# Patient Record
Sex: MALE | Race: WHITE | NOT HISPANIC OR LATINO | Employment: FULL TIME | URBAN - METROPOLITAN AREA
[De-identification: names, ages, dates, MRNs, and addresses within clinical notes are randomized per-mention and may not be internally consistent; named-entity substitution may affect disease eponyms.]

---

## 2017-01-04 ENCOUNTER — ALLSCRIPTS OFFICE VISIT (OUTPATIENT)
Dept: OTHER | Facility: OTHER | Age: 27
End: 2017-01-04

## 2017-01-04 DIAGNOSIS — R73.01 IMPAIRED FASTING GLUCOSE: ICD-10-CM

## 2017-01-04 DIAGNOSIS — E78.5 HYPERLIPIDEMIA: ICD-10-CM

## 2017-01-04 DIAGNOSIS — F31.10 BIPOLAR DISORDER, CURRENT EPISODE MANIC WITHOUT PSYCHOTIC FEATURES (HCC): ICD-10-CM

## 2017-08-28 ENCOUNTER — ALLSCRIPTS OFFICE VISIT (OUTPATIENT)
Dept: OTHER | Facility: OTHER | Age: 27
End: 2017-08-28

## 2017-08-28 DIAGNOSIS — R73.01 IMPAIRED FASTING GLUCOSE: ICD-10-CM

## 2017-08-28 DIAGNOSIS — E78.5 HYPERLIPIDEMIA: ICD-10-CM

## 2018-01-14 VITALS
HEART RATE: 73 BPM | BODY MASS INDEX: 30.81 KG/M2 | SYSTOLIC BLOOD PRESSURE: 110 MMHG | WEIGHT: 253.01 LBS | OXYGEN SATURATION: 98 % | DIASTOLIC BLOOD PRESSURE: 82 MMHG | HEIGHT: 76 IN | RESPIRATION RATE: 14 BRPM

## 2018-01-17 NOTE — PROGRESS NOTES
Assessment    1  Bipolar affective, manic (296 40) (F31 10)   2  ADD (attention deficit disorder) without hyperactivity (314 00) (F98 8)    Plan  ADD (attention deficit disorder) without hyperactivity    · Amphetamine-Dextroamphetamine 20 MG Oral Tablet (Adderall); TAKE 1  TABLET TWICE DAILY; Do Not Fill Before: [id number]  Bipolar affective, manic    · QUEtiapine Fumarate 300 MG Oral Tablet (SEROquel); TAKE one pill at bedtime   · (1) CBC/PLT/DIFF; Status:Active; Requested [id number];    · (1) COMPREHENSIVE METABOLIC PANEL; Status:Active; Requested [id number];    · (1) TSH; Status:Active; Requested EZT:[id number];    · Avoid alcoholic beverages ; Status:Complete;   Done: 84YQL8477   · Avoid foods and beverages that contain caffeine ; Status:Complete;   Done: 35FQF7466   · Be sure to get at least 8 hours of sleep every night ; Status:Complete;   Done: 22HBZ4893   · Drink plenty of fluids ; Status:Complete;   Done: 70GTS8197   · Regular aerobic exercise can help reduce stress ; Status:Complete;   Done: 52GIG2836   · Follow-up visit in 3 months Evaluation and Treatment  Follow-up  Status: Complete   Done: 40KKW6208  Bipolar affective, manic, Borderline hyperlipidemia    · (1) LIPID PANEL, FASTING; Status:Active; Requested [id number];   Bipolar affective, manic, IFG (impaired fasting glucose)    · (1) GLUCOSE,  FASTING; Status:Active; Requested AYJ:[id number];   Need for influenza vaccination    · Stop: Fluarix Quadrivalent 0 5 ML Intramuscular Suspension Prefilled  Syringe    Discussion/Summary    Flu shot offered and refused despite risks of flu  Will get fasting labs before next visit  daily walk  exercise  wt loss     Chief Complaint  Regular f/u no complains      History of Present Illness  here for a check up    The patient is being seen for follow-up of bipolar I disorder  The patient reports doing well and engaged, just bought a house  got a full itme job at [employer] with benefits   He has no comorbid illnesses  He has had no significant interval events  The patient is currently asymptomatic  Associated symptoms: no hallucinations and no suicidal ideation  Medications include quetiapine (Seroquel)  Medications:  the patient is adherent to his medication regimen, but he denies medication side effects  Diet: He consumes a diverse and healthy diet  Weight Issues: He has weight concerns  Exercise: He exercises regularly  Smoking: He does not use tobacco Drug Use: He denies drug use  Rare etoh  Disease management:  the patient is doing well with his goals Goals include relapse prevention and sobriety  Review of Systems    Constitutional: No fever or chills, feels well, no tiredness, no recent weight gain or weight loss  Cardiovascular: No complaints of slow heart rate, no fast heart rate, no chest pain, no palpitations, no leg claudication, no lower extremity  Respiratory: No complaints of shortness of breath, no wheezing, no cough, no SOB on exertion, no orthopnea or PND  Gastrointestinal: No complaints of abdominal pain, no constipation, no nausea or vomiting, no diarrhea or bloody stools  Musculoskeletal: No complaints of arthralgia, no myalgias, no joint swelling or stiffness, no limb pain or swelling  Psychiatric: Is not suicidal, no sleep disturbances, no anxiety or depression, no change in personality, no emotional problems  Over the past 2 weeks, how often have you been bothered by the following problems? 1 ) Little interest or pleasure in doing things? Not at all    2 ) Feeling down, depressed or hopeless? Not at all    3 ) Trouble falling asleep or sleeping too much? Not at all    4 ) Feeling tired or having little energy? Not at all    5 ) Poor appetite or overeating? Not at all    6 ) Feeling bad about yourself, or that you are a failure, or have let yourself or your family down?  Not at all    7 ) Trouble concentrating on things, such as reading a newspaper or watching television? Not at all    8 ) Moving or speaking so slowly that other people could have noticed, or the opposite, moving or speaking faster than usual? Not at all  How difficult have these problems made it for you to do your work, take care of things at home, or get along with people? Not at all  Score      ROS reviewed  Active Problems    1  ADD (attention deficit disorder) without hyperactivity (314 00) (F98 8)   2  Bipolar affective, manic (296 40) (F31 10)   3  Need for Tdap vaccination (V06 1) (Z23)    Past Medical History    1  History of Anxiety state (300 00) (F41 1)   2  History of Benign essential hypertension (401 1) (I10)   3  History of Bipolar disorder (296 80) (F31 9)   4  History of Cellulitis (682 9) (L03 90)   5  History of Depressive disorder (311) (F32 9)   6  History of acne (V13 3) (Z87 2)   7  History of acute bronchitis (V12 69) (Z87 09)   8  History of acute pharyngitis (V12 69) (Z87 09)   9  History of attention deficit hyperactivity disorder (ADHD) (V11 8) (Z86 59)   10  History of emotional problems (V40 9) (F48 9)   11  History of hyperlipidemia (V12 29) (Z86 39)   12  History of insomnia (V13 89) (Z87 898)   13  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)   14  History of Peptic reflux disease (530 81) (K21 9)   15  History of Persistent vomiting (536 2) (R11 10)   16  History of Sprain of shoulder and upper arm (840 9) (S43 409A)    The active problems and past medical history were reviewed and updated today  Surgical History    The surgical history was reviewed and updated today  Family History  Mother    1  Family history of mental disorder (V17 0) (Z81 8)   2  Family history of thyroid disease (V18 19) (Z83 49)   3  Family history of Illicit drug use  Father    4  Family history of diabetes mellitus (V18 0) (Z83 3)   5  Family history of malignant neoplasm (V16 9) (Z80 9)    The family history was reviewed and updated today         Social History    · Consumes alcohol at social events (V49 89) (Z78 9)   · Does not use illicit drugs (F01 62) (Z78 9)   · Full-time employment   · Never smoker  The social history was reviewed and updated today  The social history was reviewed and is unchanged  Current Meds   1  Amphetamine-Dextroamphetamine 20 MG Oral Tablet; TAKE 1 TABLET TWICE DAILY; Last Rx:99Htj7914 Ordered   2  Omeprazole 20 MG Oral Capsule Delayed Release; 1 by mouth every day for stomach   acid  Requested for: 24BLJ1079; Last Rx:22Hbb8824 Ordered   3  QUEtiapine Fumarate 300 MG Oral Tablet; TAKE 1 TABLET AT BEDTIME; Therapy: 34SWS8640 to (Evaluate:11Nov2017)  Requested for: 27ZUZ4863; Last   Rx:39Tti5547 Ordered    The medication list was reviewed and updated today  Allergies    1  No Known Drug Allergies    Vitals  Vital Signs    Recorded: 07JAP5484 01:37PM   Temperature 97 7 F   Heart Rate 515   Systolic 541   Diastolic 78   Height 6 ft    Weight 261 lb 3 2 oz   BMI Calculated 35 43   BSA Calculated 2 39   O2 Saturation 97     Physical Exam    Constitutional   General appearance: No acute distress, well appearing and well nourished  Eyes   Pupils and irises: Equal, round and reactive to light  Ears, Nose, Mouth, and Throat   Otoscopic examination: Tympanic membrance translucent with normal light reflex  Canals patent without erythema  Nasal mucosa, septum, and turbinates: Normal without edema or erythema  Pulmonary   Respiratory effort: No increased work of breathing or signs of respiratory distress  Auscultation of lungs: Clear to auscultation, equal breath sounds bilaterally, no wheezes, no rales, no rhonci  Cardiovascular   Auscultation of heart: Normal rate and rhythm, normal S1 and S2, without murmurs  Examination of extremities for edema and/or varicosities: Normal     Musculoskeletal   Gait and station: Normal     Skin   Skin and subcutaneous tissue: Normal without rashes or lesions      Psychiatric   Orientation to person, place and time: Normal     Mood and affect: Normal          Attending Note  Collaborating Note: I supervised the Advanced Practitioner and I agree with the Advanced Practitioner note        Future Appointments    Date/Time Provider Specialty Site   05/11/2017 02:00 PM Ermelinda Nation, [facility]     Signatures   Electronically signed by : Curtis Drummond, [address] [place] [address]; Jan 4 2017  2:03PM EST                       (Author)    Electronically signed by : Matthew Rod DO; Jan 4 2017  7:32PM EST                       (Co-author)

## 2018-01-22 VITALS
BODY MASS INDEX: 35.38 KG/M2 | OXYGEN SATURATION: 97 % | TEMPERATURE: 97.7 F | SYSTOLIC BLOOD PRESSURE: 126 MMHG | HEART RATE: 105 BPM | HEIGHT: 72 IN | DIASTOLIC BLOOD PRESSURE: 78 MMHG | WEIGHT: 261.2 LBS

## 2018-01-30 DIAGNOSIS — F90.9 ATTENTION DEFICIT HYPERACTIVITY DISORDER (ADHD), UNSPECIFIED ADHD TYPE: Primary | ICD-10-CM

## 2018-01-30 DIAGNOSIS — F31.12 BIPOLAR 1 DISORDER, MANIC, MODERATE (HCC): ICD-10-CM

## 2018-01-30 RX ORDER — DEXTROAMPHETAMINE SACCHARATE, AMPHETAMINE ASPARTATE, DEXTROAMPHETAMINE SULFATE AND AMPHETAMINE SULFATE 5; 5; 5; 5 MG/1; MG/1; MG/1; MG/1
1 TABLET ORAL 2 TIMES DAILY
COMMUNITY
End: 2018-02-05 | Stop reason: SDUPTHER

## 2018-01-30 RX ORDER — QUETIAPINE FUMARATE 300 MG/1
1 TABLET, FILM COATED ORAL
COMMUNITY
Start: 2016-11-16 | End: 2018-02-02 | Stop reason: SDUPTHER

## 2018-01-30 RX ORDER — QUETIAPINE 150 MG/1
TABLET, FILM COATED, EXTENDED RELEASE ORAL
COMMUNITY
Start: 2017-09-04 | End: 2018-02-05 | Stop reason: SDUPTHER

## 2018-01-31 ENCOUNTER — TELEPHONE (OUTPATIENT)
Dept: FAMILY MEDICINE CLINIC | Facility: CLINIC | Age: 28
End: 2018-01-31

## 2018-01-31 NOTE — TELEPHONE ENCOUNTER
Purvi's pt  called for Rx refill request   He will be out of medications tomorrow  Purvi is out of the office the rest of the week, can you please refill ?     Seroquel and Adderall

## 2018-02-02 DIAGNOSIS — F33.2 SEVERE EPISODE OF RECURRENT MAJOR DEPRESSIVE DISORDER, WITHOUT PSYCHOTIC FEATURES (HCC): Primary | ICD-10-CM

## 2018-02-02 RX ORDER — QUETIAPINE FUMARATE 300 MG/1
TABLET, FILM COATED ORAL
Qty: 90 TABLET | Refills: 1 | Status: SHIPPED | OUTPATIENT
Start: 2018-02-02 | End: 2018-07-23 | Stop reason: SDUPTHER

## 2018-02-05 ENCOUNTER — OFFICE VISIT (OUTPATIENT)
Dept: FAMILY MEDICINE CLINIC | Facility: CLINIC | Age: 28
End: 2018-02-05
Payer: COMMERCIAL

## 2018-02-05 VITALS
HEART RATE: 96 BPM | OXYGEN SATURATION: 97 % | WEIGHT: 261 LBS | RESPIRATION RATE: 12 BRPM | HEIGHT: 72 IN | BODY MASS INDEX: 35.35 KG/M2 | DIASTOLIC BLOOD PRESSURE: 82 MMHG | SYSTOLIC BLOOD PRESSURE: 124 MMHG

## 2018-02-05 DIAGNOSIS — F31.62 BIPOLAR DISORDER, CURRENT EPISODE MIXED, MODERATE (HCC): Primary | ICD-10-CM

## 2018-02-05 PROCEDURE — 99214 OFFICE O/P EST MOD 30 MIN: CPT | Performed by: FAMILY MEDICINE

## 2018-02-05 RX ORDER — DEXTROAMPHETAMINE SACCHARATE, AMPHETAMINE ASPARTATE, DEXTROAMPHETAMINE SULFATE AND AMPHETAMINE SULFATE 5; 5; 5; 5 MG/1; MG/1; MG/1; MG/1
1 TABLET ORAL 2 TIMES DAILY
Qty: 60 TABLET | Refills: 0 | Status: SHIPPED | OUTPATIENT
Start: 2018-02-05 | End: 2018-03-01 | Stop reason: SDUPTHER

## 2018-02-05 RX ORDER — QUETIAPINE 150 MG/1
150 TABLET, FILM COATED, EXTENDED RELEASE ORAL
Qty: 30 TABLET | Refills: 0 | OUTPATIENT
Start: 2018-02-05

## 2018-02-05 RX ORDER — QUETIAPINE FUMARATE 300 MG/1
300 TABLET, FILM COATED ORAL
Qty: 30 TABLET | Refills: 0 | OUTPATIENT
Start: 2018-02-05

## 2018-02-05 RX ORDER — FLUOXETINE HYDROCHLORIDE 20 MG/1
CAPSULE ORAL
Qty: 30 EACH | Refills: 0 | Status: SHIPPED | OUTPATIENT
Start: 2018-02-05 | End: 2018-03-05 | Stop reason: SDUPTHER

## 2018-02-05 RX ORDER — DEXTROAMPHETAMINE SACCHARATE, AMPHETAMINE ASPARTATE, DEXTROAMPHETAMINE SULFATE AND AMPHETAMINE SULFATE 5; 5; 5; 5 MG/1; MG/1; MG/1; MG/1
1 TABLET ORAL 2 TIMES DAILY
Qty: 30 TABLET | Refills: 3 | OUTPATIENT
Start: 2018-02-05

## 2018-02-05 NOTE — PROGRESS NOTES
Assessment/Plan:    Bipolar disorder, current episode mixed, moderate (HCC)  Continue the Seroquel  mg in the evening  Daily exercise  Talk therapy as needed  Stay busy  Compliance reinforced regarding medications             Subjective:      Patient ID: Sharri Baer is a 33 y o  male  Here for checkup and med refill  Treated for bipolar disorder with Seroquel 300 mg at bedtime  Treated for ADD with Adderall XR  Once a day in the morning  Gloria Dominguez is currently working full-time doing well  Does have a history of substance abuse and is currently doing very well  He is a long-time girlfriend and they are planning on getting           Review of Systems   Constitutional: Negative for activity change, fatigue, fever and unexpected weight change  HENT: Negative for congestion, ear pain, hearing loss, sinus pain, sore throat, tinnitus, trouble swallowing and voice change  Eyes: Negative for pain, discharge and visual disturbance  Respiratory: Negative for cough, chest tightness, shortness of breath and wheezing  Cardiovascular: Negative for chest pain, palpitations and leg swelling  Gastrointestinal: Negative for abdominal pain, blood in stool, diarrhea and vomiting  Endocrine: Negative for cold intolerance, heat intolerance and polyuria  Genitourinary: Negative for difficulty urinating, dysuria, flank pain, genital sores and urgency  Musculoskeletal: Negative for gait problem, joint swelling and neck stiffness  Skin: Negative for color change, rash and wound  Allergic/Immunologic: Negative for immunocompromised state  Neurological: Negative for syncope, speech difficulty and light-headedness  Psychiatric/Behavioral: Negative for behavioral problems, dysphoric mood and suicidal ideas  Bipolar disorder         Objective:     Physical Exam   Constitutional: He is oriented to person, place, and time  He appears well-developed and well-nourished     HENT:   Head: Normocephalic and atraumatic  Eyes: Pupils are equal, round, and reactive to light  Neck: Normal range of motion  Neck supple  Cardiovascular: Normal rate and normal heart sounds  No murmur heard  Pulmonary/Chest: Effort normal and breath sounds normal  No respiratory distress  He exhibits no tenderness  Abdominal: Soft  Bowel sounds are normal    Musculoskeletal: Normal range of motion  He exhibits no tenderness  Lymphadenopathy:     He has no cervical adenopathy  Neurological: He is alert and oriented to person, place, and time  Coordination normal    Skin: Skin is warm and dry  Psychiatric: He has a normal mood and affect  Thought content normal    Nursing note and vitals reviewed        Social History     Social History    Marital status: Single     Spouse name: N/A    Number of children: N/A    Years of education: N/A     Occupational History    fulltime      Social History Main Topics    Smoking status: Never Smoker    Smokeless tobacco: Not on file    Alcohol use Yes      Comment: at social events    Drug use: No    Sexual activity: Not on file     Other Topics Concern    Not on file     Social History Narrative    No narrative on file     Past Medical History:   Diagnosis Date    ADHD     Anxiety state     [facility] [address]     Depressive disorder     Essential hypertension     benign    History of emotional problems     Hyperlipidemia     Peptic reflux disease        Current Outpatient Prescriptions:     amphetamine-dextroamphetamine (ADDERALL) 20 mg tablet, Take 1 tablet (20 mg total) by mouth 2 (two) times a day Max Daily Amount: 40 mg, Disp: 60 tablet, Rfl: 0    QUEtiapine (SEROquel XR) 150 mg 24 hr tablet, Take by mouth, Disp: , Rfl:     QUEtiapine (SEROquel) 300 mg tablet, TAKE 1 TABLET AT BEDTIME, Disp: 90 tablet, Rfl: 1  Family History   Problem Relation Age of Onset    Mental illness Mother     Thyroid disease Mother     Drug abuse Mother      illicit drug    Diabetes Father    Mirna Velazquez

## 2018-02-05 NOTE — PATIENT INSTRUCTIONS
Continue the Seroquel  mg in the evening  Daily exercise  Talk therapy as needed  Stay busy  Compliance reinforced regarding medications

## 2018-03-01 ENCOUNTER — OFFICE VISIT (OUTPATIENT)
Dept: FAMILY MEDICINE CLINIC | Facility: CLINIC | Age: 28
End: 2018-03-01
Payer: COMMERCIAL

## 2018-03-01 VITALS
HEIGHT: 72 IN | OXYGEN SATURATION: 98 % | HEART RATE: 88 BPM | SYSTOLIC BLOOD PRESSURE: 122 MMHG | DIASTOLIC BLOOD PRESSURE: 80 MMHG | BODY MASS INDEX: 34.27 KG/M2 | WEIGHT: 253 LBS

## 2018-03-01 DIAGNOSIS — F90.9 ATTENTION DEFICIT HYPERACTIVITY DISORDER (ADHD), UNSPECIFIED ADHD TYPE: ICD-10-CM

## 2018-03-01 PROCEDURE — 99213 OFFICE O/P EST LOW 20 MIN: CPT | Performed by: FAMILY MEDICINE

## 2018-03-01 RX ORDER — DEXTROAMPHETAMINE SACCHARATE, AMPHETAMINE ASPARTATE, DEXTROAMPHETAMINE SULFATE AND AMPHETAMINE SULFATE 5; 5; 5; 5 MG/1; MG/1; MG/1; MG/1
1 TABLET ORAL 2 TIMES DAILY
Qty: 60 TABLET | Refills: 0 | Status: SHIPPED | OUTPATIENT
Start: 2018-03-01 | End: 2018-06-06 | Stop reason: SDUPTHER

## 2018-03-01 NOTE — PATIENT INSTRUCTIONS
Continue the Adderall 20 mg twice a day   maintain the Seroquel at bedtime    Never stop this medicine   you must maintain the Seroquel every day   exercise daily  For health as well as your mental health   three-month recheck

## 2018-03-01 NOTE — PROGRESS NOTES
Standard Visit   Assessment/Plan:     Visit Diagnosis:  There are no diagnoses linked to this encounter  Subjective:    Patient ID: Cindy Lomas is a 33 y o  male  Patient is here with the following concerns:     Here for follow-up med check use doing very well   Seroquel and fluoxetine are holding him nicely   no bipolar episodes   mood is stable   his weight is down almost 10 lb   working full-time with benefits at the [employer]   engaged to [facility]    they bought a new home   life is good        The following portions of the patient's history were reviewed and updated as appropriate: allergies, current medications, past family history, past medical history, past social history, past surgical history and problem list     Review of Systems   Constitutional: Negative for activity change, fatigue, fever and unexpected weight change  HENT: Negative for congestion, ear pain, hearing loss, sinus pain, sore throat, tinnitus, trouble swallowing and voice change  Eyes: Negative for pain, discharge and visual disturbance  Respiratory: Negative for cough, chest tightness, shortness of breath and wheezing  Cardiovascular: Negative for chest pain, palpitations and leg swelling  Gastrointestinal: Negative for abdominal pain, blood in stool, diarrhea and vomiting  Endocrine: Negative for cold intolerance, heat intolerance and polyuria  Genitourinary: Negative for difficulty urinating, dysuria, flank pain, genital sores and urgency  Musculoskeletal: Negative for gait problem, joint swelling and neck stiffness  Skin: Negative for color change, rash and wound  Allergic/Immunologic: Negative for immunocompromised state  Neurological: Negative for syncope, speech difficulty and light-headedness  Psychiatric/Behavioral: Negative for behavioral problems, dysphoric mood and suicidal ideas           /80   Pulse 88   Ht 6' (1 829 m)   Wt 115 kg (253 lb)   SpO2 98%   BMI 34 31 kg/m² Social History     Social History    Marital status: Single     Spouse name: N/A    Number of children: N/A    Years of education: N/A     Occupational History    fulltime      Social History Main Topics    Smoking status: Former Smoker     Quit date: 3/30/2015    Smokeless tobacco: Never Used    Alcohol use Yes      Comment: at social events    Drug use: No    Sexual activity: Not on file     Other Topics Concern    Not on file     Social History Narrative    No narrative on file     Past Medical History:   Diagnosis Date    ADHD     Anxiety state     Bipolar disorder ([facility] [address]     Depressive disorder     Essential hypertension     benign    History of emotional problems     Hyperlipidemia     Peptic reflux disease      Family History   Problem Relation Age of Onset    Mental illness Mother     Thyroid disease Mother     Drug abuse Mother      illicit drug    Diabetes Father     Cancer Father      No past surgical history on file  Current Outpatient Prescriptions:     amphetamine-dextroamphetamine (ADDERALL) 20 mg tablet, Take 1 tablet (20 mg total) by mouth 2 (two) times a day Max Daily Amount: 40 mg, Disp: 60 tablet, Rfl: 0    Fluoxetine HCl, PMDD, 20 MG CAPS, Take 1 pill daily every morning, Disp: 30 each, Rfl: 0    QUEtiapine (SEROquel) 300 mg tablet, TAKE 1 TABLET AT BEDTIME, Disp: 90 tablet, Rfl: 1      Objective:     Physical Exam   Constitutional: He is oriented to person, place, and time  He appears well-developed and well-nourished  HENT:   Head: Normocephalic and atraumatic  Eyes: Pupils are equal, round, and reactive to light  Neck: Normal range of motion  Neck supple  Cardiovascular: Normal rate and normal heart sounds  No murmur heard  Pulmonary/Chest: Effort normal and breath sounds normal  No respiratory distress  He exhibits no tenderness  Abdominal: Soft  Bowel sounds are normal    Musculoskeletal: Normal range of motion  He exhibits no tenderness  Lymphadenopathy:     He has no cervical adenopathy  Neurological: He is alert and oriented to person, place, and time  Coordination normal    Skin: Skin is warm and dry  Psychiatric: He has a normal mood and affect  Thought content normal    Nursing note and vitals reviewed  Social History     Social History    Marital status: Single     Spouse name: N/A    Number of children: N/A    Years of education: N/A     Occupational History    fulltime      Social History Main Topics    Smoking status: Former Smoker     Quit date: 3/30/2015    Smokeless tobacco: Never Used    Alcohol use Yes      Comment: at social events    Drug use: No    Sexual activity: Not on file     Other Topics Concern    Not on file     Social History Narrative    No narrative on file     Past Medical History:   Diagnosis Date    ADHD     Anxiety state     Bipolar disorder ([facility] [address]     Depressive disorder     Essential hypertension     benign    History of emotional problems     Hyperlipidemia     Peptic reflux disease        Current Outpatient Prescriptions:     amphetamine-dextroamphetamine (ADDERALL) 20 mg tablet, Take 1 tablet (20 mg total) by mouth 2 (two) times a day Max Daily Amount: 40 mg, Disp: 60 tablet, Rfl: 0    Fluoxetine HCl, PMDD, 20 MG CAPS, Take 1 pill daily every morning, Disp: 30 each, Rfl: 0    QUEtiapine (SEROquel) 300 mg tablet, TAKE 1 TABLET AT BEDTIME, Disp: 90 tablet, Rfl: 1  Family History   Problem Relation Age of Onset    Mental illness Mother     Thyroid disease Mother     Drug abuse Mother      illicit drug    Diabetes Father     Cancer Father        There are no Patient Instructions on file for this visit  Continue the Adderall 20 mg twice a day   maintain the Seroquel at bedtime    Never stop this medicine   you must maintain the Seroquel every day   exercise daily  For health as well as your mental health   three-month recheck    PETER Mendes

## 2018-03-04 DIAGNOSIS — F31.62 BIPOLAR DISORDER, CURRENT EPISODE MIXED, MODERATE (HCC): ICD-10-CM

## 2018-03-05 RX ORDER — FLUOXETINE HYDROCHLORIDE 20 MG/1
CAPSULE ORAL
Qty: 30 CAPSULE | Refills: 0 | Status: SHIPPED | OUTPATIENT
Start: 2018-03-05 | End: 2018-04-12 | Stop reason: SDUPTHER

## 2018-04-12 DIAGNOSIS — F31.62 BIPOLAR DISORDER, CURRENT EPISODE MIXED, MODERATE (HCC): ICD-10-CM

## 2018-04-18 RX ORDER — FLUOXETINE HYDROCHLORIDE 20 MG/1
CAPSULE ORAL
Qty: 30 CAPSULE | Refills: 0 | Status: SHIPPED | OUTPATIENT
Start: 2018-04-18 | End: 2018-06-06 | Stop reason: ALTCHOICE

## 2018-06-02 ENCOUNTER — HOSPITAL ENCOUNTER (EMERGENCY)
Facility: HOSPITAL | Age: 28
Discharge: HOME | End: 2018-06-02
Attending: EMERGENCY MEDICINE | Admitting: EMERGENCY MEDICINE
Payer: COMMERCIAL

## 2018-06-02 VITALS
RESPIRATION RATE: 16 BRPM | HEIGHT: 72 IN | BODY MASS INDEX: 33.26 KG/M2 | OXYGEN SATURATION: 95 % | WEIGHT: 245.6 LBS | HEART RATE: 74 BPM | DIASTOLIC BLOOD PRESSURE: 92 MMHG | SYSTOLIC BLOOD PRESSURE: 137 MMHG | TEMPERATURE: 97.6 F

## 2018-06-02 DIAGNOSIS — R11.10 VOMITING: Primary | ICD-10-CM

## 2018-06-02 LAB
ALBUMIN SERPL BCP-MCNC: 4.4 G/DL (ref 3.5–5)
ALP SERPL-CCNC: 82 U/L (ref 46–116)
ALT SERPL W P-5'-P-CCNC: 25 U/L (ref 12–78)
ANION GAP SERPL CALCULATED.3IONS-SCNC: 13 MMOL/L (ref 4–13)
AST SERPL W P-5'-P-CCNC: 20 U/L (ref 5–45)
BACTERIA UR QL AUTO: ABNORMAL /HPF
BASOPHILS # BLD AUTO: 0.04 THOUSANDS/ΜL (ref 0–0.1)
BASOPHILS NFR BLD AUTO: 0 % (ref 0–1)
BILIRUB SERPL-MCNC: 1.5 MG/DL (ref 0.2–1)
BILIRUB UR QL STRIP: ABNORMAL
BUN SERPL-MCNC: 16 MG/DL (ref 5–25)
CALCIUM SERPL-MCNC: 9.2 MG/DL (ref 8.3–10.1)
CHLORIDE SERPL-SCNC: 94 MMOL/L (ref 100–108)
CLARITY UR: CLEAR
CO2 SERPL-SCNC: 28 MMOL/L (ref 21–32)
COLOR UR: YELLOW
CREAT SERPL-MCNC: 1.1 MG/DL (ref 0.6–1.3)
EOSINOPHIL # BLD AUTO: 0.03 THOUSAND/ΜL (ref 0–0.61)
EOSINOPHIL NFR BLD AUTO: 0 % (ref 0–6)
ERYTHROCYTE [DISTWIDTH] IN BLOOD BY AUTOMATED COUNT: 12.6 % (ref 11.6–15.1)
GFR SERPL CREATININE-BSD FRML MDRD: 92 ML/MIN/1.73SQ M
GLUCOSE SERPL-MCNC: 141 MG/DL (ref 65–140)
GLUCOSE UR STRIP-MCNC: NEGATIVE MG/DL
HCT VFR BLD AUTO: 49.1 % (ref 36.5–49.3)
HGB BLD-MCNC: 17.3 G/DL (ref 12–17)
HGB UR QL STRIP.AUTO: NEGATIVE
IMM GRANULOCYTES # BLD AUTO: 0.04 THOUSAND/UL (ref 0–0.2)
IMM GRANULOCYTES NFR BLD AUTO: 0 % (ref 0–2)
KETONES UR STRIP-MCNC: ABNORMAL MG/DL
LACTATE SERPL-SCNC: 1.6 MMOL/L (ref 0.5–2)
LEUKOCYTE ESTERASE UR QL STRIP: NEGATIVE
LIPASE SERPL-CCNC: 192 U/L (ref 73–393)
LYMPHOCYTES # BLD AUTO: 2.49 THOUSANDS/ΜL (ref 0.6–4.47)
LYMPHOCYTES NFR BLD AUTO: 18 % (ref 14–44)
MAGNESIUM SERPL-MCNC: 1.7 MG/DL (ref 1.6–2.6)
MCH RBC QN AUTO: 28.1 PG (ref 26.8–34.3)
MCHC RBC AUTO-ENTMCNC: 35.2 G/DL (ref 31.4–37.4)
MCV RBC AUTO: 80 FL (ref 82–98)
MONOCYTES # BLD AUTO: 1.39 THOUSAND/ΜL (ref 0.17–1.22)
MONOCYTES NFR BLD AUTO: 10 % (ref 4–12)
MUCOUS THREADS UR QL AUTO: ABNORMAL
NEUTROPHILS # BLD AUTO: 9.76 THOUSANDS/ΜL (ref 1.85–7.62)
NEUTS SEG NFR BLD AUTO: 72 % (ref 43–75)
NITRITE UR QL STRIP: NEGATIVE
NON-SQ EPI CELLS URNS QL MICRO: ABNORMAL /HPF
NRBC BLD AUTO-RTO: 0 /100 WBCS
PH UR STRIP.AUTO: 6.5 [PH] (ref 4.5–8)
PLATELET # BLD AUTO: 365 THOUSANDS/UL (ref 149–390)
PMV BLD AUTO: 10.1 FL (ref 8.9–12.7)
POTASSIUM SERPL-SCNC: 2.9 MMOL/L (ref 3.5–5.3)
PROT SERPL-MCNC: 8.5 G/DL (ref 6.4–8.2)
PROT UR STRIP-MCNC: ABNORMAL MG/DL
RBC # BLD AUTO: 6.15 MILLION/UL (ref 3.88–5.62)
RBC #/AREA URNS AUTO: ABNORMAL /HPF
SODIUM SERPL-SCNC: 135 MMOL/L (ref 136–145)
SP GR UR STRIP.AUTO: 1.02 (ref 1–1.03)
UROBILINOGEN UR QL STRIP.AUTO: 0.2 E.U./DL
WBC # BLD AUTO: 13.75 THOUSAND/UL (ref 4.31–10.16)
WBC #/AREA URNS AUTO: ABNORMAL /HPF

## 2018-06-02 PROCEDURE — 85025 COMPLETE CBC W/AUTO DIFF WBC: CPT | Performed by: EMERGENCY MEDICINE

## 2018-06-02 PROCEDURE — 96361 HYDRATE IV INFUSION ADD-ON: CPT

## 2018-06-02 PROCEDURE — 96375 TX/PRO/DX INJ NEW DRUG ADDON: CPT

## 2018-06-02 PROCEDURE — 96376 TX/PRO/DX INJ SAME DRUG ADON: CPT

## 2018-06-02 PROCEDURE — 36415 COLL VENOUS BLD VENIPUNCTURE: CPT | Performed by: EMERGENCY MEDICINE

## 2018-06-02 PROCEDURE — 83690 ASSAY OF LIPASE: CPT | Performed by: EMERGENCY MEDICINE

## 2018-06-02 PROCEDURE — 83735 ASSAY OF MAGNESIUM: CPT | Performed by: EMERGENCY MEDICINE

## 2018-06-02 PROCEDURE — 81001 URINALYSIS AUTO W/SCOPE: CPT | Performed by: EMERGENCY MEDICINE

## 2018-06-02 PROCEDURE — 99283 EMERGENCY DEPT VISIT LOW MDM: CPT

## 2018-06-02 PROCEDURE — 80053 COMPREHEN METABOLIC PANEL: CPT | Performed by: EMERGENCY MEDICINE

## 2018-06-02 PROCEDURE — 96366 THER/PROPH/DIAG IV INF ADDON: CPT

## 2018-06-02 PROCEDURE — 83605 ASSAY OF LACTIC ACID: CPT | Performed by: EMERGENCY MEDICINE

## 2018-06-02 PROCEDURE — 86618 LYME DISEASE ANTIBODY: CPT | Performed by: EMERGENCY MEDICINE

## 2018-06-02 PROCEDURE — 96365 THER/PROPH/DIAG IV INF INIT: CPT

## 2018-06-02 RX ORDER — ONDANSETRON 4 MG/1
4 TABLET, ORALLY DISINTEGRATING ORAL EVERY 6 HOURS PRN
Qty: 20 TABLET | Refills: 0 | Status: SHIPPED | OUTPATIENT
Start: 2018-06-02 | End: 2018-06-06 | Stop reason: ALTCHOICE

## 2018-06-02 RX ORDER — ONDANSETRON 2 MG/ML
4 INJECTION INTRAMUSCULAR; INTRAVENOUS ONCE
Status: COMPLETED | OUTPATIENT
Start: 2018-06-02 | End: 2018-06-02

## 2018-06-02 RX ORDER — POTASSIUM CHLORIDE 14.9 MG/ML
20 INJECTION INTRAVENOUS ONCE
Status: COMPLETED | OUTPATIENT
Start: 2018-06-02 | End: 2018-06-02

## 2018-06-02 RX ORDER — LORAZEPAM 2 MG/ML
0.5 INJECTION INTRAMUSCULAR ONCE
Status: COMPLETED | OUTPATIENT
Start: 2018-06-02 | End: 2018-06-02

## 2018-06-02 RX ORDER — POTASSIUM CHLORIDE 20 MEQ/1
40 TABLET, EXTENDED RELEASE ORAL ONCE
Status: COMPLETED | OUTPATIENT
Start: 2018-06-02 | End: 2018-06-02

## 2018-06-02 RX ADMIN — POTASSIUM CHLORIDE 20 MEQ: 200 INJECTION, SOLUTION INTRAVENOUS at 07:25

## 2018-06-02 RX ADMIN — POTASSIUM CHLORIDE 40 MEQ: 1500 TABLET, EXTENDED RELEASE ORAL at 07:15

## 2018-06-02 RX ADMIN — ONDANSETRON 4 MG: 2 INJECTION INTRAMUSCULAR; INTRAVENOUS at 06:04

## 2018-06-02 RX ADMIN — LORAZEPAM 0.5 MG: 2 INJECTION INTRAMUSCULAR; INTRAVENOUS at 06:05

## 2018-06-02 RX ADMIN — ONDANSETRON 4 MG: 2 INJECTION INTRAMUSCULAR; INTRAVENOUS at 09:56

## 2018-06-02 RX ADMIN — SODIUM CHLORIDE 1000 ML: 0.9 INJECTION, SOLUTION INTRAVENOUS at 06:06

## 2018-06-02 NOTE — ED PROVIDER NOTES
History  Chief Complaint   Patient presents with    Vomiting     pt states that he has been throwing up for the past three days, can not keep anything down, also states he has been more tired for the past few weeks reports a recent tick bite     Patient is a 56-year-old male with past medical history significant for anxiety, depression, bipolar disorder, hypertension, hyperlipidemia, peptic ulcer disease, GERD, presents to the emergency department complaining of 4 days of vomiting and inability to keep any food or fluids down  Patient states he started vomiting 4 days ago and it is not getting any better  Vomitus is nonbloody and nonbilious and is happening multiple times a day, mostly after he attempts to drink any fluids  He feels weaker and more tired since the symptoms started  He does report 3 weeks ago having a tick bite on his upper chest wall and since then has been slightly more fatigued than normal  On review of systems he admits to constipation and states his last bowel movement was several days ago  He denies any fever, chills, headache, dizziness or near syncope, URI symptoms or cough, visual disturbance or eye pain, chest pain, palpitations, dyspnea, abdominal pain, diarrhea, blood per rectum or melena, dysuria, change in urinary frequency, hematuria, flank pain, skin rash or color change, lateralizing extremity weakness or paresthesia or other focal neurologic deficits  He denies prior abdominal surgical history  Denies any sick contacts, recent travel outside the country or unusual food intake  He does admit to having similar vomiting for several days in the past that was related to his anxiety and reports that he was told he had cyclical vomiting syndrome  He states his anxiety has been worse since the vomiting started because he has been unable to keep his anxiety medication down  He denies alcohol or drug use          History provided by:  Patient and significant other  Language  used: No    Vomiting   Associated symptoms: no abdominal pain, no chills, no cough, no diarrhea, no fever, no headaches and no sore throat        Prior to Admission Medications   Prescriptions Last Dose Informant Patient Reported? Taking? FLUoxetine (PROzac) 20 mg capsule   No No   Sig: TAKE 1 PILL DAILY EVERY MORNING   QUEtiapine (SEROquel) 300 mg tablet   No Yes   Sig: TAKE 1 TABLET AT BEDTIME   amphetamine-dextroamphetamine (ADDERALL) 20 mg tablet   No Yes   Sig: Take 1 tablet (20 mg total) by mouth 2 (two) times a day Max Daily Amount: 40 mg   omeprazole (PriLOSEC) 20 mg delayed release capsule   Yes Yes   Sig: TAKE 1 BY MOUTH EVERY DAY FOR STOMACH ACID      Facility-Administered Medications: None       Past Medical History:   Diagnosis Date    ADHD     Anxiety state     Bipolar disorder (HCC)     Depressive disorder     Essential hypertension     benign    History of emotional problems     Hyperlipidemia     Peptic reflux disease        History reviewed  No pertinent surgical history  Family History   Problem Relation Age of Onset    Mental illness Mother     Thyroid disease Mother     Drug abuse Mother      illicit drug    Diabetes Father     Cancer Father      I have reviewed and agree with the history as documented  Social History   Substance Use Topics    Smoking status: Former Smoker     Quit date: 3/30/2015    Smokeless tobacco: Never Used    Alcohol use Yes      Comment: at social events        Review of Systems   Constitutional: Positive for fatigue  Negative for chills and fever  HENT: Negative for congestion, ear pain, rhinorrhea and sore throat  Eyes: Negative for photophobia, pain and visual disturbance  Respiratory: Negative for cough, chest tightness, shortness of breath and wheezing  Cardiovascular: Negative for chest pain and palpitations  Gastrointestinal: Positive for constipation, nausea and vomiting   Negative for abdominal distention, abdominal pain, blood in stool and diarrhea  Genitourinary: Negative for dysuria, flank pain, frequency and hematuria  Musculoskeletal: Negative for back pain, neck pain and neck stiffness  Skin: Negative for color change, pallor and rash  Allergic/Immunologic: Negative for immunocompromised state  Neurological: Negative for dizziness, syncope, weakness, light-headedness, numbness and headaches  Hematological: Negative for adenopathy  Psychiatric/Behavioral: Negative for confusion and decreased concentration  All other systems reviewed and are negative  Physical Exam  Physical Exam   Constitutional: He is oriented to person, place, and time  He appears well-developed and well-nourished  No distress  HENT:   Head: Normocephalic and atraumatic  Mouth/Throat: Oropharynx is clear and moist  No oropharyngeal exudate  Eyes: Conjunctivae and EOM are normal  Pupils are equal, round, and reactive to light  Neck: Normal range of motion  Neck supple  No JVD present  Cardiovascular: Normal rate, regular rhythm, normal heart sounds and intact distal pulses  Exam reveals no gallop and no friction rub  No murmur heard  Pulmonary/Chest: Effort normal and breath sounds normal  No respiratory distress  He has no wheezes  He has no rales  Abdominal: Soft  Bowel sounds are normal  He exhibits no distension  There is no tenderness  There is no rebound and no guarding  No CVA tenderness  Musculoskeletal: Normal range of motion  He exhibits no edema or tenderness  Lymphadenopathy:     He has no cervical adenopathy  Neurological: He is alert and oriented to person, place, and time  No cranial nerve deficit  No gross motor or sensory deficits  Skin: Skin is warm and dry  No rash noted  He is not diaphoretic  No pallor  Psychiatric: He has a normal mood and affect  His behavior is normal    Nursing note and vitals reviewed        Vital Signs  ED Triage Vitals [06/02/18 0507]   Temperature Pulse Respirations Blood Pressure SpO2   97 6 °F (36 4 °C) 89 18 135/93 98 %      Temp Source Heart Rate Source Patient Position - Orthostatic VS BP Location FiO2 (%)   Oral Monitor Lying Right arm --      Pain Score       --         Vitals:    06/02/18 0507 06/02/18 0715 06/02/18 0925   BP: 135/93 137/92    BP Location: Right arm Right arm    Pulse: 89 80 74   Resp: 18 16    Temp: 97 6 °F (36 4 °C)     TempSrc: Oral     SpO2: 98% 97% 95%   Weight: 111 kg (245 lb 9 6 oz)     Height: 6' (1 829 m)       Visual Acuity      ED Medications  Medications   sodium chloride 0 9 % bolus 1,000 mL (0 mL Intravenous Stopped 6/2/18 0945)   ondansetron (ZOFRAN) injection 4 mg (4 mg Intravenous Given 6/2/18 0604)   LORazepam (ATIVAN) 2 mg/mL injection 0 5 mg (0 5 mg Intravenous Given 6/2/18 0605)   potassium chloride (K-DUR,KLOR-CON) CR tablet 40 mEq (40 mEq Oral Given 6/2/18 0715)   potassium chloride 20 mEq IVPB (premix) (0 mEq Intravenous Stopped 6/2/18 0945)   ondansetron (ZOFRAN) injection 4 mg (4 mg Intravenous Given 6/2/18 0956)       Diagnostic Studies  Results Reviewed     Procedure Component Value Units Date/Time    Lyme Antibody Profile with reflex to [facility] [[id number]] Collected:  06/02/18 0948    Lab Status:   In process Specimen:  Blood from Arm, Right Updated:  06/02/18 0955    Urine Microscopic [[id number]]  (Abnormal) Collected:  06/02/18 0704    Lab Status:  Final result Specimen:  Urine from Urine, Clean Catch Updated:  06/02/18 0741     RBC, UA None Seen /hpf      WBC, UA None Seen /hpf      Epithelial Cells None Seen /hpf      Bacteria, UA None Seen /hpf      MUCOUS THREADS Occasional (A)    UA w Reflex to Microscopic [[id number]]  (Abnormal) Collected:  06/02/18 0704    Lab Status:  Final result Specimen:  Urine from Urine, Clean Catch Updated:  06/02/18 0726     Color, UA Yellow     Clarity, UA Clear     Specific [place], UA 1 020     pH, UA 6 5     Leukocytes, UA Negative     Nitrite, UA Negative     Protein, UA Trace (A) mg/dl      Glucose, UA Negative mg/dl      Ketones, UA 40 (2+) (A) mg/dl      Urobilinogen, UA 0 2 E U /dl      Bilirubin, UA Small (A)     Blood, UA Negative    Lactic acid, plasma [[id number]]  (Normal) Collected:  06/02/18 0603    Lab Status:  Final result Specimen:  Blood from Arm, Right Updated:  06/02/18 0640     LACTIC ACID 1 6 mmol/L     Narrative:         Result may be elevated if tourniquet was used during collection  Comprehensive metabolic panel [[id number]]  (Abnormal) Collected:  06/02/18 0603    Lab Status:  Final result Specimen:  Blood from Arm, Right Updated:  06/02/18 6612     Sodium 135 (L) mmol/L      Potassium 2 9 (L) mmol/L      Chloride 94 (L) mmol/L      CO2 28 mmol/L      Anion Gap 13 mmol/L      BUN 16 mg/dL      Creatinine 1 10 mg/dL      Glucose 141 (H) mg/dL      Calcium 9 2 mg/dL      AST 20 U/L      ALT 25 U/L      Alkaline Phosphatase 82 U/L      Total Protein 8 5 (H) g/dL      Albumin 4 4 g/dL      Total Bilirubin 1 50 (H) mg/dL      eGFR 92 ml/min/1 73sq m     Narrative:         National Kidney Disease Education Program recommendations are as follows:  GFR calculation is accurate only with a steady state creatinine  Chronic Kidney disease less than 60 ml/min/1 73 sq  meters  Kidney failure less than 15 ml/min/1 73 sq  meters      Lipase [[id number]]  (Normal) Collected:  06/02/18 0603    Lab Status:  Final result Specimen:  Blood from Arm, Right Updated:  06/02/18 0633     Lipase 192 u/L     Magnesium [[id number]]  (Normal) Collected:  06/02/18 0603    Lab Status:  Final result Specimen:  Blood from Arm, Right Updated:  06/02/18 2107     Magnesium 1 7 mg/dL     CBC and differential [[id number]]  (Abnormal) Collected:  06/02/18 0603    Lab Status:  Final result Specimen:  Blood from Arm, Right Updated:  06/02/18 0615     WBC 13 75 (H) Thousand/uL      RBC 6 15 (H) Million/uL      Hemoglobin 17 3 (H) g/dL      Hematocrit 49 1 %      MCV 80 (L) fL      MCH 28 1 pg      MCHC 35 2 g/dL      RDW 12 6 %      MPV 10 1 fL      Platelets 685 Thousands/uL      nRBC 0 /100 WBCs      Neutrophils Relative 72 %      Immat GRANS % 0 %      Lymphocytes Relative 18 %      Monocytes Relative 10 %      Eosinophils Relative 0 %      Basophils Relative 0 %      Neutrophils Absolute 9 76 (H) Thousands/µL      Immature Grans Absolute 0 04 Thousand/uL      Lymphocytes Absolute 2 49 Thousands/µL      Monocytes Absolute 1 39 (H) Thousand/µL      Eosinophils Absolute 0 03 Thousand/µL      Basophils Absolute 0 04 Thousands/µL                  No orders to display              Procedures  Procedures       Phone Contacts  ED Phone Contact    ED Course  ED Course as of Jun 04 1058   Sat Jun 02, 2018   0636 Hypokalemia likely secondary to acute vomiting  Will replace with both oral and IV potassium  Potassium: (!) 2 9   0701 LACTIC ACID: 1 6                               MDM  Number of Diagnoses or Management Options  Diagnosis management comments: 50-year-old male with extensive psychiatric history presents with vomiting for 4 days  Differential includes acute infectious gastritis or gastroenteritis, cyclical vomiting syndrome, pancreatitis, biliary colic or less likely cholecystitis  Will check abdominal labs including lactic acid  Will provide IV fluids, Zofran and Ativan for anxiety   Low clinical suspicion for acute surgical pathology such as appendicitis, bowel obstruction especially given absence of prior surgeries, absence of abdominal pain or tenderness on exam        Amount and/or Complexity of Data Reviewed  Clinical lab tests: ordered and reviewed      CritCare Time    Disposition  Final diagnoses:   Vomiting     Time reflects when diagnosis was documented in both MDM as applicable and the Disposition within this note     Time User Action Codes Description Comment    6/2/2018  9:08 AM Pj Goldman Add [R11 10] Vomiting       ED Disposition     ED Disposition Condition Comment    Discharge  Katie Jerez discharge to home/self care  Condition at discharge: Good        Follow-up Information     Follow up With Specialties Details Why Contact Info Additional Information    Jaspal Mae, [address], Nurse Practitioner Schedule an appointment as soon as possible for a visit  [address] Emergency Department Emergency Medicine Go to As needed, If symptoms worsen [facility] [address], [address], [place], [address]          Discharge Medication List as of 6/2/2018  9:09 AM      START taking these medications    Details   ondansetron (ZOFRAN-ODT) 4 mg disintegrating tablet Take 1 tablet (4 mg total) by mouth every 6 (six) hours as needed for nausea or vomiting, Starting Sat 6/2/2018, Normal         CONTINUE these medications which have NOT CHANGED    Details   amphetamine-dextroamphetamine (ADDERALL) 20 mg tablet Take 1 tablet (20 mg total) by mouth 2 (two) times a day Max Daily Amount: 40 mg, Starting Thu 3/1/2018, Print      FLUoxetine (PROzac) 20 mg capsule TAKE 1 PILL DAILY EVERY MORNING, Normal      omeprazole (PriLOSEC) 20 mg delayed release capsule TAKE 1 BY MOUTH EVERY DAY FOR STOMACH ACID, Historical Med      QUEtiapine (SEROquel) 300 mg tablet TAKE 1 TABLET AT BEDTIME, Normal           No discharge procedures on file      ED Provider  Electronically Signed by           Darron Alcantara DO  06/04/18 4537

## 2018-06-02 NOTE — DISCHARGE INSTRUCTIONS
Acute Nausea and Vomiting   WHAT YOU NEED TO KNOW:   Acute nausea and vomiting start suddenly, worsen quickly, and last a short time  DISCHARGE INSTRUCTIONS:   Return to the emergency department if:   · You see blood in your vomit or your bowel movements  · You have sudden, severe pain in your chest and upper abdomen after hard vomiting or retching  · You have swelling in your neck and chest      · You are dizzy, cold, and thirsty and your eyes and mouth are dry  · You are urinating very little or not at all  · You have muscle weakness, leg cramps, and trouble breathing  · Your heart is beating much faster than normal      · You continue to vomit for more than 48 hours  Contact your healthcare provider if:   · You have frequent dry heaves (vomiting but nothing comes out)  · Your nausea and vomiting does not get better or go away after you use medicine  · You have questions or concerns about your condition or treatment  Medicines: You may need any of the following:  · Medicines  may be given to calm your stomach and stop your vomiting  You may also need medicines to help you feel more relaxed or to stop nausea and vomiting caused by motion sickness  · Gastrointestinal stimulants  are used to help empty your stomach and bowels  This may help decrease nausea and vomiting  · Take your medicine as directed  Contact your healthcare provider if you think your medicine is not helping or if you have side effects  Tell him or her if you are allergic to any medicine  Keep a list of the medicines, vitamins, and herbs you take  Include the amounts, and when and why you take them  Bring the list or the pill bottles to follow-up visits  Carry your medicine list with you in case of an emergency  Prevent or manage acute nausea and vomiting:   · Do not drink alcohol  Alcohol may upset or irritate your stomach  Too much alcohol can also cause acute nausea and vomiting  · Control stress    Headaches due to stress may cause nausea and vomiting  Find ways to relax and manage your stress  Get more rest and sleep  · Drink more liquids as directed  Vomiting can lead to dehydration  It is important to drink more liquids to help replace lost body fluids  Ask your healthcare provider how much liquid to drink each day and which liquids are best for you  Your provider may recommend that you drink an oral rehydration solution (ORS)  ORS contains water, salts, and sugar that are needed to replace the lost body fluids  Ask what kind of ORS to use, how much to drink, and where to get it  · Eat smaller meals, more often  Eat small amounts of food every 2 to 3 hours, even if you are not hungry  Food in your stomach may decrease your nausea  · Talk to your healthcare provider before you take over-the-counter (OTC) medicines  These medicines can cause serious problems if you use certain other medicines, or you have a medical condition  You may have problems if you use too much or use them for longer than the label says  Follow directions on the label carefully  Follow up with your healthcare provider as directed:  Write down your questions so you remember to ask them during your follow-up visits  © 2017 [employer] Information is for End User's use only and may not be sold, redistributed or otherwise used for commercial purposes  All illustrations and images included in CareNotes® are the copyrighted property of [employer]  or [employer]  The above information is an  only  It is not intended as medical advice for individual conditions or treatments  Talk to your doctor, nurse or pharmacist before following any medical regimen to see if it is safe and effective for you

## 2018-06-04 ENCOUNTER — HOSPITAL ENCOUNTER (EMERGENCY)
Facility: HOSPITAL | Age: 28
Discharge: HOME | End: 2018-06-04
Attending: EMERGENCY MEDICINE | Admitting: EMERGENCY MEDICINE
Payer: COMMERCIAL

## 2018-06-04 ENCOUNTER — APPOINTMENT (EMERGENCY)
Dept: CT IMAGING | Facility: HOSPITAL | Age: 28
End: 2018-06-04
Payer: COMMERCIAL

## 2018-06-04 VITALS
HEIGHT: 72 IN | HEART RATE: 70 BPM | TEMPERATURE: 98.4 F | RESPIRATION RATE: 18 BRPM | OXYGEN SATURATION: 96 % | SYSTOLIC BLOOD PRESSURE: 158 MMHG | BODY MASS INDEX: 32.51 KG/M2 | DIASTOLIC BLOOD PRESSURE: 101 MMHG | WEIGHT: 240 LBS

## 2018-06-04 DIAGNOSIS — E87.6 HYPOKALEMIA: ICD-10-CM

## 2018-06-04 DIAGNOSIS — R11.10 ACUTE VOMITING: Primary | ICD-10-CM

## 2018-06-04 LAB
ALBUMIN SERPL BCP-MCNC: 4.1 G/DL (ref 3.5–5)
ALP SERPL-CCNC: 74 U/L (ref 46–116)
ALT SERPL W P-5'-P-CCNC: 19 U/L (ref 12–78)
ANION GAP SERPL CALCULATED.3IONS-SCNC: 7 MMOL/L (ref 4–13)
AST SERPL W P-5'-P-CCNC: 15 U/L (ref 5–45)
B BURGDOR IGG SER IA-ACNC: 0.24
B BURGDOR IGM SER IA-ACNC: 0.31
BACTERIA UR QL AUTO: ABNORMAL /HPF
BASOPHILS # BLD AUTO: 0.04 THOUSANDS/ΜL (ref 0–0.1)
BASOPHILS NFR BLD AUTO: 0 % (ref 0–1)
BILIRUB SERPL-MCNC: 1.2 MG/DL (ref 0.2–1)
BILIRUB UR QL STRIP: NEGATIVE
BUN SERPL-MCNC: 11 MG/DL (ref 5–25)
CALCIUM SERPL-MCNC: 9.8 MG/DL (ref 8.3–10.1)
CHLORIDE SERPL-SCNC: 92 MMOL/L (ref 100–108)
CLARITY UR: ABNORMAL
CO2 SERPL-SCNC: 34 MMOL/L (ref 21–32)
COLOR UR: ABNORMAL
CREAT SERPL-MCNC: 1.04 MG/DL (ref 0.6–1.3)
EOSINOPHIL # BLD AUTO: 0.03 THOUSAND/ΜL (ref 0–0.61)
EOSINOPHIL NFR BLD AUTO: 0 % (ref 0–6)
ERYTHROCYTE [DISTWIDTH] IN BLOOD BY AUTOMATED COUNT: 12.4 % (ref 11.6–15.1)
FINE GRAN CASTS URNS QL MICRO: ABNORMAL /LPF
GFR SERPL CREATININE-BSD FRML MDRD: 98 ML/MIN/1.73SQ M
GLUCOSE SERPL-MCNC: 116 MG/DL (ref 65–140)
GLUCOSE UR STRIP-MCNC: NEGATIVE MG/DL
HCT VFR BLD AUTO: 46.4 % (ref 36.5–49.3)
HGB BLD-MCNC: 16.5 G/DL (ref 12–17)
HGB UR QL STRIP.AUTO: NEGATIVE
IMM GRANULOCYTES # BLD AUTO: 0.07 THOUSAND/UL (ref 0–0.2)
IMM GRANULOCYTES NFR BLD AUTO: 1 % (ref 0–2)
KETONES UR STRIP-MCNC: NEGATIVE MG/DL
LACTATE SERPL-SCNC: 1.2 MMOL/L (ref 0.5–2)
LACTATE SERPL-SCNC: 2.1 MMOL/L (ref 0.5–2)
LEUKOCYTE ESTERASE UR QL STRIP: NEGATIVE
LIPASE SERPL-CCNC: 167 U/L (ref 73–393)
LYMPHOCYTES # BLD AUTO: 2.91 THOUSANDS/ΜL (ref 0.6–4.47)
LYMPHOCYTES NFR BLD AUTO: 22 % (ref 14–44)
MAGNESIUM SERPL-MCNC: 1.9 MG/DL (ref 1.6–2.6)
MCH RBC QN AUTO: 28.4 PG (ref 26.8–34.3)
MCHC RBC AUTO-ENTMCNC: 35.6 G/DL (ref 31.4–37.4)
MCV RBC AUTO: 80 FL (ref 82–98)
MONOCYTES # BLD AUTO: 1.3 THOUSAND/ΜL (ref 0.17–1.22)
MONOCYTES NFR BLD AUTO: 10 % (ref 4–12)
MUCOUS THREADS UR QL AUTO: ABNORMAL
NEUTROPHILS # BLD AUTO: 9.17 THOUSANDS/ΜL (ref 1.85–7.62)
NEUTS SEG NFR BLD AUTO: 67 % (ref 43–75)
NITRITE UR QL STRIP: NEGATIVE
NON-SQ EPI CELLS URNS QL MICRO: ABNORMAL /HPF
NRBC BLD AUTO-RTO: 0 /100 WBCS
PH UR STRIP.AUTO: 7 [PH] (ref 4.5–8)
PLATELET # BLD AUTO: 374 THOUSANDS/UL (ref 149–390)
PMV BLD AUTO: 10.1 FL (ref 8.9–12.7)
POTASSIUM SERPL-SCNC: 2.8 MMOL/L (ref 3.5–5.3)
PROT SERPL-MCNC: 7.9 G/DL (ref 6.4–8.2)
PROT UR STRIP-MCNC: ABNORMAL MG/DL
RBC # BLD AUTO: 5.82 MILLION/UL (ref 3.88–5.62)
RBC #/AREA URNS AUTO: ABNORMAL /HPF
SODIUM SERPL-SCNC: 133 MMOL/L (ref 136–145)
SP GR UR STRIP.AUTO: 1.01 (ref 1–1.03)
UROBILINOGEN UR QL STRIP.AUTO: 0.2 E.U./DL
WBC # BLD AUTO: 13.52 THOUSAND/UL (ref 4.31–10.16)
WBC #/AREA URNS AUTO: ABNORMAL /HPF

## 2018-06-04 PROCEDURE — 81001 URINALYSIS AUTO W/SCOPE: CPT | Performed by: EMERGENCY MEDICINE

## 2018-06-04 PROCEDURE — 96365 THER/PROPH/DIAG IV INF INIT: CPT

## 2018-06-04 PROCEDURE — 80053 COMPREHEN METABOLIC PANEL: CPT | Performed by: EMERGENCY MEDICINE

## 2018-06-04 PROCEDURE — 96375 TX/PRO/DX INJ NEW DRUG ADDON: CPT

## 2018-06-04 PROCEDURE — 83690 ASSAY OF LIPASE: CPT | Performed by: EMERGENCY MEDICINE

## 2018-06-04 PROCEDURE — 83735 ASSAY OF MAGNESIUM: CPT | Performed by: EMERGENCY MEDICINE

## 2018-06-04 PROCEDURE — 36415 COLL VENOUS BLD VENIPUNCTURE: CPT | Performed by: EMERGENCY MEDICINE

## 2018-06-04 PROCEDURE — 99284 EMERGENCY DEPT VISIT MOD MDM: CPT

## 2018-06-04 PROCEDURE — 74177 CT ABD & PELVIS W/CONTRAST: CPT

## 2018-06-04 PROCEDURE — 83605 ASSAY OF LACTIC ACID: CPT | Performed by: EMERGENCY MEDICINE

## 2018-06-04 PROCEDURE — 96366 THER/PROPH/DIAG IV INF ADDON: CPT

## 2018-06-04 PROCEDURE — 96361 HYDRATE IV INFUSION ADD-ON: CPT

## 2018-06-04 PROCEDURE — 85025 COMPLETE CBC W/AUTO DIFF WBC: CPT | Performed by: EMERGENCY MEDICINE

## 2018-06-04 RX ORDER — POTASSIUM CHLORIDE 14.9 MG/ML
20 INJECTION INTRAVENOUS ONCE
Status: COMPLETED | OUTPATIENT
Start: 2018-06-04 | End: 2018-06-04

## 2018-06-04 RX ORDER — ONDANSETRON 2 MG/ML
4 INJECTION INTRAMUSCULAR; INTRAVENOUS ONCE
Status: COMPLETED | OUTPATIENT
Start: 2018-06-04 | End: 2018-06-04

## 2018-06-04 RX ORDER — POTASSIUM CHLORIDE 20 MEQ/1
40 TABLET, EXTENDED RELEASE ORAL ONCE
Status: COMPLETED | OUTPATIENT
Start: 2018-06-04 | End: 2018-06-04

## 2018-06-04 RX ADMIN — IOHEXOL 100 ML: 350 INJECTION, SOLUTION INTRAVENOUS at 17:29

## 2018-06-04 RX ADMIN — POTASSIUM CHLORIDE 40 MEQ: 1500 TABLET, EXTENDED RELEASE ORAL at 17:02

## 2018-06-04 RX ADMIN — ONDANSETRON 4 MG: 2 INJECTION INTRAMUSCULAR; INTRAVENOUS at 16:03

## 2018-06-04 RX ADMIN — SODIUM CHLORIDE 1000 ML: 0.9 INJECTION, SOLUTION INTRAVENOUS at 16:57

## 2018-06-04 RX ADMIN — SODIUM CHLORIDE 1000 ML: 0.9 INJECTION, SOLUTION INTRAVENOUS at 15:59

## 2018-06-04 RX ADMIN — POTASSIUM CHLORIDE 20 MEQ: 200 INJECTION, SOLUTION INTRAVENOUS at 17:02

## 2018-06-04 NOTE — ED PROVIDER NOTES
History  Chief Complaint   Patient presents with    Vomiting     Patient was seen here over the weekend for vomiting but has not been feeling any better  States he is still vomiting and feels very weak  Patient is a 78-year-old male with past medical history significant for anxiety, depression, bipolar disorder, hypertension, hyperlipidemia, peptic ulcer disease, GERD, presents to the emergency department complaining of 1 week of vomiting and inability to keep any food or fluids down  Patient states he started vomiting  last Wednesday  Vomitus is nonbloody and nonbilious and is happening multiple times a day, mostly after he attempts to drink any fluids  He was seen and evaluated early Saturday morning, found to be hypokalemic  He was given replacement of potassium, IV fluids and Zofran which did relieve his nausea  He was tolerating p o  prior to ED discharge and states he was feeling better on Saturday  He states Saturday night he tried to go out to dinner which she thinks exacerbated his symptoms and he started having nausea and vomiting again that night, persistent yesterday and today  He states he had several episodes of vomiting this morning however since being in the ED, the nausea and vomiting has been controlled  He reports feeling very weak and fatigued  He also reports having difficulty taking his psychiatric medications which is causing his anxiety to worsen  He denies any suicidal or homicidal ideation  On review of systems he admits to constipation and states his last bowel movement was last Wednesday  He denies any fever, chills, headache, dizziness or near syncope, URI symptoms or cough, visual disturbance or eye pain, chest pain, palpitations, dyspnea, abdominal pain, diarrhea, blood per rectum or melena, dysuria, change in urinary frequency, hematuria, flank pain, skin rash or color change, lateralizing extremity weakness or paresthesia or other focal neurologic deficits    He denies prior abdominal surgical history  Denies any sick contacts, recent travel outside the country or unusual food intake  He does admit to having similar vomiting for several days in the past that was related to his anxiety and reports that he was told he had cyclical vomiting syndrome  He denies alcohol or drug use  History provided by:  Patient and medical records   used: No    Vomiting   Associated symptoms: no abdominal pain, no chills, no cough, no diarrhea, no fever, no headaches and no sore throat        Prior to Admission Medications   Prescriptions Last Dose Informant Patient Reported? Taking? FLUoxetine (PROzac) 20 mg capsule   No No   Sig: TAKE 1 PILL DAILY EVERY MORNING   QUEtiapine (SEROquel) 300 mg tablet   No No   Sig: TAKE 1 TABLET AT BEDTIME   amphetamine-dextroamphetamine (ADDERALL) 20 mg tablet   No No   Sig: Take 1 tablet (20 mg total) by mouth 2 (two) times a day Max Daily Amount: 40 mg   omeprazole (PriLOSEC) 20 mg delayed release capsule   Yes No   Sig: TAKE 1 BY MOUTH EVERY DAY FOR STOMACH ACID   ondansetron (ZOFRAN-ODT) 4 mg disintegrating tablet   No No   Sig: Take 1 tablet (4 mg total) by mouth every 6 (six) hours as needed for nausea or vomiting      Facility-Administered Medications: None       Past Medical History:   Diagnosis Date    ADHD     Anxiety state     Bipolar disorder (HCC)     Depressive disorder     Essential hypertension     benign    History of emotional problems     Hyperlipidemia     Peptic reflux disease        History reviewed  No pertinent surgical history  Family History   Problem Relation Age of Onset    Mental illness Mother     Thyroid disease Mother     Drug abuse Mother      illicit drug    Diabetes Father     Cancer Father      I have reviewed and agree with the history as documented      Social History   Substance Use Topics    Smoking status: Former Smoker     Quit date: 3/30/2015    Smokeless tobacco: Never Used    Alcohol use Yes      Comment: at social events        Review of Systems   Constitutional: Positive for fatigue  Negative for chills and fever  HENT: Negative for congestion, ear pain, rhinorrhea and sore throat  Eyes: Negative for photophobia, pain and visual disturbance  Respiratory: Negative for cough, chest tightness, shortness of breath and wheezing  Cardiovascular: Negative for chest pain and palpitations  Gastrointestinal: Positive for constipation, nausea and vomiting  Negative for abdominal distention, abdominal pain, blood in stool and diarrhea  Genitourinary: Negative for dysuria, flank pain, frequency and hematuria  Musculoskeletal: Negative for back pain, neck pain and neck stiffness  Skin: Negative for color change, pallor, rash and wound  Allergic/Immunologic: Negative for immunocompromised state  Neurological: Positive for weakness  Negative for dizziness, seizures, syncope, light-headedness, numbness and headaches  Hematological: Negative for adenopathy  Psychiatric/Behavioral: Negative for confusion and decreased concentration  The patient is nervous/anxious  All other systems reviewed and are negative  Physical Exam  Physical Exam   Constitutional: He is oriented to person, place, and time  He appears well-developed and well-nourished  No distress  HENT:   Head: Normocephalic and atraumatic  Mouth/Throat: Oropharynx is clear and moist  No oropharyngeal exudate  Eyes: Conjunctivae and EOM are normal  Pupils are equal, round, and reactive to light  Neck: Normal range of motion  Neck supple  No JVD present  Cardiovascular: Normal rate, regular rhythm, normal heart sounds and intact distal pulses  Exam reveals no gallop and no friction rub  No murmur heard  Pulmonary/Chest: Effort normal and breath sounds normal  No respiratory distress  He has no wheezes  He has no rales  Abdominal: Soft  Bowel sounds are normal  He exhibits no distension  There is no tenderness  There is no rebound and no guarding  No CVA tenderness  Musculoskeletal: Normal range of motion  He exhibits no edema or tenderness  Lymphadenopathy:     He has no cervical adenopathy  Neurological: He is alert and oriented to person, place, and time  No cranial nerve deficit  No gross motor or sensory deficits  Skin: Skin is warm and dry  No rash noted  He is not diaphoretic  No pallor  Psychiatric: He has a normal mood and affect  His behavior is normal    Nursing note and vitals reviewed        Vital Signs  ED Triage Vitals [06/04/18 1356]   Temperature Pulse Respirations Blood Pressure SpO2   98 2 °F (36 8 °C) 101 16 120/76 96 %      Temp Source Heart Rate Source Patient Position - Orthostatic VS BP Location FiO2 (%)   Oral Monitor Sitting Left arm --      Pain Score       No Pain         Vitals:    06/04/18 1356 06/04/18 1458 06/04/18 1713 06/04/18 1900   BP: 120/76 139/84 141/86 (!) 158/101   BP Location: Left arm Right arm Left arm Left arm   Pulse: 101 94 79 70   Resp: 16 18 16 18   Temp: 98 2 °F (36 8 °C) 98 4 °F (36 9 °C)     TempSrc: Oral Oral     SpO2: 96% 97% 98% 96%   Weight: 109 kg (240 lb)      Height: 6' (1 829 m)        Visual Acuity      ED Medications  Medications   sodium chloride 0 9 % bolus 1,000 mL (0 mL Intravenous Stopped 6/4/18 1859)   ondansetron (ZOFRAN) injection 4 mg (4 mg Intravenous Given 6/4/18 1603)   potassium chloride (K-DUR,KLOR-CON) CR tablet 40 mEq (40 mEq Oral Given 6/4/18 1702)   potassium chloride 20 mEq IVPB (premix) (0 mEq Intravenous Stopped 6/4/18 1944)   sodium chloride 0 9 % bolus 1,000 mL (0 mL Intravenous Stopped 6/4/18 1900)   iohexol (OMNIPAQUE) 350 MG/ML injection (MULTI-DOSE) 100 mL (100 mL Intravenous Given 6/4/18 1729)       Diagnostic Studies  Results Reviewed     Procedure Component Value Units Date/Time    Lactic acid, plasma [[id number]]  (Normal) Collected:  06/04/18 1752    Lab Status:  Final result Specimen:  Blood from Arm, Left Updated:  06/04/18 1819     LACTIC ACID 1 2 mmol/L     Narrative:         Result may be elevated if tourniquet was used during collection  Urine Microscopic [[id number]]  (Abnormal) Collected:  06/04/18 1655    Lab Status:  Final result Specimen:  Urine from Urine, Clean Catch Updated:  06/04/18 1733     RBC, UA None Seen /hpf      WBC, UA None Seen /hpf      Epithelial Cells None Seen /hpf      Bacteria, UA None Seen /hpf      Fine granular casts 0-1 /lpf      MUCOUS THREADS Occasional (A)    UA w Reflex to Microscopic [[id number]]  (Abnormal) Collected:  06/04/18 1655    Lab Status:  Final result Specimen:  Urine from Urine, Clean Catch Updated:  06/04/18 1721     Color, UA Celeste     Clarity, UA Slightly Cloudy     Specific Gravity, UA 1 015     pH, UA 7 0     Leukocytes, UA Negative     Nitrite, UA Negative     Protein, UA Trace (A) mg/dl      Glucose, UA Negative mg/dl      Ketones, UA Negative mg/dl      Urobilinogen, UA 0 2 E U /dl      Bilirubin, UA Negative     Blood, UA Negative    Lactic acid, plasma [[id number]]  (Abnormal) Collected:  06/04/18 1558    Lab Status:  Final result Specimen:  Blood from Arm, Right Updated:  06/04/18 1636     LACTIC ACID 2 1 (HH) mmol/L     Narrative:         Result may be elevated if tourniquet was used during collection      Comprehensive metabolic panel [[id number]]  (Abnormal) Collected:  06/04/18 1558    Lab Status:  Final result Specimen:  Blood from Arm, Right Updated:  06/04/18 1622     Sodium 133 (L) mmol/L      Potassium 2 8 (L) mmol/L      Chloride 92 (L) mmol/L      CO2 34 (H) mmol/L      Anion Gap 7 mmol/L      BUN 11 mg/dL      Creatinine 1 04 mg/dL      Glucose 116 mg/dL      Calcium 9 8 mg/dL      AST 15 U/L      ALT 19 U/L      Alkaline Phosphatase 74 U/L      Total Protein 7 9 g/dL      Albumin 4 1 g/dL      Total Bilirubin 1 20 (H) mg/dL      eGFR 98 ml/min/1 73sq m     Narrative:         National Kidney Disease Education Program recommendations are as follows:  GFR calculation is accurate only with a steady state creatinine  Chronic Kidney disease less than 60 ml/min/1 73 sq  meters  Kidney failure less than 15 ml/min/1 73 sq  meters  Lipase [[id number]]  (Normal) Collected:  06/04/18 1558    Lab Status:  Final result Specimen:  Blood from Arm, Right Updated:  06/04/18 1622     Lipase 167 u/L     Magnesium [[id number]]  (Normal) Collected:  06/04/18 1558    Lab Status:  Final result Specimen:  Blood from Arm, Right Updated:  06/04/18 1622     Magnesium 1 9 mg/dL     CBC and differential [[id number]]  (Abnormal) Collected:  06/04/18 1558    Lab Status:  Final result Specimen:  Blood from Arm, Right Updated:  06/04/18 1609     WBC 13 52 (H) Thousand/uL      RBC 5 82 (H) Million/uL      Hemoglobin 16 5 g/dL      Hematocrit 46 4 %      MCV 80 (L) fL      MCH 28 4 pg      MCHC 35 6 g/dL      RDW 12 4 %      MPV 10 1 fL      Platelets 259 Thousands/uL      nRBC 0 /100 WBCs      Neutrophils Relative 67 %      Immat GRANS % 1 %      Lymphocytes Relative 22 %      Monocytes Relative 10 %      Eosinophils Relative 0 %      Basophils Relative 0 %      Neutrophils Absolute 9 17 (H) Thousands/µL      Immature Grans Absolute 0 07 Thousand/uL      Lymphocytes Absolute 2 91 Thousands/µL      Monocytes Absolute 1 30 (H) Thousand/µL      Eosinophils Absolute 0 03 Thousand/µL      Basophils Absolute 0 04 Thousands/µL                  CT abdomen pelvis with contrast   Final Result by Harvinder Alaniz MD (06/04 1759)      No evidence of bowel obstruction, colitis or diverticulitis  Normal appendix  Workstation performed: [id number]                    Procedures  Procedures       Phone Contacts  ED Phone Contact    ED Course  ED Course as of Jun 04 1953   Mon Jun 04, 2018   1617 Leukocytosis is stable  WBC: (!) 13 52   1844 LACTIC ACID: 1 2 1844 Patient reassessed and feeling better  He is currently eating crackers and drinking fluids without difficulty    Will await rest of potassium replacement therapy and then discharge  He already has a script for Zofran and states he has many pills at home and does not need a refill  Encouraged him to take the Zofran as needed for nausea or vomiting  Recommended he follow up with his family doctor  Discussed ED return parameters  MDM  Number of Diagnoses or Management Options  Diagnosis management comments: 70-year-old male presents with intractable nausea and vomiting for the past 1 week  He was seen and evaluated in the ED 2 days ago and was noted to be hypokalemic but was feeling better after IV fluids and replacement of potassium  His symptoms then worsened later that evening and are persistent through today  Will recheck labs and this time obtain a CT scan of the abdomen and pelvis given concomitant constipation  Will give IV fluid bolus and IV Zofran  Disposition pending  Amount and/or Complexity of Data Reviewed  Clinical lab tests: ordered and reviewed  Tests in the radiology section of CPT®: reviewed and ordered  Review and summarize past medical records: yes  Independent visualization of images, tracings, or specimens: yes      CritCare Time    Disposition  Final diagnoses:   Acute vomiting   Hypokalemia     Time reflects when diagnosis was documented in both MDM as applicable and the Disposition within this note     Time User Action Codes Description Comment    6/4/2018  7:52 PM Alison Peoples [R11 10] Acute vomiting     6/4/2018  7:52 PM Alison Peoples [E87 6] Hypokalemia       ED Disposition     ED Disposition Condition Comment    Discharge  George Bolus discharge to home/self care      Condition at discharge: Stable        Follow-up Information     Follow up With Specialties Details Why Contact Info Additional Information    Shekhar Watkins, [phone] Nurse Practitioner Schedule an appointment as soon as possible for a visit  [address] [place] [address]  [address] [place] [address] Emergency Department Emergency Medicine Go to If symptoms worsen [address] [facility] [id number] [phone] [place] ED, [address] [place], [place], [facility]          Patient's Medications   Discharge Prescriptions    No medications on file     No discharge procedures on file      ED Provider  Electronically Signed by           Nigel Snider DO  06/04/18 1953

## 2018-06-04 NOTE — ED NOTES
Patient transported to [facility], RN  06/04/18 3887

## 2018-06-04 NOTE — DISCHARGE INSTRUCTIONS
Acute Nausea and Vomiting   WHAT YOU NEED TO KNOW:   Acute nausea and vomiting start suddenly, worsen quickly, and last a short time  DISCHARGE INSTRUCTIONS:   Seek care immediately if:   · You see blood in your vomit or your bowel movements  · You have sudden, severe pain in your chest and upper abdomen after hard vomiting or retching  · You have swelling in your neck and chest      · You are dizzy, cold, and thirsty and your eyes and mouth are dry  · You are urinating very little or not at all  · You have muscle weakness, leg cramps, and trouble breathing  · Your heart is beating much faster than normal      · You continue to vomit for more than 48 hours  Contact your healthcare provider if:   · You have frequent dry heaves (vomiting but nothing comes out)  · Your nausea and vomiting does not get better or go away after you use medicine  · You have questions or concerns about your condition or treatment  Medicines: You may need any of the following:  · Medicines  may be given to calm your stomach and stop your vomiting  You may also need medicines to help you feel more relaxed or to stop nausea and vomiting caused by motion sickness  · Gastrointestinal stimulants  are used to help empty your stomach and bowels  This may help decrease nausea and vomiting  · Take your medicine as directed  Contact your healthcare provider if you think your medicine is not helping or if you have side effects  Tell him or her if you are allergic to any medicine  Keep a list of the medicines, vitamins, and herbs you take  Include the amounts, and when and why you take them  Bring the list or the pill bottles to follow-up visits  Carry your medicine list with you in case of an emergency  Prevent or manage acute nausea and vomiting:   · Do not drink alcohol  Alcohol may upset or irritate your stomach  Too much alcohol can also cause acute nausea and vomiting  · Control stress    Headaches due to stress may cause nausea and vomiting  Find ways to relax and manage your stress  Get more rest and sleep  · Drink more liquids as directed  Vomiting can lead to dehydration  It is important to drink more liquids to help replace lost body fluids  Ask your healthcare provider how much liquid to drink each day and which liquids are best for you  Your provider may recommend that you drink an oral rehydration solution (ORS)  ORS contains water, salts, and sugar that are needed to replace the lost body fluids  Ask what kind of ORS to use, how much to drink, and where to get it  · Eat smaller meals, more often  Eat small amounts of food every 2 to 3 hours, even if you are not hungry  Food in your stomach may decrease your nausea  · Talk to your healthcare provider before you take over-the-counter (OTC) medicines  These medicines can cause serious problems if you use certain other medicines, or you have a medical condition  You may have problems if you use too much or use them for longer than the label says  Follow directions on the label carefully  Follow up with your healthcare provider as directed:  Write down your questions so you remember to ask them during your visits  © 2017 [facility] Information is for End User's use only and may not be sold, redistributed or otherwise used for commercial purposes  All illustrations and images included in CareNotes® are the copyrighted property of [employer]  or [employer]  The above information is an  only  It is not intended as medical advice for individual conditions or treatments  Talk to your doctor, nurse or pharmacist before following any medical regimen to see if it is safe and effective for you  Hypokalemia   WHAT YOU NEED TO KNOW:   Hypokalemia is a low level of potassium in your blood  Potassium helps control how your muscles, heart, and digestive system work   Hypokalemia occurs when your body loses too much potassium or does not absorb enough from food  DISCHARGE INSTRUCTIONS:   Seek care immediately if:   · You cannot move your arm or leg  · You have a fast or irregular heartbeat  · You are too tired or weak to stand up  Contact your healthcare provider if:   · You are vomiting, or you have diarrhea  · You have numbness or tingling in your arms or legs  · Your symptoms do not go away or they get worse  · You have questions or concerns about your condition or care  Medicines:   · Potassium  will be given to bring your potassium levels back to normal     · Take your medicine as directed  Contact your healthcare provider if you think your medicine is not helping or if you have side effects  Tell him of her if you are allergic to any medicine  Keep a list of the medicines, vitamins, and herbs you take  Include the amounts, and when and why you take them  Bring the list or the pill bottles to follow-up visits  Carry your medicine list with you in case of an emergency  Eat foods that are high in potassium:  Foods that are high in potassium include bananas, oranges, tomatoes, potatoes, and avocado  [place] beans, turkey, salmon, lean beef, yogurt, and milk are also high in potassium  Ask your healthcare provider or dietitian for more information about foods that are high in potassium  Follow up with your healthcare provider as directed:  Write down your questions so you remember to ask them during your visits  © 2017 [address] [employer] Information is for End User's use only and may not be sold, redistributed or otherwise used for commercial purposes  All illustrations and images included in CareNotes® are the copyrighted property of [employer]  or [employer]  The above information is an  only  It is not intended as medical advice for individual conditions or treatments   Talk to your doctor, nurse or pharmacist before following any medical regimen to see if it is safe and effective for you

## 2018-06-06 ENCOUNTER — OFFICE VISIT (OUTPATIENT)
Dept: FAMILY MEDICINE CLINIC | Facility: CLINIC | Age: 28
End: 2018-06-06
Payer: COMMERCIAL

## 2018-06-06 VITALS
DIASTOLIC BLOOD PRESSURE: 80 MMHG | BODY MASS INDEX: 33.46 KG/M2 | HEART RATE: 86 BPM | HEIGHT: 72 IN | WEIGHT: 247 LBS | SYSTOLIC BLOOD PRESSURE: 128 MMHG | OXYGEN SATURATION: 98 % | TEMPERATURE: 98.2 F

## 2018-06-06 DIAGNOSIS — F31.10 BIPOLAR I DISORDER WITH MANIA (HCC): Primary | ICD-10-CM

## 2018-06-06 DIAGNOSIS — F90.9 ATTENTION DEFICIT HYPERACTIVITY DISORDER (ADHD), UNSPECIFIED ADHD TYPE: ICD-10-CM

## 2018-06-06 PROCEDURE — 99496 TRANSJ CARE MGMT HIGH F2F 7D: CPT | Performed by: FAMILY MEDICINE

## 2018-06-06 RX ORDER — QUETIAPINE FUMARATE 50 MG/1
TABLET, FILM COATED ORAL
Qty: 90 TABLET | Refills: 1 | Status: SHIPPED | OUTPATIENT
Start: 2018-06-06 | End: 2018-09-26 | Stop reason: SDUPTHER

## 2018-06-06 RX ORDER — DEXTROAMPHETAMINE SACCHARATE, AMPHETAMINE ASPARTATE, DEXTROAMPHETAMINE SULFATE AND AMPHETAMINE SULFATE 5; 5; 5; 5 MG/1; MG/1; MG/1; MG/1
20 TABLET ORAL 2 TIMES DAILY
Qty: 180 TABLET | Refills: 0 | Status: SHIPPED | OUTPATIENT
Start: 2018-06-06 | End: 2018-08-31 | Stop reason: SDUPTHER

## 2018-06-06 RX ORDER — LORAZEPAM 1 MG/1
TABLET ORAL
Qty: 60 TABLET | Refills: 0 | Status: SHIPPED | OUTPATIENT
Start: 2018-06-06 | End: 2018-06-27 | Stop reason: ALTCHOICE

## 2018-06-06 NOTE — PATIENT INSTRUCTIONS
Please provide Marcus Castellano with a note for work   your Adderall has been sent to the pharmacy  I have set up a elizabeth  emergency treatment  plan  I want you to use 25 mg of Seroquel 3 times a day for episodes of elizabeth -  The 1st signs of elizabeth  I have also ordered Ativan  1 mg to be used to 3 times a day for a max of 3 days for episodes of elizabeth  If you need it for more than 3 days - you  Are to call and come in immediately   Zofran for vomiting

## 2018-06-06 NOTE — PROGRESS NOTES
Standard Visit   Assessment/Plan:     Visit Diagnosis:  There are no diagnoses linked to this encounter  Subjective:    Patient ID: George Larson is a 33 y o  male  Patient is here with the following concerns:    HPI    The following portions of the patient's history were reviewed and updated as appropriate: allergies, current medications, past family history, past medical history, past social history, past surgical history and problem list     Review of Systems      /80 (BP Location: Left arm, Patient Position: Sitting, Cuff Size: Adult)   Pulse 86   Temp 98 2 °F (36 8 °C) (Tympanic)   Ht 6' (1 829 m)   Wt 112 kg (247 lb)   SpO2 98%   BMI 33 50 kg/m²   Social History     Social History    Marital status: Single     Spouse name: N/A    Number of children: N/A    Years of education: N/A     Occupational History    fulltime      Social History Main Topics    Smoking status: Former Smoker     Quit date: 3/30/2015    Smokeless tobacco: Never Used    Alcohol use Yes      Comment: at social events    Drug use: No    Sexual activity: Not on file     Other Topics Concern    Not on file     Social History Narrative    No narrative on file     Past Medical History:   Diagnosis Date    ADHD     Anxiety state     Bipolar disorder ([facility] [address] )     Depressive disorder     Essential hypertension     benign    History of emotional problems     Hyperlipidemia     Peptic reflux disease      Family History   Problem Relation Age of Onset    Mental illness Mother     Thyroid disease Mother     Drug abuse Mother      illicit drug    Diabetes Father     Cancer Father      No past surgical history on file      Current Outpatient Prescriptions:     amphetamine-dextroamphetamine (ADDERALL) 20 mg tablet, Take 1 tablet (20 mg total) by mouth 2 (two) times a day Max Daily Amount: 40 mg, Disp: 60 tablet, Rfl: 0    omeprazole (PriLOSEC) 20 mg delayed release capsule, TAKE 1 BY MOUTH EVERY DAY FOR STOMACH ACID, Disp: , Rfl: 1    QUEtiapine (SEROquel) 300 mg tablet, TAKE 1 TABLET AT BEDTIME, Disp: 90 tablet, Rfl: 1      Objective:     Physical Exam      There are no Patient Instructions on file for this visit          PETER Daniel

## 2018-06-06 NOTE — PROGRESS NOTES
Standard Visit   Assessment/Plan:     Visit Diagnosis:  Diagnoses and all orders for this visit:    Bipolar I disorder with elizabeth ([facility] [address] )  -     QUEtiapine (SEROquel) 50 mg tablet; For episodes of elizabeth, take half a pill to 3 times a day p r n   -     LORazepam (ATIVAN) 1 mg tablet; May take 1 pill up to 3 times a day for episodes of bipolar elizabeth for a max of 3 days    Attention deficit hyperactivity disorder (ADHD), unspecified ADHD type  -     amphetamine-dextroamphetamine (ADDERALL) 20 mg tablet; Take 1 tablet (20 mg total) by mouth 2 (two) times a day for 90 days Max Daily Amount: 40 mg    Please provide Aye Patrick with a note for work   your Adderall has been sent to the pharmacy  I have set up a elizabeth  emergency treatment  plan  I want you to use 25 mg of Seroquel 3 times a day for episodes of elizabeth -  The 1st signs of elizabeth  I have also ordered Ativan  1 mg to be used to 3 times a day for a max of 3 days for episodes of elizabeth  If you need it for more than 3 days - you  Are to call and come in immediately   Zofran for vomiting   Subjective:    Patient ID: Marylen Kalata is a 33 y o  male       Patient is here with the following concerns:    Here for transition of care   He was seen in the emergency room  June 2nd for a manic episode and  Persistent vomiting  And then again on June 4th for persistent vomiting and elevated lactic acid levels and hypokalemia   He was treated with IV hydration, Zofran, potassium, Ativan   Aye Patrick has not had a manic episode in years   he has bipolar disorder   He works Full-time   He is engaged to [place] who is  A physical therapist and is  6 months pregnant  With their baby   he is here today and he is starting to feel better he is able to keep food down   He is able to finally keep the Seroquel down   When he was in his manic state he actually stopped his Adderall which was fantastic for him           The following portions of the patient's history were reviewed and updated as appropriate: allergies, current medications, past family history, past medical history, past social history, past surgical history and problem list     Review of Systems   Constitutional: Negative for activity change, fatigue, fever and unexpected weight change  HENT: Negative for congestion, ear pain, hearing loss, sinus pain, sore throat, tinnitus, trouble swallowing and voice change  Eyes: Negative for pain, discharge and visual disturbance  Respiratory: Negative for cough, chest tightness, shortness of breath and wheezing  Cardiovascular: Negative for chest pain, palpitations and leg swelling  Gastrointestinal: Negative for abdominal pain, blood in stool, diarrhea and vomiting  Endocrine: Negative for cold intolerance, heat intolerance and polyuria  Genitourinary: Negative for difficulty urinating, dysuria, flank pain, genital sores and urgency  Musculoskeletal: Negative for gait problem, joint swelling and neck stiffness  Skin: Negative for color change, rash and wound  Allergic/Immunologic: Negative for immunocompromised state  Neurological: Negative for syncope, speech difficulty and light-headedness  Psychiatric/Behavioral: Negative for behavioral problems, dysphoric mood and suicidal ideas           /80 (BP Location: Left arm, Patient Position: Sitting, Cuff Size: Adult)   Pulse 86   Temp 98 2 °F (36 8 °C) (Tympanic)   Ht 6' (1 829 m)   Wt 112 kg (247 lb)   SpO2 98%   BMI 33 50 kg/m²   Social History     Social History    Marital status: Single     Spouse name: N/A    Number of children: N/A    Years of education: N/A     Occupational History    fulltime      Social History Main Topics    Smoking status: Former Smoker     Quit date: 3/30/2015    Smokeless tobacco: Never Used    Alcohol use Yes      Comment: at social events    Drug use: No    Sexual activity: Not on file     Other Topics Concern    Not on file     Social History Narrative    No narrative on file     Past Medical History:   Diagnosis Date    ADHD     Anxiety state     Bipolar disorder ([facility] [address] )     Depressive disorder     Essential hypertension     benign    History of emotional problems     Hyperlipidemia     Peptic reflux disease      Family History   Problem Relation Age of Onset    Mental illness Mother     Thyroid disease Mother     Drug abuse Mother      illicit drug    Diabetes Father     Cancer Father      No past surgical history on file  Current Outpatient Prescriptions:     amphetamine-dextroamphetamine (ADDERALL) 20 mg tablet, Take 1 tablet (20 mg total) by mouth 2 (two) times a day for 90 days Max Daily Amount: 40 mg, Disp: 180 tablet, Rfl: 0    omeprazole (PriLOSEC) 20 mg delayed release capsule, TAKE 1 BY MOUTH EVERY DAY FOR STOMACH ACID, Disp: , Rfl: 1    QUEtiapine (SEROquel) 300 mg tablet, TAKE 1 TABLET AT BEDTIME, Disp: 90 tablet, Rfl: 1    LORazepam (ATIVAN) 1 mg tablet, May take 1 pill up to 3 times a day for episodes of bipolar elizabeth for a max of 3 days, Disp: 60 tablet, Rfl: 0    QUEtiapine (SEROquel) 50 mg tablet, For episodes of elizabeth, take half a pill to 3 times a day p r n , Disp: 90 tablet, Rfl: 1      Objective:     Physical Exam   Constitutional: He is oriented to person, place, and time  He appears well-developed and well-nourished  HENT:   Head: Normocephalic and atraumatic  Eyes: Pupils are equal, round, and reactive to light  Neck: Normal range of motion  Neck supple  Cardiovascular: Normal rate and normal heart sounds  No murmur heard  Pulmonary/Chest: Effort normal and breath sounds normal  No respiratory distress  He exhibits no tenderness  Abdominal: Soft  Bowel sounds are normal    Musculoskeletal: Normal range of motion  He exhibits no tenderness  Lymphadenopathy:     He has no cervical adenopathy  Neurological: He is alert and oriented to person, place, and time  Coordination normal    Skin: Skin is warm and dry     Psychiatric: He has a normal mood and affect  Thought content normal    Nursing note and vitals reviewed        Social History     Social History    Marital status: Single     Spouse name: N/A    Number of children: N/A    Years of education: N/A     Occupational History    fulltime      Social History Main Topics    Smoking status: Former Smoker     Quit date: 3/30/2015    Smokeless tobacco: Never Used    Alcohol use Yes      Comment: at social events    Drug use: No    Sexual activity: Not on file     Other Topics Concern    Not on file     Social History Narrative    No narrative on file     Past Medical History:   Diagnosis Date    ADHD     Anxiety state     Bipolar disorder ([facility] [address] )     Depressive disorder     Essential hypertension     benign    History of emotional problems     Hyperlipidemia     Peptic reflux disease        Current Outpatient Prescriptions:     amphetamine-dextroamphetamine (ADDERALL) 20 mg tablet, Take 1 tablet (20 mg total) by mouth 2 (two) times a day for 90 days Max Daily Amount: 40 mg, Disp: 180 tablet, Rfl: 0    omeprazole (PriLOSEC) 20 mg delayed release capsule, TAKE 1 BY MOUTH EVERY DAY FOR STOMACH ACID, Disp: , Rfl: 1    QUEtiapine (SEROquel) 300 mg tablet, TAKE 1 TABLET AT BEDTIME, Disp: 90 tablet, Rfl: 1    LORazepam (ATIVAN) 1 mg tablet, May take 1 pill up to 3 times a day for episodes of bipolar elizabeth for a max of 3 days, Disp: 60 tablet, Rfl: 0    QUEtiapine (SEROquel) 50 mg tablet, For episodes of elizabeth, take half a pill to 3 times a day p r n , Disp: 90 tablet, Rfl: 1  Family History   Problem Relation Age of Onset    Mental illness Mother     Thyroid disease Mother     Drug abuse Mother      illicit drug    Diabetes Father     Cancer Father        Patient Instructions   Please provide Samir Cowan with a note for work   your Adderall has been sent to the pharmacy  I have set up a elizabeth  emergency treatment  plan  I want you to use 25 mg of Seroquel 3 times a day for episodes of elizabeth -  The 1st signs of elizabeth  I have also ordered Ativan  1 mg to be used to 3 times a day for a max of 3 days for episodes of elizabeth  If you need it for more than 3 days - you  Are to call and come in immediately   Zofran for vomiting             Esthela Wild, PETER

## 2018-06-22 ENCOUNTER — TELEPHONE (OUTPATIENT)
Dept: FAMILY MEDICINE CLINIC | Facility: CLINIC | Age: 28
End: 2018-06-22

## 2018-06-22 NOTE — TELEPHONE ENCOUNTER
Pt called yesterday to speak directly to you , (when computers where out )  He said it was" personal and said he just really needs to talk to you "  Please marcus at [phone]

## 2018-06-25 NOTE — TELEPHONE ENCOUNTER
Purvi, his girlfriend is pregnant and the;baby has a hole in;it's  Heart and he needs FMLA papers filled out   Made him an appointment for wednesday

## 2018-06-27 ENCOUNTER — OFFICE VISIT (OUTPATIENT)
Dept: FAMILY MEDICINE CLINIC | Facility: CLINIC | Age: 28
End: 2018-06-27
Payer: COMMERCIAL

## 2018-06-27 VITALS
DIASTOLIC BLOOD PRESSURE: 88 MMHG | HEART RATE: 88 BPM | SYSTOLIC BLOOD PRESSURE: 120 MMHG | HEIGHT: 72 IN | TEMPERATURE: 98.6 F | WEIGHT: 238 LBS | BODY MASS INDEX: 32.23 KG/M2

## 2018-06-27 DIAGNOSIS — Z63.79 STRESS DUE TO ILLNESS OF FAMILY MEMBER: ICD-10-CM

## 2018-06-27 DIAGNOSIS — Z81.8 FAMILY HISTORY OF ANXIETY DISORDER: ICD-10-CM

## 2018-06-27 DIAGNOSIS — F90.2 ATTENTION DEFICIT HYPERACTIVITY DISORDER (ADHD), COMBINED TYPE: ICD-10-CM

## 2018-06-27 DIAGNOSIS — K21.9 GASTROESOPHAGEAL REFLUX DISEASE WITHOUT ESOPHAGITIS: ICD-10-CM

## 2018-06-27 DIAGNOSIS — F31.74 BIPOLAR 1 DISORDER, MANIC, FULL REMISSION (HCC): ICD-10-CM

## 2018-06-27 PROCEDURE — 99214 OFFICE O/P EST MOD 30 MIN: CPT | Performed by: FAMILY MEDICINE

## 2018-06-28 NOTE — PROGRESS NOTES
Standard Visit   Assessment/Plan:     Visit Diagnosis:  Diagnoses and all orders for this visit:    Stress due to illness of family member    Bipolar 1 disorder, manic, full remission ([address] )    Attention deficit hyperactivity disorder (ADHD), combined type    Gastroesophageal reflux disease without esophagitis    Family history of anxiety disorder       FMLA papers completed   long discussion about Toney Bailey being compliant with his bipolar medications  Discussed the fact that Po Alcala is pregnant  With their 1st child who has a cardiac problem  The worries should be directed to this baby   When you stop taking your meds then  Po Alcala has to worry about 2 people - the baby and you  1  It's unhealthy for  you Toney Bailey  2  It's unhealthy for [place] and the baby  3  That's selfish of you when you stop your meds knowing what happens when you do      that  Do not make this all about you Toney Bailey  This has to be all about the baby and your fiance  So man up and take care of your family! WELCOME TO PARENTHOOD    Ps,  You're going to be a great dad  Subjective:    Patient ID: Sam Mcdowell is a 33 y o  male  Patient is here with the following concerns:     Here to have FMLA papers completed  His Kristyn Alcantara is pregnant with their 1st child and they just discovered a week ago that the baby has a "hole in its heart"  It was found on their 1st ultrasound to discover the gender and that the information that they discovered  Po Alcala is 6 months pregnant and they were referred to [facility] in [place]   they do not know the complete diagnosis at that time  They are going down Friday to get all the information    Po Alcala is upset and they have advised her not to drive especially in [place] and Toney Bailey is going to take off work on the days that she has appointments to  and also be by her side during this trouble some time    Also Toney Bailey is just recovering from a very serious manic episode  I have taking care of  Mark López for several years  He  has bipolar 1 disorder,  As well as in 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, benzo, alcohol addiction  he has been clean from opiates and benzos for 6 years but recently he stopped his Seroquel and went on a alcohol bingeing weekend  He ended up in the emergency room absolutely in a manic state  This manic state lasted 6 days in which I saw him in the office and had phone interviews almost daily with him  We were able to get him back on track  And we were able to work with his job at [employer] to secure his employment there     also which complicates this psychiatric picture is he has clear-cut ADD which was diagnosed when he was in middle school  He absolutely must be on a stimulant and he has been on Adderall for years  Attempts to try Strattera resulted in abdominal pain as well as elevated liver enzymes  Ritalin causes a paradoxical reaction with him and it increases his hyperactivity   Adderall seems to fit the bill quite nicely and it does not seem to contribute to elizabeth  What contributes to elizabeth in Mark López is his sudden stopping of Seroquel, which he does about once every 2 years  rather impulsively  this results in an immediate manic episode which then leads to protracted vomiting due to debilitating anxiety which result in emergency room visits requiring prolonged hydration and Zofran  Mark López is very predictable,  and very lovable elier,  who 99 percent of the time "stays the course" and is compliant  The following portions of the patient's history were reviewed and updated as appropriate: allergies, current medications, past family history, past medical history, past social history, past surgical history and problem list     Review of Systems   Constitutional: Negative for activity change, fatigue, fever and unexpected weight change  HENT: Negative for congestion, ear pain, hearing loss, sinus pain, sore throat, tinnitus, trouble swallowing and voice change      Eyes: Negative for pain, discharge and visual disturbance  Respiratory: Negative for cough, chest tightness, shortness of breath and wheezing  Cardiovascular: Negative for chest pain, palpitations and leg swelling  Gastrointestinal: Negative for abdominal pain, blood in stool, diarrhea and vomiting  Endocrine: Negative for cold intolerance, heat intolerance and polyuria  Genitourinary: Negative for difficulty urinating, dysuria, flank pain, genital sores and urgency  Musculoskeletal: Negative for gait problem, joint swelling and neck stiffness  Skin: Negative for color change, rash and wound  Allergic/Immunologic: Negative for immunocompromised state  Neurological: Negative for syncope, speech difficulty and light-headedness  Psychiatric/Behavioral: Positive for decreased concentration  Negative for behavioral problems, dysphoric mood, hallucinations and suicidal ideas  The patient is nervous/anxious and is hyperactive            /88 (BP Location: Left arm, Patient Position: Sitting, Cuff Size: Adult)   Pulse 88   Temp 98 6 °F (37 °C) (Tympanic)   Ht 6' (1 829 m)   Wt 108 kg (238 lb)   BMI 32 28 kg/m²   Social History     Social History    Marital status: Single     Spouse name: N/A    Number of children: N/A    Years of education: N/A     Occupational History    fulltime      Social History Main Topics    Smoking status: Former Smoker     Quit date: 3/30/2015    Smokeless tobacco: Never Used    Alcohol use Yes      Comment: at social events    Drug use: No    Sexual activity: Not on file     Other Topics Concern    Not on file     Social History Narrative    No narrative on file     Past Medical History:   Diagnosis Date    ADHD     Anxiety state     B[facility] disorder ([facility] [address] )     Depressive disorder     Essential hypertension     benign    History of emotional problems     Hyperlipidemia     Peptic reflux disease      Family History   Problem Relation Age of Onset    Mental illness Mother     Thyroid disease Mother     Drug abuse Mother         illicit drug    Diabetes Father     Cancer Father      No past surgical history on file  Current Outpatient Prescriptions:     amphetamine-dextroamphetamine (ADDERALL) 20 mg tablet, Take 1 tablet (20 mg total) by mouth 2 (two) times a day for 90 days Max Daily Amount: 40 mg, Disp: 180 tablet, Rfl: 0    omeprazole (PriLOSEC) 20 mg delayed release capsule, TAKE 1 BY MOUTH EVERY DAY FOR STOMACH ACID, Disp: , Rfl: 1    QUEtiapine (SEROquel) 300 mg tablet, TAKE 1 TABLET AT BEDTIME, Disp: 90 tablet, Rfl: 1    QUEtiapine (SEROquel) 50 mg tablet, For episodes of elizabeth, take half a pill to 3 times a day p r n , Disp: 90 tablet, Rfl: 1      Objective:     Physical Exam   Constitutional: He is oriented to person, place, and time  He appears well-developed and well-nourished  HENT:   Head: Normocephalic and atraumatic  Eyes:   Pupils equal bilateral reactive  4 mm   Neck: Neck supple  Cardiovascular: Normal rate and regular rhythm  Pulmonary/Chest: Effort normal and breath sounds normal    Musculoskeletal: Normal range of motion  Neurological: He is alert and oriented to person, place, and time  Skin: Skin is warm and dry  Psychiatric: His speech is normal  His mood appears anxious  Thought content is not paranoid and not delusional  He expresses impulsivity  He expresses no suicidal ideation  He expresses no suicidal plans      Behavior currently appeared a bit slowed from recent really loading of his Seroquel  He is no longer manic  Thought patterns are a bit slowed  He appears tired from the recent manic state speech is a bit  Tangential,  But he is coming around   he does appear anxious when talking about the baby   eye contact is excellent       Social History     Social History    Marital status: Single     Spouse name: N/A    Number of children: N/A    Years of education: N/A     Occupational History    fulltime Social History Main Topics    Smoking status: Former Smoker     Quit date: 3/30/2015    Smokeless tobacco: Never Used    Alcohol use Yes      Comment: at social events    Drug use: No    Sexual activity: Not on file     Other Topics Concern    Not on file     Social History Narrative    No narrative on file     Past Medical History:   Diagnosis Date    ADHD     Anxiety state     Bipolar disorder ([facility] [address] )     Depressive disorder     Essential hypertension     benign    History of emotional problems     Hyperlipidemia     Peptic reflux disease        Current Outpatient Prescriptions:     amphetamine-dextroamphetamine (ADDERALL) 20 mg tablet, Take 1 tablet (20 mg total) by mouth 2 (two) times a day for 90 days Max Daily Amount: 40 mg, Disp: 180 tablet, Rfl: 0    omeprazole (PriLOSEC) 20 mg delayed release capsule, TAKE 1 BY MOUTH EVERY DAY FOR STOMACH ACID, Disp: , Rfl: 1    QUEtiapine (SEROquel) 300 mg tablet, TAKE 1 TABLET AT BEDTIME, Disp: 90 tablet, Rfl: 1    QUEtiapine (SEROquel) 50 mg tablet, For episodes of elizabeth, take half a pill to 3 times a day p r n , Disp: 90 tablet, Rfl: 1  Family History   Problem Relation Age of Onset    Mental illness Mother     Thyroid disease Mother     Drug abuse Mother         illicit drug    Diabetes Father     Cancer Father        Patient Instructions    FMLA papers completed   long discussion about Samir Cowan being compliant with his bipolar medications  Discussed the fact that Shraddha Cardona is pregnant  With their 1st child who has a cardiac problem  The worries should be directed to this baby   When you stop taking your meds then  Shraddha Cardona has to worry about 2 people - the baby and you  1  It's unhealthy for  you Samir Cowan  2  It's unhealthy for [place] and the baby  3  That selfish of you when you stop your meds knowing what happens when you do that  Do not make this all about you Samir Cowan  This has to be all about the baby and your fiance          So man up and take care of your family! WELCOME TO PARENTHOOD    Ps,  You're going to be a great dad                PETER Knott

## 2018-06-28 NOTE — PATIENT INSTRUCTIONS
FMLA papers completed   long discussion about Ramez Malcolmrivas Blanco being compliant with his bipolar medications  Discussed the fact that Shraddha Cardona is pregnant  With their 1st child who has a cardiac problem  The worries should be directed to this baby   When you stop taking your meds then  Shraddha Cardona has to worry about 2 people - the baby and you  1  It's unhealthy for  you [address] [place] [address]  2  It's unhealthy for [place] and the baby  3  That selfish of you when you stop your meds knowing what happens when you do that  Do not make this all about you [address] [place] [address]  This has to be all about the baby and your fiance  So man up and take care of your family! WELCOME TO PARENTHOOD    Ps,  You're going to be a great dad

## 2018-07-23 DIAGNOSIS — F33.2 SEVERE EPISODE OF RECURRENT MAJOR DEPRESSIVE DISORDER, WITHOUT PSYCHOTIC FEATURES (HCC): ICD-10-CM

## 2018-07-25 RX ORDER — QUETIAPINE FUMARATE 300 MG/1
TABLET, FILM COATED ORAL
Qty: 90 TABLET | Refills: 1 | Status: SHIPPED | OUTPATIENT
Start: 2018-07-25 | End: 2018-09-26 | Stop reason: SDUPTHER

## 2018-08-30 ENCOUNTER — TELEPHONE (OUTPATIENT)
Dept: FAMILY MEDICINE CLINIC | Facility: CLINIC | Age: 28
End: 2018-08-30

## 2018-08-31 DIAGNOSIS — F90.9 ATTENTION DEFICIT HYPERACTIVITY DISORDER (ADHD), UNSPECIFIED ADHD TYPE: ICD-10-CM

## 2018-08-31 RX ORDER — DEXTROAMPHETAMINE SACCHARATE, AMPHETAMINE ASPARTATE, DEXTROAMPHETAMINE SULFATE AND AMPHETAMINE SULFATE 5; 5; 5; 5 MG/1; MG/1; MG/1; MG/1
20 TABLET ORAL 2 TIMES DAILY
Qty: 180 TABLET | Refills: 0 | Status: SHIPPED | OUTPATIENT
Start: 2018-08-31 | End: 2018-11-17 | Stop reason: ALTCHOICE

## 2018-09-26 ENCOUNTER — OFFICE VISIT (OUTPATIENT)
Dept: FAMILY MEDICINE CLINIC | Facility: CLINIC | Age: 28
End: 2018-09-26
Payer: COMMERCIAL

## 2018-09-26 VITALS
HEART RATE: 74 BPM | HEIGHT: 72 IN | DIASTOLIC BLOOD PRESSURE: 82 MMHG | SYSTOLIC BLOOD PRESSURE: 124 MMHG | TEMPERATURE: 97.6 F | WEIGHT: 259 LBS | OXYGEN SATURATION: 98 % | BODY MASS INDEX: 35.08 KG/M2

## 2018-09-26 DIAGNOSIS — F11.21 OPIOID DEPENDENCE IN REMISSION (HCC): ICD-10-CM

## 2018-09-26 DIAGNOSIS — Z23 NEED FOR TDAP VACCINATION: ICD-10-CM

## 2018-09-26 DIAGNOSIS — F33.2 SEVERE EPISODE OF RECURRENT MAJOR DEPRESSIVE DISORDER, WITHOUT PSYCHOTIC FEATURES (HCC): ICD-10-CM

## 2018-09-26 DIAGNOSIS — F31.10 BIPOLAR I DISORDER WITH MANIA (HCC): ICD-10-CM

## 2018-09-26 DIAGNOSIS — Z23 NEED FOR IMMUNIZATION AGAINST INFLUENZA: Primary | ICD-10-CM

## 2018-09-26 PROCEDURE — 90715 TDAP VACCINE 7 YRS/> IM: CPT

## 2018-09-26 PROCEDURE — 90686 IIV4 VACC NO PRSV 0.5 ML IM: CPT

## 2018-09-26 PROCEDURE — 90472 IMMUNIZATION ADMIN EACH ADD: CPT

## 2018-09-26 PROCEDURE — 90471 IMMUNIZATION ADMIN: CPT

## 2018-09-26 PROCEDURE — 99214 OFFICE O/P EST MOD 30 MIN: CPT | Performed by: FAMILY MEDICINE

## 2018-09-26 PROCEDURE — 3008F BODY MASS INDEX DOCD: CPT | Performed by: FAMILY MEDICINE

## 2018-09-26 RX ORDER — NALTREXONE HYDROCHLORIDE 50 MG/1
50 TABLET, FILM COATED ORAL DAILY
Qty: 90 TABLET | Refills: 1 | Status: SHIPPED | OUTPATIENT
Start: 2018-09-26 | End: 2018-11-20 | Stop reason: HOSPADM

## 2018-09-26 RX ORDER — QUETIAPINE FUMARATE 50 MG/1
TABLET, FILM COATED ORAL
Qty: 90 TABLET | Refills: 0 | Status: SHIPPED | OUTPATIENT
Start: 2018-09-26 | End: 2018-10-24 | Stop reason: SDUPTHER

## 2018-09-26 RX ORDER — QUETIAPINE FUMARATE 300 MG/1
300 TABLET, FILM COATED ORAL
Qty: 90 TABLET | Refills: 0 | Status: SHIPPED | OUTPATIENT
Start: 2018-09-26 | End: 2018-11-20 | Stop reason: HOSPADM

## 2018-09-26 RX ORDER — TOPIRAMATE 50 MG/1
TABLET, FILM COATED ORAL
Qty: 180 TABLET | Refills: 1 | Status: SHIPPED | OUTPATIENT
Start: 2018-09-26 | End: 2018-11-20 | Stop reason: HOSPADM

## 2018-09-26 NOTE — PATIENT INSTRUCTIONS
We are going to start naltrexone 50 mg once a day for the opiate craving   you  Are  Honest - you say you  Crave every day we are going to treat you with this  medicine every day    You have your 1st baby on the way and it is going to be a rough ride with tetralogy of [place] at [address]   we also have to get some of this weight off that you have acquired on the Seroquel   follow-up 1 month

## 2018-09-26 NOTE — PROGRESS NOTES
Standard Visit   Assessment/Plan:     Visit Diagnosis:  Diagnoses and all orders for this visit:    Need for immunization against influenza  -     SYRINGE/SINGLE-DOSE VIAL: influenza vaccine, [id number], quadrivalent, 0 5 mL, preservative-free, for patients 3+ yr (FLUZONE)    Bipolar I disorder with elizabeth (HCC)  -     QUEtiapine (SEROquel) 50 mg tablet; For episodes of elizabeth, take half a pill to 3 times a day p r n   -     topiramate (TOPAMAX) 50 MG tablet; Take 1 pill twice a day and drink a 6 glasses of water a day    Opioid dependence in remission ([facility] [address] )    Severe episode of recurrent major depressive disorder, without psychotic features (HCC)  -     QUEtiapine (SEROquel) 300 mg tablet; Take 1 tablet (300 mg total) by mouth daily at bedtime for 90 days  -     naltrexone (REVIA) 50 mg tablet; Take 1 tablet (50 mg total) by mouth daily for 30 days    Need for Tdap vaccination  -     TDAP VACCINE GREATER THAN OR EQUAL TO 6YO IM     We are going to start naltrexone 50 mg once a day for the opiate craving   you  Are  Honest - you say you  Crave every day we are going to treat you with this  medicine every day    You have your 1st baby on the way and it is going to be a rough ride with tetralogy of [place] at [address]   we also have to get some of this weight off that you have acquired on the Seroquel   follow-up 1 month        Subjective:    Patient ID: Darryl Austin is a 33 y o  male       Patient is here with the following concerns:    Here for a checkup   First baby is on the way November 6th  Makeda Larry and baby will be going to chop at 36 weeks   they will be spending time in the [place] until the baby is delivered   today Samir Cowan needs his flu shot   also needs a tetanus shot for a small open wound on his right heel   it has been a year since his last tetanus shot   he also needs refills on his meds        The following portions of the patient's history were reviewed and updated as appropriate: allergies, current medications, past family history, past medical history, past social history, past surgical history and problem list     Review of Systems   Skin: Positive for wound  Right  heel   Psychiatric/Behavioral: The patient is nervous/anxious  ADD         /82   Pulse 74   Temp 97 6 °F (36 4 °C)   Ht 6' (1 829 m)   Wt 117 kg (259 lb)   SpO2 98%   BMI 35 13 kg/m²   Social History     Social History    Marital status: Single     Spouse name: N/A    Number of children: N/A    Years of education: N/A     Occupational History    fulltime      Social History Main Topics    Smoking status: Former Smoker     Quit date: 3/30/2015    Smokeless tobacco: Never Used    Alcohol use Yes      Comment: at social events    Drug use: No    Sexual activity: Not on file     Other Topics Concern    Not on file     Social History Narrative    No narrative on file     Past Medical History:   Diagnosis Date    ADHD     Anxiety state     Bipolar disorder ([address] )     Depressive disorder     Essential hypertension     benign    History of emotional problems     Hyperlipidemia     Peptic reflux disease      Family History   Problem Relation Age of Onset    Mental illness Mother     Thyroid disease Mother     Drug abuse Mother         illicit drug    Diabetes Father     Cancer Father      No past surgical history on file      Current Outpatient Prescriptions:     amphetamine-dextroamphetamine (ADDERALL) 20 mg tablet, Take 1 tablet (20 mg total) by mouth 2 (two) times a day for 90 days Max Daily Amount: 40 mg, Disp: 180 tablet, Rfl: 0    omeprazole (PriLOSEC) 20 mg delayed release capsule, TAKE 1 BY MOUTH EVERY DAY FOR STOMACH ACID, Disp: , Rfl: 1    QUEtiapine (SEROquel) 300 mg tablet, Take 1 tablet (300 mg total) by mouth daily at bedtime for 90 days, Disp: 90 tablet, Rfl: 0    QUEtiapine (SEROquel) 50 mg tablet, For episodes of elizaebth, take half a pill to 3 times a day p r n , Disp: 90 tablet, Rfl: 0    naltrexone (REVIA) 50 mg tablet, Take 1 tablet (50 mg total) by mouth daily for 30 days, Disp: 90 tablet, Rfl: 1    topiramate (TOPAMAX) 50 MG tablet, Take 1 pill twice a day and drink a 6 glasses of water a day, Disp: 180 tablet, Rfl: 1      Objective:     Physical Exam   Constitutional: He is oriented to person, place, and time  He appears well-developed and well-nourished  HENT:   Head: Normocephalic and atraumatic  Eyes: Pupils are equal, round, and reactive to light  Neck: Normal range of motion  Neck supple  Cardiovascular: Normal rate, regular rhythm and normal heart sounds  No murmur heard  Pulmonary/Chest: Effort normal and breath sounds normal  No respiratory distress  He exhibits no tenderness  Abdominal: Soft  Bowel sounds are normal    Musculoskeletal: Normal range of motion  He exhibits no tenderness  Lymphadenopathy:     He has no cervical adenopathy  Neurological: He is alert and oriented to person, place, and time  Coordination normal    Skin: Skin is warm and dry  Small open wound right heel   Psychiatric: He has a normal mood and affect  Thought content normal    Nursing note and vitals reviewed        Social History     Social History    Marital status: Single     Spouse name: N/A    Number of children: N/A    Years of education: N/A     Occupational History    fulltime      Social History Main Topics    Smoking status: Former Smoker     Quit date: 3/30/2015    Smokeless tobacco: Never Used    Alcohol use Yes      Comment: at social events    Drug use: No    Sexual activity: Not on file     Other Topics Concern    Not on file     Social History Narrative    No narrative on file     Past Medical History:   Diagnosis Date    ADHD     Anxiety state     Bipolar disorder ([facility] [address] )     Depressive disorder     Essential hypertension     benign    History of emotional problems     Hyperlipidemia     Peptic reflux disease Current Outpatient Prescriptions:     amphetamine-dextroamphetamine (ADDERALL) 20 mg tablet, Take 1 tablet (20 mg total) by mouth 2 (two) times a day for 90 days Max Daily Amount: 40 mg, Disp: 180 tablet, Rfl: 0    omeprazole (PriLOSEC) 20 mg delayed release capsule, TAKE 1 BY MOUTH EVERY DAY FOR STOMACH ACID, Disp: , Rfl: 1    QUEtiapine (SEROquel) 300 mg tablet, Take 1 tablet (300 mg total) by mouth daily at bedtime for 90 days, Disp: 90 tablet, Rfl: 0    QUEtiapine (SEROquel) 50 mg tablet, For episodes of elizabeth, take half a pill to 3 times a day p r n , Disp: 90 tablet, Rfl: 0    naltrexone (REVIA) 50 mg tablet, Take 1 tablet (50 mg total) by mouth daily for 30 days, Disp: 90 tablet, Rfl: 1    topiramate (TOPAMAX) 50 MG tablet, Take 1 pill twice a day and drink a 6 glasses of water a day, Disp: 180 tablet, Rfl: 1  Family History   Problem Relation Age of Onset    Mental illness Mother     Thyroid disease Mother     Drug abuse Mother         illicit drug    Diabetes Father     Cancer Father        Patient Instructions    We are going to start naltrexone 50 mg once a day for the opiate craving   you  Are  Honest - you say you  Crave every day we are going to treat you with this  medicine every day    You have your 1st baby on the way and it is going to be a rough ride with tetralogy of [place] at [address]   we also have to get some of this weight off that you have acquired on the Seroquel   follow-up 1 month          PETER Cortes

## 2018-10-12 ENCOUNTER — HOSPITAL ENCOUNTER (EMERGENCY)
Facility: HOSPITAL | Age: 28
Discharge: HOME | End: 2018-10-12
Attending: EMERGENCY MEDICINE
Payer: COMMERCIAL

## 2018-10-12 VITALS
OXYGEN SATURATION: 96 % | RESPIRATION RATE: 16 BRPM | SYSTOLIC BLOOD PRESSURE: 157 MMHG | TEMPERATURE: 98.2 F | DIASTOLIC BLOOD PRESSURE: 88 MMHG | HEART RATE: 69 BPM

## 2018-10-12 DIAGNOSIS — E87.1 HYPONATREMIA: ICD-10-CM

## 2018-10-12 DIAGNOSIS — F41.9 ANXIETY: Primary | ICD-10-CM

## 2018-10-12 DIAGNOSIS — E87.6 HYPOKALEMIA: ICD-10-CM

## 2018-10-12 DIAGNOSIS — R11.10 VOMITING: ICD-10-CM

## 2018-10-12 LAB
ANION GAP BLD CALC-SCNC: 13 MMOL/L (ref 4–13)
ANION GAP SERPL CALCULATED.3IONS-SCNC: 5 MMOL/L (ref 4–13)
ATRIAL RATE: 54 BPM
BUN BLD-MCNC: 28 MG/DL (ref 5–25)
BUN SERPL-MCNC: 19 MG/DL (ref 5–25)
CA-I BLD-SCNC: 0.79 MMOL/L (ref 1.12–1.32)
CALCIUM SERPL-MCNC: 8.4 MG/DL (ref 8.3–10.1)
CHLORIDE BLD-SCNC: 82 MMOL/L (ref 100–108)
CHLORIDE SERPL-SCNC: 92 MMOL/L (ref 100–108)
CO2 SERPL-SCNC: 34 MMOL/L (ref 21–32)
CREAT BLD-MCNC: 1.2 MG/DL (ref 0.6–1.3)
CREAT SERPL-MCNC: 1.09 MG/DL (ref 0.6–1.3)
GFR SERPL CREATININE-BSD FRML MDRD: 82 ML/MIN/1.73SQ M
GFR SERPL CREATININE-BSD FRML MDRD: 93 ML/MIN/1.73SQ M
GLUCOSE SERPL-MCNC: 127 MG/DL (ref 65–140)
GLUCOSE SERPL-MCNC: 139 MG/DL (ref 65–140)
HCT VFR BLD CALC: 48 % (ref 36.5–49.3)
HGB BLDA-MCNC: 16.3 G/DL (ref 12–17)
P AXIS: 67 DEGREES
PCO2 BLD: 38 MMOL/L (ref 21–32)
POTASSIUM BLD-SCNC: 3 MMOL/L (ref 3.5–5.3)
POTASSIUM SERPL-SCNC: 2.6 MMOL/L (ref 3.5–5.3)
PR INTERVAL: 110 MS
QRS AXIS: 66 DEGREES
QRSD INTERVAL: 98 MS
QT INTERVAL: 464 MS
QTC INTERVAL: 440 MS
SODIUM BLD-SCNC: 128 MMOL/L (ref 136–145)
SODIUM SERPL-SCNC: 131 MMOL/L (ref 136–145)
SPECIMEN SOURCE: ABNORMAL
T WAVE AXIS: 34 DEGREES
VENTRICULAR RATE: 54 BPM

## 2018-10-12 PROCEDURE — 80048 BASIC METABOLIC PNL TOTAL CA: CPT | Performed by: EMERGENCY MEDICINE

## 2018-10-12 PROCEDURE — 80047 BASIC METABLC PNL IONIZED CA: CPT

## 2018-10-12 PROCEDURE — C9113 INJ PANTOPRAZOLE SODIUM, VIA: HCPCS | Performed by: EMERGENCY MEDICINE

## 2018-10-12 PROCEDURE — 93010 ELECTROCARDIOGRAM REPORT: CPT | Performed by: INTERNAL MEDICINE

## 2018-10-12 PROCEDURE — 99284 EMERGENCY DEPT VISIT MOD MDM: CPT

## 2018-10-12 PROCEDURE — 96361 HYDRATE IV INFUSION ADD-ON: CPT

## 2018-10-12 PROCEDURE — 96366 THER/PROPH/DIAG IV INF ADDON: CPT

## 2018-10-12 PROCEDURE — 93005 ELECTROCARDIOGRAM TRACING: CPT

## 2018-10-12 PROCEDURE — 85014 HEMATOCRIT: CPT

## 2018-10-12 PROCEDURE — 36415 COLL VENOUS BLD VENIPUNCTURE: CPT | Performed by: EMERGENCY MEDICINE

## 2018-10-12 PROCEDURE — 96376 TX/PRO/DX INJ SAME DRUG ADON: CPT

## 2018-10-12 PROCEDURE — 96365 THER/PROPH/DIAG IV INF INIT: CPT

## 2018-10-12 PROCEDURE — 96375 TX/PRO/DX INJ NEW DRUG ADDON: CPT

## 2018-10-12 RX ORDER — MAGNESIUM HYDROXIDE/ALUMINUM HYDROXICE/SIMETHICONE 120; 1200; 1200 MG/30ML; MG/30ML; MG/30ML
30 SUSPENSION ORAL ONCE
Status: COMPLETED | OUTPATIENT
Start: 2018-10-12 | End: 2018-10-12

## 2018-10-12 RX ORDER — POTASSIUM CHLORIDE 14.9 MG/ML
20 INJECTION INTRAVENOUS ONCE
Status: COMPLETED | OUTPATIENT
Start: 2018-10-12 | End: 2018-10-12

## 2018-10-12 RX ORDER — POTASSIUM CHLORIDE 20 MEQ/1
40 TABLET, EXTENDED RELEASE ORAL ONCE
Status: COMPLETED | OUTPATIENT
Start: 2018-10-12 | End: 2018-10-12

## 2018-10-12 RX ORDER — LORAZEPAM 2 MG/ML
2 INJECTION INTRAMUSCULAR ONCE
Status: COMPLETED | OUTPATIENT
Start: 2018-10-12 | End: 2018-10-12

## 2018-10-12 RX ORDER — ONDANSETRON 2 MG/ML
4 INJECTION INTRAMUSCULAR; INTRAVENOUS ONCE
Status: COMPLETED | OUTPATIENT
Start: 2018-10-12 | End: 2018-10-12

## 2018-10-12 RX ORDER — OLANZAPINE 10 MG/1
10 TABLET, ORALLY DISINTEGRATING ORAL
Status: DISCONTINUED | OUTPATIENT
Start: 2018-10-12 | End: 2018-10-12 | Stop reason: HOSPADM

## 2018-10-12 RX ORDER — PANTOPRAZOLE SODIUM 40 MG/1
40 INJECTION, POWDER, FOR SOLUTION INTRAVENOUS ONCE
Status: COMPLETED | OUTPATIENT
Start: 2018-10-12 | End: 2018-10-12

## 2018-10-12 RX ORDER — ONDANSETRON 8 MG/1
8 TABLET, ORALLY DISINTEGRATING ORAL EVERY 8 HOURS PRN
Qty: 20 TABLET | Refills: 0 | Status: SHIPPED | OUTPATIENT
Start: 2018-10-12 | End: 2019-10-25 | Stop reason: HOSPADM

## 2018-10-12 RX ORDER — LORAZEPAM 2 MG/ML
1 INJECTION INTRAMUSCULAR ONCE
Status: COMPLETED | OUTPATIENT
Start: 2018-10-12 | End: 2018-10-12

## 2018-10-12 RX ADMIN — OLANZAPINE 10 MG: 10 TABLET, ORALLY DISINTEGRATING ORAL at 10:44

## 2018-10-12 RX ADMIN — ALUMINUM HYDROXIDE, MAGNESIUM HYDROXIDE, AND SIMETHICONE 30 ML: 200; 200; 20 SUSPENSION ORAL at 10:27

## 2018-10-12 RX ADMIN — SODIUM CHLORIDE 1000 ML: 0.9 INJECTION, SOLUTION INTRAVENOUS at 08:19

## 2018-10-12 RX ADMIN — ALUMINUM HYDROXIDE, MAGNESIUM HYDROXIDE, AND SIMETHICONE 30 ML: 200; 200; 20 SUSPENSION ORAL at 16:09

## 2018-10-12 RX ADMIN — LIDOCAINE HYDROCHLORIDE 10 ML: 20 SOLUTION ORAL; TOPICAL at 10:27

## 2018-10-12 RX ADMIN — ONDANSETRON 4 MG: 2 INJECTION INTRAMUSCULAR; INTRAVENOUS at 08:20

## 2018-10-12 RX ADMIN — LORAZEPAM 2 MG: 2 INJECTION INTRAMUSCULAR; INTRAVENOUS at 14:12

## 2018-10-12 RX ADMIN — LIDOCAINE HYDROCHLORIDE 10 ML: 20 SOLUTION ORAL; TOPICAL at 16:09

## 2018-10-12 RX ADMIN — PANTOPRAZOLE SODIUM 40 MG: 40 INJECTION, POWDER, FOR SOLUTION INTRAVENOUS at 10:21

## 2018-10-12 RX ADMIN — LORAZEPAM 1 MG: 2 INJECTION INTRAMUSCULAR; INTRAVENOUS at 08:19

## 2018-10-12 RX ADMIN — POTASSIUM CHLORIDE 40 MEQ: 1500 TABLET, EXTENDED RELEASE ORAL at 16:10

## 2018-10-12 RX ADMIN — SODIUM CHLORIDE 1000 ML: 0.9 INJECTION, SOLUTION INTRAVENOUS at 09:42

## 2018-10-12 RX ADMIN — POTASSIUM CHLORIDE 40 MEQ: 1500 TABLET, EXTENDED RELEASE ORAL at 09:42

## 2018-10-12 RX ADMIN — POTASSIUM CHLORIDE 20 MEQ: 14.9 INJECTION, SOLUTION INTRAVENOUS at 09:34

## 2018-10-12 RX ADMIN — POTASSIUM CHLORIDE 20 MEQ: 14.9 INJECTION, SOLUTION INTRAVENOUS at 14:10

## 2018-10-12 NOTE — ED NOTES
Patient stated "I feel a little better " after receiving ativan  NAD noted at this time       Shanice Rodas RN  10/12/18 5370

## 2018-10-12 NOTE — ED PROVIDER NOTES
History  Chief Complaint   Patient presents with    Anxiety     patient presents to the ED with c/o anxiety x1 week aprrox       History provided by:  Patient  Anxiety   Presenting symptoms: depression    Presenting symptoms: no disorganized speech, no disorganized thought process, no hallucinations, no homicidal ideas, no paranoid behavior, no self-mutilation, no suicidal thoughts and no suicidal threats    Patient accompanied by:  Family member (fiance)  Degree of incapacity (severity): Moderate  Onset quality:  Gradual  Duration:  1 week  Timing:  Constant  Progression:  Worsening  Chronicity:  Recurrent  Context: medication    Context: not alcohol use and not drug abuse    Treatment compliance: All of the time  Time since last psychoactive medication taken:  1 week  Relieved by:  Nothing (usually helped by seroquel, but he has been vomiting it up)  Worsened by:  Lack of sleep (the stress of his son with heart issues, and his fiance is pregnant with a child that also has heart issues)  Ineffective treatments:  None tried  Associated symptoms: anxiety and insomnia    Associated symptoms: no abdominal pain, no fatigue, no irritability and no poor judgment    Risk factors: hx of mental illness    Risk factors: no recent psychiatric admission (last hospitalized over 5 years ago)        Prior to Admission Medications   Prescriptions Last Dose Informant Patient Reported? Taking?    QUEtiapine (SEROquel) 300 mg tablet   No Yes   Sig: Take 1 tablet (300 mg total) by mouth daily at bedtime for 90 days   QUEtiapine (SEROquel) 50 mg tablet   No Yes   Sig: For episodes of elizabeth, take half a pill to 3 times a day p r n    amphetamine-dextroamphetamine (ADDERALL) 20 mg tablet   No No   Sig: Take 1 tablet (20 mg total) by mouth 2 (two) times a day for 90 days Max Daily Amount: 40 mg   naltrexone (REVIA) 50 mg tablet   No No   Sig: Take 1 tablet (50 mg total) by mouth daily for 30 days   omeprazole (PriLOSEC) 20 mg delayed release capsule  Self Yes Yes   Sig: TAKE 1 BY MOUTH EVERY DAY FOR STOMACH ACID   topiramate (TOPAMAX) 50 MG tablet   No No   Sig: Take 1 pill twice a day and drink a 6 glasses of water a day      Facility-Administered Medications: None       Past Medical History:   Diagnosis Date    ADHD     Anxiety state     Bipolar disorder (HCC)     Depressive disorder     Essential hypertension     benign    History of emotional problems     Hyperlipidemia     Peptic reflux disease        History reviewed  No pertinent surgical history  Family History   Problem Relation Age of Onset    Mental illness Mother     Thyroid disease Mother     Drug abuse Mother         illicit drug    Diabetes Father     Cancer Father      I have reviewed and agree with the history as documented  Social History   Substance Use Topics    Smoking status: Former Smoker     Quit date: 3/30/2015    Smokeless tobacco: Never Used    Alcohol use Yes      Comment: at social events        Review of Systems   Constitutional: Negative for fatigue and irritability  Gastrointestinal: Negative for abdominal pain  Psychiatric/Behavioral: Negative for hallucinations, homicidal ideas, paranoia, self-injury and suicidal ideas  The patient is nervous/anxious and has insomnia  All other systems reviewed and are negative  Physical Exam  Physical Exam   Constitutional: He appears well-developed and well-nourished  HENT:   Head: Normocephalic and atraumatic  Eyes: Pupils are equal, round, and reactive to light  EOM are normal    Neck: Neck supple  Cardiovascular: Normal rate and regular rhythm  Pulmonary/Chest: Effort normal and breath sounds normal    Abdominal: Soft  Bowel sounds are normal  He exhibits no distension  There is no tenderness  Musculoskeletal: He exhibits no edema  Neurological: He is alert  No cranial nerve deficit  He exhibits normal muscle tone  Skin: No rash noted  No pallor     Psychiatric: His affect is blunt  His speech is delayed  He is withdrawn  He exhibits a depressed mood  He expresses no homicidal and no suicidal ideation  He expresses no suicidal plans and no homicidal plans  Vitals reviewed        Vital Signs  ED Triage Vitals   Temperature Pulse Respirations Blood Pressure SpO2   10/12/18 0745 10/12/18 0745 10/12/18 0745 10/12/18 0745 10/12/18 0745   98 2 °F (36 8 °C) 75 18 143/97 95 %      Temp Source Heart Rate Source Patient Position - Orthostatic VS BP Location FiO2 (%)   10/12/18 0745 10/12/18 1208 10/12/18 1208 10/12/18 1208 --   Oral Monitor Lying Left arm       Pain Score       10/12/18 0745       No Pain           Vitals:    10/12/18 0745 10/12/18 0944 10/12/18 1208 10/12/18 1414   BP: 143/97 137/84 150/80 135/71   Pulse: 75 81 64 67   Patient Position - Orthostatic VS:   Lying        Visual Acuity      ED Medications  Medications   OLANZapine (ZyPREXA ZYDIS) dispersible tablet 10 mg (10 mg Oral Given 10/12/18 1044)   potassium chloride 20 mEq IVPB (premix) (20 mEq Intravenous New Bag 10/12/18 1410)   potassium chloride (K-DUR,KLOR-CON) CR tablet 40 mEq (not administered)   aluminum-magnesium hydroxide-simethicone (MYLANTA) 200-200-20 mg/5 mL oral suspension 30 mL (not administered)   lidocaine viscous (XYLOCAINE) 2 % mucosal solution 10 mL (not administered)   sodium chloride 0 9 % bolus 1,000 mL (0 mL Intravenous Stopped 10/12/18 0919)   ondansetron (ZOFRAN) injection 4 mg (4 mg Intravenous Given 10/12/18 0820)   LORazepam (ATIVAN) 2 mg/mL injection 1 mg (1 mg Intravenous Given 10/12/18 0819)   sodium chloride 0 9 % bolus 1,000 mL (0 mL Intravenous Stopped 10/12/18 1235)   potassium chloride (K-DUR,KLOR-CON) CR tablet 40 mEq (40 mEq Oral Given 10/12/18 0942)   potassium chloride 20 mEq IVPB (premix) (0 mEq Intravenous Stopped 10/12/18 1134)   pantoprazole (PROTONIX) injection 40 mg (40 mg Intravenous Given 10/12/18 1021)   aluminum-magnesium hydroxide-simethicone (MYLANTA) 200-200-20 mg/5 mL oral suspension 30 mL (30 mL Oral Given 10/12/18 1027)   lidocaine viscous (XYLOCAINE) 2 % mucosal solution 10 mL (10 mL Swish & Swallow Given 10/12/18 1027)   LORazepam (ATIVAN) 2 mg/mL injection 2 mg (2 mg Intravenous Given 10/12/18 1412)       Diagnostic Studies  Results Reviewed     Procedure Component Value Units Date/Time    Basic metabolic panel [[id number]]  (Abnormal) Collected:  10/12/18 1235    Lab Status:  Final result Specimen:  Blood from Arm, Right Updated:  10/12/18 1324     Sodium 131 (L) mmol/L      Potassium 2 6 (LL) mmol/L      Chloride 92 (L) mmol/L      CO2 34 (H) mmol/L      ANION GAP 5 mmol/L      BUN 19 mg/dL      Creatinine 1 09 mg/dL      Glucose 127 mg/dL      Calcium 8 4 mg/dL      eGFR 93 ml/min/1 73sq m     Narrative:         National Kidney Disease Education Program recommendations are as follows:  GFR calculation is accurate only with a steady state creatinine  Chronic Kidney disease less than 60 ml/min/1 73 sq  meters  Kidney failure less than 15 ml/min/1 73 sq  meters  POCT Chem 8+ [[id number]]  (Abnormal) Collected:  10/12/18 0837    Lab Status:  Final result Updated:  10/12/18 0906     SODIUM, I-STAT 128 (L) mmol/l      Potassium, i-STAT 3 0 (L) mmol/L      Chloride, istat 82 (LL) mmol/L      CO2, i-STAT 38 (H) mmol/L      Anion Gap, Istat 13 mmol/L      Calcium, Ionized i-STAT 0 79 (L) mmol/L      BUN, I-STAT 28 (H) mg/dl      Creatinine, i-STAT 1 2 mg/dl      eGFR 82 ml/min/1 73sq m      Glucose, i-STAT 139 mg/dl      Hct, i-STAT 48 %      Hgb, i-STAT 16 3 g/dl      Specimen Type VENOUS                 No orders to display              Procedures  Procedures       Phone Contacts  ED Phone Contact    ED Course  ED Course as of Oct 12 1556   Fri Oct 12, 2018   1209 Pt anxiety resolved with zyprexa and reflux sx improving  Gets dehydrated when he gets cyclical vomiting with worsened reflux and anxiety  Hydrating him with 2L NS and KCL  Will repeat chem to make sure its improving  Pt met with crisis and given outpatient resources  1407 D/w pt decreased potassium level  Likely dilutional related b/c he got 2L of IVF but his potassium was low and d/w him being admitted for dehydration and hyponatremia and hypokalemia from anxiety and vomiting  Pt refusing to stay  States he can't because it will induce a worse anxiety attack and he was feeling improved  Fiance is here as well  After d/w pt he is willing to stay a few more hours to get another dose of IV potassium  MDM  Number of Diagnoses or Management Options  Anxiety: new and requires workup  Hypokalemia: new and requires workup  Hyponatremia: new and requires workup  Vomiting: new and requires workup     Amount and/or Complexity of Data Reviewed  Clinical lab tests: ordered and reviewed  Discuss the patient with other providers: yes (crisis)    Risk of Complications, Morbidity, and/or Mortality  Presenting problems: high    Patient Progress  Patient progress: improved    CritCare Time    Disposition  Final diagnoses:   Anxiety   Vomiting   Hypokalemia   Hyponatremia     Time reflects when diagnosis was documented in both MDM as applicable and the Disposition within this note     Time User Action Codes Description Comment    10/12/2018  3:53 PM Brendon Mary [F41 9] Anxiety     10/12/2018  3:53 PM [address] [R11 10] Vomiting     10/12/2018  3:53 PM Yaz [address] [[address]87 6] Hypokalemia     10/12/2018  3:53 PM Tiki Brennan [address] [[address] 1] Hyponatremia       ED Disposition     ED Disposition Condition Comment    Discharge  Gloria Cardoso discharge to home/self care      Condition at discharge: Good        Follow-up Information     Follow up With Specialties Details Why Contact Jennifer Ortiz, [address] Nurse Practitioner Go in 1 week  [address] [address]  [phone]            Patient's Medications   Discharge Prescriptions    ONDANSETRON (ZOFRAN-ODT) 8 MG DISINTEGRATING TABLET    Take 1 tablet (8 mg total) by mouth every 8 (eight) hours as needed for nausea or vomiting       Start Date: 10/12/2018End Date: --       Order Dose: 8 mg       Quantity: 20 tablet    Refills: 0       Outpatient Discharge Orders  Basic metabolic panel   Standing Status: Future  Standing Exp   Date: 10/12/19         ED Provider  Electronically Signed by           Ermelinda Middleton MD  10/12/18 7474

## 2018-10-12 NOTE — DISCHARGE INSTRUCTIONS
Acute Nausea and Vomiting, Ambulatory Care   GENERAL INFORMATION:   Acute nausea and vomiting  starts suddenly, gets worse quickly, and lasts a short time  Nausea and vomiting may be caused by pregnancy, alcohol, infection, or medicines  Common related symptoms include the following:   · Fever    · Abdominal swelling    · Pain, tenderness, or a lump in the abdomen    · Splashing sounds heard in your stomach when you move  Seek immediate care for the following symptoms:   · Blood in your vomit or bowel movements    · Sudden, severe pain in your chest and upper abdomen after hard vomiting    · Dizziness, dry mouth, and thirst    · Urinating very little or not at all    · Muscle weakness, leg cramps, and trouble breathing    · A heart beat that is faster than normal    · Vomiting for more than 48 hours  Treatment for acute nausea and vomiting  may include medicines to calm your stomach and stop the vomiting  You may need IV fluids if you are dehydrated  Manage your nausea and vomiting:   · Drink liquids as directed to prevent dehydration  Ask how much liquid to drink each day and which liquids are best for you  You may need to drink an oral rehydration solution (ORS)  ORS contains water, salts, and sugar that are needed to replace the lost body fluids  Ask what kind of ORS to use, how much to drink, and where to get it  · Eat smaller meals, more often  Eat small amounts of food every 2 to 3 hours, even if you are not hungry  Food in your stomach may help decrease your nausea  · Avoid stress  Find ways to relax and manage your stress  Headaches due to stress may cause nausea and vomiting  Get more rest and sleep  Follow up with your healthcare provider as directed:  Write down your questions so you remember to ask them during your visits  CARE AGREEMENT:   You have the right to help plan your care  Learn about your health condition and how it may be treated   Discuss treatment options with your caregivers to decide what care you want to receive  You always have the right to refuse treatment  The above information is an  only  It is not intended as medical advice for individual conditions or treatments  Talk to your doctor, nurse or pharmacist before following any medical regimen to see if it is safe and effective for you  © 2014 [address] is for End User's use only and may not be sold, redistributed or otherwise used for commercial purposes  All illustrations and images included in CareNotes® are the copyrighted property of [employer]  or Basilio Anabell  Anxiety   WHAT YOU SHOULD KNOW:   Anxiety is a condition that causes you to feel excessive worry, uneasiness, or fear  Family or work stress, smoking, caffeine, and alcohol can increase your risk for anxiety  Certain medicines or health conditions can also increase your risk  Anxiety may begin gradually, and can become a long-term condition if it is not managed or treated  AFTER YOU LEAVE:   Medicines:   · Medicines  can help you feel more calm and relaxed, and decrease your symptoms  · Take your medicine as directed  Contact your healthcare provider if you think your medicine is not helping or if you have side effects  Tell him if you are allergic to any medicine  Keep a list of the medicines, vitamins, and herbs you take  Include the amounts, and when and why you take them  Bring the list or the pill bottles to follow-up visits  Carry your medicine list with you in case of an emergency  Follow up with your healthcare provider within 2 weeks or as directed:  Write down your questions so you remember to ask them during your visits  Manage anxiety:   · Go to counseling as directed  Cognitive behavioral therapy can help you understand and change how you react to events that trigger your symptoms  · Find ways to manage your symptoms    Activities such as exercise, meditation, or listening to music can help you relax  · Practice deep breathing  Breathing can change how your body reacts to stress  Focus on taking slow, deep breaths several times a day, or during an anxiety attack  Breathe in through your nose, and out through your mouth  · Avoid caffeine  Caffeine can make your symptoms worse  Avoid foods or drinks that are meant to increase your energy level  · Limit or avoid alcohol  Ask your healthcare provider if alcohol is safe for you  You may not be able to drink alcohol if you take certain anxiety or depression medicines  Limit alcohol to 1 drink per day if you are a woman  Limit alcohol to 2 drinks per day if you are a man  A drink of alcohol is 12 ounces of beer, 5 ounces of wine, or 1½ ounces of liquor  Contact your healthcare provider if:   · Your symptoms get worse or do not get better with treatment  · You think your medicine may be causing side effects  · Your anxiety keeps you from doing your regular daily activities  · You have new symptoms since your last visit  · You have questions or concerns about your condition or care  Seek care immediately or call 911 if:   · You have chest pain, tightness, or heaviness that may spread to your shoulders, arms, jaw, neck, or back  · You feel like hurting yourself or someone else  · You feel dizzy, lightheaded, or faint  © 2014 [address] is for End User's use only and may not be sold, redistributed or otherwise used for commercial purposes  All illustrations and images included in CareNotes® are the copyrighted property of [employer]  or Basilio Hung  The above information is an  only  It is not intended as medical advice for individual conditions or treatments  Talk to your doctor, nurse or pharmacist before following any medical regimen to see if it is safe and effective for you      Hypokalemia   AMBULATORY CARE:   Hypokalemia  is a low level of potassium in your blood  Potassium helps control how your muscles, heart, and digestive system work  Hypokalemia occurs when your body loses too much potassium or does not absorb enough from food  Common signs and symptoms include the following: You may not have any signs or symptoms if you have mild hypokalemia  You may have any of the following if it is more severe:  · Fatigue    · Constipation    · Frequent urination or urinating large amounts    · Muscle cramps or skin tingling    · Muscle weakness    · Fast or irregular heartbeat  Seek care immediately if:   · You cannot move your arm or leg  · You have a fast or irregular heartbeat  · You are too tired or weak to stand up  Contact your healthcare provider if:   · You are vomiting, or you have diarrhea  · You have numbness or tingling in your arms or legs  · Your symptoms do not go away or they get worse  · You have questions or concerns about your condition or care  Treatment:  You will receive potassium to bring your levels back to normal  This may be given as a pill or IV  The amount of potassium you will be given depends on your potassium level  Eat foods that are high in potassium:  Foods that are high in potassium include bananas, oranges, tomatoes, potatoes, and avocado  [place] beans, turkey, salmon, lean beef, yogurt, and milk are also high in potassium  Ask your healthcare provider or dietitian for more information about foods that are high in potassium  Follow up with your healthcare provider as directed:  Write down your questions so you remember to ask them during your visits  © 2017 [address]  Information is for End User's use only and may not be sold, redistributed or otherwise used for commercial purposes  All illustrations and images included in CareNotes® are the copyrighted property of [employer]  or Basilio Hung  The above information is an  only   It is not intended as medical advice for individual conditions or treatments  Talk to your doctor, nurse or pharmacist before following any medical regimen to see if it is safe and effective for you

## 2018-10-12 NOTE — ED NOTES
Outpt  resources provided to the pt, per the request of the ED Physician       Malcom Min LMSW  10/12/18  2203

## 2018-10-15 ENCOUNTER — HOSPITAL ENCOUNTER (EMERGENCY)
Facility: HOSPITAL | Age: 28
Discharge: HOME | End: 2018-10-15
Attending: EMERGENCY MEDICINE
Payer: COMMERCIAL

## 2018-10-15 VITALS
HEART RATE: 78 BPM | WEIGHT: 250 LBS | BODY MASS INDEX: 33.86 KG/M2 | RESPIRATION RATE: 18 BRPM | TEMPERATURE: 98.3 F | SYSTOLIC BLOOD PRESSURE: 156 MMHG | DIASTOLIC BLOOD PRESSURE: 81 MMHG | HEIGHT: 72 IN | OXYGEN SATURATION: 95 %

## 2018-10-15 DIAGNOSIS — F41.9 ANXIETY: Primary | ICD-10-CM

## 2018-10-15 DIAGNOSIS — R11.10 VOMITING: ICD-10-CM

## 2018-10-15 LAB
ALBUMIN SERPL BCP-MCNC: 4 G/DL (ref 3.5–5)
ALP SERPL-CCNC: 65 U/L (ref 46–116)
ALT SERPL W P-5'-P-CCNC: 20 U/L (ref 12–78)
AMPHETAMINES SERPL QL SCN: NEGATIVE
ANION GAP SERPL CALCULATED.3IONS-SCNC: 10 MMOL/L (ref 4–13)
AST SERPL W P-5'-P-CCNC: 19 U/L (ref 5–45)
BARBITURATES UR QL: NEGATIVE
BASOPHILS # BLD AUTO: 0.03 THOUSANDS/ΜL (ref 0–0.1)
BASOPHILS NFR BLD AUTO: 0 % (ref 0–1)
BENZODIAZ UR QL: NEGATIVE
BILIRUB DIRECT SERPL-MCNC: 0.22 MG/DL (ref 0–0.2)
BILIRUB SERPL-MCNC: 0.8 MG/DL (ref 0.2–1)
BUN SERPL-MCNC: 12 MG/DL (ref 5–25)
CALCIUM SERPL-MCNC: 9.2 MG/DL (ref 8.3–10.1)
CHLORIDE SERPL-SCNC: 97 MMOL/L (ref 100–108)
CO2 SERPL-SCNC: 30 MMOL/L (ref 21–32)
COCAINE UR QL: NEGATIVE
CREAT SERPL-MCNC: 1.15 MG/DL (ref 0.6–1.3)
EOSINOPHIL # BLD AUTO: 0.01 THOUSAND/ΜL (ref 0–0.61)
EOSINOPHIL NFR BLD AUTO: 0 % (ref 0–6)
ERYTHROCYTE [DISTWIDTH] IN BLOOD BY AUTOMATED COUNT: 12.4 % (ref 11.6–15.1)
GFR SERPL CREATININE-BSD FRML MDRD: 87 ML/MIN/1.73SQ M
GLUCOSE SERPL-MCNC: 133 MG/DL (ref 65–140)
HCT VFR BLD AUTO: 46.3 % (ref 36.5–49.3)
HGB BLD-MCNC: 16.3 G/DL (ref 12–17)
IMM GRANULOCYTES # BLD AUTO: 0.08 THOUSAND/UL (ref 0–0.2)
IMM GRANULOCYTES NFR BLD AUTO: 1 % (ref 0–2)
LYMPHOCYTES # BLD AUTO: 1.93 THOUSANDS/ΜL (ref 0.6–4.47)
LYMPHOCYTES NFR BLD AUTO: 16 % (ref 14–44)
MCH RBC QN AUTO: 28.5 PG (ref 26.8–34.3)
MCHC RBC AUTO-ENTMCNC: 35.2 G/DL (ref 31.4–37.4)
MCV RBC AUTO: 81 FL (ref 82–98)
METHADONE UR QL: NEGATIVE
MONOCYTES # BLD AUTO: 1.06 THOUSAND/ΜL (ref 0.17–1.22)
MONOCYTES NFR BLD AUTO: 9 % (ref 4–12)
NEUTROPHILS # BLD AUTO: 8.91 THOUSANDS/ΜL (ref 1.85–7.62)
NEUTS SEG NFR BLD AUTO: 74 % (ref 43–75)
NRBC BLD AUTO-RTO: 0 /100 WBCS
OPIATES UR QL SCN: NEGATIVE
PCP UR QL: NEGATIVE
PLATELET # BLD AUTO: 363 THOUSANDS/UL (ref 149–390)
PMV BLD AUTO: 10.2 FL (ref 8.9–12.7)
POTASSIUM SERPL-SCNC: 3.1 MMOL/L (ref 3.5–5.3)
PROT SERPL-MCNC: 7.7 G/DL (ref 6.4–8.2)
RBC # BLD AUTO: 5.71 MILLION/UL (ref 3.88–5.62)
SODIUM SERPL-SCNC: 137 MMOL/L (ref 136–145)
THC UR QL: POSITIVE
WBC # BLD AUTO: 12.02 THOUSAND/UL (ref 4.31–10.16)

## 2018-10-15 PROCEDURE — 96374 THER/PROPH/DIAG INJ IV PUSH: CPT

## 2018-10-15 PROCEDURE — 80076 HEPATIC FUNCTION PANEL: CPT | Performed by: EMERGENCY MEDICINE

## 2018-10-15 PROCEDURE — 85025 COMPLETE CBC W/AUTO DIFF WBC: CPT | Performed by: EMERGENCY MEDICINE

## 2018-10-15 PROCEDURE — 80048 BASIC METABOLIC PNL TOTAL CA: CPT | Performed by: EMERGENCY MEDICINE

## 2018-10-15 PROCEDURE — 96361 HYDRATE IV INFUSION ADD-ON: CPT

## 2018-10-15 PROCEDURE — 99283 EMERGENCY DEPT VISIT LOW MDM: CPT

## 2018-10-15 PROCEDURE — 96375 TX/PRO/DX INJ NEW DRUG ADDON: CPT

## 2018-10-15 PROCEDURE — 80307 DRUG TEST PRSMV CHEM ANLYZR: CPT | Performed by: EMERGENCY MEDICINE

## 2018-10-15 PROCEDURE — 36415 COLL VENOUS BLD VENIPUNCTURE: CPT | Performed by: EMERGENCY MEDICINE

## 2018-10-15 RX ORDER — ONDANSETRON 4 MG/1
4 TABLET, FILM COATED ORAL EVERY 6 HOURS
Qty: 12 TABLET | Refills: 0 | Status: SHIPPED | OUTPATIENT
Start: 2018-10-15 | End: 2018-11-17 | Stop reason: ALTCHOICE

## 2018-10-15 RX ORDER — LORAZEPAM 2 MG/ML
0.5 INJECTION INTRAMUSCULAR ONCE
Status: COMPLETED | OUTPATIENT
Start: 2018-10-15 | End: 2018-10-15

## 2018-10-15 RX ORDER — POTASSIUM CHLORIDE 20 MEQ/1
40 TABLET, EXTENDED RELEASE ORAL ONCE
Status: COMPLETED | OUTPATIENT
Start: 2018-10-15 | End: 2018-10-15

## 2018-10-15 RX ORDER — ONDANSETRON 2 MG/ML
4 INJECTION INTRAMUSCULAR; INTRAVENOUS ONCE
Status: COMPLETED | OUTPATIENT
Start: 2018-10-15 | End: 2018-10-15

## 2018-10-15 RX ADMIN — POTASSIUM CHLORIDE 40 MEQ: 1500 TABLET, EXTENDED RELEASE ORAL at 08:45

## 2018-10-15 RX ADMIN — LORAZEPAM 0.5 MG: 2 INJECTION INTRAMUSCULAR; INTRAVENOUS at 07:51

## 2018-10-15 RX ADMIN — SODIUM CHLORIDE 1000 ML: 0.9 INJECTION, SOLUTION INTRAVENOUS at 07:49

## 2018-10-15 RX ADMIN — ONDANSETRON 4 MG: 2 INJECTION INTRAMUSCULAR; INTRAVENOUS at 07:51

## 2018-10-15 NOTE — DISCHARGE INSTRUCTIONS
Acute Nausea and Vomiting, Ambulatory Care   GENERAL INFORMATION:   Acute nausea and vomiting  starts suddenly, gets worse quickly, and lasts a short time  Nausea and vomiting may be caused by pregnancy, alcohol, infection, or medicines  Common related symptoms include the following:   · Fever    · Abdominal swelling    · Pain, tenderness, or a lump in the abdomen    · Splashing sounds heard in your stomach when you move  Seek immediate care for the following symptoms:   · Blood in your vomit or bowel movements    · Sudden, severe pain in your chest and upper abdomen after hard vomiting    · Dizziness, dry mouth, and thirst    · Urinating very little or not at all    · Muscle weakness, leg cramps, and trouble breathing    · A heart beat that is faster than normal    · Vomiting for more than 48 hours  Treatment for acute nausea and vomiting  may include medicines to calm your stomach and stop the vomiting  You may need IV fluids if you are dehydrated  Manage your nausea and vomiting:   · Drink liquids as directed to prevent dehydration  Ask how much liquid to drink each day and which liquids are best for you  You may need to drink an oral rehydration solution (ORS)  ORS contains water, salts, and sugar that are needed to replace the lost body fluids  Ask what kind of ORS to use, how much to drink, and where to get it  · Eat smaller meals, more often  Eat small amounts of food every 2 to 3 hours, even if you are not hungry  Food in your stomach may help decrease your nausea  · Avoid stress  Find ways to relax and manage your stress  Headaches due to stress may cause nausea and vomiting  Get more rest and sleep  Follow up with your healthcare provider as directed:  Write down your questions so you remember to ask them during your visits  CARE AGREEMENT:   You have the right to help plan your care  Learn about your health condition and how it may be treated   Discuss treatment options with your caregivers to decide what care you want to receive  You always have the right to refuse treatment  The above information is an  only  It is not intended as medical advice for individual conditions or treatments  Talk to your doctor, nurse or pharmacist before following any medical regimen to see if it is safe and effective for you  © 2014 [address] is for End User's use only and may not be sold, redistributed or otherwise used for commercial purposes  All illustrations and images included in CareNotes® are the copyrighted property of [employer]  or Basilio Anabell  Anxiety   WHAT YOU SHOULD KNOW:   Anxiety is a condition that causes you to feel excessive worry, uneasiness, or fear  Family or work stress, smoking, caffeine, and alcohol can increase your risk for anxiety  Certain medicines or health conditions can also increase your risk  Anxiety may begin gradually, and can become a long-term condition if it is not managed or treated  AFTER YOU LEAVE:   Medicines:   · Medicines  can help you feel more calm and relaxed, and decrease your symptoms  · Take your medicine as directed  Contact your healthcare provider if you think your medicine is not helping or if you have side effects  Tell him if you are allergic to any medicine  Keep a list of the medicines, vitamins, and herbs you take  Include the amounts, and when and why you take them  Bring the list or the pill bottles to follow-up visits  Carry your medicine list with you in case of an emergency  Follow up with your healthcare provider within 2 weeks or as directed:  Write down your questions so you remember to ask them during your visits  Manage anxiety:   · Go to counseling as directed  Cognitive behavioral therapy can help you understand and change how you react to events that trigger your symptoms  · Find ways to manage your symptoms    Activities such as exercise, meditation, or listening to music can help you relax  · Practice deep breathing  Breathing can change how your body reacts to stress  Focus on taking slow, deep breaths several times a day, or during an anxiety attack  Breathe in through your nose, and out through your mouth  · Avoid caffeine  Caffeine can make your symptoms worse  Avoid foods or drinks that are meant to increase your energy level  · Limit or avoid alcohol  Ask your healthcare provider if alcohol is safe for you  You may not be able to drink alcohol if you take certain anxiety or depression medicines  Limit alcohol to 1 drink per day if you are a woman  Limit alcohol to 2 drinks per day if you are a man  A drink of alcohol is 12 ounces of beer, 5 ounces of wine, or 1½ ounces of liquor  Contact your healthcare provider if:   · Your symptoms get worse or do not get better with treatment  · You think your medicine may be causing side effects  · Your anxiety keeps you from doing your regular daily activities  · You have new symptoms since your last visit  · You have questions or concerns about your condition or care  Seek care immediately or call 911 if:   · You have chest pain, tightness, or heaviness that may spread to your shoulders, arms, jaw, neck, or back  · You feel like hurting yourself or someone else  · You feel dizzy, lightheaded, or faint  © 2014 [address] is for End User's use only and may not be sold, redistributed or otherwise used for commercial purposes  All illustrations and images included in CareNotes® are the copyrighted property of [employer] M [employer]  or Basilio Hung  The above information is an  only  It is not intended as medical advice for individual conditions or treatments  Talk to your doctor, nurse or pharmacist before following any medical regimen to see if it is safe and effective for you

## 2018-10-15 NOTE — ED PROVIDER NOTES
History  Chief Complaint   Patient presents with    Anxiety     pt was seen on friday for high levels of anxiety spoke with crisis continues with anxiety causing excessive vomiting      HPI  60-year-old white male with a chief complaint of being very anxious and with the anxiety comes vomiting  Patient states that he is not able to keep anything down due to his vomiting and has not been able to take his Seroquel due to the vomiting as well  Patient states that he is anxious about the upcoming birth of his  child who has heart issues  Significant other is in the room and states that she has to be hospitalized for 3 weeks prior to the baby being born at the [facility]  Patient states he was here 3 days ago for the similar problems and thought he could make it to work today however he just continues to vomit and complains of anxiety  Patient states that he talked to crisis 3 days ago and does not wish to speak to them although I offered again for him to see them  Significant other states that he has set up an outpatient appointment  Patient denies any suicidal or homicidal ideations  Patient denies any use of illicit drugs and does not smoke  Patient was also hypokalemic when he was here 3 days ago  Prior to Admission Medications   Prescriptions Last Dose Informant Patient Reported? Taking?    QUEtiapine (SEROquel) 300 mg tablet   No No   Sig: Take 1 tablet (300 mg total) by mouth daily at bedtime for 90 days   QUEtiapine (SEROquel) 50 mg tablet   No No   Sig: For episodes of elizabeth, take half a pill to 3 times a day p r n    amphetamine-dextroamphetamine (ADDERALL) 20 mg tablet   No No   Sig: Take 1 tablet (20 mg total) by mouth 2 (two) times a day for 90 days Max Daily Amount: 40 mg   naltrexone (REVIA) 50 mg tablet   No No   Sig: Take 1 tablet (50 mg total) by mouth daily for 30 days   omeprazole (PriLOSEC) 20 mg delayed release capsule  Self Yes No   Sig: TAKE 1 BY MOUTH EVERY DAY FOR STOMACH ACID   ondansetron (ZOFRAN-ODT) 8 mg disintegrating tablet   No No   Sig: Take 1 tablet (8 mg total) by mouth every 8 (eight) hours as needed for nausea or vomiting   topiramate (TOPAMAX) 50 MG tablet   No No   Sig: Take 1 pill twice a day and drink a 6 glasses of water a day      Facility-Administered Medications: None       Past Medical History:   Diagnosis Date    ADHD     Anxiety state     Bipolar disorder (HCC)     Depressive disorder     Essential hypertension     benign    History of emotional problems     Hyperlipidemia     Peptic reflux disease        History reviewed  No pertinent surgical history  Family History   Problem Relation Age of Onset    Mental illness Mother     Thyroid disease Mother     Drug abuse Mother         illicit drug    Diabetes Father     Cancer Father      I have reviewed and agree with the history as documented  Social History   Substance Use Topics    Smoking status: Former Smoker     Quit date: 3/30/2015    Smokeless tobacco: Never Used    Alcohol use No      Comment: at social events        Review of Systems   Constitutional: Negative for chills and fever  HENT: Negative for congestion and rhinorrhea  Eyes: Negative for discharge and visual disturbance  Respiratory: Negative for shortness of breath and wheezing  Cardiovascular: Negative for chest pain and palpitations  Gastrointestinal: Positive for nausea and vomiting  Negative for abdominal pain  Endocrine: Negative for polydipsia and polyuria  Genitourinary: Negative for dysuria and hematuria  Musculoskeletal: Negative for arthralgias, gait problem and neck stiffness  Skin: Negative for rash and wound  Neurological: Negative for dizziness and headaches  Psychiatric/Behavioral: Negative for confusion and suicidal ideas  The patient is nervous/anxious  Physical Exam  Physical Exam   Constitutional: He is oriented to person, place, and time   He appears well-developed and well-nourished  72-year-old white male lying on the stretcher in no acute distress   HENT:   Head: Normocephalic and atraumatic  Mouth/Throat: Oropharynx is clear and moist    Eyes: Pupils are equal, round, and reactive to light  EOM are normal    Neck: Normal range of motion  Neck supple  Cardiovascular: Normal rate, regular rhythm and normal heart sounds  Pulmonary/Chest: Breath sounds normal  No respiratory distress  He has no wheezes  He exhibits no tenderness  Abdominal: Soft  Bowel sounds are normal  There is no tenderness  There is no rebound and no guarding  Musculoskeletal: Normal range of motion  Neurological: He is alert and oriented to person, place, and time  No cranial nerve deficit  He exhibits normal muscle tone  Coordination normal    Skin: Skin is warm and dry  Psychiatric:   Patient is somewhat anxious  Patient denies any suicidal or homicidal ideations  Nursing note and vitals reviewed        Vital Signs  ED Triage Vitals [10/15/18 0713]   Temperature Pulse Respirations Blood Pressure SpO2   98 3 °F (36 8 °C) 68 18 158/100 96 %      Temp Source Heart Rate Source Patient Position - Orthostatic VS BP Location FiO2 (%)   Oral Monitor Sitting Right arm --      Pain Score       No Pain           Vitals:    10/15/18 0713 10/15/18 0844 10/15/18 0900   BP: 158/100 (!) 154/103 156/81   Pulse: 68 78    Patient Position - Orthostatic VS: Sitting Lying        Visual Acuity      ED Medications  Medications   sodium chloride 0 9 % bolus 1,000 mL (0 mL Intravenous Stopped 10/15/18 0849)   ondansetron (ZOFRAN) injection 4 mg (4 mg Intravenous Given 10/15/18 0751)   LORazepam (ATIVAN) 2 mg/mL injection 0 5 mg (0 5 mg Intravenous Given 10/15/18 0751)   potassium chloride (K-DUR,KLOR-CON) CR tablet 40 mEq (40 mEq Oral Given 10/15/18 0845)       Diagnostic Studies  Results Reviewed     Procedure Component Value Units Date/Time    Rapid drug screen, urine [[id number]]  (Abnormal) Collected:  10/15/18 0906    Lab Status:  Final result Specimen:  Urine from Urine, Clean Catch Updated:  10/15/18 0921     Amph/Meth UR Negative     Barbiturate Ur Negative     Benzodiazepine Urine Negative     Cocaine Urine Negative     Methadone Urine Negative     Opiate Urine Negative     PCP Ur Negative     THC Urine Positive (A)    Narrative:         Presumptive report  If requested, specimen will be sent to reference lab for confirmation  FOR MEDICAL PURPOSES ONLY  IF CONFIRMATION NEEDED PLEASE CONTACT THE LAB WITHIN 5 DAYS  Drug Screen Cutoff Levels:  AMPHETAMINE/METHAMPHETAMINES  1000 ng/mL  BARBITURATES     200 ng/mL  BENZODIAZEPINES     200 ng/mL  COCAINE      300 ng/mL  METHADONE      300 ng/mL  OPIATES      300 ng/mL  PHENCYCLIDINE     25 ng/mL  THC       50 ng/mL    Basic metabolic panel [[id number]]  (Abnormal) Collected:  10/15/18 0753    Lab Status:  Final result Specimen:  Blood from Arm, Right Updated:  10/15/18 4576     Sodium 137 mmol/L      Potassium 3 1 (L) mmol/L      Chloride 97 (L) mmol/L      CO2 30 mmol/L      ANION GAP 10 mmol/L      BUN 12 mg/dL      Creatinine 1 15 mg/dL      Glucose 133 mg/dL      Calcium 9 2 mg/dL      eGFR 87 ml/min/1 73sq m     Narrative:         National Kidney Disease Education Program recommendations are as follows:  GFR calculation is accurate only with a steady state creatinine  Chronic Kidney disease less than 60 ml/min/1 73 sq  meters  Kidney failure less than 15 ml/min/1 73 sq  meters      Hepatic function panel [[id number]]  (Abnormal) Collected:  10/15/18 0753    Lab Status:  Final result Specimen:  Blood from Arm, Right Updated:  10/15/18 0821     Total Bilirubin 0 80 mg/dL      Bilirubin, Direct 0 22 (H) mg/dL      Alkaline Phosphatase 65 U/L      AST 19 U/L      ALT 20 U/L      Total Protein 7 7 g/dL      Albumin 4 0 g/dL     CBC and differential [[id number]]  (Abnormal) Collected:  10/15/18 0753    Lab Status:  Final result Specimen:  Blood from Arm, Right Updated:  10/15/18 0804     WBC 12 02 (H) Thousand/uL      RBC 5 71 (H) Million/uL      Hemoglobin 16 3 g/dL      Hematocrit 46 3 %      MCV 81 (L) fL      MCH 28 5 pg      MCHC 35 2 g/dL      RDW 12 4 %      MPV 10 2 fL      Platelets 016 Thousands/uL      nRBC 0 /100 WBCs      Neutrophils Relative 74 %      Immat GRANS % 1 %      Lymphocytes Relative 16 %      Monocytes Relative 9 %      Eosinophils Relative 0 %      Basophils Relative 0 %      Neutrophils Absolute 8 91 (H) Thousands/µL      Immature Grans Absolute 0 08 Thousand/uL      Lymphocytes Absolute 1 93 Thousands/µL      Monocytes Absolute 1 06 Thousand/µL      Eosinophils Absolute 0 01 Thousand/µL      Basophils Absolute 0 03 Thousands/µL                  No orders to display              Procedures  Procedures       Phone Contacts  ED Phone Contact    ED Course  ED Course as of Oct 15 1726   Mon Oct 15, 2018   0910 SL AMB POTASSIUM: (!) 3 1          patient felt better after IV hydration and some IV Ativan  I discussed the labs with patient and gave him 1 dose of potassium since his level was slightly low  I also discussed with patient that he denied any illicit drug use in his drug screen was positive for marijuana  Patient states that he admitted to smoking marijuana last week  I educated him on the possible causes of cyclic vomiting with cannabis use  I advised patient to stop using the drug  Patient requested a work note for today  I also prescribed him some p o  Zofran on discharge  [facility]  CritCare Time     Differential diagnosis includes:  1  Vomiting  2  Nausea  3  Anxiety  4   Nonsuicidal / non homicidal ideations      Disposition  Final diagnoses:   Anxiety   Vomiting     Time reflects when diagnosis was documented in both MDM as applicable and the Disposition within this note     Time User Action Codes Description Comment    10/15/2018  9:57 AM Andria Burdick Add [F41 9] Anxiety     10/15/2018  9:57 AM Mahad Trevino Add [R11 10] Vomiting       ED Disposition     ED Disposition Condition Comment    Discharge  Reece Monroe discharge to home/self care  Condition at discharge: Good        Follow-up Information     Follow up With Specialties Details [address], Nurse Practitioner In 3 days  [address] [address]  [phone]            Discharge Medication List as of 10/15/2018 10:26 AM      START taking these medications    Details   ondansetron (ZOFRAN) 4 mg tablet Take 1 tablet (4 mg total) by mouth every 6 (six) hours, Starting Mon 10/15/2018, Print         CONTINUE these medications which have NOT CHANGED    Details   amphetamine-dextroamphetamine (ADDERALL) 20 mg tablet Take 1 tablet (20 mg total) by mouth 2 (two) times a day for 90 days Max Daily Amount: 40 mg, Starting Fri 8/31/2018, Until Thu 11/29/2018, Normal      naltrexone (REVIA) 50 mg tablet Take 1 tablet (50 mg total) by mouth daily for 30 days, Starting Wed 9/26/2018, Until Fri 10/26/2018, Normal      omeprazole (PriLOSEC) 20 mg delayed release capsule TAKE 1 BY MOUTH EVERY DAY FOR STOMACH ACID, Historical Med      ondansetron (ZOFRAN-ODT) 8 mg disintegrating tablet Take 1 tablet (8 mg total) by mouth every 8 (eight) hours as needed for nausea or vomiting, Starting Fri 10/12/2018, Normal      !! QUEtiapine (SEROquel) 300 mg tablet Take 1 tablet (300 mg total) by mouth daily at bedtime for 90 days, Starting Wed 9/26/2018, Until Tue 12/25/2018, Normal      !! QUEtiapine (SEROquel) 50 mg tablet For episodes of elizabeth, take half a pill to 3 times a day p r n , Normal      topiramate (TOPAMAX) 50 MG tablet Take 1 pill twice a day and drink a 6 glasses of water a day, Normal       !! - Potential duplicate medications found  Please discuss with provider  No discharge procedures on file      ED Provider  Electronically Signed by           Mo Alfred DO  10/15/18 3245

## 2018-10-24 DIAGNOSIS — F31.10 BIPOLAR I DISORDER WITH MANIA (HCC): ICD-10-CM

## 2018-10-24 RX ORDER — QUETIAPINE FUMARATE 50 MG/1
TABLET, FILM COATED ORAL
Qty: 90 TABLET | Refills: 0 | Status: SHIPPED | OUTPATIENT
Start: 2018-10-24 | End: 2018-11-20 | Stop reason: HOSPADM

## 2018-11-17 ENCOUNTER — HOSPITAL ENCOUNTER (EMERGENCY)
Facility: HOSPITAL | Age: 28
End: 2018-11-18
Attending: EMERGENCY MEDICINE | Admitting: EMERGENCY MEDICINE
Payer: COMMERCIAL

## 2018-11-17 DIAGNOSIS — R11.2 NAUSEA AND VOMITING: ICD-10-CM

## 2018-11-17 DIAGNOSIS — F32.A DEPRESSION: Primary | ICD-10-CM

## 2018-11-17 LAB
ALBUMIN SERPL BCP-MCNC: 4.5 G/DL (ref 3.5–5)
ALP SERPL-CCNC: 79 U/L (ref 46–116)
ALT SERPL W P-5'-P-CCNC: 29 U/L (ref 12–78)
AMPHETAMINES SERPL QL SCN: NEGATIVE
ANION GAP SERPL CALCULATED.3IONS-SCNC: 10 MMOL/L (ref 4–13)
ANION GAP SERPL CALCULATED.3IONS-SCNC: 17 MMOL/L (ref 4–13)
AST SERPL W P-5'-P-CCNC: 44 U/L (ref 5–45)
BARBITURATES UR QL: NEGATIVE
BASOPHILS # BLD AUTO: 0.01 THOUSANDS/ΜL (ref 0–0.1)
BASOPHILS # BLD AUTO: 0.02 THOUSANDS/ΜL (ref 0–0.1)
BASOPHILS NFR BLD AUTO: 0 % (ref 0–1)
BASOPHILS NFR BLD AUTO: 0 % (ref 0–1)
BENZODIAZ UR QL: NEGATIVE
BILIRUB SERPL-MCNC: 1 MG/DL (ref 0.2–1)
BILIRUB UR QL STRIP: NEGATIVE
BUN SERPL-MCNC: 11 MG/DL (ref 5–25)
BUN SERPL-MCNC: 15 MG/DL (ref 5–25)
CALCIUM SERPL-MCNC: 10.1 MG/DL (ref 8.3–10.1)
CALCIUM SERPL-MCNC: 9.3 MG/DL (ref 8.3–10.1)
CHLORIDE SERPL-SCNC: 91 MMOL/L (ref 100–108)
CHLORIDE SERPL-SCNC: 99 MMOL/L (ref 100–108)
CLARITY UR: CLEAR
CO2 SERPL-SCNC: 29 MMOL/L (ref 21–32)
CO2 SERPL-SCNC: 29 MMOL/L (ref 21–32)
COCAINE UR QL: NEGATIVE
COLOR UR: YELLOW
CREAT SERPL-MCNC: 0.95 MG/DL (ref 0.6–1.3)
CREAT SERPL-MCNC: 0.98 MG/DL (ref 0.6–1.3)
EOSINOPHIL # BLD AUTO: 0 THOUSAND/ΜL (ref 0–0.61)
EOSINOPHIL # BLD AUTO: 0 THOUSAND/ΜL (ref 0–0.61)
EOSINOPHIL NFR BLD AUTO: 0 % (ref 0–6)
EOSINOPHIL NFR BLD AUTO: 0 % (ref 0–6)
ERYTHROCYTE [DISTWIDTH] IN BLOOD BY AUTOMATED COUNT: 12.8 % (ref 11.6–15.1)
ERYTHROCYTE [DISTWIDTH] IN BLOOD BY AUTOMATED COUNT: 12.9 % (ref 11.6–15.1)
ETHANOL SERPL-MCNC: <3 MG/DL (ref 0–3)
GFR SERPL CREATININE-BSD FRML MDRD: 105 ML/MIN/1.73SQ M
GFR SERPL CREATININE-BSD FRML MDRD: 109 ML/MIN/1.73SQ M
GLUCOSE SERPL-MCNC: 114 MG/DL (ref 65–140)
GLUCOSE SERPL-MCNC: 155 MG/DL (ref 65–140)
GLUCOSE UR STRIP-MCNC: ABNORMAL MG/DL
HCT VFR BLD AUTO: 44.5 % (ref 36.5–49.3)
HCT VFR BLD AUTO: 47.4 % (ref 36.5–49.3)
HGB BLD-MCNC: 15.2 G/DL (ref 12–17)
HGB BLD-MCNC: 16.8 G/DL (ref 12–17)
HGB UR QL STRIP.AUTO: NEGATIVE
IMM GRANULOCYTES # BLD AUTO: 0.04 THOUSAND/UL (ref 0–0.2)
IMM GRANULOCYTES # BLD AUTO: 0.07 THOUSAND/UL (ref 0–0.2)
IMM GRANULOCYTES NFR BLD AUTO: 0 % (ref 0–2)
IMM GRANULOCYTES NFR BLD AUTO: 0 % (ref 0–2)
KETONES UR STRIP-MCNC: ABNORMAL MG/DL
LEUKOCYTE ESTERASE UR QL STRIP: NEGATIVE
LYMPHOCYTES # BLD AUTO: 1.52 THOUSANDS/ΜL (ref 0.6–4.47)
LYMPHOCYTES # BLD AUTO: 1.97 THOUSANDS/ΜL (ref 0.6–4.47)
LYMPHOCYTES NFR BLD AUTO: 15 % (ref 14–44)
LYMPHOCYTES NFR BLD AUTO: 9 % (ref 14–44)
MAGNESIUM SERPL-MCNC: 1.3 MG/DL (ref 1.6–2.6)
MCH RBC QN AUTO: 27.8 PG (ref 26.8–34.3)
MCH RBC QN AUTO: 28.1 PG (ref 26.8–34.3)
MCHC RBC AUTO-ENTMCNC: 34.2 G/DL (ref 31.4–37.4)
MCHC RBC AUTO-ENTMCNC: 35.4 G/DL (ref 31.4–37.4)
MCV RBC AUTO: 79 FL (ref 82–98)
MCV RBC AUTO: 81 FL (ref 82–98)
METHADONE UR QL: NEGATIVE
MONOCYTES # BLD AUTO: 1.01 THOUSAND/ΜL (ref 0.17–1.22)
MONOCYTES # BLD AUTO: 1.24 THOUSAND/ΜL (ref 0.17–1.22)
MONOCYTES NFR BLD AUTO: 6 % (ref 4–12)
MONOCYTES NFR BLD AUTO: 9 % (ref 4–12)
NEUTROPHILS # BLD AUTO: 14.19 THOUSANDS/ΜL (ref 1.85–7.62)
NEUTROPHILS # BLD AUTO: 9.87 THOUSANDS/ΜL (ref 1.85–7.62)
NEUTS SEG NFR BLD AUTO: 76 % (ref 43–75)
NEUTS SEG NFR BLD AUTO: 85 % (ref 43–75)
NITRITE UR QL STRIP: NEGATIVE
NRBC BLD AUTO-RTO: 0 /100 WBCS
NRBC BLD AUTO-RTO: 0 /100 WBCS
OPIATES UR QL SCN: NEGATIVE
PCP UR QL: NEGATIVE
PH UR STRIP.AUTO: 7.5 [PH] (ref 4.5–8)
PLATELET # BLD AUTO: 341 THOUSANDS/UL (ref 149–390)
PLATELET # BLD AUTO: 385 THOUSANDS/UL (ref 149–390)
PMV BLD AUTO: 10.2 FL (ref 8.9–12.7)
PMV BLD AUTO: 10.4 FL (ref 8.9–12.7)
POTASSIUM SERPL-SCNC: 3.6 MMOL/L (ref 3.5–5.3)
POTASSIUM SERPL-SCNC: 3.9 MMOL/L (ref 3.5–5.3)
PROT SERPL-MCNC: 9.1 G/DL (ref 6.4–8.2)
PROT UR STRIP-MCNC: NEGATIVE MG/DL
RBC # BLD AUTO: 5.47 MILLION/UL (ref 3.88–5.62)
RBC # BLD AUTO: 5.98 MILLION/UL (ref 3.88–5.62)
SODIUM SERPL-SCNC: 137 MMOL/L (ref 136–145)
SODIUM SERPL-SCNC: 138 MMOL/L (ref 136–145)
SP GR UR STRIP.AUTO: 1.01 (ref 1–1.03)
THC UR QL: POSITIVE
UROBILINOGEN UR QL STRIP.AUTO: 0.2 E.U./DL
WBC # BLD AUTO: 13.14 THOUSAND/UL (ref 4.31–10.16)
WBC # BLD AUTO: 16.8 THOUSAND/UL (ref 4.31–10.16)

## 2018-11-17 PROCEDURE — 96372 THER/PROPH/DIAG INJ SC/IM: CPT

## 2018-11-17 PROCEDURE — 81003 URINALYSIS AUTO W/O SCOPE: CPT | Performed by: EMERGENCY MEDICINE

## 2018-11-17 PROCEDURE — 96361 HYDRATE IV INFUSION ADD-ON: CPT

## 2018-11-17 PROCEDURE — 96365 THER/PROPH/DIAG IV INF INIT: CPT

## 2018-11-17 PROCEDURE — 80320 DRUG SCREEN QUANTALCOHOLS: CPT | Performed by: EMERGENCY MEDICINE

## 2018-11-17 PROCEDURE — 36415 COLL VENOUS BLD VENIPUNCTURE: CPT | Performed by: EMERGENCY MEDICINE

## 2018-11-17 PROCEDURE — 96366 THER/PROPH/DIAG IV INF ADDON: CPT

## 2018-11-17 PROCEDURE — C9113 INJ PANTOPRAZOLE SODIUM, VIA: HCPCS | Performed by: EMERGENCY MEDICINE

## 2018-11-17 PROCEDURE — G0480 DRUG TEST DEF 1-7 CLASSES: HCPCS | Performed by: EMERGENCY MEDICINE

## 2018-11-17 PROCEDURE — 80307 DRUG TEST PRSMV CHEM ANLYZR: CPT | Performed by: EMERGENCY MEDICINE

## 2018-11-17 PROCEDURE — 85025 COMPLETE CBC W/AUTO DIFF WBC: CPT | Performed by: EMERGENCY MEDICINE

## 2018-11-17 PROCEDURE — 80048 BASIC METABOLIC PNL TOTAL CA: CPT | Performed by: EMERGENCY MEDICINE

## 2018-11-17 PROCEDURE — 96367 TX/PROPH/DG ADDL SEQ IV INF: CPT

## 2018-11-17 PROCEDURE — 83735 ASSAY OF MAGNESIUM: CPT | Performed by: EMERGENCY MEDICINE

## 2018-11-17 PROCEDURE — 99285 EMERGENCY DEPT VISIT HI MDM: CPT

## 2018-11-17 PROCEDURE — 93005 ELECTROCARDIOGRAM TRACING: CPT

## 2018-11-17 PROCEDURE — 96375 TX/PRO/DX INJ NEW DRUG ADDON: CPT

## 2018-11-17 PROCEDURE — 96376 TX/PRO/DX INJ SAME DRUG ADON: CPT

## 2018-11-17 PROCEDURE — 80053 COMPREHEN METABOLIC PANEL: CPT | Performed by: EMERGENCY MEDICINE

## 2018-11-17 RX ORDER — TOPIRAMATE 25 MG/1
50 TABLET ORAL 2 TIMES DAILY
Status: DISCONTINUED | OUTPATIENT
Start: 2018-11-17 | End: 2018-11-18 | Stop reason: HOSPADM

## 2018-11-17 RX ORDER — TRAZODONE HYDROCHLORIDE 50 MG/1
50 TABLET ORAL
Status: CANCELLED | OUTPATIENT
Start: 2018-11-17

## 2018-11-17 RX ORDER — PANTOPRAZOLE SODIUM 20 MG/1
20 TABLET, DELAYED RELEASE ORAL
Status: DISCONTINUED | OUTPATIENT
Start: 2018-11-18 | End: 2018-11-18 | Stop reason: HOSPADM

## 2018-11-17 RX ORDER — MAGNESIUM SULFATE 1 G/100ML
1 INJECTION INTRAVENOUS ONCE
Status: COMPLETED | OUTPATIENT
Start: 2018-11-17 | End: 2018-11-17

## 2018-11-17 RX ORDER — LORAZEPAM 2 MG/ML
1 INJECTION INTRAMUSCULAR ONCE
Status: COMPLETED | OUTPATIENT
Start: 2018-11-17 | End: 2018-11-17

## 2018-11-17 RX ORDER — OLANZAPINE 10 MG/1
10 INJECTION, POWDER, LYOPHILIZED, FOR SOLUTION INTRAMUSCULAR ONCE
Status: COMPLETED | OUTPATIENT
Start: 2018-11-17 | End: 2018-11-17

## 2018-11-17 RX ORDER — PROMETHAZINE HYDROCHLORIDE 25 MG/ML
25 INJECTION, SOLUTION INTRAMUSCULAR; INTRAVENOUS ONCE
Status: COMPLETED | OUTPATIENT
Start: 2018-11-17 | End: 2018-11-17

## 2018-11-17 RX ORDER — ACETAMINOPHEN 325 MG/1
325 TABLET ORAL EVERY 6 HOURS PRN
Status: CANCELLED | OUTPATIENT
Start: 2018-11-17

## 2018-11-17 RX ORDER — PANTOPRAZOLE SODIUM 40 MG/1
40 INJECTION, POWDER, FOR SOLUTION INTRAVENOUS ONCE
Status: COMPLETED | OUTPATIENT
Start: 2018-11-17 | End: 2018-11-17

## 2018-11-17 RX ORDER — POTASSIUM CHLORIDE 14.9 MG/ML
20 INJECTION INTRAVENOUS
Status: DISPENSED | OUTPATIENT
Start: 2018-11-17 | End: 2018-11-17

## 2018-11-17 RX ORDER — BENZTROPINE MESYLATE 1 MG/1
2 TABLET ORAL EVERY 6 HOURS PRN
Status: CANCELLED | OUTPATIENT
Start: 2018-11-17

## 2018-11-17 RX ORDER — MAGNESIUM HYDROXIDE/ALUMINUM HYDROXICE/SIMETHICONE 120; 1200; 1200 MG/30ML; MG/30ML; MG/30ML
15 SUSPENSION ORAL EVERY 4 HOURS PRN
Status: CANCELLED | OUTPATIENT
Start: 2018-11-17

## 2018-11-17 RX ORDER — BENZTROPINE MESYLATE 1 MG/ML
2 INJECTION INTRAMUSCULAR; INTRAVENOUS EVERY 6 HOURS PRN
Status: CANCELLED | OUTPATIENT
Start: 2018-11-17

## 2018-11-17 RX ORDER — HYDROXYZINE HYDROCHLORIDE 25 MG/1
25 TABLET, FILM COATED ORAL EVERY 6 HOURS PRN
Status: CANCELLED | OUTPATIENT
Start: 2018-11-17

## 2018-11-17 RX ORDER — HALOPERIDOL 5 MG
5 TABLET ORAL EVERY 6 HOURS PRN
Status: CANCELLED | OUTPATIENT
Start: 2018-11-17

## 2018-11-17 RX ORDER — HALOPERIDOL 5 MG/ML
5 INJECTION INTRAMUSCULAR EVERY 6 HOURS PRN
Status: CANCELLED | OUTPATIENT
Start: 2018-11-17

## 2018-11-17 RX ORDER — POTASSIUM CHLORIDE 29.8 MG/ML
40 INJECTION INTRAVENOUS ONCE
Status: DISCONTINUED | OUTPATIENT
Start: 2018-11-17 | End: 2018-11-17 | Stop reason: CLARIF

## 2018-11-17 RX ADMIN — OLANZAPINE 10 MG: 10 INJECTION, POWDER, FOR SOLUTION INTRAMUSCULAR at 08:14

## 2018-11-17 RX ADMIN — LORAZEPAM 1 MG: 2 INJECTION INTRAMUSCULAR; INTRAVENOUS at 08:05

## 2018-11-17 RX ADMIN — LORAZEPAM 1 MG: 2 INJECTION INTRAMUSCULAR; INTRAVENOUS at 22:27

## 2018-11-17 RX ADMIN — PANTOPRAZOLE SODIUM 40 MG: 40 INJECTION, POWDER, FOR SOLUTION INTRAVENOUS at 08:04

## 2018-11-17 RX ADMIN — QUETIAPINE 300 MG: 200 TABLET ORAL at 22:02

## 2018-11-17 RX ADMIN — MAGNESIUM SULFATE HEPTAHYDRATE 1 G: 1 INJECTION, SOLUTION INTRAVENOUS at 08:39

## 2018-11-17 RX ADMIN — WATER 2.1 ML: 1 INJECTION INTRAMUSCULAR; INTRAVENOUS; SUBCUTANEOUS at 08:15

## 2018-11-17 RX ADMIN — TOPIRAMATE 50 MG: 25 TABLET, FILM COATED ORAL at 22:00

## 2018-11-17 RX ADMIN — SODIUM CHLORIDE 1000 ML: 0.9 INJECTION, SOLUTION INTRAVENOUS at 07:51

## 2018-11-17 RX ADMIN — POTASSIUM CHLORIDE 20 MEQ: 200 INJECTION, SOLUTION INTRAVENOUS at 09:56

## 2018-11-17 RX ADMIN — LORAZEPAM 1 MG: 2 INJECTION INTRAMUSCULAR; INTRAVENOUS at 15:50

## 2018-11-17 RX ADMIN — PROMETHAZINE HYDROCHLORIDE 25 MG: 25 INJECTION, SOLUTION INTRAMUSCULAR; INTRAVENOUS at 08:10

## 2018-11-17 RX ADMIN — LORAZEPAM 1 MG: 2 INJECTION INTRAMUSCULAR; INTRAVENOUS at 11:59

## 2018-11-17 RX ADMIN — PROMETHAZINE HYDROCHLORIDE 25 MG: 25 INJECTION INTRAMUSCULAR; INTRAVENOUS at 15:40

## 2018-11-17 RX ADMIN — PROMETHAZINE HYDROCHLORIDE 25 MG: 25 INJECTION, SOLUTION INTRAMUSCULAR; INTRAVENOUS at 22:27

## 2018-11-17 NOTE — ED NOTES
Pt is a 33 y o  male who was brought to the ED with increased anxiety and depression  Patient has experienced an increase in symptoms for the past three weeks following his wife giving birth, however the baby was stillborn  Patient has not slept or eaten in two days; he reports that he will fall asleep for about 20 minutes at a time and then wake up with energy  Patient has also been vomitting due to increased anxiety and panic attacks, therefore he has not been able to keep down his psychiatric medications  Patient recognizes that his symptoms are severe, however he relates that they were worse in June and this is about moderate for him  Patient does speak with a counselor at his job, however, he has not been to work in almost 4 weeks  Patient has scheduled for outpatient services, but cannot get an appointment until December and then won't see the psychiatrist until about February  Patient does feel that he needs an adjustment to his medications as the ones his PCP has prescribed are not helping him  Patient denies suicidal/homicidal ideations and auditory/visual hallucinations  Patient is receptive to inpatient care, however at this time he feels that he cannot be away from his wife  Patient was provided with a list of outpatient providers that accept his insurance  He was also encouraged to contact county crisis as needed, and return to the ER if symptoms progress or worsen  Chief Complaint   Patient presents with    Vomiting     patient presents to the ED with c/o extreme vomitting and depression after patient's wife had stillbirth three weeks ago   Patient denies SI/HI     Intake Assessment completed, Safety risk Assessment completed    GERONIMO Moreno  11/17/18   [id number]

## 2018-11-17 NOTE — ED NOTES
11/17/18 @ 1815:  Crisis notified inpatient unit that labs were re-drawn  RN states that patient will be re-submitted to [facility] for possible admission  RN will notify crisis as soon as decision is reached    1800 Mara Villegas MS

## 2018-11-17 NOTE — ED NOTES
Met with patient, who expressed that he thinks that inpatient treatment would be best for him  He relates that if he goes home, he will not get better  Patient is worried about being away from his wife and expressed that he would like to just be there for 72 hours in order to be stabilized and linked with outpatient treatment  Patient and Dr Padmini De Los Santos signed [phone]       GERONIMO West  11/17/18   3008

## 2018-11-17 NOTE — ED PROVIDER NOTES
History  Chief Complaint   Patient presents with    Vomiting     patient presents to the ED with c/o extreme vomitting and depression after patient's wife had stillbirth three weeks ago  Patient denies SI/HI       History provided by:  Patient  Vomiting   Severity:  Moderate  Duration:  2 days  Timing:  Constant  Quality:  Stomach contents and bilious material  Progression:  Worsening  Chronicity:  Recurrent  Recent urination:  Decreased  Context: not post-tussive and not self-induced    Relieved by:  Nothing  Worsened by:  Nothing  Ineffective treatments:  Antiemetics (took ODT zofran twice without relief)  Associated symptoms: no abdominal pain, no cough, no sore throat and no URI    Risk factors: no prior abdominal surgery, no suspect food intake and no travel to endemic areas    Depression   Presenting symptoms: depression    Presenting symptoms: no aggressive behavior, no agitation, no disorganized speech, no disorganized thought process, no hallucinations, no homicidal ideas, no paranoid behavior, no suicidal thoughts, no suicidal threats and no suicide attempt    Patient accompanied by:  Family member (wife)  Degree of incapacity (severity):  Severe  Onset quality:  Gradual  Duration:  3 weeks  Timing:  Constant  Progression:  Worsening  Chronicity:  New  Context: stressful life event (Pt has been having increased stress b/c his wife was pregnant with a child with congenital defects, but 3 weeks ago, she gave birth to the baby that was stillborn, the loss of the child has greatly increased his depression/anxiety)    Treatment compliance:   All of the time  Time since last psychoactive medication taken:  2 days  Relieved by:  Nothing  Worsened by:  Lack of sleep  Ineffective treatments:  Anti-anxiety medications and antidepressants (sx worsened that when he gets severe anxiety/depression, he gets cyclical vomiting and cannot keep anything down including his psychiatric meds)  Associated symptoms: anxiety, fatigue and insomnia    Associated symptoms: no abdominal pain    Risk factors: no recent psychiatric admission        Prior to Admission Medications   Prescriptions Last Dose Informant Patient Reported? Taking? QUEtiapine (SEROquel) 300 mg tablet   No Yes   Sig: Take 1 tablet (300 mg total) by mouth daily at bedtime for 90 days   QUEtiapine (SEROquel) 50 mg tablet   No Yes   Sig: FOR EPISODES OF SYEDA, TAKE 1/2 TABLET 3 TIMES A DAY AS NEEDED   naltrexone (REVIA) 50 mg tablet   No No   Sig: Take 1 tablet (50 mg total) by mouth daily for 30 days   omeprazole (PriLOSEC) 20 mg delayed release capsule  Self Yes Yes   Sig: TAKE 1 BY MOUTH EVERY DAY FOR STOMACH ACID   ondansetron (ZOFRAN-ODT) 8 mg disintegrating tablet   No Yes   Sig: Take 1 tablet (8 mg total) by mouth every 8 (eight) hours as needed for nausea or vomiting   topiramate (TOPAMAX) 50 MG tablet   No No   Sig: Take 1 pill twice a day and drink a 6 glasses of water a day      Facility-Administered Medications: None       Past Medical History:   Diagnosis Date    ADHD     Anxiety state     Bipolar disorder (HCC)     Depressive disorder     Essential hypertension     benign    History of emotional problems     Hyperlipidemia     Peptic reflux disease        History reviewed  No pertinent surgical history  Family History   Problem Relation Age of Onset    Mental illness Mother     Thyroid disease Mother     Drug abuse Mother         illicit drug    Diabetes Father     Cancer Father      I have reviewed and agree with the history as documented  Social History   Substance Use Topics    Smoking status: Former Smoker     Quit date: 3/30/2015    Smokeless tobacco: Never Used    Alcohol use No      Comment: at social events        Review of Systems   Constitutional: Positive for fatigue  HENT: Negative for sore throat  Respiratory: Negative for cough  Gastrointestinal: Positive for vomiting  Negative for abdominal pain  Psychiatric/Behavioral: Positive for depression  Negative for agitation, hallucinations, homicidal ideas, paranoia and suicidal ideas  The patient is nervous/anxious and has insomnia  All other systems reviewed and are negative  Physical Exam  Physical Exam   Constitutional: He appears well-developed and well-nourished  HENT:   Head: Normocephalic and atraumatic  Eyes: Pupils are equal, round, and reactive to light  EOM are normal    Neck: Neck supple  Cardiovascular: Normal rate  Pulmonary/Chest: Effort normal and breath sounds normal    Abdominal: Soft  Bowel sounds are normal  He exhibits no distension  There is no tenderness  Musculoskeletal: Normal range of motion  He exhibits no edema or tenderness  Neurological: He is alert  No cranial nerve deficit  He exhibits normal muscle tone  Skin: Skin is warm  Psychiatric: His speech is not delayed, not tangential and not slurred  He is withdrawn  He is not actively hallucinating  He does not express impulsivity or inappropriate judgment  He exhibits a depressed mood  He expresses no suicidal ideation  He expresses no suicidal plans  He is attentive         Vital Signs  ED Triage Vitals [11/17/18 0735]   Temp Pulse Respirations Blood Pressure SpO2   -- 80 18 142/82 98 %      Temp src Heart Rate Source Patient Position - Orthostatic VS BP Location FiO2 (%)   -- -- -- -- --      Pain Score       --           Vitals:    11/17/18 0735   BP: 142/82   Pulse: 80       Visual Acuity      ED Medications  Medications   sodium chloride 0 9 % bolus 1,000 mL (1,000 mL Intravenous New Bag 11/17/18 0751)   magnesium sulfate IVPB (premix) SOLN 1 g (not administered)   potassium chloride 40 mEq IVPB (premix) (not administered)   OLANZapine (ZyPREXA) IM injection 10 mg (10 mg Intramuscular Given 11/17/18 0814)   promethazine (PHENERGAN) injection 25 mg (25 mg Intravenous Given 11/17/18 0810)   LORazepam (ATIVAN) 2 mg/mL injection 1 mg (1 mg Intravenous Given 11/17/18 0805)   pantoprazole (PROTONIX) injection 40 mg (40 mg Intravenous Given 11/17/18 0804)   sterile water injection **ADS Override Pull** (2 1 mL  Given 11/17/18 0815)       Diagnostic Studies  Results Reviewed     Procedure Component Value Units Date/Time    Comprehensive metabolic panel [[id number]]  (Abnormal) Collected:  11/17/18 0751    Lab Status:  Final result Specimen:  Blood from Arm, Right Updated:  11/17/18 0824     Sodium 137 mmol/L      Potassium 3 9 mmol/L      Chloride 91 (L) mmol/L      CO2 29 mmol/L      ANION GAP 17 (H) mmol/L      BUN 15 mg/dL      Creatinine 0 98 mg/dL      Glucose 155 (H) mg/dL      Calcium 10 1 mg/dL      AST 44 U/L      ALT 29 U/L      Alkaline Phosphatase 79 U/L      Total Protein 9 1 (H) g/dL      Albumin 4 5 g/dL      Total Bilirubin 1 00 mg/dL      eGFR 105 ml/min/1 73sq m     Narrative:         National Kidney Disease Education Program recommendations are as follows:  GFR calculation is accurate only with a steady state creatinine  Chronic Kidney disease less than 60 ml/min/1 73 sq  meters  Kidney failure less than 15 ml/min/1 73 sq  meters  Magnesium [[id number]]  (Abnormal) Collected:  11/17/18 0751    Lab Status:  Final result Specimen:  Blood from Arm, Right Updated:  11/17/18 0824     Magnesium 1 3 (L) mg/dL     Ethanol [[id number]] Collected:  11/17/18 3812    Lab Status:   In process Specimen:  Blood from Arm, Right Updated:  11/17/18 0823    CBC and differential [[id number]]  (Abnormal) Collected:  11/17/18 0751    Lab Status:  Final result Specimen:  Blood from Arm, Right Updated:  11/17/18 0759     WBC 16 80 (H) Thousand/uL      RBC 5 98 (H) Million/uL      Hemoglobin 16 8 g/dL      Hematocrit 47 4 %      MCV 79 (L) fL      MCH 28 1 pg      MCHC 35 4 g/dL      RDW 12 8 %      MPV 10 4 fL      Platelets 904 Thousands/uL      nRBC 0 /100 WBCs      Neutrophils Relative 85 (H) %      Immat GRANS % 0 %      Lymphocytes Relative 9 (L) %      Monocytes Relative 6 % Eosinophils Relative 0 %      Basophils Relative 0 %      Neutrophils Absolute 14 19 (H) Thousands/µL      Immature Grans Absolute 0 07 Thousand/uL      Lymphocytes Absolute 1 52 Thousands/µL      Monocytes Absolute 1 01 Thousand/µL      Eosinophils Absolute 0 00 Thousand/µL      Basophils Absolute 0 01 Thousands/µL     UA w Reflex to Microscopic [[id number]]     Lab Status:  No result Specimen:  Urine     Rapid drug screen, urine [[id number]]     Lab Status:  No result Specimen:  Urine                  No orders to display              Procedures  ECG 12 Lead Documentation  Date/Time: 11/17/2018 8:29 AM  Performed by: Francisco Javier Ramos  Authorized by: Francisco Javier Ramos     Indications / Diagnosis:  Vomiting, h/o low potassium  ECG reviewed by me, the ED Provider: yes    Patient location:  ED  Rate:     ECG rate:  72    ECG rate assessment: normal    Rhythm:     Rhythm: sinus rhythm      Rhythm comment:  With sinus arrythmia  QRS:     QRS axis:  Normal  ST segments:     ST segments:  Normal  T waves:     T waves: normal             Phone Contacts  ED Phone Contact    ED Course  ED Course as of Nov 17 1450   Sat Nov 17, 2018   1146 Discussed the patient his labs and his potassium her done  He is feeling improved although is a little bit anxious  He had met with crisis and did not want inpatient treatment  And he was not homicidal or suicidal and was not hearing voices or seeing things, so would not be a candidate for involuntary commitment  He did not want to be away from his wife because he thought that would make things worse  Also discussed with him whether not he smokes marijuana when he is anxious with acute stressors  Because when he is more stressed he seems to get cyclical vomiting  Patient did state that he smokes marijuana when he gets more anxious and admits that he has in the hot shower all the time    So I actually discussed with patient that marijuana can induce cyclical vomiting syndrome the only gets better with hot showers and does not really get better with other anti medics like Zofran, as in his case  And then his psychiatric symptoms worsen because he is unable to keep his medications down  Discussed with him that he may need other management options for his anxiety other than the marijuana because they may in fact be worsening things because they are likely inducing his cyclical vomiting that he then cannot keep his psychiatric medications down  1356 Pt evaluated and treated, and is medically stable and clear for psychiatric inpatient admission  12 Pt now with d/w his wife, does think he should come in for psychiatric treatment since thinks he will not get better at home  Pt signed a 201 with crisis  MDM  Number of Diagnoses or Management Options     Amount and/or Complexity of Data Reviewed  Clinical lab tests: ordered  Discuss the patient with other providers: yes (D/w crisis)  Independent visualization of images, tracings, or specimens: yes    Risk of Complications, Morbidity, and/or Mortality  Presenting problems: high      CritCare Time    Disposition  Final diagnoses:   None     ED Disposition     None      Follow-up Information    None         Patient's Medications   Discharge Prescriptions    No medications on file     No discharge procedures on file      ED Provider  Electronically Signed by           Karma Fontenot MD  11/19/18 8966

## 2018-11-17 NOTE — LETTER
[address] [address]  [address] [address]  Dept: [phone]             SQXSEP TRANSFER CONSENT    NAME Hallie Hobbs                               1990                              MRN [id number]    I have been informed of my rights regarding examination, treatment, and transfer   by Dr Kimberly Yip DO    Benefits: Other benefits (Include comment)_______________________ (Inpatient MH treatment)    Risks: Potential for delay in receiving treatment      Consent for Transfer:  I acknowledge that my medical condition has been evaluated and explained to me by the emergency department physician or other qualified medical person and/or my attending physician, who has recommended that I be transferred to the service of  Accepting Physician: Dr Dewayne Dennis at [facility] Name, [address] : [place]  The above potential benefits of such transfer, the potential risks associated with such transfer, and the probable risks of not being transferred have been explained to me, and I fully understand them  The doctor has explained that, in my case, the benefits of transfer outweigh the risks  I agree to be transferred  I authorize the performance of emergency medical procedures and treatments upon me in both transit and upon arrival at the receiving facility  Additionally, I authorize the release of any and all medical records to the receiving facility and request they be transported with me, if possible  I understand that the safest mode of transportation during a medical emergency is an ambulance and that the Hospital advocates the use of this mode of transport  Risks of traveling to the receiving facility by car, including absence of medical control, life sustaining equipment, such as oxygen, and medical personnel has been explained to me and I fully understand them      (ELENA CORRECT BOX BELOW)  Margret Núñez  I consent to the stated transfer and to be transported by ambulance/helicopter  [  ]  I consent to the stated transfer, but refuse transportation by ambulance and accept full responsibility for my transportation by car  I understand the risks of non-ambulance transfers and I exonerate the Hospital and its staff from any deterioration in my condition that results from this refusal     X___________________________________________    DATE  18  TIME________  Signature of patient or legally responsible individual signing on patient behalf           RELATIONSHIP TO PATIENT_________________________                      Provider Certification    NAME Sharri Baer                               MIGUELINA 1990                              MRN [id number]    A medical screening exam was performed on the above named patient  Based on the examination:    Condition Necessitating Transfer The primary encounter diagnosis was Depression  A diagnosis of Nausea and vomiting was also pertinent to this visit  Patient Condition: The patient has been stabilized such that within reasonable medical probability, no material deterioration of the patient condition or the condition of the unborn child(jairo) is likely to result from the transfer    Reason for Transfer: Level of Care needed not available at this facility    Transfer Requirements: Facility [facility], [address] [place] [address]   · Space available and qualified personnel available for treatment as acknowledged by GERONIMO Moreno  · Agreed to accept transfer and to provide appropriate medical treatment as acknowledged by       Dr John Mas  · Appropriate medical records of the examination and treatment of the patient are provided at the time of transfer   [address] [place] [address] [address] ___DC____  · Transfer will be performed by qualified personnel from [facility]   [phone]  and appropriate transfer equipment as required, including the use of necessary and appropriate life support measures      Provider Certification: I have examined the patient and explained the following risks and benefits of being transferred/refusing transfer to the patient/family:  General risk, such as traffic hazards, adverse weather conditions, rough terrain or turbulence, possible failure of equipment (including vehicle or aircraft), or consequences of actions of persons outside the control of the transport personnel      Based on these reasonable risks and benefits to the patient and/or the unborn child(jairo), and based upon the information available at the time of the patients examination, I certify that the medical benefits reasonably to be expected from the provision of appropriate medical treatments at another medical facility outweigh the increasing risks, if any, to the individuals medical condition, and in the case of labor to the unborn child, from effecting the transfer      X____________________________________________ DATE 11/17/18        TIME_______      ORIGINAL - SEND TO MEDICAL RECORDS   COPY - SEND WITH PATIENT DURING TRANSFER

## 2018-11-17 NOTE — ED NOTES
Call received from Sadiq at [facility], expressing that the unit is concerned with patient's lab results and requested updated results  Dr Eric Allen 94 made aware and put in for repeat labs       Fan Dexter, GERONIMO  11/17/18   6312

## 2018-11-17 NOTE — ED NOTES
Spoke with Tameka Abrams at  [facility] to make him aware that patient's lab results are available for review  Patient's chart will be re-reviewed       GERONIMO Nguyen  11/17/18   4056

## 2018-11-18 ENCOUNTER — HOSPITAL ENCOUNTER (INPATIENT)
Facility: HOSPITAL | Age: 28
LOS: 2 days | Discharge: HOME | DRG: 885 | End: 2018-11-20
Attending: PSYCHIATRY & NEUROLOGY | Admitting: PSYCHIATRY & NEUROLOGY
Payer: COMMERCIAL

## 2018-11-18 VITALS
DIASTOLIC BLOOD PRESSURE: 80 MMHG | RESPIRATION RATE: 18 BRPM | TEMPERATURE: 98.4 F | SYSTOLIC BLOOD PRESSURE: 132 MMHG | HEART RATE: 84 BPM | OXYGEN SATURATION: 96 %

## 2018-11-18 DIAGNOSIS — F41.9 ANXIETY: ICD-10-CM

## 2018-11-18 DIAGNOSIS — F31.62 BIPOLAR DISORDER, CURRENT EPISODE MIXED, MODERATE (HCC): Primary | ICD-10-CM

## 2018-11-18 DIAGNOSIS — R11.2 NAUSEA AND VOMITING: ICD-10-CM

## 2018-11-18 LAB
ATRIAL RATE: 72 BPM
P AXIS: 69 DEGREES
PR INTERVAL: 124 MS
QRS AXIS: 69 DEGREES
QRSD INTERVAL: 90 MS
QT INTERVAL: 416 MS
QTC INTERVAL: 455 MS
T WAVE AXIS: 67 DEGREES
VENTRICULAR RATE: 72 BPM

## 2018-11-18 PROCEDURE — 99222 1ST HOSP IP/OBS MODERATE 55: CPT | Performed by: PSYCHIATRY & NEUROLOGY

## 2018-11-18 PROCEDURE — 93010 ELECTROCARDIOGRAM REPORT: CPT | Performed by: INTERNAL MEDICINE

## 2018-11-18 RX ORDER — ONDANSETRON 4 MG/1
4 TABLET, ORALLY DISINTEGRATING ORAL EVERY 6 HOURS PRN
Status: DISCONTINUED | OUTPATIENT
Start: 2018-11-18 | End: 2018-11-20 | Stop reason: HOSPADM

## 2018-11-18 RX ORDER — HYDROXYZINE HYDROCHLORIDE 25 MG/1
25 TABLET, FILM COATED ORAL EVERY 6 HOURS PRN
Status: DISCONTINUED | OUTPATIENT
Start: 2018-11-18 | End: 2018-11-20 | Stop reason: HOSPADM

## 2018-11-18 RX ORDER — TRAZODONE HYDROCHLORIDE 50 MG/1
50 TABLET ORAL
Status: DISCONTINUED | OUTPATIENT
Start: 2018-11-18 | End: 2018-11-20 | Stop reason: HOSPADM

## 2018-11-18 RX ORDER — MAGNESIUM HYDROXIDE/ALUMINUM HYDROXICE/SIMETHICONE 120; 1200; 1200 MG/30ML; MG/30ML; MG/30ML
15 SUSPENSION ORAL EVERY 4 HOURS PRN
Status: DISCONTINUED | OUTPATIENT
Start: 2018-11-18 | End: 2018-11-20 | Stop reason: HOSPADM

## 2018-11-18 RX ORDER — HALOPERIDOL 5 MG
5 TABLET ORAL EVERY 6 HOURS PRN
Status: DISCONTINUED | OUTPATIENT
Start: 2018-11-18 | End: 2018-11-20 | Stop reason: HOSPADM

## 2018-11-18 RX ORDER — BENZTROPINE MESYLATE 1 MG/ML
2 INJECTION INTRAMUSCULAR; INTRAVENOUS EVERY 6 HOURS PRN
Status: DISCONTINUED | OUTPATIENT
Start: 2018-11-18 | End: 2018-11-20 | Stop reason: HOSPADM

## 2018-11-18 RX ORDER — ACETAMINOPHEN 325 MG/1
325 TABLET ORAL EVERY 6 HOURS PRN
Status: DISCONTINUED | OUTPATIENT
Start: 2018-11-18 | End: 2018-11-20 | Stop reason: HOSPADM

## 2018-11-18 RX ORDER — HALOPERIDOL 5 MG/ML
5 INJECTION INTRAMUSCULAR EVERY 6 HOURS PRN
Status: DISCONTINUED | OUTPATIENT
Start: 2018-11-18 | End: 2018-11-20 | Stop reason: HOSPADM

## 2018-11-18 RX ORDER — OLANZAPINE 2.5 MG/1
2.5 TABLET ORAL 2 TIMES DAILY
Status: DISCONTINUED | OUTPATIENT
Start: 2018-11-18 | End: 2018-11-19

## 2018-11-18 RX ORDER — METOCLOPRAMIDE HYDROCHLORIDE 5 MG/ML
10 INJECTION INTRAMUSCULAR; INTRAVENOUS ONCE
Status: COMPLETED | OUTPATIENT
Start: 2018-11-18 | End: 2018-11-18

## 2018-11-18 RX ORDER — BENZTROPINE MESYLATE 1 MG/1
2 TABLET ORAL EVERY 6 HOURS PRN
Status: DISCONTINUED | OUTPATIENT
Start: 2018-11-18 | End: 2018-11-20 | Stop reason: HOSPADM

## 2018-11-18 RX ADMIN — METOCLOPRAMIDE 10 MG: 5 INJECTION, SOLUTION INTRAMUSCULAR; INTRAVENOUS at 12:23

## 2018-11-18 RX ADMIN — HALOPERIDOL 5 MG: 5 TABLET ORAL at 05:50

## 2018-11-18 RX ADMIN — OLANZAPINE 2.5 MG: 2.5 TABLET, FILM COATED ORAL at 12:23

## 2018-11-18 RX ADMIN — BENZTROPINE MESYLATE 2 MG: 1 TABLET ORAL at 05:50

## 2018-11-18 RX ADMIN — HYDROXYZINE HYDROCHLORIDE 25 MG: 25 TABLET ORAL at 02:19

## 2018-11-18 RX ADMIN — ALUMINUM HYDROXIDE, MAGNESIUM HYDROXIDE, AND SIMETHICONE 15 ML: 200; 200; 20 SUSPENSION ORAL at 16:59

## 2018-11-18 RX ADMIN — OLANZAPINE 2.5 MG: 2.5 TABLET, FILM COATED ORAL at 17:05

## 2018-11-18 RX ADMIN — HYDROXYZINE HYDROCHLORIDE 25 MG: 25 TABLET ORAL at 20:38

## 2018-11-18 RX ADMIN — TRAZODONE HYDROCHLORIDE 50 MG: 50 TABLET ORAL at 20:38

## 2018-11-18 RX ADMIN — ONDANSETRON 4 MG: 4 TABLET, ORALLY DISINTEGRATING ORAL at 17:49

## 2018-11-18 RX ADMIN — ALUMINUM HYDROXIDE, MAGNESIUM HYDROXIDE, AND SIMETHICONE 15 ML: 200; 200; 20 SUSPENSION ORAL at 21:17

## 2018-11-18 RX ADMIN — HALOPERIDOL 5 MG: 5 TABLET ORAL at 23:05

## 2018-11-18 RX ADMIN — TRAZODONE HYDROCHLORIDE 50 MG: 50 TABLET ORAL at 02:19

## 2018-11-18 RX ADMIN — BENZTROPINE MESYLATE 2 MG: 1 TABLET ORAL at 23:05

## 2018-11-18 NOTE — H&P
H&P Exam - Srinivas Michael 33 y o  male MRN: [id number]    Unit/Bed#: -01 Encounter: [id number]      Assessment/Plan     Assessment:  Bipolar disorder  Depression  Marijuana use    Plan:  Bipolar disorder-this will be addressed by Psychiatry  Depression-this will be addressed by Psychiatry  Marijuana abuse-this will be addressed by Psychiatry    History of Present Illness   History, ROS and PFSH unobtainable from any source due to N?A   HPI:  Srinivas Michael is a 33 y o  male who presents with bipolar disorder and depression  Review of Systems    Historical Information   Past Medical History:   Diagnosis Date    ADHD     Anxiety state     Bipolar disorder ([facility] [address] )     Depressive disorder     Essential hypertension     benign    History of emotional problems     Hyperlipidemia     Peptic reflux disease      No past surgical history on file  Social History   History   Alcohol Use    Yes     Comment: at social events     History   Drug Use    Frequency: 2 0 times per week    Types: Marijuana     History   Smoking Status    Former Smoker    Quit date: 3/30/2015   Smokeless Tobacco    Never Used     Family History: non-contributory    Meds/Allergies   all medications and allergies reviewed  No Known Allergies    Objective   Vitals: Blood pressure 137/71, pulse 71, temperature 98 4 °F (36 9 °C), temperature source Temporal, resp  rate 16, height 6' (1 829 m), weight 113 kg (249 lb), SpO2 96 %  No intake or output data in the 24 hours ending 11/18/18 1116    Invasive Devices     Peripheral Intravenous Line            Peripheral IV 11/17/18 Right Antecubital 1 day                Physical Exam    Lab Results: I have personally reviewed pertinent reports  Imaging: I have personally reviewed pertinent reports  EKG, Pathology, and Other Studies: I have personally reviewed pertinent reports        Code Status: Level 1 - Full Code  Advance Directive and Living Will:      Power of :    POLST: Counseling / Coordination of Care  Total floor / unit time spent today 20 minutes  Greater than 50% of total time was spent with the patient and / or family counseling and / or coordination of care  A description of the counseling / coordination of care:   Reported to staff

## 2018-11-18 NOTE — ED NOTES
Attempted to complete pre-cert with patient's insurance [facility]: [phone]; offices are closed at this time and there was not a representative available  A message was left requesting a return call to provide clinical and obtain authorization- it is not clear if this will be completed kishor Chris, GERONIMO  11/17/18   2608

## 2018-11-18 NOTE — LETTER
November 20, 2018     Roshan Flores, [address] [facility] [place] [address]    Patient: Adryan Garza   YOB: 1990   Date of Visit: 11/18/2018       Dear Dr Simmons Gone: Thank you for referring Adryan Garza to me for evaluation  Below are my notes for this consultation  If you have questions, please do not hesitate to call me  I look forward to following your patient along with you  Sincerely,        No name on file          CC: Mingo Streeter

## 2018-11-18 NOTE — PLAN OF CARE
Anxiety     Anxiety is at manageable level Progressing        Depression     Treatment Goal: Demonstrate behavioral control of depressive symptoms, verbalize feelings of improved mood/affect, and adopt new coping skills prior to discharge Progressing     Verbalize thoughts and feelings Progressing

## 2018-11-18 NOTE — PROGRESS NOTES
Patient bright alert awake in am but declined to eat breakfast related to "zero appetite " per pt  Pt c/o mild nausea but has not vomited since last night, c/o depression 7/10 and anxiety 7/10  Pt tells this nurse ~ 1 month ago him and his fiance had still born baby at 6 months gestation and has been not doing well since then   Pt c/o poor sleep, appetite and difficulty coping since then for him and his fiance  He states they have been together for 7 years and have a house together and they have good family support in the area  Pt denies si hi and hallucinations and only had history of [facility] inpatient when teenager related to substance abuse  Pt has not used heroin in years but currently smokes THC and rarely drinks alcohol  Will maintain on safety precautions and 7 minute checks, no needs identified

## 2018-11-18 NOTE — PROGRESS NOTES
Patient up at [phone] stating he has been unable to sleep  Let patient know this writer had tried to wake him shortly after he laid down and he was sleeping  Patient stated he did not feel like he had gotten any sleep  He c/o feeling restless and agitated  Discussed prn haldol and cogentin with patient  Patient agreeable to trying this    Received haldol and cogentin at 0125

## 2018-11-18 NOTE — H&P
Psychiatric Evaluation - Maria De Jesus [age] y o  male MRN: [id number]  Unit/Bed#: U 218-01 Encounter: [id number]    Assessment/Plan   Principal Problem:    Bipolar disorder, current episode mixed, moderate ([address] )    Plan:   Risks, benefits and possible side effects of Medications:   Risks, benefits, and possible side effects of medications explained to patient and patient verbalizes understanding  1-Zyprexa 2 5mg bid  2-Consider Tegretol   2-Unit Milue  3-Supportive therapy      Chief Complaint: "I don't want to go on living like this"    History of Present Illness     Patient is a 33 y o  male presents with a background hx notable for bipolar disorder who was admitted with worsening of his mood related symptoms in the past 2 weeks  the patient reports that they lost a baby 2 weeks ago and since then he had been struggling with his mood with attendant low mood,anhedonia,social withdrawal,despair with sleep and appetite issues,he reports having repeated bouts of cyclical vomiting something he states often happens when he is under a lot of stress  Pt also endorses feelings of hopelessness and worthlessness which was made worse by acting as a caregiver for his fiancee the past 2 weeks  Pt also reports feelings of anger with some irritability  No Psychosis,delusional thinking/behavior,he denies /  PT has been non-compliant with his medications including Seroquel which he states don't quite help with his symptoms at this time      Psychiatric Review Of Systems:  sleep: insomnia  appetite changes: reduced  weight changes: no  energy/anergy: no  interest/pleasure/anhedonia: yes  somatic symptoms: yes  anxiety/panic: no  elizabeth: no  guilty/hopeless: yes  self injurious behavior/risky behavior: yes    Historical Information     Past Psychiatric History:   Therapy, Out Patient Sees a psychiatrist in a [facility]  Past Suicide attempts: nil previous suicidal behavior  Past Violent behavior: admits to some anger issues  Past Psychiatric medication trial: seroquel    Substance Abuse History:  Social History     Tobacco History     Smoking Status  Former Smoker Quit date  3/30/2015    Smokeless Tobacco Use  Never Used          Alcohol History     Alcohol Use Status  Yes Comment  at social events          Drug Use     Drug Use Status  Yes Types  Marijuana Frequency  2 times/week          Sexual Activity     Sexually Active  Yes Partners  Female          Activities of Daily Living    Not Asked               Additional Substance Use Detail     Questions Responses    Cannabis frequency 1-2 times/week    Comment: 1-2 times/week on 11/18/2018     Cannabis method Smoke    Comment: Smoke on 11/18/2018     Last reviewed by Alvino Tariq RN on 11/18/2018        I have assessed this patient for substance use within the past 12 months    Family Psychiatric History:   Pt denies    Social History:  Education: high school diploma/GED  Learning Disabilities: denies  Marital history: co-habitating  Living arrangement, social support: The patient lives in home with jessi  Occupational History: works full time in [employer]: good support system  Other Pertinent History: None      Traumatic History:   Abuse: denies  Other Traumatic Events: denies    Past Medical History:   Diagnosis Date    ADHD     Anxiety state     B[address] disorder [address] [address]     Depressive disorder     Essential hypertension     benign    History of emotional problems     Hyperlipidemia     Peptic reflux disease        Medical Review Of Systems:  Pertinent items are noted in HPI      Meds/Allergies   current meds:   Current Facility-Administered Medications   Medication Dose Route Frequency    acetaminophen (TYLENOL) tablet 325 mg  325 mg Oral Q6H PRN    aluminum-magnesium hydroxide-simethicone (MYLANTA) 200-200-20 mg/5 mL oral suspension 15 mL  15 mL Oral Q4H PRN    benztropine (COGENTIN) injection 2 mg  2 mg Intramuscular Q6H PRN    benztropine (COGENTIN) tablet 2 mg  2 mg Oral Q6H PRN    haloperidol (HALDOL) tablet 5 mg  5 mg Oral Q6H PRN    haloperidol lactate (HALDOL) injection 5 mg  5 mg Intramuscular Q6H PRN    hydrOXYzine HCL (ATARAX) tablet 25 mg  25 mg Oral Q6H PRN    magnesium hydroxide (MILK OF MAGNESIA) 400 mg/5 mL oral suspension 20 mL  20 mL Oral Daily PRN    OLANZapine (ZyPREXA) tablet 2 5 mg  2 5 mg Oral BID    traZODone (DESYREL) tablet 50 mg  50 mg Oral HS PRN     No Known Allergies    Objective   Vital signs in last 24 hours:  Temp:  [97 8 °F (36 6 °C)-98 4 °F (36 9 °C)] 98 4 °F (36 9 °C)  HR:  [] 71  Resp:  [14-21] 16  BP: (128-151)/(64-92) 137/71    No intake or output data in the 24 hours ending 11/18/18 1225    Mental Status Evaluation:  Appearance:  disheveled   Behavior:  psychomotor retardation and restless and fidgety   Speech:  soft   Mood:  anxious and depressed   Affect:  labile   Language: wnl   Thought Process:  goal directed   Thought Content:  delusions  obsessive/rumination   Perceptual Disturbances: denies   Risk Potential: Suicidal Ideations without plan   Sensorium:  person, place, time/date, situation, day of week, month of year and year   Cognition:  grossly intact   Consciousness:  awake    Attention: attention span and concentration were age appropriate   Intellect: within normal limits   Fund of Knowledge: awareness of current events: fair   Insight:  limited   Judgment: limited   Muscle Strength and Tone: wnl   Gait/Station: normal gait/station   Motor Activity: no abnormal movements     Lab Results: I have personally reviewed pertinent lab results        Code Status: Level 1 - Full Code  Advance Directive and Living Will:      Power of :    POLST:        Patient Strengths/Assets: average or above intelligence, capable of independent living, cooperative, communication skills, good physical health    Patient Barriers/Limitations: difficulty adapting, noncompliant with medication, noncompliant with treatment, poor insight    Counseling / Coordination of Care  Total floor / unit time spent today 55 minutes  Greater than 50% of total time was spent with the patient and / or family counseling and / or coordination of care   A description of the counseling / coordination of care:

## 2018-11-18 NOTE — TREATMENT PLAN
TREATMENT PLAN REVIEW - Roseanna Sevilla 1213 28 y o  1990 male MRN: [id number]    [address] Room / Bed: [facility] 218/[facility] 218-01 Encounter: [id number]          Admit Date/Time:  11/18/2018  1:40 AM    Treatment Team: Attending Provider: Hellen Bailey MD; Registered Nurse: Dwayne Ribeiro, RN; Patient Care Assistant: Traci Mcgee; Registered Nurse: Emery Cadena, RN; Nursing Student: Jonathan Whitley; Registered Nurse: Shi Krause RN; Patient Care Assistant: Trev Maher    Diagnosis: Principal Problem:    Bipolar disorder, current episode mixed, moderate ([facility] [address] )      Patient Strengths/Assets: average or above intelligence, capable of independent living, cooperative, general fund of knowledge, good support system    Patient Barriers/Limitations: limited motivation, noncompliant with medication, noncompliant with treatment, poor insight    Short Term Goals: decrease in depressive symptoms, decrease in anxiety symptoms, improvement in insight    Long Term Goals: resolution of depressive symptoms, stabilization of mood, free of suicidal thoughts, resolution of psychotic symptoms, improved insight, acceptance of need for psychiatric medications, acceptance of need for psychiatric treatment, acceptance of need for psychiatric follow up after discharge, acceptance of psychiatric diagnosis, adequate sleep    Progress Towards Goals: starting psychiatric medications as prescribed    Recommended Treatment: medication management, patient medication education, group therapy, milieu therapy, continued Behavioral Health psychiatric evaluation/assessment process    Treatment Frequency: daily medication monitoring, group and milieu therapy daily, monitoring through interdisciplinary rounds, monitoring through weekly patient care conferences    Expected Discharge Date:  11/14/18    Discharge Plan: referral for outpatient medication management with a psychiatrist    Treatment Plan Created/Updated By: Jane Alan MD

## 2018-11-18 NOTE — PROGRESS NOTES
Patient transferred to [facility] from [facility] ER on a 201 status with a diagnosis of Depression    Medical hx - GERD    Past in-patient psych - 2x as an adolescent    D&A - Marijuana 2-3x/wk  Alcohol - Every other week to the point of drunkenness    Events leading to admission:  Patient reports that he is generally a happy person and has been relatively stable on his current psych medications  (Seroquel 300 mg HS and 25 mg PRN )    Three weeks ago he and his fiance lost their first child  The fiance was due to deliver and noticed that the baby hadn't moved in awhile  They immediately went to the hospital but the baby was already dead and the fiance had to deliver the baby  Patient reports he was strong at first but over the last week he has become increasingly anxious  He states that when his emotions are this extreme he frequently experiences nausea as he has been over the past few days  He has been unable to keep his psych meds down which has exacerbated the situation  He reports he hasn't slept or eaten anything that has stayed down over the last three days  He relates he is experiencing grief, extreme anxiety and racing thoughts  Denies any s/i  Says he would like a medication adjustment  Patient was cooperative with admission process  Flat, depressed, tearful at times  C/o anxiety  Received PRN atarax and trazodone after admission and retired to his room  Currently sleeping  PRN medications effective

## 2018-11-18 NOTE — PROGRESS NOTES
Patient vomited small amount after eating few bites of lunch but states " I feel so much better now" reglan Im injection given per provider order and pt went to sleep  Will continue to monitor

## 2018-11-18 NOTE — ED NOTES
Patient is accepted at [facility]  Patient is accepted by Dr Bri Dexter per Christian Hooks  Transportation is arranged with [facility]  Transportation is scheduled for Midnight  Patient may go to the floor at anytime  *Nurse report is to be called to [phone] prior to patient transfer  GERONIMO Joe  11/17/18   6415

## 2018-11-18 NOTE — ED NOTES
11/17/18 @ 1945:  Received call from RN that patient is accepted by Dr Breezy Momin  Crisis will inform Samir Girard at [facility] [employer] [facility]  Mary MS  1950: Crisis informed Samir Girard at [facility] [employer] [facility];  at 2400 by CASI; Crisis informed inpatient unit    Brook Evans, MS

## 2018-11-18 NOTE — PLAN OF CARE
Anxiety     Anxiety is at manageable level Not Progressing        Depression     Treatment Goal: Demonstrate behavioral control of depressive symptoms, verbalize feelings of improved mood/affect, and adopt new coping skills prior to discharge Not Progressing        GASTROINTESTINAL - ADULT     Minimal or absence of nausea and/or vomiting Not Progressing     Maintains adequate nutritional intake Not Progressing        METABOLIC, FLUID AND ELECTROLYTES - ADULT     Electrolytes maintained within normal limits Not Progressing        SLEEP DISTURBANCE     Will exhibit normal sleeping pattern Not Progressing          Depression     Verbalize thoughts and feelings Progressing

## 2018-11-19 PROBLEM — F43.21 GRIEF AT LOSS OF CHILD: Status: ACTIVE | Noted: 2018-11-19

## 2018-11-19 PROBLEM — Z63.4 GRIEF AT LOSS OF CHILD: Status: ACTIVE | Noted: 2018-11-19

## 2018-11-19 PROCEDURE — 99232 SBSQ HOSP IP/OBS MODERATE 35: CPT | Performed by: PSYCHIATRY & NEUROLOGY

## 2018-11-19 RX ORDER — OXCARBAZEPINE 150 MG/1
300 TABLET, FILM COATED ORAL EVERY 12 HOURS SCHEDULED
Status: DISCONTINUED | OUTPATIENT
Start: 2018-11-19 | End: 2018-11-20 | Stop reason: HOSPADM

## 2018-11-19 RX ORDER — PANTOPRAZOLE SODIUM 20 MG/1
20 TABLET, DELAYED RELEASE ORAL
Status: DISCONTINUED | OUTPATIENT
Start: 2018-11-19 | End: 2018-11-20 | Stop reason: HOSPADM

## 2018-11-19 RX ORDER — OMEPRAZOLE 20 MG/1
20 CAPSULE, DELAYED RELEASE ORAL
Status: DISCONTINUED | OUTPATIENT
Start: 2018-11-19 | End: 2018-11-19 | Stop reason: CLARIF

## 2018-11-19 RX ORDER — CLONAZEPAM 0.5 MG/1
0.5 TABLET ORAL 2 TIMES DAILY
Status: DISCONTINUED | OUTPATIENT
Start: 2018-11-19 | End: 2018-11-20 | Stop reason: HOSPADM

## 2018-11-19 RX ADMIN — OLANZAPINE 2.5 MG: 2.5 TABLET, FILM COATED ORAL at 07:53

## 2018-11-19 RX ADMIN — HYDROXYZINE HYDROCHLORIDE 25 MG: 25 TABLET ORAL at 07:53

## 2018-11-19 RX ADMIN — OXCARBAZEPINE 300 MG: 150 TABLET ORAL at 17:14

## 2018-11-19 RX ADMIN — PANTOPRAZOLE SODIUM 20 MG: 20 TABLET, DELAYED RELEASE ORAL at 10:46

## 2018-11-19 RX ADMIN — CLONAZEPAM 0.5 MG: 0.5 TABLET ORAL at 10:46

## 2018-11-19 RX ADMIN — ONDANSETRON 4 MG: 4 TABLET, ORALLY DISINTEGRATING ORAL at 07:15

## 2018-11-19 RX ADMIN — CLONAZEPAM 0.5 MG: 0.5 TABLET ORAL at 17:14

## 2018-11-19 RX ADMIN — QUETIAPINE FUMARATE 300 MG: 200 TABLET ORAL at 22:03

## 2018-11-19 RX ADMIN — HALOPERIDOL 5 MG: 5 TABLET ORAL at 15:18

## 2018-11-19 NOTE — PLAN OF CARE
Anxiety     Anxiety is at manageable level Not Progressing        Depression     Treatment Goal: Demonstrate behavioral control of depressive symptoms, verbalize feelings of improved mood/affect, and adopt new coping skills prior to discharge Not Progressing        SLEEP DISTURBANCE     Will exhibit normal sleeping pattern Not Progressing          Depression     Verbalize thoughts and feelings Progressing

## 2018-11-19 NOTE — PROGRESS NOTES
Patient came to nurses station anxious and restless, patient requested PRN medication for anxiety and Atarax given with positive results  Patient currently resting in his room  Denies any SI/HI  Will continue to monitor for behaviors and changes

## 2018-11-19 NOTE — PROGRESS NOTES
Progress Note - Lida [age] y o  male MRN: [id number]   Unit/Bed#: U 218-01 Encounter: [id number]    Behavior over the last 24 hours: more anxious  Sunitha Barnes  Reports he is "too anxious" to be here and would feel better at home with his fiance  They have been struggling with acute grief due to the loss of their baby two weeks ago  He is beside himself with anxiety and panic  Sleep: insomnia  Appetite: poor  Medication side effects: undetermined   ROS: no complaints    Mental Status Evaluation:    Appearance:  age appropriate, casually dressed   Behavior:  agitated   Speech:  delayed, decreased volume   Mood:  dysphoric   Affect:  constricted   Thought Process:  perseverative   Associations: circumstantial associations   Thought Content:  negative thinking   Perceptual Disturbances: none   Risk Potential: Suicidal ideation - Yes  Homicidal ideation - None  Potential for aggression - No   Sensorium:  oriented to person, place and situation   Memory:  recent and remote memory grossly intact   Consciousness:  alert   Attention: attention span and concentration appear shorter than expected for age   Insight:  fair   Judgment: fair   Gait/Station: normal gait/station   Motor Activity: no abnormal movements     Vital signs in last 24 hours:    Temp:  [98 2 °F (36 8 °C)-98 7 °F (37 1 °C)] 98 2 °F (36 8 °C)  HR:  [] 105  Resp:  [16-18] 16  BP: (124-140)/(76-93) 140/93    Laboratory results:    I have personally reviewed all pertinent laboratory/tests results    Most Recent Labs:   Lab Results   Component Value Date    WBC 13 14 (H) 11/17/2018    RBC 5 47 11/17/2018    HGB 15 2 11/17/2018    HCT 44 5 11/17/2018     11/17/2018    RDW 12 9 11/17/2018    NEUTROABS 9 87 (H) 11/17/2018    SODIUM 138 11/17/2018    K 3 6 11/17/2018    CL 99 (L) 11/17/2018    CO2 29 11/17/2018    BUN 11 11/17/2018    CREATININE 0 95 11/17/2018    GLUC 114 11/17/2018    CALCIUM 9 3 11/17/2018    AST 44 11/17/2018    ALT 29 11/17/2018    ALKPHOS 79 11/17/2018    TP 9 1 (H) 11/17/2018    ALB 4 5 11/17/2018    TBILI 1 00 11/17/2018    BILIDIR 0 22 (H) 10/15/2018       Progress Toward Goals: Incomplete    Assessment/Plan   Principal Problem:    Bipolar disorder, current episode mixed, moderate ([facility])    Recommended Treatment:   Short-term trial of clonazepam to clarify diagnosis, panic, anxiety, grief  Clarify the diagnosis, effective treatments  Planned medication and treatment changes: All current active medications have been reviewed  Encourage group therapy, milieu therapy and occupational therapy  Behavioral Health checks every 7 minutes        Current Facility-Administered Medications:  acetaminophen 325 mg Oral Q6H PRN Pura Rubinstein, MD   aluminum-magnesium hydroxide-simethicone 15 mL Oral Q4H PRN Pura Rubinstein, MD   benztropine 2 mg Intramuscular Q6H PRN Pura Rubinstein, MD   benztropine 2 mg Oral Q6H PRN Pura Rubinstein, MD   clonazePAM 0 5 mg Oral BID Gilford Lennox, MD   haloperidol 5 mg Oral Q6H PRN Pura Rubinstein, MD   haloperidol lactate 5 mg Intramuscular Q6H PRN Pura Rubinstein, MD   hydrOXYzine HCL 25 mg Oral Q6H PRN Pura Rubinstein, MD   magnesium hydroxide 20 mL Oral Daily PRN Pura Rubinstein, MD   OLANZapine 2 5 mg Oral BID Shelia Barros MD   ondansetron 4 mg Oral Q6H PRN Summer Mann MD   pantoprazole 20 mg Oral Early Morning PETER Martinez   traZODone 50 mg Oral HS PRN Pura Rubinstein, MD       Risks / Benefits of Treatment:    Risks, benefits, and possible side effects of medications explained to patient and patient verbalizes understanding and agreement for treatment  Counseling / Coordination of Care:    Patient's progress discussed with staff in treatment team meeting  Medications, treatment progress and treatment plan reviewed with patient      Gilford Lennox, MD 11/19/18

## 2018-11-19 NOTE — SOCIAL WORK
PT met with PT to complete psychosocial assessment, treatment plan review, and to complete DON's  DON's signed for PCP, fiance, Therapist, D&A, and Psychiatrist      PT presented as depressed and anxious at onset of session with periodic episodes of being teary eyed, particularly when talking about not being with his fiance and recent loss of baby, by end of the session appeared to be more relaxed  PT reported mid session and at end of session he "felt better" after talking about presenting needs and concerns  PT denies SI/HI/AH/VH  PT reported coming to hospital because he was depressed and anxious since recent loss of baby  PT reported he was "fearful of going back to old ways" when asked to clarify he discussed past use of drugs to include, marijuana, cocaine, heroine  PT couldn't clarify a time line exactly of last use but that it had been years  He reported he use to self medicate by taking "downers"  PT reported he did however use marijuana "one or two weeks ago"  PT reported he hadn't taken his Seroquel in 3 days due to having nausea  PT reported current stressors are losing baby, "wife and I were staying at [address] [facility] [facility] for a month for monitoring   took a few days off and went to a museum    she was not feeling well   went to [facility]   baby did not have heart beat  Kasandra Nissen abdominal pain   lost baby at  [address] weeks " PT reported he feels bad not being able to be there for his wife because he is in the hospital, but wants " to do the right thing" " thats why I came to the hospital for help, so I don't go back to my old ways " PT reports his parents (Phyllis Wili and Marilu Briceno) and siblings (Sandrita Mahmood) are supportive along with his fiance Harley Blanchard) and her family  PT reports HX Bipolar with manic episodes, diagnosed at age 26  PT Denies access to firearms  PT reported his maternal family has a strong  HX of substance abuse   He reported that his father use to be a "Bachelor" until age 37, but that he "never partied in front of the children"  PT reports no legal history  PT is engaged and has been with his fiance for ten years, and they bought a home together five years ago  PT reported he and his fiance are financially stable, that his fiance is a Physical Therapist  PT reported he is open to seeing a psychiatrist, therapist, for follow up services/treatment, along with taking part in support groups to include both NA and grief  PT reports having "no coping skills" but enjoys music, "war stuff", and mystery/criminal shows/movies  PT reported his strengths include family, loyal to family, and being a good friend

## 2018-11-19 NOTE — PROGRESS NOTES
Patient had a difficult time sleeping last night  Up and down multiple times  Anxious  Agitated  Tearful at times  Concerned he will "not get better '  Offered multiple different distraction techniques to patient  He settled on taking a hot shower  Controlled despite his distress    Patient requesting he be restarted on Seroquel as he states it is the only thing that helps him sleep

## 2018-11-19 NOTE — ED NOTES
Call received from [address] [place] [address] at [facility] to report admission for precertification purposes ([phone])  She requested that clinical information be faxed for review by a nurse to [phone], using reference number: [id number]  Clinical chart to be printed and faxed

## 2018-11-19 NOTE — CASE MANAGEMENT
CM called to notify fiance of PT's admission and address any needs or concerns, unable to leave a message, voicemail not set up, unable to leave a message  SW to follow up

## 2018-11-19 NOTE — PROGRESS NOTES
Patient c/o high anxiety but reports he is feeling "a little better" overall  Reported he had a good conversation with his girlfriend  He did state he likes the zyprexa he was started on however he feels it doesn't last long enough  He received prn trazodone and atarax per his request at 2040  At 2115 he c/o acid reflux  He was given prn mylanta with good results  He states he takes omeprazole for GERD at home  None ordered today during H&P  Will pass along to next shift  He then went to his room and fell asleep briefly  At 2300 he came out flush faced and agitated  Reported he felt as if he had closed his eyes for only a minute and woke back up  C/o racing thoughts  Per his request, prn haldol and cogentin were given at 2305   He fell asleep shortly thereafter

## 2018-11-19 NOTE — ED NOTES
Call received from [place], [place], indicating that prior-authorization was not yet obtained  Attempted to complete pre-cert with patient's insurance [facility]: [phone]; offices are closed at this time and there was not a representative available  A message was left requesting a return call to provide clinical and obtain authorization  [employer] to follow-up

## 2018-11-19 NOTE — PROGRESS NOTES
Clinical Pharmacy Note: Antipsychotic Monitoring Requirements     Charles Abad is a 33 y o  male who presents with worsening of his mood and is currently taking  olanzapine 2 5 mg twice daily  Performing metabolic monitoring on patients receiving any antipsychotics is a Centers for [employer] (CMS) requirement  Antipsychotic treatment increases the risk of developing type 2 diabetes mellitus, hypertension, and hyperlipidemia  According to the [facility] (NICE) guidelines, baseline weight, waist circumference, pulse, blood pressure, fasting blood glucose, hemoglobin A1c, lipid profile, and prolactin level (if initiating risperidone or paliperidone) should be collected  These guidelines coincide with Centers and Medicare & Medicaid Services (CMS) requirements including baseline Body Mass Index, blood pressure, fasting glucose, hemoglobin A1c, and fasting lipid panel  CMS states laboratory values collected 12 months from discharge date are acceptable for evaluation  CMS Checklist:     BMI: yes  BP: yes  Fasting glucose: yes  A1c within last 12 months: no, needs to be ordered  Lipid panel within last 12 months: no, needs to be ordered  Nicotine Replacement Therapy: no, patient does not smoke    The following values have been collected:  Value Status Result    BMI Body mass index is 33 77 kg/m²     Blood pressure /79 (BP Location: Left arm)   Pulse 95   Temp 98 3 °F (36 8 °C) (Temporal)   Resp 16   Ht 6' (1 829 m)   Wt 113 kg (249 lb)   SpO2 95%   BMI 33 77 kg/m²    Fasting glucose   0  Lab Value Date/Time   GLUC 114 11/17/2018 1659      Hemoglobin A1c No results found for: HGBA1C   Lipid panel No results found for: CHOL  No results found for: HDL  No results found for: LDLCALC   No results found for: TRIG       Recommendations    Based on the above information, pharmacy recommends the following: Please order HbA1c and fasting lipid panel to meet CMS requirements for metabolic syndrome monitoring        Pharmacy will continue to follow patient with team   Electronically signed by: Nathanael Carrion, Pharmacist

## 2018-11-20 VITALS
BODY MASS INDEX: 33.72 KG/M2 | TEMPERATURE: 98.8 F | HEIGHT: 72 IN | OXYGEN SATURATION: 93 % | WEIGHT: 249 LBS | DIASTOLIC BLOOD PRESSURE: 95 MMHG | HEART RATE: 95 BPM | RESPIRATION RATE: 18 BRPM | SYSTOLIC BLOOD PRESSURE: 159 MMHG

## 2018-11-20 PROCEDURE — 99239 HOSP IP/OBS DSCHRG MGMT >30: CPT | Performed by: PSYCHIATRY & NEUROLOGY

## 2018-11-20 RX ORDER — OXCARBAZEPINE 300 MG/1
300 TABLET, FILM COATED ORAL EVERY 12 HOURS SCHEDULED
Qty: 60 TABLET | Refills: 0 | Status: SHIPPED | OUTPATIENT
Start: 2018-11-20 | End: 2018-12-21 | Stop reason: SDUPTHER

## 2018-11-20 RX ORDER — QUETIAPINE FUMARATE 300 MG/1
300 TABLET, FILM COATED ORAL
Qty: 30 TABLET | Refills: 0 | Status: SHIPPED | OUTPATIENT
Start: 2018-11-20 | End: 2019-01-08 | Stop reason: SDUPTHER

## 2018-11-20 RX ORDER — CLONAZEPAM 0.5 MG/1
0.5 TABLET ORAL 2 TIMES DAILY
Qty: 20 TABLET | Refills: 0 | Status: SHIPPED | OUTPATIENT
Start: 2018-11-20 | End: 2018-12-06 | Stop reason: SDUPTHER

## 2018-11-20 RX ADMIN — OXCARBAZEPINE 300 MG: 150 TABLET ORAL at 08:10

## 2018-11-20 RX ADMIN — HALOPERIDOL LACTATE 5 MG: 5 INJECTION, SOLUTION INTRAMUSCULAR at 05:15

## 2018-11-20 RX ADMIN — CLONAZEPAM 0.5 MG: 0.5 TABLET ORAL at 08:10

## 2018-11-20 RX ADMIN — HYDROXYZINE HYDROCHLORIDE 25 MG: 25 TABLET ORAL at 04:21

## 2018-11-20 RX ADMIN — PANTOPRAZOLE SODIUM 20 MG: 20 TABLET, DELAYED RELEASE ORAL at 04:21

## 2018-11-20 NOTE — DISCHARGE INSTR - OTHER ORDERS
You will discharge home to [address] [facility] Phone: [phone]  Crisis Plan:    Triggers you identified during your hospital stay that led to increased anxiety, depression, and panic attacks include: loss of your baby and poor sleep  Coping skills you identified during your hospital stay include: listening to music, talking to your family, and watching TV  We also recommend trying journaling, exercise, and deep breathing  After discharge, if you find your coping skills are not effective and you continue to feel distressed please reach out to your family and contact a mental health provider  If that is not effective and you feel you are a danger to yourself or others please contact [facility] Intervention: (307) 328-2018, [facility] Alcohol: (351) 430-7276, Peer Support Talk Line: Seven days a week, 1:00 PM  9:00 PM) Call: 782.955.7641 or Text: 402.274.2896, National Suicide Prevention Lifeline:  8-308.341.9050, National [place] on [address] [facility] ([facility]) HELPLINE: 872.338.5793/Email: www karsten  org, or Substance Abuse and [employer] [address] [place] [address])  [employer], which is a confidential, free, 24-hour-a-day, 365-day-a-year, information service for individuals and family members facing mental health and/or substance use disorders  This service provides referrals to local treatment facilities, support groups, and community-based organizations  Callers can also order free publications and other information    Call 4-438.315.6361/AXEL: www [facility] gov

## 2018-11-20 NOTE — SOCIAL WORK
SAGE made referral to [facility] for individual therapy; they will call him today after discharge to confirm and schedule appointment  SAGE scheduled pt appointment with his PCP

## 2018-11-20 NOTE — SOCIAL WORK
SW met with pt to discuss discharge/aftercare plan  Pt is aware that team feels he should stay but feels strongly that he needs to get home to his fiance  SW explained to pt that she has attempted to get him services but due to him insisting on leaving AMA it has significantly limited the time we have to find services  Pt informed SW that his wife is out of work for 2 months due to loss and that she will work hard on finding him a provider  Pt denies any SI and feels safe to leave    Discharge set for 3:30PM

## 2018-11-20 NOTE — DISCHARGE SUMMARY
Discharge Summary - Maria De Jesus [age] y o  male MRN: [id number]  Unit/Bed#: -01 Encounter: [id number]     Admission Date: 11/18/2018         Discharge Date: No discharge date for patient encounter  Attending Psychiatrist: Saúl Zaragoza MD    Reason for Admission/HPI:   Jannie Alva is a 41-year-old male patient admitted to the Adult Psychiatric Unit for depression, anxiety, and feelings of helplessness and hopelessness  Per initial emergency room intake completed by crisis worker Sandro Stevens, when Jannie Alva arrived at the hospital, he stated he had not slept or eaten in 2 days because when he is emotionally distressed he vomits and is unable to keep anything down including his psychiatric medications  He has had these kinds of episodes in the past   Before this admission, Jannie Alva and his fiancee had lost a full-term baby  This happened 3 weeks ago and he has been taking care of his wife since she came home from the hospital   He relates his depression and anxiety along with the nausea and vomiting kept worsening until he felt he needed to come to the emergency room  Jannie Alva has a history of bipolar disorder and sees a psychiatrist on an outpatient basis  He has never received psychiatric inpatient services  Past Medical History:   Diagnosis Date    ADHD     Anxiety state     Bipolar disorder ([address] )     Depressive disorder     Essential hypertension     benign    History of emotional problems     Hyperlipidemia     Peptic reflux disease      History reviewed  No pertinent surgical history  Medications: All current active medications have been reviewed      Allergies:     No Known Allergies    Objective     Vital signs in last 24 hours:    Temp:  [98 3 °F (36 8 °C)-98 8 °F (37 1 °C)] 98 8 °F (37 1 °C)  HR:  [95] 95  Resp:  [16-18] 18  BP: (156-159)/(79-95) 159/95    No intake or output data in the 24 hours ending 11/20/18 [id number] [address]:   Jannie Alva was admitted to the Adult Psychiatric unit after being medically cleared  He opted deciding 72 hr notice upon admission once on the unit  He was seen by medical doctor for physical examination  He was placed on 7 min behavioral health checks for patient safety  He was encouraged to participate in group and occupational therapy  Psychiatric medications were initiated and titrated to effective doses  Before any medication was administered, risk versus benefit was discussed  Tariq Dexter agreed to take psychiatric medications as prescribed and participate in treatment  Once he began taking medication he was able to sleep  After being exposed to the therapeutic milieu of the unit and continuing treatment, Rudolph's mood began to stabilize although he remained depressed and anxious  The Psychiatric Care Team felt it would be appropriate for Tariq Dexter to spend a few more days on the unit to continue treatment  He did not agree and wished to return home to his wife  Because he was not experiencing any suicidal or homicidal ideation or signs of psychosis and was psychiatrically stable, the care team was obligated to comply with his decision  On the day of discharge, Tariq Dexter denied any suicidal or homicidal ideation as well as any auditory or visual hallucinations  Although he was depressed and anxious, his mood had improved significantly  His fiancee was contacted by case management  She felt comfortable with the discharge plan    Resources for follow-up were given to Tariq Dexter by case management team     Mental Status at Time of Discharge:     Appearance:  casually dressed   Behavior:  cooperative, calm   Speech:  normal rate and volume   Mood:  Improved but still somewhat depressed and anxious   Affect:  normal range and intensity   Thought Process:  organized, logical, coherent   Associations: intact associations   Thought Content:  normal   Perceptual Disturbances: none   Risk Potential: Suicidal ideation - None  Homicidal ideation - None  Potential for aggression - No   Sensorium:   Oriented to person, place, time, date, and situation   Memory:  recent and remote memory grossly intact   Consciousness:  alert and awake   Attention: attention span and concentration are age appropriate   Insight:  fair   Judgment: fair   Gait/Station: normal gait/station and normal balance   Motor Activity: no abnormal movements       Admission Diagnosis:    Principal Problem:    Bipolar disorder, current episode mixed, moderate ([facility] [address] )  Active Problems:    Grief at loss of child      Discharge Diagnosis:     Principal Problem:    [facility]  Active Problems:    Grief at loss of child  Resolved Problems:    * No resolved hospital problems  *      Lab Results: I have personally reviewed all pertinent laboratory/tests results  Discharge Medications:    See after visit summary for all reconciled discharge medications provided to patient and family  Discharge instructions/Information to patient and family:     See after visit summary for information provided to patient and family  Provisions for Follow-Up Care:    See after visit summary for information related to follow-up care and any pertinent home health orders  Discharge Statement:    I spent 35 minutes discharging the patient  This time was spent on the day of discharge  I had direct contact with the patient on the day of discharge  Additional documentation is required if more than 30 minutes were spent on discharge:    I reviewed with Rena Dc importance of compliance with medications and outpatient treatment after discharge

## 2018-11-20 NOTE — DISCHARGE INSTRUCTIONS
Oxcarbazepine (By mouth)   Oxcarbazepine (mq-ssw-KLJ-e-peen)  Treats seizures  Brand Name(s): Oxtellar XR, Trileptal   There may be other brand names for this medicine  When This Medicine Should Not Be Used: This medicine is not right for everyone  Do not use it if you had an allergic reaction to oxcarbazepine  How to Use This Medicine:   Liquid, Tablet, Long Acting Tablet  · Take your medicine as directed  Your dose may need to be changed several times to find what works best for you  · This medicine should come with a Medication Guide  Ask your pharmacist for a copy if you do not have one  · Swallow the extended-release tablet whole  Do not crush, break, or chew it  · Oral liquid: Measure the oral liquid medicine with a marked measuring spoon, oral syringe, or medicine cup  Shake well just before you measure a dose  Swallow the medicine directly, or mix it in a glass with a small amount of water and drink all of the mixture right away  · Food:   ¨ Take the extended-release tablet on an empty stomach at least 1 hour before or 2 hours after a meal   ¨ You may take the oral liquid or regular tablet with or without food  · Missed dose: Take a dose as soon as you remember  If it is almost time for your next dose, wait until then and take a regular dose  Do not take extra medicine to make up for a missed dose  · Store the medicine in a closed container at room temperature, away from heat, moisture, and direct light  Store the oral liquid in the original container and use within 7 weeks after you first open the bottle  Throw away any unused liquid  Drugs and Foods to Avoid:   Ask your doctor or pharmacist before using any other medicine, including over-the-counter medicines, vitamins, and herbal products  · Some medicines and foods can affect how this drug works   Tell your doctor if you are also using any of the following:  ¨ Calcium channel blocker medicine, including felodipine or verapamil  ¨ Other medicine to control seizures  · Tell your doctor if you use anything else that makes you sleepy  Some examples are allergy medicine, narcotic pain medicine, and alcohol  Warnings While Using This Medicine:   · Tell your doctor if you are pregnant or breastfeeding, or if you have kidney disease or liver disease  Also tell your doctor if you are allergic to carbamazepine or tartrazine  · This medicine may cause the following problems:  ¨ Drug reaction with eosinophilia and systemic symptoms (DRESS), which may damage organs such as the liver, kidney, or heart  ¨ Low sodium levels in the blood  ¨ Serious skin or allergic reactions  ¨ Decreased levels of blood cells, which may cause bleeding problems or increase your risk for infection  · This medicine may cause depression, thoughts of suicide, or changes in mood or behavior  Tell your doctor if you have a history of depression or mental health problems  · Do not stop using this medicine suddenly  Your doctor will need to slowly decrease your dose before you stop it completely  The seizures might become more frequent if you suddenly stop using this medicine  · Birth control that uses hormones may not work as well while you are using this medicine  Use a different type of birth control if you need to  Talk with your doctor if you have questions  · This medicine may make you dizzy or drowsy  Do not drive or do anything else that could be dangerous until you know how this medicine affects you  · Your doctor will do lab tests at regular visits to check on the effects of this medicine  Keep all appointments  · Keep all medicine out of the reach of children  Never share your medicine with anyone    Possible Side Effects While Using This Medicine:   Call your doctor right away if you notice any of these side effects:  · Allergic reaction: Itching or hives, swelling in your face or hands, swelling or tingling in your mouth or throat, chest tightness, trouble breathing  · Blistering, peeling, red skin rash  · Confusion, weakness, muscle twitching  · Fever, skin rash, or swollen glands in your armpits, neck, or groin  · Unusual thoughts or behaviors, thoughts of hurting yourself, or feeling depressed, irritable, nervous, restless  · Unusual bleeding, bruising, or weakness  If you notice these less serious side effects, talk with your doctor:   · Dizziness, headache  · Nausea, vomiting, stomach pain  · Trouble concentrating or speaking, tiredness, clumsiness, trouble walking  · Vision changes, double vision  If you notice other side effects that you think are caused by this medicine, tell your doctor  Call your doctor for medical advice about side effects  You may report side effects to FDA at 9-807-FDA-3103  © 2017 [address] Information is for End User's use only and may not be sold, redistributed or otherwise used for commercial purposes  The above information is an  only  It is not intended as medical advice for individual conditions or treatments  Talk to your doctor, nurse or pharmacist before following any medical regimen to see if it is safe and effective for you  Quetiapine (By mouth)   Quetiapine Fumarate (qiw-TFF-u-peen FUE-ma-rate)  Treats schizophrenia, bipolar disorder, or depression  Brand Name(s): SEROquel, SEROquel XR, Seroquel XR 14-Day Sample Kit   There may be other brand names for this medicine  When This Medicine Should Not Be Used: This medicine is not right for everyone  Do not use if you had an allergic reaction to quetiapine  How to Use This Medicine:   Tablet, Long Acting Tablet  · Take your medicine as directed  Your dose may need to be changed several times to find what works best for you  You need to start with a low dose, even if you have used this medicine before  · Your doctor may tell you to take the medicine at bedtime, because quetiapine can make you sleepy    · Extended-release tablets: Take this medicine either without food or with a light snack (approximately 300 calories)  · Swallow the extended-release tablet whole  Do not crush, break, or chew it  · This medicine should come with a Medication Guide  Ask your pharmacist for a copy if you do not have one  · Missed dose: Take a dose as soon as you remember  If it is almost time for your next dose, wait until then and take a regular dose  Do not take extra medicine to make up for a missed dose  · Store the medicine in a closed container at room temperature, away from heat, moisture, and direct light  Drugs and Foods to Avoid:   Ask your doctor or pharmacist before using any other medicine, including over-the-counter medicines, vitamins, and herbal products  · Some medicines can affect how quetiapine works  Tell your doctor if you are using any of the following:  ¨ Levodopa, methadone, nefazodone, rifampin, Danica's wort  ¨ Blood pressure medicine  ¨ Medicine for heart rhythm problems (including amiodarone, procainamide, quinidine, sotalol)  ¨ Medicine for seizures (including carbamazepine, phenobarbital, phenytoin)  ¨ Medicine to treat an infection (including gatifloxacin, itraconazole, ketoconazole, moxifloxacin, pentamidine)  ¨ Medicine to treat HIV/AIDS (including indinavir, ritonavir)  ¨ Other medicine to treat mental health problems (including chlorpromazine, thioridazine, ziprasidone)  · Tell your doctor if you use anything else that makes you sleepy  Some examples are allergy medicine, narcotic pain medicine, and alcohol  · Do not drink alcohol while you are using this medicine  Warnings While Using This Medicine:   · Tell your doctor if you are pregnant or breastfeeding, or if you have liver disease, breast cancer, diabetes, underactive thyroid, or a history of seizures or neuroleptic malignant syndrome (NMS)   Tell your doctor if you have any kind of blood vessel or heart problems, including low or high blood pressure, heart failure, heart rhythm problems (QT prolongation, slow heartbeat), high cholesterol, or a history of heart attack or stroke  · This medicine may cause the following problems:  ¨ Neuroleptic malignant syndrome (a nerve and muscle problem)  ¨ High blood sugar, cholesterol, or triglyceride levels  ¨ Tardive dyskinesia (a muscle problem that may become permanent)  ¨ Changes in blood pressure, especially in children  ¨ Heart rhythm changes  · This medicine may cause depression or thoughts of suicide  Make sure family members know about this  Always tell your doctor about any behavior changes, depression, intense feelings, or thoughts of hurting yourself or others  · This medicine may make you dizzy or drowsy, or may cause trouble with thinking or controlling body movements, which may lead to falls, fractures or other injuries  Do not drive or do anything that could be dangerous until you know how this medicine affects you  You may also feel lightheaded when getting up suddenly from a lying or sitting position, so stand up slowly  · This medicine may make you bleed, bruise, or get infections more easily  Take precautions to prevent illness and injury  Wash your hands often  · This medicine may make it more difficult for your body to cool down  Be careful to not become overheated during exercise or hot weather, because you could have heat stroke  · Do not stop using this medicine suddenly  Your doctor will need to slowly decrease your dose before you stop it completely  · Your doctor will do lab tests at regular visits to check on the effects of this medicine  Keep all appointments  You may also need to have your eyes tested on a regular basis  · Tell any doctor or dentist who treats you that you are using this medicine  This medicine may affect certain medical test results  · Keep all medicine out of the reach of children  Never share your medicine with anyone    Possible Side Effects While Using This Medicine:   Call your doctor right away if you notice any of these side effects:  · Allergic reaction: Itching or hives, swelling in your face or hands, swelling or tingling in your mouth or throat, chest tightness, trouble breathing  · Changes in mood or behavior, agitation, anxiety, restlessness, or thoughts of hurting yourself or others  · Constant muscle movement that you cannot control (often in your lips, tongue, jaw, arms, or legs)  · Fast, slow, pounding, or uneven heartbeat  · Fever, chills, cough, sore throat, and body aches  · Fever, sweating, confusion, uneven heartbeat, muscle stiffness  · Increase in how much or how often you urinate, increased thirst, increased hunger, or weakness  · Lightheadedness, dizziness, fainting, or clumsiness  · Seizures  · Vision changes  If you notice these less serious side effects, talk with your doctor:   · Constipation, vomiting, nausea, dry mouth  · Headache  · Tiredness, dizziness, or sleepiness  · Trouble swallowing  · Weight gain  If you notice other side effects that you think are caused by this medicine, tell your doctor  Call your doctor for medical advice about side effects  You may report side effects to FDA at 8-335-FDA-3361  © 2017 [address]  Information is for End User's use only and may not be sold, redistributed or otherwise used for commercial purposes  The above information is an  only  It is not intended as medical advice for individual conditions or treatments  Talk to your doctor, nurse or pharmacist before following any medical regimen to see if it is safe and effective for you

## 2018-11-20 NOTE — PROGRESS NOTES
Medication and shower not effective  Shaking and unable to relax  Moderate agitation noted  Stated he needs to leave the hospital today  Ruminating about stillborn child and wife  Haldol 5 mg given IM at 0515  Allowed to sit in shower

## 2018-11-20 NOTE — PROGRESS NOTES
Slept most of the shift  Requested and received Atarax 25 mg at 0421 for anxiety  Allowed to take shower to decrease anxiety  Maintained on q 7 minute safety checks  No suicidal ideations

## 2018-11-20 NOTE — SOCIAL WORK
SW spoke to pt's zeina Henry and provided update on patient  SAGE explained that pt signed a 72 hour notice when he arrived and is expires today and he is requesting to leave today  SW explained that we don't have grounds to hold him but do feel as though he should stay as there have been no improvements but that he feels he wants to get home to her  Elaine informed SW that pt feels anxious about not being with her and states that she will talk to him  She reports that if he slept then she feels he should be okay to come home  SAGE informed her of medication change  She asked about pt having appointments for aftercare and SW explained that because pt is leaving AMA he is leaving us in a position in which it is very difficult to establish aftercare  SW informed her that she would try her best but can't guarantee that she will be able to establish an appointment  SW explained that they may need to find their own appointments  She reports that he had appointments with Julia Akins in [facility] but they a month out which is too long  SAGE again explained that she will do her best and explain this to pt  [facility] will speak to pt as well and call back with any concerns or changes if necessary      SAGE made the following aftercare contacts:  [facility]- booked until February  [facility] [address] accept pt's insurance  [facility]- left [facility]-do not accept pt's insurance  Dr Siddharth Thomas left  Dr Arcadio Sheppard until January

## 2018-11-20 NOTE — PROGRESS NOTES
Patient quiet, minimally social, can make needs known  Appetite is good  Hygiene is good  Maintained on Q 7 minute safety checks  No acting out, suicidal ideations, and/or homicidal behaviors  No changes in medical condition  Fluids maintained at bedside to promote hydration

## 2018-11-20 NOTE — NURSING NOTE
Reviewed discharge packet, prescriptions and follow up care appointments with pt  Patient verbalized an understanding with no additional questions or concerns  Provided patient with paper copy of discharge packet

## 2018-11-21 ENCOUNTER — TRANSITIONAL CARE MANAGEMENT (OUTPATIENT)
Dept: FAMILY MEDICINE CLINIC | Facility: CLINIC | Age: 28
End: 2018-11-21

## 2018-12-06 ENCOUNTER — OFFICE VISIT (OUTPATIENT)
Dept: FAMILY MEDICINE CLINIC | Facility: CLINIC | Age: 28
End: 2018-12-06
Payer: COMMERCIAL

## 2018-12-06 VITALS
HEIGHT: 72 IN | WEIGHT: 269 LBS | OXYGEN SATURATION: 97 % | HEART RATE: 93 BPM | BODY MASS INDEX: 36.44 KG/M2 | TEMPERATURE: 97.7 F | DIASTOLIC BLOOD PRESSURE: 84 MMHG | SYSTOLIC BLOOD PRESSURE: 128 MMHG

## 2018-12-06 DIAGNOSIS — Z63.4 GRIEF AT LOSS OF CHILD: ICD-10-CM

## 2018-12-06 DIAGNOSIS — F43.21 GRIEF REACTION: ICD-10-CM

## 2018-12-06 DIAGNOSIS — F90.2 ATTENTION DEFICIT HYPERACTIVITY DISORDER (ADHD), COMBINED TYPE: ICD-10-CM

## 2018-12-06 DIAGNOSIS — IMO0001 TRANSITION OF CARE PERFORMED WITH SHARING OF CLINICAL SUMMARY: Primary | ICD-10-CM

## 2018-12-06 DIAGNOSIS — F31.62 BIPOLAR DISORDER, CURRENT EPISODE MIXED, MODERATE (HCC): ICD-10-CM

## 2018-12-06 DIAGNOSIS — F43.21 GRIEF AT LOSS OF CHILD: ICD-10-CM

## 2018-12-06 DIAGNOSIS — F41.9 ANXIETY: ICD-10-CM

## 2018-12-06 PROBLEM — F90.9 ADHD: Status: ACTIVE | Noted: 2018-12-06

## 2018-12-06 PROCEDURE — 99495 TRANSJ CARE MGMT MOD F2F 14D: CPT | Performed by: FAMILY MEDICINE

## 2018-12-06 RX ORDER — DEXTROAMPHETAMINE SACCHARATE, AMPHETAMINE ASPARTATE MONOHYDRATE, DEXTROAMPHETAMINE SULFATE AND AMPHETAMINE SULFATE 7.5; 7.5; 7.5; 7.5 MG/1; MG/1; MG/1; MG/1
CAPSULE, EXTENDED RELEASE ORAL
Qty: 180 CAPSULE | Refills: 0 | Status: CANCELLED | OUTPATIENT
Start: 2018-12-06

## 2018-12-06 RX ORDER — DEXTROAMPHETAMINE SACCHARATE, AMPHETAMINE ASPARTATE MONOHYDRATE, DEXTROAMPHETAMINE SULFATE AND AMPHETAMINE SULFATE 7.5; 7.5; 7.5; 7.5 MG/1; MG/1; MG/1; MG/1
CAPSULE, EXTENDED RELEASE ORAL
Qty: 180 CAPSULE | Refills: 0 | Status: SHIPPED | OUTPATIENT
Start: 2018-12-06 | End: 2018-12-07 | Stop reason: SDUPTHER

## 2018-12-06 RX ORDER — CLONAZEPAM 0.5 MG/1
0.5 TABLET ORAL 2 TIMES DAILY PRN
Qty: 30 TABLET | Refills: 0 | Status: SHIPPED | OUTPATIENT
Start: 2018-12-06 | End: 2019-04-13

## 2018-12-06 SDOH — SOCIAL STABILITY - SOCIAL INSECURITY: DISSAPEARANCE AND DEATH OF FAMILY MEMBER: Z63.4

## 2018-12-06 NOTE — PROGRESS NOTES
TCM Call (since 11/5/2018)     Date and time call was made  11/21/2018 11:21 AM    Hospital care reviewed  Records reviewed    Patient was hospitialized at  [facility]    Date of Admission  11/18/18    Date of discharge  11/20/18    Diagnosis  Bipolar disorder, current episode mixed, moderate ([facility] [address] )     Disposition  Home    Were the patients medications reviewed and updated  Yes    Current Symptoms  None      TCM Call (since 11/5/2018)     Post hospital issues  None    Should patient be enrolled in anticoag monitoring? No    Scheduled for follow up? Yes    Did you obtain your prescribed medications  Yes    Do you need help managing your prescriptions or medications  No    Is transportation to your appointment needed  No    I have advised the patient to call PCP with any new or worsening symptoms  Jada Susana AGUS  A  Living Arrangements  Spouse or Significiant other    Are you recieving any outpatient services  No    Are you recieving home care services  No    Are you using any community resources  No    Current waiver services  No    Have you fallen in the last 12 months  No    Interperter language line needed  No    Counseling  Patient            Assessment and Plan   1  Transition of care performed with sharing of clinical summary  Psychiatric meds have been reordered  Trileptal remain at 300 mg twice a day  Seroquel 300 mg Q HS as he has always been  Adderall X are 30 mg 5:00 a m  And 2:00 p m  We are going to leave the Klonopin as is as it was ordered when he was released  From the hospital    2  Grief reaction  We are going to set him up with Hugo Centeno    Please provide him with her phone number today for talk therapy  3  Bipolar disorder, current episode mixed, moderate ([facility])    Seroquel  mg p o  Q h s    Trileptal 300 mg b i d       4  Grief at loss of child  Talk therapy with Hugo Centeno  I want you to go to talk therapy with Donny Hodgkin this is a process and it takes time    5  Attention deficit hyperactivity disorder (ADHD), combined type    - amphetamine-dextroamphetamine (ADDERALL XR) 30 MG 24 hr capsule; Take the 1st pill at 5:00 a m  In the morning then take the 2nd pill at 2:00 p m  In the afternoon  Dispense: 180 capsule; Refill: 0    6  Anxiety    - clonazePAM (KlonoPIN) 0 5 mg tablet; Take 1 tablet (0 5 mg total) by mouth 2 (two) times a day as needed for seizures for up to 10 days  Dispense: 30 tablet; Refill: 0    Return to office in: 1 month    Transitional Care Management Review   Hallie Hobbs is a 33 y o  male here for TCM follow-up    During the TCM phone call patient stated:    TCM Call (since 11/5/2018)     Date and time call was made  11/21/2018 11:21 AM    Hospital care reviewed  Records reviewed    Patient was hospitialized at  [address] [place] [address]    Date of Admission  11/18/18    Date of discharge  11/20/18    Diagnosis  B[facility]     Disposition  Home    Were the patients medications reviewed and updated  Yes    Current Symptoms  None      TCM Call (since 11/5/2018)     Post hospital issues  None    Should patient be enrolled in anticoag monitoring? No    Scheduled for follow up? Yes    Did you obtain your prescribed medications  Yes    Do you need help managing your prescriptions or medications  No    Is transportation to your appointment needed  No    I have advised the patient to call PCP with any new or worsening symptoms  Parish VENTURA      Living Arrangements  Spouse or Significiant other    Are you recieving any outpatient services  No    Are you recieving home care services  No    Are you using any community resources  No    Current waiver services  No    Have you fallen in the last 12 months  No    Interperter language line needed  No    Counseling  Patient          History of Present Illness     Here in office following up hospitalization  Spent 72 hours mental health unit following grief reaction following death of his son Casa Horner  The current medications continue Seroquel 300 mg q h s  Trileptal 300 mg was added q 12 hours  Small dose of Klonopin was added to help quell panic attacks  Currently Jaycee Romano feels grounded and feels well  He is doing very well for [employer]  He is back to work  He is currently off his Adderall but he needs to be put back on that  Currently no suicidal ideation  Lives in [place] with [place]        The following portions of the patient's history were reviewed and updated as appropriate: allergies, current medications, past family history, past medical history, past social history, past surgical history and problem list     Review of Systems   Review of Systems   Psychiatric/Behavioral: Positive for dysphoric mood and sleep disturbance  Bipolar1    ADD       Active Problem List     Patient Active Problem List   Diagnosis    Bipolar disorder, current episode mixed, moderate (HCC)    Grief at loss of child    ADHD       Objective   /84   Pulse 93   Temp 97 7 °F (36 5 °C)   Ht 6' (1 829 m)   Wt 122 kg (269 lb)   SpO2 97%   BMI 36 48 kg/m²     Physical Exam   Constitutional: He is oriented to person, place, and time  He appears well-developed and well-nourished  HENT:   Head: Normocephalic and atraumatic  Eyes: Pupils are equal, round, and reactive to light  Neck: Normal range of motion  Neck supple  Cardiovascular: Normal rate  Pulmonary/Chest: Effort normal and breath sounds normal    Abdominal: Soft  Musculoskeletal: Normal range of motion  Neurological: He is alert and oriented to person, place, and time  Skin: Skin is warm and dry  Psychiatric: He has a normal mood and affect  Nursing note and vitals reviewed        Pertinent Laboratory/Diagnostic Studies:  CBC:   Lab Results   Component Value Date/Time    WBC 13 14 (H) 11/17/2018 05:48 PM    WBC 10 03 05/29/2014 06:21 PM    RBC 5 47 11/17/2018 05:48 PM    RBC 6 11 (H) 05/29/2014 06:21 PM    HGB 15 2 11/17/2018 05:48 PM    HGB 17 7 (H) 05/30/2014 01:17 AM    HGB 17 7 (H) 05/29/2014 06:21 PM    HCT 44 5 11/17/2018 05:48 PM    HCT 47 3 05/29/2014 06:21 PM    MCV 81 (L) 11/17/2018 05:48 PM    MCV 77 (L) 05/29/2014 06:21 PM    MCH 27 8 11/17/2018 05:48 PM    MCH 29 0 05/29/2014 06:21 PM    MCHC 34 2 11/17/2018 05:48 PM    MCHC 37 4 05/29/2014 06:21 PM    RDW 12 9 11/17/2018 05:48 PM    RDW 12 2 05/29/2014 06:21 PM    MPV 10 2 11/17/2018 05:48 PM    MPV 10 7 05/29/2014 06:21 PM     11/17/2018 05:48 PM     05/29/2014 06:21 PM    NRBC 0 11/17/2018 05:48 PM    NEUTOPHILPCT 76 (H) 11/17/2018 05:48 PM    NEUTOPHILPCT 64 05/29/2014 06:21 PM    LYMPHOPCT 15 11/17/2018 05:48 PM    LYMPHOPCT 22 05/29/2014 06:21 PM    MONOPCT 9 11/17/2018 05:48 PM    MONOPCT 14 (H) 05/29/2014 06:21 PM    EOSPCT 0 11/17/2018 05:48 PM    EOSPCT 0 05/29/2014 06:21 PM    BASOPCT 0 11/17/2018 05:48 PM    BASOPCT 0 05/29/2014 06:21 PM    NEUTROABS 9 87 (H) 11/17/2018 05:48 PM    NEUTROABS 6 42 05/29/2014 06:21 PM    LYMPHSABS 1 97 11/17/2018 05:48 PM    LYMPHSABS 2 21 05/29/2014 06:21 PM    MONOSABS 1 24 (H) 11/17/2018 05:48 PM    MONOSABS 1 40 (H) 05/29/2014 06:21 PM    EOSABS 0 00 11/17/2018 05:48 PM    EOSABS 0 00 05/29/2014 06:21 PM     Chemistry Profile:   Lab Results   Component Value Date/Time     (L) 05/29/2014 06:21 PM    K 3 6 11/17/2018 04:59 PM    K 3 4 (L) 05/30/2014 05:11 AM    CL 99 (L) 11/17/2018 04:59 PM    CL 90 (L) 05/29/2014 06:21 PM    CO2 29 11/17/2018 04:59 PM    CO2 38 (H) 10/12/2018 08:37 AM    ANIONGAP 13 05/30/2014 01:17 AM    BUN 11 11/17/2018 04:59 PM    BUN 12 05/29/2014 06:21 PM    CREATININE 0 95 11/17/2018 04:59 PM    CREATININE 0 89 05/29/2014 06:21 PM    GLUC 114 11/17/2018 04:59 PM    GLUCOSE 139 10/12/2018 08:37 AM    GLUCOSE 109 05/29/2014 06:21 PM    CALCIUM 9 3 11/17/2018 04:59 PM    CALCIUM 9 8 05/29/2014 06:21 PM    MG 1 3 (L) 11/17/2018 07:51 AM    AST 44 11/17/2018 07:51 AM    ALT 29 11/17/2018 07:51 AM    ALKPHOS 79 11/17/2018 07:51 AM    EGFR 109 11/17/2018 04:59 PM    EGFR 82 10/12/2018 08:37 AM     Coagulation Studies: No results found for: PROTIME, INR, PTT  Cardiac Studies: No results found for: NTBNP, BNP, TROPONINI, POCTROP  Hepatology:   Lab Results   Component Value Date/Time    LIPASE 167 06/04/2018 03:58 PM     Endocrine Studies: No results found for: HGBA1C, [id number], T3FREE, S0OKYLT, FREET4, THYMICROANTI, THGAB, TRIG, CHOL, CHOLESTEROL, HDL, LDLC, LDLCALC, LDL, LDLDIRECT, NONHDLC, [id number], PTH, [id number]  Iron Studies: No results found for: LABIRON, IRON, TIBC, FERRITIN  Health Maintenance: No results found for: PSA, HEPCAB  Toxicology:   Lab Results   Component Value Date/Time    AMPHETUR Negative 05/29/2014 06:04 PM    BARBTUR Negative 11/17/2018 10:53 AM    BARBTUR Negative 05/29/2014 06:04 PM    BDZUR Negative 11/17/2018 10:53 AM    BDZUR Negative 05/29/2014 06:04 PM    COCAINEUR Negative 11/17/2018 10:53 AM    COCAINEUR Negative 05/29/2014 06:04 PM    OPIATEUR Negative 11/17/2018 10:53 AM    OPIATEUR Negative 05/29/2014 06:04 PM    PCPUR Negative 11/17/2018 10:53 AM    PCPUR Negative 05/29/2014 06:04 PM    THCUR Positive (A) 11/17/2018 10:53 AM    THCUR Positive (A) 05/29/2014 06:04 PM    ETOH <3 11/17/2018 08:07 AM     Urine Protein/Creatinine Ratio: No results found for: CREATININEUR, PROTUR, UTPCR, LABMICR, MICROALBCRE  Urinalysis:   Lab Results   Component Value Date/Time    COLORU Yellow 11/17/2018 10:53 AM    CLARITYU Clear 11/17/2018 10:53 AM    SPECGRAV 1 015 11/17/2018 10:53 AM    PHUR 7 5 11/17/2018 10:53 AM    LEUKOCYTESUR Negative 11/17/2018 10:53 AM    NITRITE Negative 11/17/2018 10:53 AM    GLUCOSEU 100 (1/10%) (A) 11/17/2018 10:53 AM    KETONESU Trace (A) 11/17/2018 10:53 AM    BILIRUBINUR Negative 11/17/2018 10:53 AM    BLOODU Negative 11/17/2018 10:53 AM      Urine Micro:   Lab Results   Component Value Date/Time    RBCUA None Seen 06/04/2018 04:55 PM    WBCUA None Seen 06/04/2018 04:55 PM    EPIS None Seen 06/04/2018 04:55 PM    BACTERIA None Seen 06/04/2018 04:55 PM     Urine Dip: No components found for: Nadia Sor, SLAMBSG, [address], [address] [facility], [address] [facility], [address], [address] [facility], [address] [facility], [address]  Hematology: No results found for: FOLATE, [id number], LDH, IRF, RETICCTPCT, RETIC, RETICHGB  ID Studies:   Lab Results   Component Value Date/Time    LACTICACID 1 2 06/04/2018 05:52 PM     Cultures: No results found for: Neomia Tashi, SPUTUMCULTUR, GRAMSTAIN, URINECX, WOUNDCULT, BODYFLUIDCUL, MRSACULTURE, INFLUAPCR, INFLUBPCR, RSVPCR, LEGIONELLAUR, STPU, CDIFFTOXINB, AFBSTAIN, TISSUECULT, ANAEROBICCUL, FUNGUSCULTUR, CSFCULTURE     Current Medications     Current Outpatient Prescriptions:     clonazePAM (KlonoPIN) 0 5 mg tablet, Take 1 tablet (0 5 mg total) by mouth 2 (two) times a day as needed for seizures for up to 10 days, Disp: 30 tablet, Rfl: 0    omeprazole (PriLOSEC) 20 mg delayed release capsule, TAKE 1 BY MOUTH EVERY DAY FOR STOMACH ACID, Disp: , Rfl: 1    ondansetron (ZOFRAN-ODT) 8 mg disintegrating tablet, Take 1 tablet (8 mg total) by mouth every 8 (eight) hours as needed for nausea or vomiting, Disp: 20 tablet, Rfl: 0    OXcarbazepine (TRILEPTAL) 300 mg tablet, Take 1 tablet (300 mg total) by mouth every 12 (twelve) hours for 30 days, Disp: 60 tablet, Rfl: 0    QUEtiapine (SEROquel) 300 mg tablet, Take 1 tablet (300 mg total) by mouth daily at bedtime for 30 days, Disp: 30 tablet, Rfl: 0    amphetamine-dextroamphetamine (ADDERALL XR) 30 MG 24 hr capsule, Take the 1st pill at 5:00 a m  In the morning then take the 2nd pill at 2:00 p m   In the afternoon, Disp: 180 capsule, Rfl: 0

## 2018-12-07 DIAGNOSIS — F90.2 ATTENTION DEFICIT HYPERACTIVITY DISORDER (ADHD), COMBINED TYPE: ICD-10-CM

## 2018-12-07 RX ORDER — DEXTROAMPHETAMINE SACCHARATE, AMPHETAMINE ASPARTATE MONOHYDRATE, DEXTROAMPHETAMINE SULFATE AND AMPHETAMINE SULFATE 5; 5; 5; 5 MG/1; MG/1; MG/1; MG/1
CAPSULE, EXTENDED RELEASE ORAL
Qty: 60 CAPSULE | Refills: 0 | Status: SHIPPED | OUTPATIENT
Start: 2018-12-07 | End: 2019-01-08 | Stop reason: SDUPTHER

## 2018-12-19 DIAGNOSIS — K21.9 GASTROESOPHAGEAL REFLUX DISEASE WITHOUT ESOPHAGITIS: Primary | ICD-10-CM

## 2018-12-19 RX ORDER — OMEPRAZOLE 20 MG/1
CAPSULE, DELAYED RELEASE ORAL
Qty: 90 CAPSULE | Refills: 1 | Status: SHIPPED | OUTPATIENT
Start: 2018-12-19 | End: 2019-05-08

## 2018-12-21 DIAGNOSIS — F31.62 BIPOLAR DISORDER, CURRENT EPISODE MIXED, MODERATE (HCC): ICD-10-CM

## 2018-12-26 RX ORDER — OXCARBAZEPINE 300 MG/1
300 TABLET, FILM COATED ORAL EVERY 12 HOURS SCHEDULED
Qty: 120 TABLET | Refills: 1 | Status: SHIPPED | OUTPATIENT
Start: 2018-12-26 | End: 2018-12-28 | Stop reason: SDUPTHER

## 2018-12-28 ENCOUNTER — TELEPHONE (OUTPATIENT)
Dept: FAMILY MEDICINE CLINIC | Facility: CLINIC | Age: 28
End: 2018-12-28

## 2018-12-28 DIAGNOSIS — F31.62 BIPOLAR DISORDER, CURRENT EPISODE MIXED, MODERATE (HCC): ICD-10-CM

## 2018-12-28 RX ORDER — OXCARBAZEPINE 300 MG/1
300 TABLET, FILM COATED ORAL EVERY 12 HOURS SCHEDULED
Qty: 120 TABLET | Refills: 1 | Status: SHIPPED | OUTPATIENT
Start: 2018-12-28 | End: 2019-04-08 | Stop reason: SDUPTHER

## 2018-12-28 NOTE — TELEPHONE ENCOUNTER
----- Message from Beck Herrera, [address] Villegas sent at 12/28/2018  1:21 PM EST -----  Let Tariq Dexter know I sent in his Trileptal

## 2019-01-08 DIAGNOSIS — F90.2 ATTENTION DEFICIT HYPERACTIVITY DISORDER (ADHD), COMBINED TYPE: ICD-10-CM

## 2019-01-08 DIAGNOSIS — F31.62 BIPOLAR DISORDER, CURRENT EPISODE MIXED, MODERATE (HCC): ICD-10-CM

## 2019-01-11 RX ORDER — DEXTROAMPHETAMINE SACCHARATE, AMPHETAMINE ASPARTATE MONOHYDRATE, DEXTROAMPHETAMINE SULFATE AND AMPHETAMINE SULFATE 5; 5; 5; 5 MG/1; MG/1; MG/1; MG/1
CAPSULE, EXTENDED RELEASE ORAL
Qty: 60 CAPSULE | Refills: 0 | Status: SHIPPED | OUTPATIENT
Start: 2019-01-11 | End: 2019-02-18 | Stop reason: SDUPTHER

## 2019-01-11 RX ORDER — QUETIAPINE FUMARATE 300 MG/1
300 TABLET, FILM COATED ORAL
Qty: 90 TABLET | Refills: 0 | Status: SHIPPED | OUTPATIENT
Start: 2019-01-11 | End: 2019-05-06 | Stop reason: SDUPTHER

## 2019-01-28 ENCOUNTER — HOSPITAL ENCOUNTER (EMERGENCY)
Facility: HOSPITAL | Age: 29
Discharge: HOME | End: 2019-01-28
Admitting: EMERGENCY MEDICINE
Payer: COMMERCIAL

## 2019-01-28 VITALS
BODY MASS INDEX: 34.04 KG/M2 | SYSTOLIC BLOOD PRESSURE: 133 MMHG | HEIGHT: 72 IN | TEMPERATURE: 98.4 F | RESPIRATION RATE: 18 BRPM | DIASTOLIC BLOOD PRESSURE: 89 MMHG | HEART RATE: 92 BPM | OXYGEN SATURATION: 95 % | WEIGHT: 251.32 LBS

## 2019-01-28 DIAGNOSIS — F43.0 STRESS REACTION: ICD-10-CM

## 2019-01-28 DIAGNOSIS — R11.2 NAUSEA AND VOMITING: Primary | ICD-10-CM

## 2019-01-28 LAB
AMPHETAMINES SERPL QL SCN: NEGATIVE
ANION GAP SERPL CALCULATED.3IONS-SCNC: 14 MMOL/L (ref 4–13)
ATRIAL RATE: 87 BPM
BARBITURATES UR QL: NEGATIVE
BASOPHILS # BLD AUTO: 0.02 THOUSANDS/ΜL (ref 0–0.1)
BASOPHILS NFR BLD AUTO: 0 % (ref 0–1)
BENZODIAZ UR QL: NEGATIVE
BUN SERPL-MCNC: 14 MG/DL (ref 5–25)
CALCIUM SERPL-MCNC: 9.4 MG/DL (ref 8.3–10.1)
CHLORIDE SERPL-SCNC: 89 MMOL/L (ref 100–108)
CO2 SERPL-SCNC: 32 MMOL/L (ref 21–32)
COCAINE UR QL: NEGATIVE
CREAT SERPL-MCNC: 1.11 MG/DL (ref 0.6–1.3)
EOSINOPHIL # BLD AUTO: 0.01 THOUSAND/ΜL (ref 0–0.61)
EOSINOPHIL NFR BLD AUTO: 0 % (ref 0–6)
ERYTHROCYTE [DISTWIDTH] IN BLOOD BY AUTOMATED COUNT: 12.1 % (ref 11.6–15.1)
GFR SERPL CREATININE-BSD FRML MDRD: 91 ML/MIN/1.73SQ M
GLUCOSE SERPL-MCNC: 139 MG/DL (ref 65–140)
HCT VFR BLD AUTO: 48.9 % (ref 36.5–49.3)
HGB BLD-MCNC: 17.6 G/DL (ref 12–17)
IMM GRANULOCYTES # BLD AUTO: 0.07 THOUSAND/UL (ref 0–0.2)
IMM GRANULOCYTES NFR BLD AUTO: 1 % (ref 0–2)
LYMPHOCYTES # BLD AUTO: 1.95 THOUSANDS/ΜL (ref 0.6–4.47)
LYMPHOCYTES NFR BLD AUTO: 16 % (ref 14–44)
MAGNESIUM SERPL-MCNC: 1.9 MG/DL (ref 1.6–2.6)
MCH RBC QN AUTO: 28 PG (ref 26.8–34.3)
MCHC RBC AUTO-ENTMCNC: 36 G/DL (ref 31.4–37.4)
MCV RBC AUTO: 78 FL (ref 82–98)
METHADONE UR QL: NEGATIVE
MONOCYTES # BLD AUTO: 1.66 THOUSAND/ΜL (ref 0.17–1.22)
MONOCYTES NFR BLD AUTO: 14 % (ref 4–12)
NEUTROPHILS # BLD AUTO: 8.61 THOUSANDS/ΜL (ref 1.85–7.62)
NEUTS SEG NFR BLD AUTO: 69 % (ref 43–75)
NRBC BLD AUTO-RTO: 0 /100 WBCS
OPIATES UR QL SCN: NEGATIVE
P AXIS: 69 DEGREES
PCP UR QL: NEGATIVE
PLATELET # BLD AUTO: 402 THOUSANDS/UL (ref 149–390)
PMV BLD AUTO: 10.5 FL (ref 8.9–12.7)
POTASSIUM SERPL-SCNC: 2.5 MMOL/L (ref 3.5–5.3)
PR INTERVAL: 138 MS
QRS AXIS: 37 DEGREES
QRSD INTERVAL: 88 MS
QT INTERVAL: 504 MS
QTC INTERVAL: 606 MS
RBC # BLD AUTO: 6.28 MILLION/UL (ref 3.88–5.62)
SODIUM SERPL-SCNC: 135 MMOL/L (ref 136–145)
T WAVE AXIS: 47 DEGREES
THC UR QL: POSITIVE
VENTRICULAR RATE: 87 BPM
WBC # BLD AUTO: 12.32 THOUSAND/UL (ref 4.31–10.16)

## 2019-01-28 PROCEDURE — 99284 EMERGENCY DEPT VISIT MOD MDM: CPT

## 2019-01-28 PROCEDURE — 96361 HYDRATE IV INFUSION ADD-ON: CPT

## 2019-01-28 PROCEDURE — 96365 THER/PROPH/DIAG IV INF INIT: CPT

## 2019-01-28 PROCEDURE — 83735 ASSAY OF MAGNESIUM: CPT | Performed by: NURSE PRACTITIONER

## 2019-01-28 PROCEDURE — 93010 ELECTROCARDIOGRAM REPORT: CPT | Performed by: INTERNAL MEDICINE

## 2019-01-28 PROCEDURE — 80048 BASIC METABOLIC PNL TOTAL CA: CPT | Performed by: NURSE PRACTITIONER

## 2019-01-28 PROCEDURE — 96372 THER/PROPH/DIAG INJ SC/IM: CPT

## 2019-01-28 PROCEDURE — 96375 TX/PRO/DX INJ NEW DRUG ADDON: CPT

## 2019-01-28 PROCEDURE — 93005 ELECTROCARDIOGRAM TRACING: CPT

## 2019-01-28 PROCEDURE — 80307 DRUG TEST PRSMV CHEM ANLYZR: CPT | Performed by: NURSE PRACTITIONER

## 2019-01-28 PROCEDURE — 96376 TX/PRO/DX INJ SAME DRUG ADON: CPT

## 2019-01-28 PROCEDURE — 36415 COLL VENOUS BLD VENIPUNCTURE: CPT | Performed by: NURSE PRACTITIONER

## 2019-01-28 PROCEDURE — 96366 THER/PROPH/DIAG IV INF ADDON: CPT

## 2019-01-28 PROCEDURE — C9113 INJ PANTOPRAZOLE SODIUM, VIA: HCPCS | Performed by: NURSE PRACTITIONER

## 2019-01-28 PROCEDURE — 85025 COMPLETE CBC W/AUTO DIFF WBC: CPT | Performed by: NURSE PRACTITIONER

## 2019-01-28 RX ORDER — SODIUM CHLORIDE, SODIUM LACTATE, POTASSIUM CHLORIDE, CALCIUM CHLORIDE 600; 310; 30; 20 MG/100ML; MG/100ML; MG/100ML; MG/100ML
1000 INJECTION, SOLUTION INTRAVENOUS ONCE
Status: COMPLETED | OUTPATIENT
Start: 2019-01-28 | End: 2019-01-28

## 2019-01-28 RX ORDER — OLANZAPINE 10 MG/1
10 INJECTION, POWDER, LYOPHILIZED, FOR SOLUTION INTRAMUSCULAR ONCE
Status: COMPLETED | OUTPATIENT
Start: 2019-01-28 | End: 2019-01-28

## 2019-01-28 RX ORDER — POTASSIUM CHLORIDE 14.9 MG/ML
20 INJECTION INTRAVENOUS
Status: COMPLETED | OUTPATIENT
Start: 2019-01-28 | End: 2019-01-28

## 2019-01-28 RX ORDER — SODIUM CHLORIDE 9 MG/ML
125 INJECTION, SOLUTION INTRAVENOUS CONTINUOUS
Status: DISCONTINUED | OUTPATIENT
Start: 2019-01-28 | End: 2019-01-28 | Stop reason: HOSPADM

## 2019-01-28 RX ORDER — LORAZEPAM 2 MG/ML
0.5 INJECTION INTRAMUSCULAR ONCE
Status: COMPLETED | OUTPATIENT
Start: 2019-01-28 | End: 2019-01-28

## 2019-01-28 RX ORDER — ONDANSETRON 4 MG/1
4 TABLET, ORALLY DISINTEGRATING ORAL EVERY 8 HOURS PRN
Qty: 9 TABLET | Refills: 0 | Status: SHIPPED | OUTPATIENT
Start: 2019-01-28 | End: 2019-01-31

## 2019-01-28 RX ORDER — LORAZEPAM 2 MG/ML
1 INJECTION INTRAMUSCULAR ONCE
Status: COMPLETED | OUTPATIENT
Start: 2019-01-28 | End: 2019-01-28

## 2019-01-28 RX ORDER — MAGNESIUM HYDROXIDE/ALUMINUM HYDROXICE/SIMETHICONE 120; 1200; 1200 MG/30ML; MG/30ML; MG/30ML
30 SUSPENSION ORAL ONCE
Status: COMPLETED | OUTPATIENT
Start: 2019-01-28 | End: 2019-01-28

## 2019-01-28 RX ORDER — PANTOPRAZOLE SODIUM 40 MG/1
40 INJECTION, POWDER, FOR SOLUTION INTRAVENOUS ONCE
Status: COMPLETED | OUTPATIENT
Start: 2019-01-28 | End: 2019-01-28

## 2019-01-28 RX ORDER — POTASSIUM CHLORIDE 29.8 MG/ML
40 INJECTION INTRAVENOUS ONCE
Status: DISCONTINUED | OUTPATIENT
Start: 2019-01-28 | End: 2019-01-28 | Stop reason: CLARIF

## 2019-01-28 RX ORDER — ONDANSETRON 2 MG/ML
4 INJECTION INTRAMUSCULAR; INTRAVENOUS ONCE
Status: COMPLETED | OUTPATIENT
Start: 2019-01-28 | End: 2019-01-28

## 2019-01-28 RX ADMIN — SODIUM CHLORIDE 125 ML/HR: 0.9 INJECTION, SOLUTION INTRAVENOUS at 09:44

## 2019-01-28 RX ADMIN — ONDANSETRON 4 MG: 2 INJECTION INTRAMUSCULAR; INTRAVENOUS at 08:06

## 2019-01-28 RX ADMIN — ALUMINUM HYDROXIDE, MAGNESIUM HYDROXIDE, AND SIMETHICONE 30 ML: 200; 200; 20 SUSPENSION ORAL at 08:41

## 2019-01-28 RX ADMIN — SODIUM CHLORIDE, SODIUM LACTATE, POTASSIUM CHLORIDE, AND CALCIUM CHLORIDE 1000 ML: .6; .31; .03; .02 INJECTION, SOLUTION INTRAVENOUS at 08:00

## 2019-01-28 RX ADMIN — PANTOPRAZOLE SODIUM 40 MG: 40 INJECTION, POWDER, FOR SOLUTION INTRAVENOUS at 08:04

## 2019-01-28 RX ADMIN — LORAZEPAM 1 MG: 2 INJECTION, SOLUTION INTRAMUSCULAR; INTRAVENOUS at 11:29

## 2019-01-28 RX ADMIN — WATER 2.1 ML: 1 INJECTION INTRAMUSCULAR; INTRAVENOUS; SUBCUTANEOUS at 08:05

## 2019-01-28 RX ADMIN — POTASSIUM CHLORIDE 20 MEQ: 200 INJECTION, SOLUTION INTRAVENOUS at 09:38

## 2019-01-28 RX ADMIN — OLANZAPINE 10 MG: 10 INJECTION, POWDER, FOR SOLUTION INTRAMUSCULAR at 08:01

## 2019-01-28 RX ADMIN — POTASSIUM CHLORIDE 20 MEQ: 200 INJECTION, SOLUTION INTRAVENOUS at 11:50

## 2019-01-28 RX ADMIN — LORAZEPAM 0.5 MG: 2 INJECTION, SOLUTION INTRAMUSCULAR; INTRAVENOUS at 08:04

## 2019-01-28 NOTE — DISCHARGE INSTRUCTIONS

## 2019-01-28 NOTE — ED NOTES
D/c instructions and rx reviewed w/ pt  All questions and concerns addressed at this time         Rosamaria Rasheed, PRETTY  01/28/19 5904

## 2019-01-28 NOTE — ED PROVIDER NOTES
History  Chief Complaint   Patient presents with    Anxiety     pt states he has been having anxiety attacks and vomiting for 3 days  67-year-old male patient presents here with his significant other with reports of anxiety and stress  He believes this is related to a stillborn birth at the head in the 2 months ago  Is a known history of bipolar disorder  He reports that he is not able to take medications because of the nausea vomiting  Denies any fever chills no diarrhea no tremor he does not look overly stressed he has sort of a flat affect rather than an anxious affect            Prior to Admission Medications   Prescriptions Last Dose Informant Patient Reported? Taking? OXcarbazepine (TRILEPTAL) 300 mg tablet   No No   Sig: Take 1 tablet (300 mg total) by mouth every 12 (twelve) hours for 30 days   QUEtiapine (SEROquel) 300 mg tablet   No No   Sig: Take 1 tablet (300 mg total) by mouth daily at bedtime for 30 days   amphetamine-dextroamphetamine (ADDERALL XR) 20 MG 24 hr capsule   No No   Sig: Take the 1st pill at 5:00 a m  In the morning then take the 2nd pill at 2:00 p m  In the afternoon   clonazePAM (KlonoPIN) 0 5 mg tablet   No No   Sig: Take 1 tablet (0 5 mg total) by mouth 2 (two) times a day as needed for seizures for up to 10 days   omeprazole (PriLOSEC) 20 mg delayed release capsule   No No   Sig: TAKE 1 BY MOUTH EVERY DAY FOR STOMACH ACID   ondansetron (ZOFRAN-ODT) 8 mg disintegrating tablet   No No   Sig: Take 1 tablet (8 mg total) by mouth every 8 (eight) hours as needed for nausea or vomiting      Facility-Administered Medications: None       Past Medical History:   Diagnosis Date    ADHD     Anxiety state     Bipolar disorder (HCC)     Depressive disorder     Essential hypertension     benign    History of emotional problems     Hyperlipidemia     Peptic reflux disease        History reviewed  No pertinent surgical history      Family History   Problem Relation Age of Onset    Mental illness Mother     Thyroid disease Mother     Drug abuse Mother         illicit drug    Diabetes Father     Cancer Father      I have reviewed and agree with the history as documented  Social History   Substance Use Topics    Smoking status: Former Smoker     Quit date: 3/30/2015    Smokeless tobacco: Never Used    Alcohol use Yes      Comment: at social events        Review of Systems   Constitutional: Negative for diaphoresis, fatigue and fever  HENT: Negative for congestion, ear pain, nosebleeds and sore throat  Eyes: Negative for photophobia, pain, discharge and visual disturbance  Respiratory: Negative for cough, choking, chest tightness, shortness of breath and wheezing  Cardiovascular: Negative for chest pain and palpitations  Gastrointestinal: Positive for nausea  Negative for abdominal distention, abdominal pain, diarrhea and vomiting  Genitourinary: Negative for dysuria, flank pain and frequency  Musculoskeletal: Negative for back pain, gait problem and joint swelling  Skin: Negative for color change and rash  Neurological: Negative for dizziness, syncope and headaches  Psychiatric/Behavioral: Positive for sleep disturbance  Negative for behavioral problems and confusion  The patient is nervous/anxious  All other systems reviewed and are negative  Physical Exam  Physical Exam   Constitutional: He is oriented to person, place, and time  He appears well-developed and well-nourished  HENT:   Head: Normocephalic and atraumatic  Eyes: Pupils are equal, round, and reactive to light  Neck: Normal range of motion  Neck supple  Cardiovascular: Normal rate, regular rhythm, normal heart sounds and normal pulses  PMI is not displaced  Pulmonary/Chest: Effort normal and breath sounds normal  No respiratory distress  Abdominal: Soft  He exhibits no distension  There is no tenderness  There is no guarding  Musculoskeletal: Normal range of motion  Lymphadenopathy:     He has no cervical adenopathy  Neurological: He is alert and oriented to person, place, and time  Skin: Skin is warm and dry  No rash noted  He is not diaphoretic  No pallor  Psychiatric: His affect is labile  He expresses no homicidal and no suicidal ideation  He expresses no suicidal plans and no homicidal plans  Vitals reviewed        Vital Signs  ED Triage Vitals   Temperature Pulse Respirations Blood Pressure SpO2   01/28/19 0730 01/28/19 0725 01/28/19 0725 01/28/19 0725 01/28/19 0725   98 4 °F (36 9 °C) 65 18 154/98 98 %      Temp Source Heart Rate Source Patient Position - Orthostatic VS BP Location FiO2 (%)   01/28/19 0730 01/28/19 0725 01/28/19 0725 01/28/19 0725 --   Oral Monitor Lying Right arm       Pain Score       01/28/19 0725       No Pain           Vitals:    01/28/19 0725 01/28/19 0944 01/28/19 1000 01/28/19 1100   BP: 154/98 140/89 127/80 133/89   Pulse: 65 85 92 79   Patient Position - Orthostatic VS: Lying Lying Lying Lying       Visual Acuity      ED Medications  Medications   potassium chloride 20 mEq IVPB (premix) (20 mEq Intravenous New Bag 1/28/19 1150)   sodium chloride 0 9 % infusion (125 mL/hr Intravenous New Bag 1/28/19 0944)   lactated ringers infusion 1,000 mL (0 mL Intravenous Stopped 1/28/19 0938)   pantoprazole (PROTONIX) injection 40 mg (40 mg Intravenous Given 1/28/19 0804)   LORazepam (ATIVAN) 2 mg/mL injection 0 5 mg (0 5 mg Intravenous Given 1/28/19 0804)   OLANZapine (ZyPREXA) IM injection 10 mg (10 mg Intramuscular Given 1/28/19 0801)   aluminum-magnesium hydroxide-simethicone (MYLANTA) 200-200-20 mg/5 mL oral suspension 30 mL (30 mL Oral Given 1/28/19 0841)   ondansetron (ZOFRAN) injection 4 mg (4 mg Intravenous Given 1/28/19 0806)   sterile water injection **ADS Override Pull** (2 1 mL  Given 1/28/19 0805)   LORazepam (ATIVAN) 2 mg/mL injection 1 mg (1 mg Intravenous Given 1/28/19 1128)       Diagnostic Studies  Results Reviewed     Procedure Component Value Units Date/Time    Rapid drug screen, urine [[id number]]  (Abnormal) Collected:  01/28/19 1134    Lab Status:  Final result Specimen:  Urine from Urine, Clean Catch Updated:  01/28/19 1157     Amph/Meth UR Negative     Barbiturate Ur Negative     Benzodiazepine Urine Negative     Cocaine Urine Negative     Methadone Urine Negative     Opiate Urine Negative     PCP Ur Negative     THC Urine Positive (A)    Narrative:         Presumptive report  If requested, specimen will be sent to reference lab for confirmation  FOR MEDICAL PURPOSES ONLY  IF CONFIRMATION NEEDED PLEASE CONTACT THE LAB WITHIN 5 DAYS  Drug Screen Cutoff Levels:  AMPHETAMINE/METHAMPHETAMINES  1000 ng/mL  BARBITURATES     200 ng/mL  BENZODIAZEPINES     200 ng/mL  COCAINE      300 ng/mL  METHADONE      300 ng/mL  OPIATES      300 ng/mL  PHENCYCLIDINE     25 ng/mL  THC       50 ng/mL    Magnesium [[id number]]  (Normal) Collected:  01/28/19 0800    Lab Status:  Final result Specimen:  Blood from Arm, Right Updated:  01/28/19 0928     Magnesium 1 9 mg/dL     Basic metabolic panel [[id number]]  (Abnormal) Collected:  01/28/19 0800    Lab Status:  Final result Specimen:  Blood from Arm, Right Updated:  01/28/19 0834     Sodium 135 (L) mmol/L      Potassium 2 5 (LL) mmol/L      Chloride 89 (L) mmol/L      CO2 32 mmol/L      ANION GAP 14 (H) mmol/L      BUN 14 mg/dL      Creatinine 1 11 mg/dL      Glucose 139 mg/dL      Calcium 9 4 mg/dL      eGFR 91 ml/min/1 73sq m     Narrative:         National Kidney Disease Education Program recommendations are as follows:  GFR calculation is accurate only with a steady state creatinine  Chronic Kidney disease less than 60 ml/min/1 73 sq  meters  Kidney failure less than 15 ml/min/1 73 sq  meters      CBC and differential [[id number]]  (Abnormal) Collected:  01/28/19 0800    Lab Status:  Final result Specimen:  Blood from Arm, Right Updated:  01/28/19 0812     WBC 12 32 (H) Thousand/uL      RBC 6 28 (H) Million/uL      Hemoglobin 17 6 (H) g/dL      Hematocrit 48 9 %      MCV 78 (L) fL      MCH 28 0 pg      MCHC 36 0 g/dL      RDW 12 1 %      MPV 10 5 fL      Platelets 357 (H) Thousands/uL      nRBC 0 /100 WBCs      Neutrophils Relative 69 %      Immat GRANS % 1 %      Lymphocytes Relative 16 %      Monocytes Relative 14 (H) %      Eosinophils Relative 0 %      Basophils Relative 0 %      Neutrophils Absolute 8 61 (H) Thousands/µL      Immature Grans Absolute 0 07 Thousand/uL      Lymphocytes Absolute 1 95 Thousands/µL      Monocytes Absolute 1 66 (H) Thousand/µL      Eosinophils Absolute 0 01 Thousand/µL      Basophils Absolute 0 02 Thousands/µL                  No orders to display              Procedures  ECG 12 Lead Documentation  Date/Time: 1/28/2019 1:12 PM  Performed by: Donnamae Najjar  Authorized by: Donnamae Najjar     Indications / Diagnosis:  Hypokalemia  ECG reviewed by me, the ED Provider: yes    Patient location:  ED  Interpretation:     Interpretation: normal    Rate:     ECG rate assessment: normal    Rhythm:     Rhythm: sinus rhythm    Ectopy:     Ectopy: none    QRS:     QRS axis:  Normal  Conduction:     Conduction: normal    ST segments:     ST segments:  Normal  T waves:     T waves: normal             Phone Contacts  ED Phone Contact    ED Course                               MDM  Number of Diagnoses or Management Options  Nausea and vomiting: new and requires workup  Stress reaction: new and requires workup  Diagnosis management comments: I spoke to the patient's PCP 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  Is a history of opiate abuse she is concerned with good reason  He denies any recent use to me  PCP reported to me that in the past when Jaycee Romano becomes stressed or bipolar mood swings he stops taking his medication and develops nausea vomiting  She knows him for the last 20 years and this has been his pattern of presentation    Patient tolerated oral intake air potassium was replaced with he is not suicidal or homicidal        Amount and/or Complexity of Data Reviewed  Clinical lab tests: reviewed and ordered  Tests in the radiology section of CPT®: reviewed and ordered  Tests in the medicine section of CPT®: reviewed and ordered  Review and summarize past medical records: yes  Discuss the patient with other providers: yes (Discussed with PCP)  Independent visualization of images, tracings, or specimens: yes      CritCare Time    Disposition  Final diagnoses:   Nausea and vomiting   Stress reaction     Time reflects when diagnosis was documented in both MDM as applicable and the Disposition within this note     Time User Action Codes Description Comment    1/28/2019 12:59 PM Arlene Bee Add [R11 2] Nausea and vomiting     1/28/2019 12:59 PM Arlene Bee Add [F43 0] Stress reaction       ED Disposition     ED Disposition Condition Comment    Discharge  Sharri Baer discharge to home/self care  Condition at discharge: Stable        Follow-up Information     Follow up With Specialties Details Why Contact Info    Iram Ortiz, [address], Nurse Practitioner Schedule an appointment as soon as possible for a visit For Continued Evaluation [address] [place] [id number]  [phone]            Patient's Medications   Discharge Prescriptions    ONDANSETRON (ZOFRAN-ODT) 4 MG DISINTEGRATING TABLET    Take 1 tablet (4 mg total) by mouth every 8 (eight) hours as needed for nausea or vomiting for up to 3 days       Start Date: 1/28/2019 End Date: 1/31/2019       Order Dose: 4 mg       Quantity: 9 tablet    Refills: 0     No discharge procedures on file      ED Provider  Electronically Signed by           PETER Santizo  01/28/19 6520

## 2019-01-31 ENCOUNTER — OFFICE VISIT (OUTPATIENT)
Dept: FAMILY MEDICINE CLINIC | Facility: CLINIC | Age: 29
End: 2019-01-31
Payer: COMMERCIAL

## 2019-01-31 VITALS
TEMPERATURE: 98.8 F | BODY MASS INDEX: 34 KG/M2 | WEIGHT: 251 LBS | HEART RATE: 94 BPM | SYSTOLIC BLOOD PRESSURE: 140 MMHG | HEIGHT: 72 IN | RESPIRATION RATE: 16 BRPM | OXYGEN SATURATION: 98 % | DIASTOLIC BLOOD PRESSURE: 90 MMHG

## 2019-01-31 DIAGNOSIS — F43.21 GRIEF AT LOSS OF CHILD: Primary | ICD-10-CM

## 2019-01-31 DIAGNOSIS — Z63.4 GRIEF AT LOSS OF CHILD: Primary | ICD-10-CM

## 2019-01-31 DIAGNOSIS — F50.89 PSYCHOGENIC VOMITING WITH NAUSEA: ICD-10-CM

## 2019-01-31 DIAGNOSIS — F31.4 BIPOLAR 1 DISORDER, DEPRESSED, SEVERE (HCC): ICD-10-CM

## 2019-01-31 DIAGNOSIS — F90.2 ATTENTION DEFICIT HYPERACTIVITY DISORDER (ADHD), COMBINED TYPE: ICD-10-CM

## 2019-01-31 PROCEDURE — 99214 OFFICE O/P EST MOD 30 MIN: CPT | Performed by: FAMILY MEDICINE

## 2019-01-31 PROCEDURE — 3008F BODY MASS INDEX DOCD: CPT | Performed by: FAMILY MEDICINE

## 2019-01-31 SDOH — SOCIAL STABILITY - SOCIAL INSECURITY: DISSAPEARANCE AND DEATH OF FAMILY MEMBER: Z63.4

## 2019-01-31 NOTE — PROGRESS NOTES
Standard Visit   Assessment/Plan:     Visit Diagnosis:  Diagnoses and all orders for this visit:    Grief at loss of child    Bipolar 1 disorder, depressed, severe ([address] )    Attention deficit hyperactivity disorder (ADHD), combined type    Psychogenic vomiting with nausea      Continue the Zofran to allow you to rehydrate  High potassium foods  No work for the rest of the week  Signed up for talk therapy with Rajni Preston  Introduced to [facility] to establish for talk therapy  For any worsening of mood to the emergency room immediately for crisis  Continue the Seroquel and Trileptal  Mood recheck 2 weeks or sooner if needed      Subjective:    Patient ID: Rena Boswell is a 33 y o  male  Patient is here with the following concerns:    Here for follow-up following his emergency room visit  Was seen in the emergency room January 28th 2 days ago for an acute stress reaction with vomiting dehydration anxiety panic attack  Extreme dehydration, secondary to vomiting and lack of fluid intake with a serum potassium of 2 5  Zoie Maguire recently experienced a major life event with stillborn birth of his first child  He has diagnosis of bipolar disorder, ADD and past history of opiate substance abuse  He has done very well for the past 5 years  Has been very compliant with his bipolar meds  His fiancee Vivino have been together for several years they bought a house and the loss of a child has thrown him into a spiral   Zoie Magurie had done very well while Francisco Javier Franklin was home on leave, but when she went back to work, that is when he decompensated  All his meds were stopped and the anxiety and panic escalated, as is Rudolph's pattern  Currently, he is able to tolerate liquids  He is able to taking Pedialyte for the electrolyte replacement chicken noodle soup with Zofran  He is back on his Seroquel and Trileptal  He is using the Klonopin to help him sleep  He is taking this week off from work    Drug screen done in the emergency room was negative for opiates and cocaine  He denies suicidal ideation  The following portions of the patient's history were reviewed and updated as appropriate: allergies, current medications, past family history, past medical history, past social history, past surgical history and problem list     Review of Systems   Constitutional: Positive for appetite change and fatigue  Cardiovascular: Negative for chest pain and palpitations  Gastrointestinal: Positive for abdominal pain, nausea and vomiting  Neurological: Positive for dizziness and weakness  Psychiatric/Behavioral: Positive for decreased concentration, dysphoric mood and sleep disturbance  Negative for self-injury and suicidal ideas  The patient is nervous/anxious  /90   Pulse 94   Temp 98 8 °F (37 1 °C)   Resp 16   Ht 6' (1 829 m)   Wt 114 kg (251 lb)   SpO2 98%   BMI 34 04 kg/m²   Social History     Social History    Marital status: Single     Spouse name: N/A    Number of children: N/A    Years of education: N/A     Occupational History    fulltime      Social History Main Topics    Smoking status: Former Smoker     Quit date: 3/30/2015    Smokeless tobacco: Never Used    Alcohol use Yes      Comment: at social events    Drug use: No    Sexual activity: Yes     Partners: Female     Other Topics Concern    Not on file     Social History Narrative    No narrative on file     Past Medical History:   Diagnosis Date    ADHD     Essential hypertension     benign    Hyperlipidemia     Peptic reflux disease      Family History   Problem Relation Age of Onset    Mental illness Mother     Thyroid disease Mother     Drug abuse Mother         illicit drug    Diabetes Father     Cancer Father      No past surgical history on file  Current Outpatient Prescriptions:     amphetamine-dextroamphetamine (ADDERALL XR) 20 MG 24 hr capsule, Take the 1st pill at 5:00 a m   In the morning then take the 2nd pill at 2: 00 p m  In the afternoon, Disp: 60 capsule, Rfl: 0    clonazePAM (KlonoPIN) 0 5 mg tablet, Take 1 tablet (0 5 mg total) by mouth 2 (two) times a day as needed for seizures for up to 10 days, Disp: 30 tablet, Rfl: 0    omeprazole (PriLOSEC) 20 mg delayed release capsule, TAKE 1 BY MOUTH EVERY DAY FOR STOMACH ACID, Disp: 90 capsule, Rfl: 1    ondansetron (ZOFRAN-ODT) 8 mg disintegrating tablet, Take 1 tablet (8 mg total) by mouth every 8 (eight) hours as needed for nausea or vomiting, Disp: 20 tablet, Rfl: 0    OXcarbazepine (TRILEPTAL) 300 mg tablet, Take 1 tablet (300 mg total) by mouth every 12 (twelve) hours for 30 days, Disp: 120 tablet, Rfl: 1    QUEtiapine (SEROquel) 300 mg tablet, Take 1 tablet (300 mg total) by mouth daily at bedtime for 30 days, Disp: 90 tablet, Rfl: 0      Objective:     Physical Exam   Constitutional: He is oriented to person, place, and time  Eyes: Pupils are equal, round, and reactive to light  Neck: Normal range of motion  Neck supple  Cardiovascular: Normal rate, regular rhythm and normal heart sounds  Pulmonary/Chest: Effort normal and breath sounds normal    Musculoskeletal: Normal range of motion  Neurological: He is alert and oriented to person, place, and time  Skin: Skin is warm and dry  Psychiatric: His mood appears anxious  He is slowed  Thought content is not delusional  He does not express inappropriate judgment  He exhibits a depressed mood  He expresses no suicidal ideation  He expresses no suicidal plans     Very depressed, tearful  Crying during exam  Looks very tired       Social History     Social History    Marital status: Single     Spouse name: N/A    Number of children: N/A    Years of education: N/A     Occupational History    fulltime      Social History Main Topics    Smoking status: Former Smoker     Quit date: 3/30/2015    Smokeless tobacco: Never Used    Alcohol use Yes      Comment: at social events    Drug use: No    Sexual activity: Yes     Partners: Female     Other Topics Concern    Not on file     Social History Narrative    No narrative on file     Past Medical History:   Diagnosis Date    ADHD     Essential hypertension     benign    Hyperlipidemia     Peptic reflux disease        Current Outpatient Prescriptions:     amphetamine-dextroamphetamine (ADDERALL XR) 20 MG 24 hr capsule, Take the 1st pill at 5:00 a m  In the morning then take the 2nd pill at 2:00 p m   In the afternoon, Disp: 60 capsule, Rfl: 0    clonazePAM (KlonoPIN) 0 5 mg tablet, Take 1 tablet (0 5 mg total) by mouth 2 (two) times a day as needed for seizures for up to 10 days, Disp: 30 tablet, Rfl: 0    omeprazole (PriLOSEC) 20 mg delayed release capsule, TAKE 1 BY MOUTH EVERY DAY FOR STOMACH ACID, Disp: 90 capsule, Rfl: 1    ondansetron (ZOFRAN-ODT) 8 mg disintegrating tablet, Take 1 tablet (8 mg total) by mouth every 8 (eight) hours as needed for nausea or vomiting, Disp: 20 tablet, Rfl: 0    OXcarbazepine (TRILEPTAL) 300 mg tablet, Take 1 tablet (300 mg total) by mouth every 12 (twelve) hours for 30 days, Disp: 120 tablet, Rfl: 1    QUEtiapine (SEROquel) 300 mg tablet, Take 1 tablet (300 mg total) by mouth daily at bedtime for 30 days, Disp: 90 tablet, Rfl: 0  Family History   Problem Relation Age of Onset    Mental illness Mother     Thyroid disease Mother     Drug abuse Mother         illicit drug    Diabetes Father     Cancer Father        Patient Instructions   Continue the Zofran to allow you to rehydrate  High potassium foods  No work for the rest of the week  Signed up for talk therapy with Lion Bobo  Introduced to [facility] to establish for talk therapy  For any worsening of mood to the emergency room immediately for crisis  Continue the Seroquel and Trileptal  Mood recheck 2 weeks or sooner if needed          PETER Morgan

## 2019-01-31 NOTE — PATIENT INSTRUCTIONS
Continue the Zofran to allow you to rehydrate  High potassium foods  No work for the rest of the week  Signed up for talk therapy with Nidia Coyne to [facility] to establish for talk therapy  For any worsening of mood to the emergency room immediately for crisis  Continue the Seroquel and Trileptal  Mood recheck 2 weeks or sooner if needed

## 2019-02-15 ENCOUNTER — OFFICE VISIT (OUTPATIENT)
Dept: BEHAVIORAL/MENTAL HEALTH CLINIC | Facility: CLINIC | Age: 29
End: 2019-02-15
Payer: COMMERCIAL

## 2019-02-15 DIAGNOSIS — F90.2 ATTENTION DEFICIT HYPERACTIVITY DISORDER (ADHD), COMBINED TYPE: ICD-10-CM

## 2019-02-15 DIAGNOSIS — F41.1 GENERALIZED ANXIETY DISORDER: Primary | ICD-10-CM

## 2019-02-15 DIAGNOSIS — F90.0 ATTENTION DEFICIT HYPERACTIVITY DISORDER, INATTENTIVE TYPE: ICD-10-CM

## 2019-02-15 PROCEDURE — 90834 PSYTX W PT 45 MINUTES: CPT | Performed by: SOCIAL WORKER

## 2019-02-15 NOTE — PSYCH
Assessment/Plan:      Diagnoses and all orders for this visit:    Generalized anxiety disorder    Attention deficit hyperactivity disorder, inattentive type          Subjective: Client reported increased episodes of anxiety and mood liability  Due to the intensity of anxiety and panic, he now fears these attacks  During these episodes he will vomit  These symptoms began in high school  He also reported prior poor sleep, restlessness, and feeling on edge, and poor appetite at times  He denied any SI/HI or self harm, since high school  Patient ID: Lizandro Danielle is a 30 y o  male  Client is a 30year old  male of average height and stocky build  He currently resides with his fiance Elaine  They have been together for 8 years and plan to wed in February of 2020  Client reported his relationship is "very good and supportive " Summer Gonzales has her doctorate degree in physical therapy and is employed as the same  In October of last year Elaine delivered their first child, lamar Gutierrezborn  They had known he had passed away in the womb  Shortly after this client was admitted to the psych aiken for feeling overwhelmed  Client reported he and Elaine continue to process this loss, but will try again for another child following their marriage  Client is employed by the [employer] as a   He has been employed there since 2011  He enjoys his job and 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  He graduated from [employer], he had sperical education to assist with his Dyslexia as well as ADD diagnosis  Client had no behavior issues in school and played football for the school team  Client did attend one year of community college however due to his disability he did not feel this was the correct fit for him and left to gain employment  Client reported an extensive history of substance abuse  He continues to use marijuana regularly   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, and then moved to opiate pain killers  He reported he still will "take pills" and has never sustained full sobriety, though his family believes that he has  He does not use alcohol  He reported an "amazing" support system including his parents Pepe Valentine and Eliana Cosme, and six friends he has had since childhood  He reported that two have also struggled with substances and three others are "great men who work hard " Three of them are marrying this year, he is part of all three weddings  He has two older brothers Theresa and Laura Monreal, he is closest to Laura Monreal  Client is currently prescribed Adderall 20mg and Serequel, however chart states this was terminated  for mental health  Review of Systems   Psychiatric/Behavioral: Positive for dysphoric mood  The patient is nervous/anxious  Objective: Client presented as casually dressed, he was fully oriented and made consistent eye contact  He was able to fully engaged with provider, demonstrated insight, and was attentive and focused during session  Physical Exam   Psychiatric: His speech is normal and behavior is normal  Judgment and thought content normal  His mood appears anxious  Cognition and memory are normal    No SI/HI or self harm reported

## 2019-02-18 DIAGNOSIS — F90.2 ATTENTION DEFICIT HYPERACTIVITY DISORDER (ADHD), COMBINED TYPE: ICD-10-CM

## 2019-02-18 RX ORDER — DEXTROAMPHETAMINE SACCHARATE, AMPHETAMINE ASPARTATE MONOHYDRATE, DEXTROAMPHETAMINE SULFATE AND AMPHETAMINE SULFATE 5; 5; 5; 5 MG/1; MG/1; MG/1; MG/1
CAPSULE, EXTENDED RELEASE ORAL
Qty: 60 CAPSULE | Refills: 0 | Status: SHIPPED | OUTPATIENT
Start: 2019-02-18 | End: 2019-04-08 | Stop reason: SDUPTHER

## 2019-03-18 ENCOUNTER — HOSPITAL ENCOUNTER (EMERGENCY)
Facility: HOSPITAL | Age: 29
Discharge: HOME | End: 2019-03-18
Attending: EMERGENCY MEDICINE | Admitting: EMERGENCY MEDICINE
Payer: COMMERCIAL

## 2019-03-18 ENCOUNTER — APPOINTMENT (EMERGENCY)
Dept: CT IMAGING | Facility: HOSPITAL | Age: 29
End: 2019-03-18
Payer: COMMERCIAL

## 2019-03-18 VITALS
SYSTOLIC BLOOD PRESSURE: 147 MMHG | DIASTOLIC BLOOD PRESSURE: 102 MMHG | HEART RATE: 75 BPM | BODY MASS INDEX: 31.71 KG/M2 | HEIGHT: 72 IN | OXYGEN SATURATION: 100 % | RESPIRATION RATE: 18 BRPM | WEIGHT: 234.13 LBS | TEMPERATURE: 97.9 F

## 2019-03-18 DIAGNOSIS — E86.0 DEHYDRATION: Primary | ICD-10-CM

## 2019-03-18 DIAGNOSIS — R11.10 VOMITING AND DIARRHEA: ICD-10-CM

## 2019-03-18 DIAGNOSIS — R19.7 VOMITING AND DIARRHEA: ICD-10-CM

## 2019-03-18 LAB
ALBUMIN SERPL BCP-MCNC: 5.1 G/DL (ref 3.5–5)
ALP SERPL-CCNC: 90 U/L (ref 46–116)
ALT SERPL W P-5'-P-CCNC: 16 U/L (ref 12–78)
ANION GAP SERPL CALCULATED.3IONS-SCNC: 11 MMOL/L (ref 4–13)
AST SERPL W P-5'-P-CCNC: 17 U/L (ref 5–45)
ATRIAL RATE: 70 BPM
BACTERIA UR QL AUTO: NORMAL /HPF
BASOPHILS # BLD AUTO: 0.03 THOUSANDS/ΜL (ref 0–0.1)
BASOPHILS NFR BLD AUTO: 0 % (ref 0–1)
BILIRUB SERPL-MCNC: 1.3 MG/DL (ref 0.2–1)
BILIRUB UR QL STRIP: NEGATIVE
BUN SERPL-MCNC: 28 MG/DL (ref 5–25)
CALCIUM SERPL-MCNC: 10 MG/DL (ref 8.3–10.1)
CHLORIDE SERPL-SCNC: 85 MMOL/L (ref 100–108)
CLARITY UR: CLEAR
CO2 SERPL-SCNC: 35 MMOL/L (ref 21–32)
COLOR UR: YELLOW
CREAT SERPL-MCNC: 1.29 MG/DL (ref 0.6–1.3)
EOSINOPHIL # BLD AUTO: 0 THOUSAND/ΜL (ref 0–0.61)
EOSINOPHIL NFR BLD AUTO: 0 % (ref 0–6)
ERYTHROCYTE [DISTWIDTH] IN BLOOD BY AUTOMATED COUNT: 12.4 % (ref 11.6–15.1)
GFR SERPL CREATININE-BSD FRML MDRD: 75 ML/MIN/1.73SQ M
GLUCOSE SERPL-MCNC: 150 MG/DL (ref 65–140)
GLUCOSE UR STRIP-MCNC: NEGATIVE MG/DL
HCT VFR BLD AUTO: 50.5 % (ref 36.5–49.3)
HGB BLD-MCNC: 18.1 G/DL (ref 12–17)
HGB UR QL STRIP.AUTO: NEGATIVE
IMM GRANULOCYTES # BLD AUTO: 0.07 THOUSAND/UL (ref 0–0.2)
IMM GRANULOCYTES NFR BLD AUTO: 0 % (ref 0–2)
KETONES UR STRIP-MCNC: ABNORMAL MG/DL
LEUKOCYTE ESTERASE UR QL STRIP: NEGATIVE
LIPASE SERPL-CCNC: 127 U/L (ref 73–393)
LYMPHOCYTES # BLD AUTO: 2.05 THOUSANDS/ΜL (ref 0.6–4.47)
LYMPHOCYTES NFR BLD AUTO: 12 % (ref 14–44)
MAGNESIUM SERPL-MCNC: 1.9 MG/DL (ref 1.6–2.6)
MCH RBC QN AUTO: 27.8 PG (ref 26.8–34.3)
MCHC RBC AUTO-ENTMCNC: 35.8 G/DL (ref 31.4–37.4)
MCV RBC AUTO: 78 FL (ref 82–98)
MONOCYTES # BLD AUTO: 1.45 THOUSAND/ΜL (ref 0.17–1.22)
MONOCYTES NFR BLD AUTO: 9 % (ref 4–12)
NEUTROPHILS # BLD AUTO: 13.47 THOUSANDS/ΜL (ref 1.85–7.62)
NEUTS SEG NFR BLD AUTO: 79 % (ref 43–75)
NITRITE UR QL STRIP: NEGATIVE
NON-SQ EPI CELLS URNS QL MICRO: NORMAL /HPF
NRBC BLD AUTO-RTO: 0 /100 WBCS
P AXIS: 73 DEGREES
PH UR STRIP.AUTO: 8 [PH]
PHOSPHATE SERPL-MCNC: 2.2 MG/DL (ref 2.7–4.5)
PLATELET # BLD AUTO: 421 THOUSANDS/UL (ref 149–390)
PMV BLD AUTO: 10.8 FL (ref 8.9–12.7)
POTASSIUM SERPL-SCNC: 2.6 MMOL/L (ref 3.5–5.3)
PR INTERVAL: 126 MS
PROT SERPL-MCNC: 9.1 G/DL (ref 6.4–8.2)
PROT UR STRIP-MCNC: ABNORMAL MG/DL
QRS AXIS: 72 DEGREES
QRSD INTERVAL: 88 MS
QT INTERVAL: 454 MS
QTC INTERVAL: 490 MS
RBC # BLD AUTO: 6.51 MILLION/UL (ref 3.88–5.62)
RBC #/AREA URNS AUTO: NORMAL /HPF
SODIUM SERPL-SCNC: 131 MMOL/L (ref 136–145)
SP GR UR STRIP.AUTO: 1.01 (ref 1–1.03)
T WAVE AXIS: 61 DEGREES
UROBILINOGEN UR QL STRIP.AUTO: 0.2 E.U./DL
VENTRICULAR RATE: 70 BPM
WBC # BLD AUTO: 17.07 THOUSAND/UL (ref 4.31–10.16)
WBC #/AREA URNS AUTO: NORMAL /HPF

## 2019-03-18 PROCEDURE — 99284 EMERGENCY DEPT VISIT MOD MDM: CPT

## 2019-03-18 PROCEDURE — 81001 URINALYSIS AUTO W/SCOPE: CPT | Performed by: PHYSICIAN ASSISTANT

## 2019-03-18 PROCEDURE — 96374 THER/PROPH/DIAG INJ IV PUSH: CPT

## 2019-03-18 PROCEDURE — 83735 ASSAY OF MAGNESIUM: CPT | Performed by: PHYSICIAN ASSISTANT

## 2019-03-18 PROCEDURE — 93010 ELECTROCARDIOGRAM REPORT: CPT | Performed by: INTERNAL MEDICINE

## 2019-03-18 PROCEDURE — 83690 ASSAY OF LIPASE: CPT | Performed by: PHYSICIAN ASSISTANT

## 2019-03-18 PROCEDURE — 84100 ASSAY OF PHOSPHORUS: CPT | Performed by: PHYSICIAN ASSISTANT

## 2019-03-18 PROCEDURE — 85025 COMPLETE CBC W/AUTO DIFF WBC: CPT | Performed by: PHYSICIAN ASSISTANT

## 2019-03-18 PROCEDURE — 96375 TX/PRO/DX INJ NEW DRUG ADDON: CPT

## 2019-03-18 PROCEDURE — 36415 COLL VENOUS BLD VENIPUNCTURE: CPT | Performed by: PHYSICIAN ASSISTANT

## 2019-03-18 PROCEDURE — 80053 COMPREHEN METABOLIC PANEL: CPT | Performed by: PHYSICIAN ASSISTANT

## 2019-03-18 PROCEDURE — 93005 ELECTROCARDIOGRAM TRACING: CPT

## 2019-03-18 PROCEDURE — 74177 CT ABD & PELVIS W/CONTRAST: CPT

## 2019-03-18 PROCEDURE — 96361 HYDRATE IV INFUSION ADD-ON: CPT

## 2019-03-18 RX ORDER — POTASSIUM CHLORIDE 20 MEQ/1
40 TABLET, EXTENDED RELEASE ORAL ONCE
Status: COMPLETED | OUTPATIENT
Start: 2019-03-18 | End: 2019-03-18

## 2019-03-18 RX ORDER — METOCLOPRAMIDE HYDROCHLORIDE 5 MG/ML
10 INJECTION INTRAMUSCULAR; INTRAVENOUS ONCE
Status: COMPLETED | OUTPATIENT
Start: 2019-03-18 | End: 2019-03-18

## 2019-03-18 RX ORDER — DIPHENHYDRAMINE HYDROCHLORIDE 50 MG/ML
25 INJECTION INTRAMUSCULAR; INTRAVENOUS ONCE
Status: COMPLETED | OUTPATIENT
Start: 2019-03-18 | End: 2019-03-18

## 2019-03-18 RX ORDER — ONDANSETRON 4 MG/1
4 TABLET, ORALLY DISINTEGRATING ORAL EVERY 8 HOURS PRN
Qty: 20 TABLET | Refills: 0 | Status: SHIPPED | OUTPATIENT
Start: 2019-03-18 | End: 2019-04-13

## 2019-03-18 RX ORDER — ONDANSETRON 2 MG/ML
4 INJECTION INTRAMUSCULAR; INTRAVENOUS ONCE
Status: COMPLETED | OUTPATIENT
Start: 2019-03-18 | End: 2019-03-18

## 2019-03-18 RX ADMIN — SODIUM CHLORIDE 1000 ML: 0.9 INJECTION, SOLUTION INTRAVENOUS at 17:40

## 2019-03-18 RX ADMIN — POTASSIUM CHLORIDE 40 MEQ: 1500 TABLET, EXTENDED RELEASE ORAL at 19:00

## 2019-03-18 RX ADMIN — DIPHENHYDRAMINE HYDROCHLORIDE 25 MG: 50 INJECTION, SOLUTION INTRAMUSCULAR; INTRAVENOUS at 22:12

## 2019-03-18 RX ADMIN — POTASSIUM & SODIUM PHOSPHATES POWDER PACK 280-160-250 MG 2 PACKET: 280-160-250 PACK at 22:09

## 2019-03-18 RX ADMIN — SODIUM CHLORIDE 1000 ML: 0.9 INJECTION, SOLUTION INTRAVENOUS at 18:56

## 2019-03-18 RX ADMIN — METOCLOPRAMIDE 10 MG: 5 INJECTION, SOLUTION INTRAMUSCULAR; INTRAVENOUS at 22:09

## 2019-03-18 RX ADMIN — ONDANSETRON 4 MG: 2 INJECTION INTRAMUSCULAR; INTRAVENOUS at 17:57

## 2019-03-18 RX ADMIN — IOHEXOL 100 ML: 350 INJECTION, SOLUTION INTRAVENOUS at 19:40

## 2019-03-18 NOTE — ED PROVIDER NOTES
History  Chief Complaint   Patient presents with    Abdominal Pain     Beginning Friday, could not keep any food down, has not really eaten anything since  Multiple episodes of n/v/d, muscle cramping  PHx of same related to anxiety, usually needs electrolyte replacement and hydration  Chest pain began today  Timothy Barrios is a 30 y o  male w PMH anxiety, HTN, HLD who presents for evaluation of nausea and vomiting  Patient presents complaining of nausea, vomiting and diarrhea, ongoing since Friday  He reports he apparently gets anxiety episodes when he gets nauseous and vomiting episodes  Does not know why he gets these episodes  He denies history of diabetes  He does take medicines for his anxiety and sleep at night but reports he can't swallow them when he gets nauseous  Has apparently been hospital setting before and needed electrolyte repletion and IV fluids for his dehydration he has full body aches including the chest and extremities but denies abdominal pain  Prior to Admission Medications   Prescriptions Last Dose Informant Patient Reported? Taking? OXcarbazepine (TRILEPTAL) 300 mg tablet   No No   Sig: Take 1 tablet (300 mg total) by mouth every 12 (twelve) hours for 30 days   QUEtiapine (SEROquel) 300 mg tablet   No No   Sig: Take 1 tablet (300 mg total) by mouth daily at bedtime for 30 days   amphetamine-dextroamphetamine (ADDERALL XR) 20 MG 24 hr capsule   No No   Sig: Take the 1st pill at 5:00 a m  In the morning then take the 2nd pill at 2:00 p m   In the afternoon   clonazePAM (KlonoPIN) 0 5 mg tablet   No No   Sig: Take 1 tablet (0 5 mg total) by mouth 2 (two) times a day as needed for seizures for up to 10 days   omeprazole (PriLOSEC) 20 mg delayed release capsule   No No   Sig: TAKE 1 BY MOUTH EVERY DAY FOR STOMACH ACID   ondansetron (ZOFRAN-ODT) 8 mg disintegrating tablet   No No   Sig: Take 1 tablet (8 mg total) by mouth every 8 (eight) hours as needed for nausea or vomiting      Facility-Administered Medications: None       Past Medical History:   Diagnosis Date    ADHD     Anxiety     Essential hypertension     benign    Hyperlipidemia     Peptic reflux disease        History reviewed  No pertinent surgical history  Family History   Problem Relation Age of Onset    Mental illness Mother     Thyroid disease Mother     Drug abuse Mother         illicit drug    Diabetes Father     Cancer Father      I have reviewed and agree with the history as documented  Social History     Tobacco Use    Smoking status: Former Smoker     Last attempt to quit: 3/30/2015     Years since quitting: 3 9    Smokeless tobacco: Never Used   Substance Use Topics    Alcohol use: Not Currently     Comment: at social events    Drug use: No     Frequency: 2 0 times per week     Types: Marijuana        Review of Systems   Constitutional: Negative for activity change, chills, diaphoresis, fatigue and fever  HENT: Negative for congestion and rhinorrhea  Eyes: Negative for pain  Respiratory: Negative for cough, chest tightness, shortness of breath and wheezing  Cardiovascular: Negative for chest pain and palpitations  Gastrointestinal: Positive for diarrhea, nausea and vomiting  Negative for abdominal distention and constipation  Genitourinary: Negative for difficulty urinating and dysuria  Musculoskeletal: Positive for myalgias  Negative for arthralgias  Neurological: Negative for dizziness, weakness, light-headedness and headaches  Psychiatric/Behavioral: The patient is not nervous/anxious  Physical Exam  Physical Exam   Constitutional: He is oriented to person, place, and time  He appears well-developed and well-nourished  No distress  HENT:   Head: Normocephalic and atraumatic  Dry mucous membranes    Eyes: Pupils are equal, round, and reactive to light  Neck: Normal range of motion  Neck supple  No tracheal deviation present     Cardiovascular: Normal rate, regular rhythm, normal heart sounds and intact distal pulses  Exam reveals no gallop and no friction rub  No murmur heard  Pulmonary/Chest: Effort normal and breath sounds normal  No respiratory distress  He has no wheezes  He has no rales  Abdominal: Soft  Bowel sounds are normal  He exhibits no distension and no mass  There is no tenderness  There is no guarding  Musculoskeletal: He exhibits no edema or deformity  Neurological: He is alert and oriented to person, place, and time  Skin: Skin is warm and dry  He is not diaphoretic  Psychiatric: He has a normal mood and affect  His behavior is normal    No anxiety / panic      Nursing note and vitals reviewed        Vital Signs  ED Triage Vitals [03/18/19 1709]   Temperature Pulse Respirations Blood Pressure SpO2   97 9 °F (36 6 °C) 85 18 138/97 97 %      Temp Source Heart Rate Source Patient Position - Orthostatic VS BP Location FiO2 (%)   Oral Monitor Lying Left arm --      Pain Score       --           Vitals:    03/18/19 1709 03/18/19 1945 03/18/19 2215   BP: 138/97  (!) 147/102   Pulse: 85 71 75   Patient Position - Orthostatic VS: Lying  Sitting       qSOFA     Row Name 03/18/19 2215 03/18/19 1945 03/18/19 1709          Altered mental status GCS < 15  --  --  --      Respiratory Rate > / =22  0  0  0      Systolic BP < / =285  0  --  0      Q Sofa Score  0  0  0            Visual Acuity      ED Medications  Medications   sodium chloride 0 9 % bolus 1,000 mL (0 mL Intravenous Stopped 3/18/19 1857)   ondansetron (ZOFRAN) injection 4 mg (4 mg Intravenous Given 3/18/19 1757)   potassium chloride (K-DUR,KLOR-CON) CR tablet 40 mEq (40 mEq Oral Given 3/18/19 1900)   potassium chloride (K-DUR,KLOR-CON) CR tablet 40 mEq (40 mEq Oral Given 3/18/19 1900)   sodium chloride 0 9 % bolus 1,000 mL (0 mL Intravenous Stopped 3/18/19 2045)   iohexol (OMNIPAQUE) 350 MG/ML injection (MULTI-DOSE) 100 mL (100 mL Intravenous Given 3/18/19 1940)   potassium-sodium phosphates (PHOS-NAK) packet 2 packet (2 packets Oral Given 3/18/19 2209)   metoclopramide (REGLAN) injection 10 mg (10 mg Intravenous Given 3/18/19 2209)   diphenhydrAMINE (BENADRYL) injection 25 mg (25 mg Intravenous Given 3/18/19 2212)       Diagnostic Studies  Results Reviewed     Procedure Component Value Units Date/Time    Phosphorus [[id number]]  (Abnormal) Collected:  03/18/19 1737    Lab Status:  Final result Specimen:  Blood from Arm, Right Updated:  03/18/19 2120     Phosphorus 2 2 mg/dL     Urine Microscopic [[id number]]  (Normal) Collected:  03/18/19 2055    Lab Status:  Final result Specimen:  Urine Updated:  03/18/19 2116     RBC, UA None Seen /hpf      WBC, UA None Seen /hpf      Epithelial Cells None Seen /hpf      Bacteria, UA None Seen /hpf     UA w Reflex to Microscopic w Reflex to Culture [[id number]]  (Abnormal) Collected:  03/18/19 2055    Lab Status:  Final result Specimen:  Urine Updated:  03/18/19 2059     Color, UA Yellow     Clarity, UA Clear     Specific Gravity, UA 1 010     pH, UA 8 0     Leukocytes, UA Negative     Nitrite, UA Negative     Protein, UA Trace mg/dl      Glucose, UA Negative mg/dl      Ketones, UA 15 (1+) mg/dl      Urobilinogen, UA 0 2 E U /dl      Bilirubin, UA Negative     Blood, UA Negative    Comprehensive metabolic panel [[id number]]  (Abnormal) Collected:  03/18/19 1737    Lab Status:  Final result Specimen:  Blood from Arm, Right Updated:  03/18/19 1816     Sodium 131 mmol/L      Potassium 2 6 mmol/L      Chloride 85 mmol/L      CO2 35 mmol/L      ANION GAP 11 mmol/L      BUN 28 mg/dL      Creatinine 1 29 mg/dL      Glucose 150 mg/dL      Calcium 10 0 mg/dL      AST 17 U/L      ALT 16 U/L      Alkaline Phosphatase 90 U/L      Total Protein 9 1 g/dL      Albumin 5 1 g/dL      Total Bilirubin 1 30 mg/dL      eGFR 75 ml/min/1 73sq m     Narrative:       National Kidney Disease Education Program recommendations are as follows:  GFR calculation is accurate only with a steady state creatinine  Chronic Kidney disease less than 60 ml/min/1 73 sq  meters  Kidney failure less than 15 ml/min/1 73 sq  meters  Lipase [[id number]]  (Normal) Collected:  03/18/19 1737    Lab Status:  Final result Specimen:  Blood from Arm, Right Updated:  03/18/19 1812     Lipase 127 u/L     Magnesium [[id number]]  (Normal) Collected:  03/18/19 1737    Lab Status:  Final result Specimen:  Blood from Arm, Right Updated:  03/18/19 1812     Magnesium 1 9 mg/dL     CBC and differential [[id number]]  (Abnormal) Collected:  03/18/19 1737    Lab Status:  Final result Specimen:  Blood from Arm, Right Updated:  03/18/19 1749     WBC 17 07 Thousand/uL      RBC 6 51 Million/uL      Hemoglobin 18 1 g/dL      Hematocrit 50 5 %      MCV 78 fL      MCH 27 8 pg      MCHC 35 8 g/dL      RDW 12 4 %      MPV 10 8 fL      Platelets 922 Thousands/uL      nRBC 0 /100 WBCs      Neutrophils Relative 79 %      Immat GRANS % 0 %      Lymphocytes Relative 12 %      Monocytes Relative 9 %      Eosinophils Relative 0 %      Basophils Relative 0 %      Neutrophils Absolute 13 47 Thousands/µL      Immature Grans Absolute 0 07 Thousand/uL      Lymphocytes Absolute 2 05 Thousands/µL      Monocytes Absolute 1 45 Thousand/µL      Eosinophils Absolute 0 00 Thousand/µL      Basophils Absolute 0 03 Thousands/µL                  CT abdomen pelvis with contrast   Final Result by Nasreen Piedra MD (03/18 2008)      No acute CT findings  Workstation performed: [id number]                    Procedures  Procedures       Phone Contacts  ED Phone Contact    ED Course  ED Course as of Mar 18 2234   Mon Mar 18, 2019   2125 Given 40meq to raise K to 3 4     Potassium(!!): 2 6                               MDM  Number of Diagnoses or Management Options  Dehydration:   Vomiting and diarrhea:   Diagnosis management comments: DDX includes but not ltd to:   Concern for lyte abnormalities, dehydration 2/2 nausea / vomiting   Consider this is psychogenic, related to marajuana use vs gastroenteritis     Plan is to obtain:  CBC as marker of infectivity, hemoconcentration for hydration status  CMP to check for electrolytes, renal function, liver function   Lipase to check for pancreatitis    EKG Interpretation    Rate: 70 BPM  Rhythm: nsr  Axis: normal  Intervals: Normal, no blocks, QTc 490ms  Q waves: no  T waves: normal   ST segments: no dep / elevation    Impression: normal EKG  EKG for comparison: improved compared to prior on 1/29/19, qtc has shortened   EKG interpreted by me  Based on results: Added on CT abdomen given his continued vomiting and report that he doesn't feel improved  He had K / phos repleted  Mg was normal  He can follow up w PCP as an outpatient, can see GI as requesting someone to follow w for these repetitive episodes  CT returned normal  He can take zofran at home, is concerned about recurrent vomiting at home so was given reglan here and benadryl, can follow w GI  Return parameters discussed  Pt requires f/u as an outpt  Pt expresses understanding w above treatment plan  All questions answered prior to d/c  Portions of the record may have been created with voice recognition software   Occasional wrong word or "sound a like" substitutions may have occurred due to the inherent limitations of voice recognition software   Read the chart carefully and recognize, using context, where substitutions have occurred          Disposition  Final diagnoses:   Dehydration   Vomiting and diarrhea     Time reflects when diagnosis was documented in both MDM as applicable and the Disposition within this note     Time User Action Codes Description Comment    3/18/2019  9:29 PM Radha Whittaker Add [E86 0] Dehydration     3/18/2019  9:29 PM Nohemy Breed [R11 10,  R19 7] Vomiting and diarrhea       ED Disposition     ED Disposition Condition Date/Time Comment    Discharge Stable Mon Mar 18, 2019  9:29 PM Margaux Healy discharge to home/self care             Follow-up Information     Follow up With Specialties Details Why Contact Info Additional [address], [address] [facility], Nurse Practitioner  for follow up  [place] [phone]        [facility]  for constant nausea and vomiting [facility] Abdiel [address] [facility], [address],  [address] [facility], [place], [place], [address]          Discharge Medication List as of 3/18/2019  9:30 PM      START taking these medications    Details   !! ondansetron (ZOFRAN-ODT) 4 mg disintegrating tablet Take 1 tablet (4 mg total) by mouth every 8 (eight) hours as needed for nausea for up to 7 days, Starting Mon 3/18/2019, Until Mon 3/25/2019, Print       !! - Potential duplicate medications found  Please discuss with provider  CONTINUE these medications which have NOT CHANGED    Details   amphetamine-dextroamphetamine (ADDERALL XR) 20 MG 24 hr capsule Take the 1st pill at 5:00 a m  In the morning then take the 2nd pill at 2:00 p m   In the afternoon, Normal      clonazePAM (KlonoPIN) 0 5 mg tablet Take 1 tablet (0 5 mg total) by mouth 2 (two) times a day as needed for seizures for up to 10 days, Starting Thu 12/6/2018, Until Thu 1/31/2019, Normal      omeprazole (PriLOSEC) 20 mg delayed release capsule TAKE 1 BY MOUTH EVERY DAY FOR STOMACH ACID, Normal      !! ondansetron (ZOFRAN-ODT) 8 mg disintegrating tablet Take 1 tablet (8 mg total) by mouth every 8 (eight) hours as needed for nausea or vomiting, Starting Fri 10/12/2018, Normal      OXcarbazepine (TRILEPTAL) 300 mg tablet Take 1 tablet (300 mg total) by mouth every 12 (twelve) hours for 30 days, Starting Fri 12/28/2018, Until Thu 1/31/2019, Print      QUEtiapine (SEROquel) 300 mg tablet Take 1 tablet (300 mg total) by mouth daily at bedtime for 30 days, Starting Fri 1/11/2019, Until Sun 2/10/2019, Normal       !! - Potential duplicate medications found  Please discuss with provider  No discharge procedures on file  ED Provider  Electronically Signed by           Essie Barfield PA-C  03/18/19 2232       Essie Barfield PA-C  03/18/19 2956       Raymundo Cao MD  03/18/19 2179  I was the supervising physician of the advanced practice provider at the time of service  I was physically present and available for consultation during that time         Raymundo Cao MD  03/18/19 8808

## 2019-03-20 ENCOUNTER — TELEPHONE (OUTPATIENT)
Dept: BEHAVIORAL/MENTAL HEALTH CLINIC | Facility: CLINIC | Age: 29
End: 2019-03-20

## 2019-03-29 ENCOUNTER — OFFICE VISIT (OUTPATIENT)
Dept: BEHAVIORAL/MENTAL HEALTH CLINIC | Facility: CLINIC | Age: 29
End: 2019-03-29
Payer: COMMERCIAL

## 2019-03-29 DIAGNOSIS — F41.1 GENERALIZED ANXIETY DISORDER: Primary | ICD-10-CM

## 2019-03-29 PROCEDURE — 90834 PSYTX W PT 45 MINUTES: CPT | Performed by: SOCIAL WORKER

## 2019-03-29 NOTE — PSYCH
Time In 2pm, Time Out 2:45 pm Session length 45 minutes  Assessment/Plan:   Client continues to struggle with anxiety as well as using pain meds sporadically, and marijuana consistently  Diagnoses and all orders for this visit:    Generalized anxiety disorder          Subjective: Client reported continuing to struggle with anxiety  He is also struggling with his use of marijuana, and constant cravings for opiates  Patient ID: Yanci Colunga is a 30 y o  male  Client reported he is still struggling with anxiety, missing work due to drug use, and becoming caught in his own negative thinking  Session focused on teaching anti-anxiety techniques, as well as discussing the vivatrol shot with him  He was encouraged to make an appointment with Dee Branch to discuss the potential benefits of this with her  We also discussed becoming active in a hobby, or volunteering  He stated his wife has also wanted him to engage in this as he has too much time after work  Review of Systems   Psychiatric/Behavioral: The patient is nervous/anxious  Objective: Client was casually dressed, she was fully oriented, however he appeared to be under the influence  He made limited contact and demonstrated limited insight  He was able to engage in session  Physical Exam   Psychiatric: His speech is normal and behavior is normal  Judgment and thought content normal  His mood appears anxious  Cognition and memory are normal    No SI/HI or self harming behaviors reported

## 2019-04-08 DIAGNOSIS — F90.2 ATTENTION DEFICIT HYPERACTIVITY DISORDER (ADHD), COMBINED TYPE: ICD-10-CM

## 2019-04-08 DIAGNOSIS — F31.62 BIPOLAR DISORDER, CURRENT EPISODE MIXED, MODERATE (HCC): ICD-10-CM

## 2019-04-08 RX ORDER — OXCARBAZEPINE 300 MG/1
300 TABLET, FILM COATED ORAL EVERY 12 HOURS SCHEDULED
Qty: 120 TABLET | Refills: 0 | Status: SHIPPED | OUTPATIENT
Start: 2019-04-08 | End: 2019-05-08 | Stop reason: SDUPTHER

## 2019-04-08 RX ORDER — DEXTROAMPHETAMINE SACCHARATE, AMPHETAMINE ASPARTATE MONOHYDRATE, DEXTROAMPHETAMINE SULFATE AND AMPHETAMINE SULFATE 5; 5; 5; 5 MG/1; MG/1; MG/1; MG/1
CAPSULE, EXTENDED RELEASE ORAL
Qty: 60 CAPSULE | Refills: 0 | Status: SHIPPED | OUTPATIENT
Start: 2019-04-08 | End: 2019-04-13

## 2019-04-13 ENCOUNTER — HOSPITAL ENCOUNTER (EMERGENCY)
Facility: HOSPITAL | Age: 29
End: 2019-04-14
Attending: EMERGENCY MEDICINE
Payer: COMMERCIAL

## 2019-04-13 DIAGNOSIS — F11.23 OPIATE WITHDRAWAL (HCC): Primary | ICD-10-CM

## 2019-04-13 LAB
ALBUMIN SERPL BCP-MCNC: 4.4 G/DL (ref 3.5–5)
ALP SERPL-CCNC: 90 U/L (ref 46–116)
ALT SERPL W P-5'-P-CCNC: 18 U/L (ref 12–78)
AMPHETAMINES SERPL QL SCN: NEGATIVE
ANION GAP SERPL CALCULATED.3IONS-SCNC: 14 MMOL/L (ref 4–13)
AST SERPL W P-5'-P-CCNC: 22 U/L (ref 5–45)
BARBITURATES UR QL: NEGATIVE
BASOPHILS # BLD AUTO: 0.02 THOUSANDS/ΜL (ref 0–0.1)
BASOPHILS NFR BLD AUTO: 0 % (ref 0–1)
BENZODIAZ UR QL: NEGATIVE
BILIRUB SERPL-MCNC: 0.8 MG/DL (ref 0.2–1)
BUN SERPL-MCNC: 18 MG/DL (ref 5–25)
CALCIUM SERPL-MCNC: 10.1 MG/DL (ref 8.3–10.1)
CHLORIDE SERPL-SCNC: 91 MMOL/L (ref 100–108)
CO2 SERPL-SCNC: 29 MMOL/L (ref 21–32)
COCAINE UR QL: NEGATIVE
CREAT SERPL-MCNC: 1.03 MG/DL (ref 0.6–1.3)
EOSINOPHIL # BLD AUTO: 0 THOUSAND/ΜL (ref 0–0.61)
EOSINOPHIL NFR BLD AUTO: 0 % (ref 0–6)
ERYTHROCYTE [DISTWIDTH] IN BLOOD BY AUTOMATED COUNT: 12.9 % (ref 11.6–15.1)
GFR SERPL CREATININE-BSD FRML MDRD: 98 ML/MIN/1.73SQ M
GLUCOSE SERPL-MCNC: 154 MG/DL (ref 65–140)
HCT VFR BLD AUTO: 45 % (ref 36.5–49.3)
HGB BLD-MCNC: 15.8 G/DL (ref 12–17)
IMM GRANULOCYTES # BLD AUTO: 0.09 THOUSAND/UL (ref 0–0.2)
IMM GRANULOCYTES NFR BLD AUTO: 1 % (ref 0–2)
LYMPHOCYTES # BLD AUTO: 1.18 THOUSANDS/ΜL (ref 0.6–4.47)
LYMPHOCYTES NFR BLD AUTO: 7 % (ref 14–44)
MCH RBC QN AUTO: 28.3 PG (ref 26.8–34.3)
MCHC RBC AUTO-ENTMCNC: 35.1 G/DL (ref 31.4–37.4)
MCV RBC AUTO: 81 FL (ref 82–98)
METHADONE UR QL: NEGATIVE
MONOCYTES # BLD AUTO: 0.98 THOUSAND/ΜL (ref 0.17–1.22)
MONOCYTES NFR BLD AUTO: 5 % (ref 4–12)
NEUTROPHILS # BLD AUTO: 15.81 THOUSANDS/ΜL (ref 1.85–7.62)
NEUTS SEG NFR BLD AUTO: 87 % (ref 43–75)
NRBC BLD AUTO-RTO: 0 /100 WBCS
OPIATES UR QL SCN: POSITIVE
PCP UR QL: NEGATIVE
PLATELET # BLD AUTO: 399 THOUSANDS/UL (ref 149–390)
PMV BLD AUTO: 10.5 FL (ref 8.9–12.7)
POTASSIUM SERPL-SCNC: 3.1 MMOL/L (ref 3.5–5.3)
PROT SERPL-MCNC: 8.9 G/DL (ref 6.4–8.2)
RBC # BLD AUTO: 5.59 MILLION/UL (ref 3.88–5.62)
SODIUM SERPL-SCNC: 134 MMOL/L (ref 136–145)
THC UR QL: POSITIVE
WBC # BLD AUTO: 18.08 THOUSAND/UL (ref 4.31–10.16)

## 2019-04-13 PROCEDURE — 96366 THER/PROPH/DIAG IV INF ADDON: CPT

## 2019-04-13 PROCEDURE — 96361 HYDRATE IV INFUSION ADD-ON: CPT

## 2019-04-13 PROCEDURE — 96365 THER/PROPH/DIAG IV INF INIT: CPT

## 2019-04-13 PROCEDURE — 99285 EMERGENCY DEPT VISIT HI MDM: CPT

## 2019-04-13 PROCEDURE — 36415 COLL VENOUS BLD VENIPUNCTURE: CPT | Performed by: NURSE PRACTITIONER

## 2019-04-13 PROCEDURE — 99284 EMERGENCY DEPT VISIT MOD MDM: CPT | Performed by: NURSE PRACTITIONER

## 2019-04-13 PROCEDURE — 96376 TX/PRO/DX INJ SAME DRUG ADON: CPT

## 2019-04-13 PROCEDURE — 85025 COMPLETE CBC W/AUTO DIFF WBC: CPT | Performed by: NURSE PRACTITIONER

## 2019-04-13 PROCEDURE — 80307 DRUG TEST PRSMV CHEM ANLYZR: CPT | Performed by: NURSE PRACTITIONER

## 2019-04-13 PROCEDURE — 96375 TX/PRO/DX INJ NEW DRUG ADDON: CPT

## 2019-04-13 PROCEDURE — 80053 COMPREHEN METABOLIC PANEL: CPT | Performed by: NURSE PRACTITIONER

## 2019-04-13 RX ORDER — LORAZEPAM 2 MG/ML
1 INJECTION INTRAMUSCULAR ONCE
Status: COMPLETED | OUTPATIENT
Start: 2019-04-13 | End: 2019-04-13

## 2019-04-13 RX ORDER — DIPHENHYDRAMINE HCL 25 MG
50 TABLET ORAL ONCE
Status: COMPLETED | OUTPATIENT
Start: 2019-04-13 | End: 2019-04-13

## 2019-04-13 RX ORDER — CHLORDIAZEPOXIDE HYDROCHLORIDE 25 MG/1
50 CAPSULE, GELATIN COATED ORAL ONCE
Status: COMPLETED | OUTPATIENT
Start: 2019-04-13 | End: 2019-04-13

## 2019-04-13 RX ORDER — MAGNESIUM HYDROXIDE/ALUMINUM HYDROXICE/SIMETHICONE 120; 1200; 1200 MG/30ML; MG/30ML; MG/30ML
30 SUSPENSION ORAL ONCE
Status: COMPLETED | OUTPATIENT
Start: 2019-04-13 | End: 2019-04-13

## 2019-04-13 RX ORDER — CLONIDINE HYDROCHLORIDE 0.1 MG/1
0.1 TABLET ORAL ONCE
Status: COMPLETED | OUTPATIENT
Start: 2019-04-13 | End: 2019-04-13

## 2019-04-13 RX ORDER — KETOROLAC TROMETHAMINE 30 MG/ML
15 INJECTION, SOLUTION INTRAMUSCULAR; INTRAVENOUS ONCE
Status: COMPLETED | OUTPATIENT
Start: 2019-04-13 | End: 2019-04-13

## 2019-04-13 RX ORDER — LORAZEPAM 1 MG/1
1 TABLET ORAL ONCE
Status: COMPLETED | OUTPATIENT
Start: 2019-04-13 | End: 2019-04-13

## 2019-04-13 RX ORDER — ONDANSETRON 4 MG/1
4 TABLET, ORALLY DISINTEGRATING ORAL ONCE
Status: COMPLETED | OUTPATIENT
Start: 2019-04-13 | End: 2019-04-13

## 2019-04-13 RX ORDER — PROMETHAZINE HYDROCHLORIDE 25 MG/ML
25 INJECTION, SOLUTION INTRAMUSCULAR; INTRAVENOUS ONCE
Status: COMPLETED | OUTPATIENT
Start: 2019-04-13 | End: 2019-04-13

## 2019-04-13 RX ORDER — ONDANSETRON 2 MG/ML
4 INJECTION INTRAMUSCULAR; INTRAVENOUS ONCE
Status: COMPLETED | OUTPATIENT
Start: 2019-04-13 | End: 2019-04-13

## 2019-04-13 RX ORDER — POTASSIUM CHLORIDE 14.9 MG/ML
20 INJECTION INTRAVENOUS ONCE
Status: COMPLETED | OUTPATIENT
Start: 2019-04-13 | End: 2019-04-13

## 2019-04-13 RX ADMIN — ONDANSETRON 4 MG: 4 TABLET, ORALLY DISINTEGRATING ORAL at 20:19

## 2019-04-13 RX ADMIN — ONDANSETRON 4 MG: 2 INJECTION INTRAMUSCULAR; INTRAVENOUS at 07:17

## 2019-04-13 RX ADMIN — DIPHENHYDRAMINE HCL 50 MG: 25 TABLET, FILM COATED ORAL at 22:57

## 2019-04-13 RX ADMIN — CLONIDINE HYDROCHLORIDE 0.1 MG: 0.1 TABLET ORAL at 07:16

## 2019-04-13 RX ADMIN — SODIUM CHLORIDE 1000 ML: 0.9 INJECTION, SOLUTION INTRAVENOUS at 07:15

## 2019-04-13 RX ADMIN — LORAZEPAM 1 MG: 2 INJECTION INTRAMUSCULAR; INTRAVENOUS at 14:12

## 2019-04-13 RX ADMIN — FAMOTIDINE 20 MG: 10 INJECTION, SOLUTION INTRAVENOUS at 07:17

## 2019-04-13 RX ADMIN — LORAZEPAM 1 MG: 1 TABLET ORAL at 18:59

## 2019-04-13 RX ADMIN — LORAZEPAM 1 MG: 2 INJECTION INTRAMUSCULAR; INTRAVENOUS at 10:34

## 2019-04-13 RX ADMIN — PROMETHAZINE HYDROCHLORIDE 25 MG: 25 INJECTION, SOLUTION INTRAMUSCULAR; INTRAVENOUS at 10:34

## 2019-04-13 RX ADMIN — POTASSIUM CHLORIDE 20 MEQ: 200 INJECTION, SOLUTION INTRAVENOUS at 08:12

## 2019-04-13 RX ADMIN — ALUMINUM HYDROXIDE, MAGNESIUM HYDROXIDE, AND SIMETHICONE 30 ML: 200; 200; 20 SUSPENSION ORAL at 07:15

## 2019-04-13 RX ADMIN — LIDOCAINE HYDROCHLORIDE 10 ML: 20 SOLUTION ORAL; TOPICAL at 07:15

## 2019-04-13 RX ADMIN — KETOROLAC TROMETHAMINE 15 MG: 30 INJECTION, SOLUTION INTRAMUSCULAR; INTRAVENOUS at 07:16

## 2019-04-13 RX ADMIN — ONDANSETRON 4 MG: 4 TABLET, ORALLY DISINTEGRATING ORAL at 18:59

## 2019-04-13 RX ADMIN — CHLORDIAZEPOXIDE HYDROCHLORIDE 50 MG: 25 CAPSULE ORAL at 07:16

## 2019-04-13 RX ADMIN — SODIUM CHLORIDE 1000 ML: 0.9 INJECTION, SOLUTION INTRAVENOUS at 10:34

## 2019-04-14 VITALS
SYSTOLIC BLOOD PRESSURE: 132 MMHG | RESPIRATION RATE: 18 BRPM | TEMPERATURE: 98 F | HEART RATE: 87 BPM | OXYGEN SATURATION: 97 % | DIASTOLIC BLOOD PRESSURE: 71 MMHG

## 2019-04-14 RX ORDER — LORAZEPAM 1 MG/1
1 TABLET ORAL ONCE
Status: COMPLETED | OUTPATIENT
Start: 2019-04-14 | End: 2019-04-14

## 2019-04-14 RX ADMIN — LORAZEPAM 1 MG: 1 TABLET ORAL at 00:33

## 2019-04-14 RX ADMIN — LORAZEPAM 1 MG: 1 TABLET ORAL at 08:08

## 2019-04-21 ENCOUNTER — APPOINTMENT (EMERGENCY)
Dept: RADIOLOGY | Facility: HOSPITAL | Age: 29
DRG: 871 | End: 2019-04-21
Payer: COMMERCIAL

## 2019-04-21 ENCOUNTER — APPOINTMENT (EMERGENCY)
Dept: CT IMAGING | Facility: HOSPITAL | Age: 29
DRG: 871 | End: 2019-04-21
Payer: COMMERCIAL

## 2019-04-21 ENCOUNTER — HOSPITAL ENCOUNTER (INPATIENT)
Facility: HOSPITAL | Age: 29
LOS: 3 days | Discharge: HOME | DRG: 871 | End: 2019-04-24
Attending: EMERGENCY MEDICINE | Admitting: FAMILY MEDICINE
Payer: COMMERCIAL

## 2019-04-21 DIAGNOSIS — J18.9 MULTIFOCAL PNEUMONIA: Primary | ICD-10-CM

## 2019-04-21 DIAGNOSIS — E87.1 HYPONATREMIA: ICD-10-CM

## 2019-04-21 DIAGNOSIS — F41.9 ANXIETY: ICD-10-CM

## 2019-04-21 DIAGNOSIS — Z63.4 GRIEF AT LOSS OF CHILD: ICD-10-CM

## 2019-04-21 DIAGNOSIS — E87.6 HYPOKALEMIA: ICD-10-CM

## 2019-04-21 DIAGNOSIS — F43.21 GRIEF AT LOSS OF CHILD: ICD-10-CM

## 2019-04-21 PROBLEM — R74.01 TRANSAMINITIS: Status: ACTIVE | Noted: 2019-04-21

## 2019-04-21 PROBLEM — F32.A DEPRESSION: Status: ACTIVE | Noted: 2019-04-21

## 2019-04-21 LAB
ALBUMIN SERPL BCP-MCNC: 2.4 G/DL (ref 3.5–5)
ALP SERPL-CCNC: 252 U/L (ref 46–116)
ALT SERPL W P-5'-P-CCNC: 120 U/L (ref 12–78)
AMPHETAMINES SERPL QL SCN: NEGATIVE
ANION GAP SERPL CALCULATED.3IONS-SCNC: 8 MMOL/L (ref 4–13)
AST SERPL W P-5'-P-CCNC: 149 U/L (ref 5–45)
ATRIAL RATE: 103 BPM
BARBITURATES UR QL: NEGATIVE
BASOPHILS # BLD MANUAL: 0 THOUSAND/UL (ref 0–0.1)
BASOPHILS NFR MAR MANUAL: 0 % (ref 0–1)
BENZODIAZ UR QL: POSITIVE
BILIRUB SERPL-MCNC: 2 MG/DL (ref 0.2–1)
BUN SERPL-MCNC: 6 MG/DL (ref 5–25)
CALCIUM SERPL-MCNC: 9.3 MG/DL (ref 8.3–10.1)
CHLORIDE SERPL-SCNC: 89 MMOL/L (ref 100–108)
CO2 SERPL-SCNC: 33 MMOL/L (ref 21–32)
COCAINE UR QL: NEGATIVE
CREAT SERPL-MCNC: 0.92 MG/DL (ref 0.6–1.3)
DEPRECATED D DIMER PPP: 1012 NG/ML (FEU)
EOSINOPHIL # BLD MANUAL: 0.17 THOUSAND/UL (ref 0–0.4)
EOSINOPHIL NFR BLD MANUAL: 1 % (ref 0–6)
ERYTHROCYTE [DISTWIDTH] IN BLOOD BY AUTOMATED COUNT: 13.6 % (ref 11.6–15.1)
GFR SERPL CREATININE-BSD FRML MDRD: 113 ML/MIN/1.73SQ M
GLUCOSE SERPL-MCNC: 141 MG/DL (ref 65–140)
HCT VFR BLD AUTO: 36.2 % (ref 36.5–49.3)
HGB BLD-MCNC: 12.7 G/DL (ref 12–17)
HIV 1+2 AB+HIV1 P24 AG SERPL QL IA: NORMAL
HIV1 P24 AG SER QL: NORMAL
LACTATE SERPL-SCNC: 1.5 MMOL/L (ref 0.5–2)
LIPASE SERPL-CCNC: 250 U/L (ref 73–393)
LYMPHOCYTES # BLD AUTO: 1.01 THOUSAND/UL (ref 0.6–4.47)
LYMPHOCYTES # BLD AUTO: 6 % (ref 14–44)
MCH RBC QN AUTO: 28 PG (ref 26.8–34.3)
MCHC RBC AUTO-ENTMCNC: 35.1 G/DL (ref 31.4–37.4)
MCV RBC AUTO: 80 FL (ref 82–98)
METHADONE UR QL: NEGATIVE
MONOCYTES # BLD AUTO: 0.34 THOUSAND/UL (ref 0–1.22)
MONOCYTES NFR BLD: 2 % (ref 4–12)
NEUTROPHILS # BLD MANUAL: 15.27 THOUSAND/UL (ref 1.85–7.62)
NEUTS BAND NFR BLD MANUAL: 7 % (ref 0–8)
NEUTS SEG NFR BLD AUTO: 84 % (ref 43–75)
NRBC BLD AUTO-RTO: 0 /100 WBCS
OPIATES UR QL SCN: NEGATIVE
P AXIS: 63 DEGREES
PCP UR QL: NEGATIVE
PLATELET # BLD AUTO: 499 THOUSANDS/UL (ref 149–390)
PLATELET BLD QL SMEAR: ABNORMAL
PMV BLD AUTO: 10.2 FL (ref 8.9–12.7)
POTASSIUM SERPL-SCNC: 2.9 MMOL/L (ref 3.5–5.3)
PR INTERVAL: 128 MS
PROT SERPL-MCNC: 7.8 G/DL (ref 6.4–8.2)
QRS AXIS: 67 DEGREES
QRSD INTERVAL: 84 MS
QT INTERVAL: 350 MS
QTC INTERVAL: 458 MS
RBC # BLD AUTO: 4.54 MILLION/UL (ref 3.88–5.62)
SODIUM SERPL-SCNC: 130 MMOL/L (ref 136–145)
T WAVE AXIS: 62 DEGREES
THC UR QL: POSITIVE
TOTAL CELLS COUNTED SPEC: 100
VENTRICULAR RATE: 103 BPM
WBC # BLD AUTO: 16.78 THOUSAND/UL (ref 4.31–10.16)

## 2019-04-21 PROCEDURE — 99223 1ST HOSP IP/OBS HIGH 75: CPT | Performed by: FAMILY MEDICINE

## 2019-04-21 PROCEDURE — 71046 X-RAY EXAM CHEST 2 VIEWS: CPT

## 2019-04-21 PROCEDURE — 99284 EMERGENCY DEPT VISIT MOD MDM: CPT | Performed by: EMERGENCY MEDICINE

## 2019-04-21 PROCEDURE — 83605 ASSAY OF LACTIC ACID: CPT | Performed by: EMERGENCY MEDICINE

## 2019-04-21 PROCEDURE — 96361 HYDRATE IV INFUSION ADD-ON: CPT

## 2019-04-21 PROCEDURE — 85027 COMPLETE CBC AUTOMATED: CPT | Performed by: EMERGENCY MEDICINE

## 2019-04-21 PROCEDURE — 36415 COLL VENOUS BLD VENIPUNCTURE: CPT | Performed by: EMERGENCY MEDICINE

## 2019-04-21 PROCEDURE — 87806 HIV AG W/HIV1&2 ANTB W/OPTIC: CPT | Performed by: EMERGENCY MEDICINE

## 2019-04-21 PROCEDURE — 71275 CT ANGIOGRAPHY CHEST: CPT

## 2019-04-21 PROCEDURE — 93010 ELECTROCARDIOGRAM REPORT: CPT | Performed by: INTERNAL MEDICINE

## 2019-04-21 PROCEDURE — 87040 BLOOD CULTURE FOR BACTERIA: CPT | Performed by: EMERGENCY MEDICINE

## 2019-04-21 PROCEDURE — 80307 DRUG TEST PRSMV CHEM ANLYZR: CPT | Performed by: FAMILY MEDICINE

## 2019-04-21 PROCEDURE — 83690 ASSAY OF LIPASE: CPT | Performed by: EMERGENCY MEDICINE

## 2019-04-21 PROCEDURE — 87449 NOS EACH ORGANISM AG IA: CPT | Performed by: FAMILY MEDICINE

## 2019-04-21 PROCEDURE — 85379 FIBRIN DEGRADATION QUANT: CPT | Performed by: EMERGENCY MEDICINE

## 2019-04-21 PROCEDURE — 93005 ELECTROCARDIOGRAM TRACING: CPT

## 2019-04-21 PROCEDURE — 87205 SMEAR GRAM STAIN: CPT | Performed by: FAMILY MEDICINE

## 2019-04-21 PROCEDURE — 80053 COMPREHEN METABOLIC PANEL: CPT | Performed by: EMERGENCY MEDICINE

## 2019-04-21 PROCEDURE — 94664 DEMO&/EVAL PT USE INHALER: CPT

## 2019-04-21 PROCEDURE — 85007 BL SMEAR W/DIFF WBC COUNT: CPT | Performed by: EMERGENCY MEDICINE

## 2019-04-21 PROCEDURE — 94760 N-INVAS EAR/PLS OXIMETRY 1: CPT

## 2019-04-21 PROCEDURE — 96365 THER/PROPH/DIAG IV INF INIT: CPT

## 2019-04-21 PROCEDURE — 87070 CULTURE OTHR SPECIMN AEROBIC: CPT | Performed by: FAMILY MEDICINE

## 2019-04-21 PROCEDURE — 99285 EMERGENCY DEPT VISIT HI MDM: CPT

## 2019-04-21 RX ORDER — QUETIAPINE FUMARATE 100 MG/1
300 TABLET, FILM COATED ORAL
Status: DISCONTINUED | OUTPATIENT
Start: 2019-04-21 | End: 2019-04-24 | Stop reason: HOSPADM

## 2019-04-21 RX ORDER — SODIUM CHLORIDE 9 MG/ML
1000 INJECTION, SOLUTION INTRAVENOUS ONCE
Status: COMPLETED | OUTPATIENT
Start: 2019-04-21 | End: 2019-04-21

## 2019-04-21 RX ORDER — POTASSIUM CHLORIDE 14.9 MG/ML
20 INJECTION INTRAVENOUS ONCE
Status: COMPLETED | OUTPATIENT
Start: 2019-04-21 | End: 2019-04-21

## 2019-04-21 RX ORDER — ONDANSETRON 2 MG/ML
4 INJECTION INTRAMUSCULAR; INTRAVENOUS EVERY 6 HOURS PRN
Status: DISCONTINUED | OUTPATIENT
Start: 2019-04-21 | End: 2019-04-24 | Stop reason: HOSPADM

## 2019-04-21 RX ORDER — LANOLIN ALCOHOL/MO/W.PET/CERES
3 CREAM (GRAM) TOPICAL
Status: DISCONTINUED | OUTPATIENT
Start: 2019-04-21 | End: 2019-04-24 | Stop reason: HOSPADM

## 2019-04-21 RX ORDER — OXCARBAZEPINE 150 MG/1
300 TABLET, FILM COATED ORAL EVERY 12 HOURS SCHEDULED
Status: DISCONTINUED | OUTPATIENT
Start: 2019-04-21 | End: 2019-04-24 | Stop reason: HOSPADM

## 2019-04-21 RX ORDER — POTASSIUM CHLORIDE 20 MEQ/1
40 TABLET, EXTENDED RELEASE ORAL ONCE
Status: COMPLETED | OUTPATIENT
Start: 2019-04-21 | End: 2019-04-21

## 2019-04-21 RX ORDER — HYDROXYZINE HYDROCHLORIDE 25 MG/1
25 TABLET, FILM COATED ORAL EVERY 6 HOURS PRN
Status: DISCONTINUED | OUTPATIENT
Start: 2019-04-21 | End: 2019-04-24 | Stop reason: HOSPADM

## 2019-04-21 RX ORDER — AZITHROMYCIN 250 MG/1
250 TABLET, FILM COATED ORAL EVERY 24 HOURS
Status: DISCONTINUED | OUTPATIENT
Start: 2019-04-22 | End: 2019-04-24 | Stop reason: HOSPADM

## 2019-04-21 RX ORDER — ALBUTEROL SULFATE 2.5 MG/3ML
2.5 SOLUTION RESPIRATORY (INHALATION) EVERY 6 HOURS PRN
Status: DISCONTINUED | OUTPATIENT
Start: 2019-04-21 | End: 2019-04-24 | Stop reason: HOSPADM

## 2019-04-21 RX ORDER — PANTOPRAZOLE SODIUM 20 MG/1
20 TABLET, DELAYED RELEASE ORAL
Status: DISCONTINUED | OUTPATIENT
Start: 2019-04-22 | End: 2019-04-24 | Stop reason: HOSPADM

## 2019-04-21 RX ORDER — ONDANSETRON 2 MG/ML
4 INJECTION INTRAMUSCULAR; INTRAVENOUS ONCE
Status: DISCONTINUED | OUTPATIENT
Start: 2019-04-21 | End: 2019-04-24 | Stop reason: HOSPADM

## 2019-04-21 RX ORDER — SODIUM CHLORIDE 9 MG/ML
125 INJECTION, SOLUTION INTRAVENOUS CONTINUOUS
Status: DISCONTINUED | OUTPATIENT
Start: 2019-04-21 | End: 2019-04-22

## 2019-04-21 RX ORDER — IBUPROFEN 400 MG/1
400 TABLET ORAL EVERY 6 HOURS PRN
Status: DISCONTINUED | OUTPATIENT
Start: 2019-04-21 | End: 2019-04-24 | Stop reason: HOSPADM

## 2019-04-21 RX ORDER — LORAZEPAM 2 MG/ML
1 INJECTION INTRAMUSCULAR EVERY 8 HOURS PRN
Status: DISCONTINUED | OUTPATIENT
Start: 2019-04-21 | End: 2019-04-24 | Stop reason: HOSPADM

## 2019-04-21 RX ADMIN — SODIUM CHLORIDE 125 ML/HR: 0.9 INJECTION, SOLUTION INTRAVENOUS at 17:47

## 2019-04-21 RX ADMIN — SODIUM CHLORIDE 1000 ML/HR: 0.9 INJECTION, SOLUTION INTRAVENOUS at 11:02

## 2019-04-21 RX ADMIN — MELATONIN 3 MG: at 21:15

## 2019-04-21 RX ADMIN — ENOXAPARIN SODIUM 40 MG: 40 INJECTION SUBCUTANEOUS at 14:31

## 2019-04-21 RX ADMIN — SODIUM CHLORIDE 1000 ML/HR: 0.9 INJECTION, SOLUTION INTRAVENOUS at 12:23

## 2019-04-21 RX ADMIN — POTASSIUM CHLORIDE 20 MEQ: 14.9 INJECTION, SOLUTION INTRAVENOUS at 11:58

## 2019-04-21 RX ADMIN — HYDROXYZINE HYDROCHLORIDE 25 MG: 25 TABLET ORAL at 17:46

## 2019-04-21 RX ADMIN — LORAZEPAM 1 MG: 2 INJECTION INTRAMUSCULAR; INTRAVENOUS at 20:29

## 2019-04-21 RX ADMIN — SODIUM CHLORIDE 125 ML/HR: 0.9 INJECTION, SOLUTION INTRAVENOUS at 23:20

## 2019-04-21 RX ADMIN — POTASSIUM CHLORIDE 40 MEQ: 1500 TABLET, EXTENDED RELEASE ORAL at 12:25

## 2019-04-21 RX ADMIN — AZITHROMYCIN MONOHYDRATE 500 MG: 500 INJECTION, POWDER, LYOPHILIZED, FOR SOLUTION INTRAVENOUS at 14:41

## 2019-04-21 RX ADMIN — OXCARBAZEPINE 300 MG: 150 TABLET ORAL at 14:31

## 2019-04-21 RX ADMIN — CEFTRIAXONE SODIUM 1000 MG: 10 INJECTION, POWDER, FOR SOLUTION INTRAVENOUS at 12:23

## 2019-04-21 RX ADMIN — IOHEXOL 85 ML: 350 INJECTION, SOLUTION INTRAVENOUS at 13:29

## 2019-04-21 RX ADMIN — QUETIAPINE FUMARATE 300 MG: 100 TABLET ORAL at 21:14

## 2019-04-21 RX ADMIN — OXCARBAZEPINE 300 MG: 150 TABLET ORAL at 21:15

## 2019-04-21 SDOH — SOCIAL STABILITY - SOCIAL INSECURITY: DISSAPEARANCE AND DEATH OF FAMILY MEMBER: Z63.4

## 2019-04-22 ENCOUNTER — APPOINTMENT (INPATIENT)
Dept: NON INVASIVE DIAGNOSTICS | Facility: HOSPITAL | Age: 29
DRG: 871 | End: 2019-04-22
Payer: COMMERCIAL

## 2019-04-22 ENCOUNTER — APPOINTMENT (INPATIENT)
Dept: ULTRASOUND IMAGING | Facility: HOSPITAL | Age: 29
DRG: 871 | End: 2019-04-22
Payer: COMMERCIAL

## 2019-04-22 LAB
ALBUMIN SERPL BCP-MCNC: 1.9 G/DL (ref 3.5–5)
ALP SERPL-CCNC: 227 U/L (ref 46–116)
ALT SERPL W P-5'-P-CCNC: 94 U/L (ref 12–78)
ANION GAP SERPL CALCULATED.3IONS-SCNC: 10 MMOL/L (ref 4–13)
AST SERPL W P-5'-P-CCNC: 123 U/L (ref 5–45)
BILIRUB SERPL-MCNC: 2.9 MG/DL (ref 0.2–1)
BUN SERPL-MCNC: 5 MG/DL (ref 5–25)
CALCIUM SERPL-MCNC: 8.8 MG/DL (ref 8.3–10.1)
CHLORIDE SERPL-SCNC: 96 MMOL/L (ref 100–108)
CO2 SERPL-SCNC: 28 MMOL/L (ref 21–32)
CREAT SERPL-MCNC: 0.71 MG/DL (ref 0.6–1.3)
ERYTHROCYTE [DISTWIDTH] IN BLOOD BY AUTOMATED COUNT: 13.8 % (ref 11.6–15.1)
GFR SERPL CREATININE-BSD FRML MDRD: 128 ML/MIN/1.73SQ M
GLUCOSE SERPL-MCNC: 120 MG/DL (ref 65–140)
HAV IGM SER QL: NORMAL
HBV CORE IGM SER QL: NORMAL
HBV SURFACE AG SER QL: NORMAL
HCT VFR BLD AUTO: 32.4 % (ref 36.5–49.3)
HCV AB SER QL: NORMAL
HGB BLD-MCNC: 11.2 G/DL (ref 12–17)
MAGNESIUM SERPL-MCNC: 2 MG/DL (ref 1.6–2.6)
MCH RBC QN AUTO: 27.9 PG (ref 26.8–34.3)
MCHC RBC AUTO-ENTMCNC: 34.6 G/DL (ref 31.4–37.4)
MCV RBC AUTO: 81 FL (ref 82–98)
PLATELET # BLD AUTO: 450 THOUSANDS/UL (ref 149–390)
PMV BLD AUTO: 9.9 FL (ref 8.9–12.7)
POTASSIUM SERPL-SCNC: 3.8 MMOL/L (ref 3.5–5.3)
PROT SERPL-MCNC: 6.8 G/DL (ref 6.4–8.2)
RBC # BLD AUTO: 4.02 MILLION/UL (ref 3.88–5.62)
SODIUM SERPL-SCNC: 134 MMOL/L (ref 136–145)
WBC # BLD AUTO: 15.29 THOUSAND/UL (ref 4.31–10.16)

## 2019-04-22 PROCEDURE — 76705 ECHO EXAM OF ABDOMEN: CPT

## 2019-04-22 PROCEDURE — 83735 ASSAY OF MAGNESIUM: CPT | Performed by: FAMILY MEDICINE

## 2019-04-22 PROCEDURE — 93306 TTE W/DOPPLER COMPLETE: CPT | Performed by: INTERNAL MEDICINE

## 2019-04-22 PROCEDURE — 80074 ACUTE HEPATITIS PANEL: CPT | Performed by: FAMILY MEDICINE

## 2019-04-22 PROCEDURE — G0425 INPT/ED TELECONSULT30: HCPCS | Performed by: PSYCHIATRY & NEUROLOGY

## 2019-04-22 PROCEDURE — 85027 COMPLETE CBC AUTOMATED: CPT | Performed by: FAMILY MEDICINE

## 2019-04-22 PROCEDURE — 94640 AIRWAY INHALATION TREATMENT: CPT

## 2019-04-22 PROCEDURE — 93306 TTE W/DOPPLER COMPLETE: CPT

## 2019-04-22 PROCEDURE — 99232 SBSQ HOSP IP/OBS MODERATE 35: CPT | Performed by: INTERNAL MEDICINE

## 2019-04-22 PROCEDURE — 80053 COMPREHEN METABOLIC PANEL: CPT | Performed by: FAMILY MEDICINE

## 2019-04-22 PROCEDURE — 94760 N-INVAS EAR/PLS OXIMETRY 1: CPT

## 2019-04-22 RX ADMIN — CEFTRIAXONE SODIUM 1000 MG: 10 INJECTION, POWDER, FOR SOLUTION INTRAVENOUS at 14:18

## 2019-04-22 RX ADMIN — QUETIAPINE FUMARATE 300 MG: 100 TABLET ORAL at 20:51

## 2019-04-22 RX ADMIN — AZITHROMYCIN 250 MG: 250 TABLET, FILM COATED ORAL at 14:18

## 2019-04-22 RX ADMIN — SODIUM CHLORIDE 125 ML/HR: 0.9 INJECTION, SOLUTION INTRAVENOUS at 08:25

## 2019-04-22 RX ADMIN — MELATONIN 3 MG: at 20:51

## 2019-04-22 RX ADMIN — PERFLUTREN 0.8 ML/MIN: 6.52 INJECTION, SUSPENSION INTRAVENOUS at 11:08

## 2019-04-22 RX ADMIN — PANTOPRAZOLE SODIUM 20 MG: 20 TABLET, DELAYED RELEASE ORAL at 08:26

## 2019-04-22 RX ADMIN — LORAZEPAM 1 MG: 2 INJECTION INTRAMUSCULAR; INTRAVENOUS at 20:23

## 2019-04-22 RX ADMIN — OXCARBAZEPINE 300 MG: 150 TABLET ORAL at 08:26

## 2019-04-22 RX ADMIN — OXCARBAZEPINE 300 MG: 150 TABLET ORAL at 20:51

## 2019-04-22 RX ADMIN — ALBUTEROL SULFATE 2.5 MG: 2.5 SOLUTION RESPIRATORY (INHALATION) at 23:55

## 2019-04-22 RX ADMIN — ENOXAPARIN SODIUM 40 MG: 40 INJECTION SUBCUTANEOUS at 08:26

## 2019-04-23 LAB
ALBUMIN SERPL BCP-MCNC: 2 G/DL (ref 3.5–5)
ALP SERPL-CCNC: 212 U/L (ref 46–116)
ALT SERPL W P-5'-P-CCNC: 78 U/L (ref 12–78)
ANION GAP SERPL CALCULATED.3IONS-SCNC: 11 MMOL/L (ref 4–13)
AST SERPL W P-5'-P-CCNC: 68 U/L (ref 5–45)
BILIRUB SERPL-MCNC: 2.2 MG/DL (ref 0.2–1)
BUN SERPL-MCNC: 5 MG/DL (ref 5–25)
CALCIUM SERPL-MCNC: 9.2 MG/DL (ref 8.3–10.1)
CHLORIDE SERPL-SCNC: 94 MMOL/L (ref 100–108)
CO2 SERPL-SCNC: 28 MMOL/L (ref 21–32)
CREAT SERPL-MCNC: 0.84 MG/DL (ref 0.6–1.3)
ERYTHROCYTE [DISTWIDTH] IN BLOOD BY AUTOMATED COUNT: 14.1 % (ref 11.6–15.1)
GFR SERPL CREATININE-BSD FRML MDRD: 119 ML/MIN/1.73SQ M
GLUCOSE SERPL-MCNC: 140 MG/DL (ref 65–140)
HCT VFR BLD AUTO: 34.2 % (ref 36.5–49.3)
HGB BLD-MCNC: 11.8 G/DL (ref 12–17)
L PNEUMO1 AG UR QL IA.RAPID: NEGATIVE
MCH RBC QN AUTO: 27.6 PG (ref 26.8–34.3)
MCHC RBC AUTO-ENTMCNC: 34.5 G/DL (ref 31.4–37.4)
MCV RBC AUTO: 80 FL (ref 82–98)
PLATELET # BLD AUTO: 507 THOUSANDS/UL (ref 149–390)
PMV BLD AUTO: 9.8 FL (ref 8.9–12.7)
POTASSIUM SERPL-SCNC: 3 MMOL/L (ref 3.5–5.3)
PROT SERPL-MCNC: 7 G/DL (ref 6.4–8.2)
RBC # BLD AUTO: 4.27 MILLION/UL (ref 3.88–5.62)
S PNEUM AG UR QL: NEGATIVE
SODIUM SERPL-SCNC: 133 MMOL/L (ref 136–145)
WBC # BLD AUTO: 15.88 THOUSAND/UL (ref 4.31–10.16)

## 2019-04-23 PROCEDURE — 99232 SBSQ HOSP IP/OBS MODERATE 35: CPT | Performed by: INTERNAL MEDICINE

## 2019-04-23 PROCEDURE — 94760 N-INVAS EAR/PLS OXIMETRY 1: CPT

## 2019-04-23 PROCEDURE — 94640 AIRWAY INHALATION TREATMENT: CPT

## 2019-04-23 PROCEDURE — 85027 COMPLETE CBC AUTOMATED: CPT | Performed by: INTERNAL MEDICINE

## 2019-04-23 PROCEDURE — 80053 COMPREHEN METABOLIC PANEL: CPT | Performed by: INTERNAL MEDICINE

## 2019-04-23 RX ORDER — POTASSIUM CHLORIDE 20 MEQ/1
40 TABLET, EXTENDED RELEASE ORAL ONCE
Status: COMPLETED | OUTPATIENT
Start: 2019-04-23 | End: 2019-04-23

## 2019-04-23 RX ADMIN — QUETIAPINE FUMARATE 300 MG: 100 TABLET ORAL at 21:14

## 2019-04-23 RX ADMIN — HYDROXYZINE HYDROCHLORIDE 25 MG: 25 TABLET ORAL at 21:15

## 2019-04-23 RX ADMIN — ENOXAPARIN SODIUM 40 MG: 40 INJECTION SUBCUTANEOUS at 10:11

## 2019-04-23 RX ADMIN — OXCARBAZEPINE 300 MG: 150 TABLET ORAL at 21:15

## 2019-04-23 RX ADMIN — MELATONIN 3 MG: at 21:15

## 2019-04-23 RX ADMIN — ALBUTEROL SULFATE 2.5 MG: 2.5 SOLUTION RESPIRATORY (INHALATION) at 23:07

## 2019-04-23 RX ADMIN — OXCARBAZEPINE 300 MG: 150 TABLET ORAL at 10:11

## 2019-04-23 RX ADMIN — AZITHROMYCIN 250 MG: 250 TABLET, FILM COATED ORAL at 14:34

## 2019-04-23 RX ADMIN — CEFTRIAXONE SODIUM 1000 MG: 10 INJECTION, POWDER, FOR SOLUTION INTRAVENOUS at 12:04

## 2019-04-23 RX ADMIN — POTASSIUM CHLORIDE 40 MEQ: 1500 TABLET, EXTENDED RELEASE ORAL at 12:04

## 2019-04-23 RX ADMIN — LORAZEPAM 1 MG: 2 INJECTION INTRAMUSCULAR; INTRAVENOUS at 20:04

## 2019-04-24 VITALS
HEART RATE: 101 BPM | DIASTOLIC BLOOD PRESSURE: 80 MMHG | OXYGEN SATURATION: 100 % | RESPIRATION RATE: 20 BRPM | SYSTOLIC BLOOD PRESSURE: 121 MMHG | HEIGHT: 72 IN | WEIGHT: 232.37 LBS | TEMPERATURE: 99.7 F | BODY MASS INDEX: 31.47 KG/M2

## 2019-04-24 LAB
ANION GAP SERPL CALCULATED.3IONS-SCNC: 10 MMOL/L (ref 4–13)
ANISOCYTOSIS BLD QL SMEAR: PRESENT
BACTERIA SPT RESP CULT: ABNORMAL
BACTERIA SPT RESP CULT: ABNORMAL
BASOPHILS # BLD MANUAL: 0 THOUSAND/UL (ref 0–0.1)
BASOPHILS NFR MAR MANUAL: 0 % (ref 0–1)
BUN SERPL-MCNC: 5 MG/DL (ref 5–25)
CALCIUM SERPL-MCNC: 9 MG/DL (ref 8.3–10.1)
CHLORIDE SERPL-SCNC: 93 MMOL/L (ref 100–108)
CO2 SERPL-SCNC: 28 MMOL/L (ref 21–32)
CREAT SERPL-MCNC: 0.91 MG/DL (ref 0.6–1.3)
EOSINOPHIL # BLD MANUAL: 0.46 THOUSAND/UL (ref 0–0.4)
EOSINOPHIL NFR BLD MANUAL: 3 % (ref 0–6)
ERYTHROCYTE [DISTWIDTH] IN BLOOD BY AUTOMATED COUNT: 14 % (ref 11.6–15.1)
GFR SERPL CREATININE-BSD FRML MDRD: 114 ML/MIN/1.73SQ M
GLUCOSE SERPL-MCNC: 147 MG/DL (ref 65–140)
GRAM STN SPEC: ABNORMAL
HCT VFR BLD AUTO: 34.5 % (ref 36.5–49.3)
HGB BLD-MCNC: 11.7 G/DL (ref 12–17)
LYMPHOCYTES # BLD AUTO: 1.39 THOUSAND/UL (ref 0.6–4.47)
LYMPHOCYTES # BLD AUTO: 9 % (ref 14–44)
MCH RBC QN AUTO: 27.6 PG (ref 26.8–34.3)
MCHC RBC AUTO-ENTMCNC: 33.9 G/DL (ref 31.4–37.4)
MCV RBC AUTO: 81 FL (ref 82–98)
MONOCYTES # BLD AUTO: 0.31 THOUSAND/UL (ref 0–1.22)
MONOCYTES NFR BLD: 2 % (ref 4–12)
MYELOCYTES NFR BLD MANUAL: 2 % (ref 0–1)
NEUTROPHILS # BLD MANUAL: 12.97 THOUSAND/UL (ref 1.85–7.62)
NEUTS SEG NFR BLD AUTO: 84 % (ref 43–75)
NRBC BLD AUTO-RTO: 0 /100 WBCS
PLATELET # BLD AUTO: 549 THOUSANDS/UL (ref 149–390)
PLATELET BLD QL SMEAR: ABNORMAL
PMV BLD AUTO: 9.7 FL (ref 8.9–12.7)
POTASSIUM SERPL-SCNC: 3.1 MMOL/L (ref 3.5–5.3)
RBC # BLD AUTO: 4.24 MILLION/UL (ref 3.88–5.62)
SODIUM SERPL-SCNC: 131 MMOL/L (ref 136–145)
TOTAL CELLS COUNTED SPEC: 100
WBC # BLD AUTO: 15.44 THOUSAND/UL (ref 4.31–10.16)

## 2019-04-24 PROCEDURE — 99239 HOSP IP/OBS DSCHRG MGMT >30: CPT | Performed by: INTERNAL MEDICINE

## 2019-04-24 PROCEDURE — 80048 BASIC METABOLIC PNL TOTAL CA: CPT | Performed by: INTERNAL MEDICINE

## 2019-04-24 PROCEDURE — 85027 COMPLETE CBC AUTOMATED: CPT | Performed by: INTERNAL MEDICINE

## 2019-04-24 PROCEDURE — 85007 BL SMEAR W/DIFF WBC COUNT: CPT | Performed by: INTERNAL MEDICINE

## 2019-04-24 RX ORDER — LEVOFLOXACIN 750 MG/1
750 TABLET ORAL EVERY 24 HOURS
Qty: 5 TABLET | Refills: 0 | Status: SHIPPED | OUTPATIENT
Start: 2019-04-24 | End: 2019-04-29

## 2019-04-24 RX ORDER — POTASSIUM CHLORIDE 20 MEQ/1
40 TABLET, EXTENDED RELEASE ORAL ONCE
Status: COMPLETED | OUTPATIENT
Start: 2019-04-24 | End: 2019-04-24

## 2019-04-24 RX ADMIN — LORAZEPAM 1 MG: 2 INJECTION INTRAMUSCULAR; INTRAVENOUS at 04:05

## 2019-04-24 RX ADMIN — POTASSIUM CHLORIDE 40 MEQ: 1500 TABLET, EXTENDED RELEASE ORAL at 11:19

## 2019-04-24 RX ADMIN — ENOXAPARIN SODIUM 40 MG: 40 INJECTION SUBCUTANEOUS at 09:44

## 2019-04-24 RX ADMIN — OXCARBAZEPINE 300 MG: 150 TABLET ORAL at 09:44

## 2019-04-24 RX ADMIN — PANTOPRAZOLE SODIUM 20 MG: 20 TABLET, DELAYED RELEASE ORAL at 05:46

## 2019-04-24 RX ADMIN — CEFTRIAXONE SODIUM 1000 MG: 10 INJECTION, POWDER, FOR SOLUTION INTRAVENOUS at 11:20

## 2019-04-25 ENCOUNTER — TRANSITIONAL CARE MANAGEMENT (OUTPATIENT)
Dept: FAMILY MEDICINE CLINIC | Facility: CLINIC | Age: 29
End: 2019-04-25

## 2019-04-26 LAB
BACTERIA BLD CULT: NORMAL
BACTERIA BLD CULT: NORMAL

## 2019-05-04 ENCOUNTER — OFFICE VISIT (OUTPATIENT)
Dept: FAMILY MEDICINE CLINIC | Facility: CLINIC | Age: 29
End: 2019-05-04
Payer: COMMERCIAL

## 2019-05-04 VITALS
SYSTOLIC BLOOD PRESSURE: 120 MMHG | WEIGHT: 229 LBS | DIASTOLIC BLOOD PRESSURE: 80 MMHG | RESPIRATION RATE: 17 BRPM | HEIGHT: 72 IN | TEMPERATURE: 97.6 F | HEART RATE: 103 BPM | OXYGEN SATURATION: 98 % | BODY MASS INDEX: 31.02 KG/M2

## 2019-05-04 DIAGNOSIS — F43.21 GRIEF AT LOSS OF CHILD: Primary | ICD-10-CM

## 2019-05-04 DIAGNOSIS — Z63.4 GRIEF AT LOSS OF CHILD: Primary | ICD-10-CM

## 2019-05-04 DIAGNOSIS — K21.9 GASTROESOPHAGEAL REFLUX DISEASE WITHOUT ESOPHAGITIS: ICD-10-CM

## 2019-05-04 DIAGNOSIS — F90.2 ATTENTION DEFICIT HYPERACTIVITY DISORDER (ADHD), COMBINED TYPE: ICD-10-CM

## 2019-05-04 DIAGNOSIS — F31.62 BIPOLAR DISORDER, CURRENT EPISODE MIXED, MODERATE (HCC): ICD-10-CM

## 2019-05-04 DIAGNOSIS — F32.2 CURRENT SEVERE EPISODE OF MAJOR DEPRESSIVE DISORDER WITHOUT PSYCHOTIC FEATURES, UNSPECIFIED WHETHER RECURRENT (HCC): ICD-10-CM

## 2019-05-04 PROCEDURE — 99496 TRANSJ CARE MGMT HIGH F2F 7D: CPT | Performed by: FAMILY MEDICINE

## 2019-05-04 SDOH — SOCIAL STABILITY - SOCIAL INSECURITY: DISSAPEARANCE AND DEATH OF FAMILY MEMBER: Z63.4

## 2019-05-06 RX ORDER — QUETIAPINE FUMARATE 300 MG/1
300 TABLET, FILM COATED ORAL
Qty: 90 TABLET | Refills: 0 | Status: SHIPPED | OUTPATIENT
Start: 2019-05-06 | End: 2019-05-08 | Stop reason: SDUPTHER

## 2019-05-07 ENCOUNTER — TELEPHONE (OUTPATIENT)
Dept: FAMILY MEDICINE CLINIC | Facility: CLINIC | Age: 29
End: 2019-05-07

## 2019-05-08 DIAGNOSIS — F90.2 ATTENTION DEFICIT HYPERACTIVITY DISORDER (ADHD), COMBINED TYPE: Primary | ICD-10-CM

## 2019-05-08 PROBLEM — J18.9 MULTIFOCAL PNEUMONIA: Status: RESOLVED | Noted: 2019-04-21 | Resolved: 2019-05-08

## 2019-05-08 PROBLEM — E87.6 HYPOKALEMIA: Status: RESOLVED | Noted: 2019-04-21 | Resolved: 2019-05-08

## 2019-05-08 PROBLEM — R74.01 TRANSAMINITIS: Status: RESOLVED | Noted: 2019-04-21 | Resolved: 2019-05-08

## 2019-05-08 RX ORDER — DEXTROAMPHETAMINE SACCHARATE, AMPHETAMINE ASPARTATE MONOHYDRATE, DEXTROAMPHETAMINE SULFATE AND AMPHETAMINE SULFATE 5; 5; 5; 5 MG/1; MG/1; MG/1; MG/1
CAPSULE, EXTENDED RELEASE ORAL
Qty: 60 CAPSULE | Refills: 0 | Status: SHIPPED | OUTPATIENT
Start: 2019-05-08 | End: 2019-10-01

## 2019-05-08 RX ORDER — OXCARBAZEPINE 300 MG/1
300 TABLET, FILM COATED ORAL EVERY 12 HOURS SCHEDULED
Qty: 120 TABLET | Refills: 0 | Status: SHIPPED | OUTPATIENT
Start: 2019-05-08 | End: 2019-05-16 | Stop reason: SDUPTHER

## 2019-05-08 RX ORDER — QUETIAPINE FUMARATE 300 MG/1
300 TABLET, FILM COATED ORAL
Qty: 90 TABLET | Refills: 0 | Status: SHIPPED | OUTPATIENT
Start: 2019-05-08 | End: 2019-05-16 | Stop reason: SDUPTHER

## 2019-05-08 RX ORDER — OMEPRAZOLE 20 MG/1
20 CAPSULE, DELAYED RELEASE ORAL DAILY
Qty: 90 CAPSULE | Refills: 1 | Status: SHIPPED | OUTPATIENT
Start: 2019-05-08 | End: 2019-07-30 | Stop reason: SDUPTHER

## 2019-05-16 ENCOUNTER — OFFICE VISIT (OUTPATIENT)
Dept: FAMILY MEDICINE CLINIC | Facility: CLINIC | Age: 29
End: 2019-05-16
Payer: COMMERCIAL

## 2019-05-16 VITALS
OXYGEN SATURATION: 96 % | WEIGHT: 230 LBS | DIASTOLIC BLOOD PRESSURE: 82 MMHG | SYSTOLIC BLOOD PRESSURE: 122 MMHG | BODY MASS INDEX: 31.15 KG/M2 | HEIGHT: 72 IN | HEART RATE: 88 BPM

## 2019-05-16 DIAGNOSIS — F43.21 GRIEF AT LOSS OF CHILD: ICD-10-CM

## 2019-05-16 DIAGNOSIS — Z63.4 GRIEF AT LOSS OF CHILD: ICD-10-CM

## 2019-05-16 DIAGNOSIS — F31.4 BIPOLAR 1 DISORDER, DEPRESSED, SEVERE (HCC): ICD-10-CM

## 2019-05-16 DIAGNOSIS — F11.20 HEROIN ADDICTION (HCC): Primary | ICD-10-CM

## 2019-05-16 DIAGNOSIS — F31.62 BIPOLAR DISORDER, CURRENT EPISODE MIXED, MODERATE (HCC): ICD-10-CM

## 2019-05-16 PROCEDURE — 3008F BODY MASS INDEX DOCD: CPT | Performed by: FAMILY MEDICINE

## 2019-05-16 PROCEDURE — 99214 OFFICE O/P EST MOD 30 MIN: CPT | Performed by: FAMILY MEDICINE

## 2019-05-16 PROCEDURE — 1036F TOBACCO NON-USER: CPT | Performed by: FAMILY MEDICINE

## 2019-05-16 RX ORDER — OXCARBAZEPINE 300 MG/1
300 TABLET, FILM COATED ORAL EVERY 12 HOURS SCHEDULED
Qty: 120 TABLET | Refills: 0 | Status: SHIPPED | OUTPATIENT
Start: 2019-05-16 | End: 2019-06-22 | Stop reason: SDUPTHER

## 2019-05-16 RX ORDER — QUETIAPINE FUMARATE 300 MG/1
300 TABLET, FILM COATED ORAL
Qty: 90 TABLET | Refills: 0
Start: 2019-05-16 | End: 2019-07-30 | Stop reason: SDUPTHER

## 2019-05-16 RX ORDER — BUPRENORPHINE HYDROCHLORIDE AND NALOXONE HYDROCHLORIDE DIHYDRATE 8; 2 MG/1; MG/1
TABLET SUBLINGUAL
COMMUNITY
Start: 2019-05-07 | End: 2019-05-16 | Stop reason: SDUPTHER

## 2019-05-16 RX ORDER — BUPRENORPHINE HYDROCHLORIDE AND NALOXONE HYDROCHLORIDE DIHYDRATE 8; 2 MG/1; MG/1
TABLET SUBLINGUAL
Start: 2019-05-16 | End: 2020-05-11 | Stop reason: ALTCHOICE

## 2019-05-16 SDOH — SOCIAL STABILITY - SOCIAL INSECURITY: DISSAPEARANCE AND DEATH OF FAMILY MEMBER: Z63.4

## 2019-05-21 ENCOUNTER — TELEPHONE (OUTPATIENT)
Dept: BEHAVIORAL/MENTAL HEALTH CLINIC | Facility: CLINIC | Age: 29
End: 2019-05-21

## 2019-05-31 ENCOUNTER — OFFICE VISIT (OUTPATIENT)
Dept: BEHAVIORAL/MENTAL HEALTH CLINIC | Facility: CLINIC | Age: 29
End: 2019-05-31
Payer: COMMERCIAL

## 2019-05-31 DIAGNOSIS — F41.1 GENERALIZED ANXIETY DISORDER: Primary | ICD-10-CM

## 2019-05-31 PROCEDURE — 90834 PSYTX W PT 45 MINUTES: CPT | Performed by: SOCIAL WORKER

## 2019-06-20 ENCOUNTER — TELEPHONE (OUTPATIENT)
Dept: OTHER | Facility: OTHER | Age: 29
End: 2019-06-20

## 2019-06-22 DIAGNOSIS — F31.4 BIPOLAR 1 DISORDER, DEPRESSED, SEVERE (HCC): ICD-10-CM

## 2019-06-22 DIAGNOSIS — F31.62 BIPOLAR DISORDER, CURRENT EPISODE MIXED, MODERATE (HCC): ICD-10-CM

## 2019-06-26 RX ORDER — OXCARBAZEPINE 300 MG/1
TABLET, FILM COATED ORAL
Qty: 180 TABLET | Refills: 1 | Status: SHIPPED | OUTPATIENT
Start: 2019-06-26 | End: 2019-10-10 | Stop reason: SDUPTHER

## 2019-07-30 DIAGNOSIS — K21.9 GASTROESOPHAGEAL REFLUX DISEASE WITHOUT ESOPHAGITIS: ICD-10-CM

## 2019-07-30 DIAGNOSIS — F31.62 BIPOLAR DISORDER, CURRENT EPISODE MIXED, MODERATE (HCC): ICD-10-CM

## 2019-07-31 RX ORDER — QUETIAPINE FUMARATE 300 MG/1
300 TABLET, FILM COATED ORAL
Qty: 90 TABLET | Refills: 1
Start: 2019-07-31 | End: 2019-10-10 | Stop reason: SDUPTHER

## 2019-07-31 RX ORDER — OMEPRAZOLE 20 MG/1
20 CAPSULE, DELAYED RELEASE ORAL DAILY
Qty: 90 CAPSULE | Refills: 1 | Status: SHIPPED | OUTPATIENT
Start: 2019-07-31 | End: 2019-10-10 | Stop reason: SDUPTHER

## 2019-09-29 ENCOUNTER — HOSPITAL ENCOUNTER (EMERGENCY)
Facility: HOSPITAL | Age: 29
Discharge: HOME | End: 2019-09-29
Attending: EMERGENCY MEDICINE | Admitting: EMERGENCY MEDICINE
Payer: COMMERCIAL

## 2019-09-29 VITALS
DIASTOLIC BLOOD PRESSURE: 72 MMHG | RESPIRATION RATE: 18 BRPM | BODY MASS INDEX: 31.1 KG/M2 | TEMPERATURE: 98.3 F | OXYGEN SATURATION: 94 % | WEIGHT: 229.28 LBS | HEART RATE: 58 BPM | SYSTOLIC BLOOD PRESSURE: 125 MMHG

## 2019-09-29 DIAGNOSIS — R11.2 NAUSEA & VOMITING: Primary | ICD-10-CM

## 2019-09-29 LAB
ALBUMIN SERPL BCP-MCNC: 5.2 G/DL (ref 3.5–5)
ALP SERPL-CCNC: 88 U/L (ref 46–116)
ALT SERPL W P-5'-P-CCNC: 17 U/L (ref 12–78)
ANION GAP SERPL CALCULATED.3IONS-SCNC: 15 MMOL/L (ref 4–13)
AST SERPL W P-5'-P-CCNC: 20 U/L (ref 5–45)
BASOPHILS # BLD AUTO: 0.01 THOUSANDS/ΜL (ref 0–0.1)
BASOPHILS NFR BLD AUTO: 0 % (ref 0–1)
BILIRUB DIRECT SERPL-MCNC: 0.33 MG/DL (ref 0–0.2)
BILIRUB SERPL-MCNC: 1.7 MG/DL (ref 0.2–1)
BUN SERPL-MCNC: 29 MG/DL (ref 5–25)
CALCIUM SERPL-MCNC: 10.4 MG/DL (ref 8.3–10.1)
CHLORIDE SERPL-SCNC: 89 MMOL/L (ref 100–108)
CO2 SERPL-SCNC: 32 MMOL/L (ref 21–32)
CREAT SERPL-MCNC: 1.23 MG/DL (ref 0.6–1.3)
EOSINOPHIL # BLD AUTO: 0 THOUSAND/ΜL (ref 0–0.61)
EOSINOPHIL NFR BLD AUTO: 0 % (ref 0–6)
ERYTHROCYTE [DISTWIDTH] IN BLOOD BY AUTOMATED COUNT: 13 % (ref 11.6–15.1)
GFR SERPL CREATININE-BSD FRML MDRD: 79 ML/MIN/1.73SQ M
GLUCOSE SERPL-MCNC: 158 MG/DL (ref 65–140)
HCT VFR BLD AUTO: 50.8 % (ref 36.5–49.3)
HGB BLD-MCNC: 17.8 G/DL (ref 12–17)
IMM GRANULOCYTES # BLD AUTO: 0.04 THOUSAND/UL (ref 0–0.2)
IMM GRANULOCYTES NFR BLD AUTO: 0 % (ref 0–2)
LIPASE SERPL-CCNC: 237 U/L (ref 73–393)
LYMPHOCYTES # BLD AUTO: 1.97 THOUSANDS/ΜL (ref 0.6–4.47)
LYMPHOCYTES NFR BLD AUTO: 14 % (ref 14–44)
MCH RBC QN AUTO: 27.7 PG (ref 26.8–34.3)
MCHC RBC AUTO-ENTMCNC: 35 G/DL (ref 31.4–37.4)
MCV RBC AUTO: 79 FL (ref 82–98)
MONOCYTES # BLD AUTO: 0.94 THOUSAND/ΜL (ref 0.17–1.22)
MONOCYTES NFR BLD AUTO: 7 % (ref 4–12)
NEUTROPHILS # BLD AUTO: 11.35 THOUSANDS/ΜL (ref 1.85–7.62)
NEUTS SEG NFR BLD AUTO: 79 % (ref 43–75)
NRBC BLD AUTO-RTO: 0 /100 WBCS
PLATELET # BLD AUTO: 392 THOUSANDS/UL (ref 149–390)
PMV BLD AUTO: 10.3 FL (ref 8.9–12.7)
POTASSIUM SERPL-SCNC: 3.4 MMOL/L (ref 3.5–5.3)
PROT SERPL-MCNC: 9 G/DL (ref 6.4–8.2)
RBC # BLD AUTO: 6.43 MILLION/UL (ref 3.88–5.62)
SODIUM SERPL-SCNC: 136 MMOL/L (ref 136–145)
WBC # BLD AUTO: 14.31 THOUSAND/UL (ref 4.31–10.16)

## 2019-09-29 PROCEDURE — 96375 TX/PRO/DX INJ NEW DRUG ADDON: CPT

## 2019-09-29 PROCEDURE — 96361 HYDRATE IV INFUSION ADD-ON: CPT

## 2019-09-29 PROCEDURE — 99285 EMERGENCY DEPT VISIT HI MDM: CPT | Performed by: EMERGENCY MEDICINE

## 2019-09-29 PROCEDURE — 80048 BASIC METABOLIC PNL TOTAL CA: CPT | Performed by: EMERGENCY MEDICINE

## 2019-09-29 PROCEDURE — 96374 THER/PROPH/DIAG INJ IV PUSH: CPT

## 2019-09-29 PROCEDURE — 83690 ASSAY OF LIPASE: CPT | Performed by: EMERGENCY MEDICINE

## 2019-09-29 PROCEDURE — 36415 COLL VENOUS BLD VENIPUNCTURE: CPT | Performed by: EMERGENCY MEDICINE

## 2019-09-29 PROCEDURE — 80076 HEPATIC FUNCTION PANEL: CPT | Performed by: EMERGENCY MEDICINE

## 2019-09-29 PROCEDURE — 85025 COMPLETE CBC W/AUTO DIFF WBC: CPT | Performed by: EMERGENCY MEDICINE

## 2019-09-29 PROCEDURE — 99283 EMERGENCY DEPT VISIT LOW MDM: CPT

## 2019-09-29 RX ORDER — DIPHENHYDRAMINE HYDROCHLORIDE 50 MG/ML
25 INJECTION INTRAMUSCULAR; INTRAVENOUS ONCE
Status: COMPLETED | OUTPATIENT
Start: 2019-09-29 | End: 2019-09-29

## 2019-09-29 RX ORDER — ONDANSETRON 2 MG/ML
4 INJECTION INTRAMUSCULAR; INTRAVENOUS ONCE
Status: COMPLETED | OUTPATIENT
Start: 2019-09-29 | End: 2019-09-29

## 2019-09-29 RX ORDER — METOCLOPRAMIDE HYDROCHLORIDE 5 MG/ML
10 INJECTION INTRAMUSCULAR; INTRAVENOUS ONCE
Status: COMPLETED | OUTPATIENT
Start: 2019-09-29 | End: 2019-09-29

## 2019-09-29 RX ORDER — ONDANSETRON 4 MG/1
4 TABLET, FILM COATED ORAL EVERY 6 HOURS PRN
Qty: 10 TABLET | Refills: 0 | Status: SHIPPED | OUTPATIENT
Start: 2019-09-29 | End: 2020-05-11 | Stop reason: SDUPTHER

## 2019-09-29 RX ORDER — LORAZEPAM 2 MG/ML
0.5 INJECTION INTRAMUSCULAR ONCE
Status: COMPLETED | OUTPATIENT
Start: 2019-09-29 | End: 2019-09-29

## 2019-09-29 RX ORDER — MORPHINE SULFATE 10 MG/ML
6 INJECTION, SOLUTION INTRAMUSCULAR; INTRAVENOUS ONCE
Status: COMPLETED | OUTPATIENT
Start: 2019-09-29 | End: 2019-09-29

## 2019-09-29 RX ADMIN — METOCLOPRAMIDE 10 MG: 5 INJECTION, SOLUTION INTRAMUSCULAR; INTRAVENOUS at 09:17

## 2019-09-29 RX ADMIN — ONDANSETRON 4 MG: 2 INJECTION INTRAMUSCULAR; INTRAVENOUS at 08:09

## 2019-09-29 RX ADMIN — SODIUM CHLORIDE 1000 ML: 0.9 INJECTION, SOLUTION INTRAVENOUS at 08:06

## 2019-09-29 RX ADMIN — DIPHENHYDRAMINE HYDROCHLORIDE 25 MG: 50 INJECTION, SOLUTION INTRAMUSCULAR; INTRAVENOUS at 09:17

## 2019-09-29 RX ADMIN — LORAZEPAM 0.5 MG: 2 INJECTION INTRAMUSCULAR; INTRAVENOUS at 09:37

## 2019-09-29 RX ADMIN — MORPHINE SULFATE 6 MG: 10 INJECTION INTRAVENOUS at 08:09

## 2019-09-29 NOTE — ED PROVIDER NOTES
History  Chief Complaint   Patient presents with    Vomiting     onset Thursday, +nausea and vomiting      HPI patient is a 62-year-old male, he reports since Thursday he has had some nausea and vomiting, patient reports normally is on Suboxone has been able to take his medications because of the vomiting  Denies any real abdominal pain reports some diffuse soreness but reports no real specific abdominal pain but reports that every time he eats or drinks he vomits it up  Denies any diarrhea  Denies any previous abdominal obstruction  Denies any abdominal surgery  Past medical history of anxiety disease bipolar disease depression, drug use in the past, peptic ulcer disease  Family history noncontributory  Social history, admits to narcotic abuse in the past, former smoker    Prior to Admission Medications   Prescriptions Last Dose Informant Patient Reported? Taking? OXcarbazepine (TRILEPTAL) 300 mg tablet   No No   Sig: TAKE 1 TABLET EVERY 12 HOURS   QUEtiapine (SEROquel) 300 mg tablet   No No   Sig: Take 1 tablet (300 mg total) by mouth daily at bedtime for 100 days   amphetamine-dextroamphetamine (ADDERALL XR) 20 MG 24 hr capsule   No No   Sig: Take 1 pill early in the morning and a 2nd pill at 2:00 p m  Daily   buprenorphine-naloxone (SUBOXONE) 8-2 mg per SL tablet   No No   Sig: One daily   omeprazole (PriLOSEC) 20 mg delayed release capsule   No No   Sig: Take 1 capsule (20 mg total) by mouth daily for 90 days   ondansetron (ZOFRAN-ODT) 8 mg disintegrating tablet   No No   Sig: Take 1 tablet (8 mg total) by mouth every 8 (eight) hours as needed for nausea or vomiting      Facility-Administered Medications: None       Past Medical History:   Diagnosis Date    ADHD     Anxiety     Bipolar disorder ([facility] [address] )     Depression     Drug use     Essential hypertension     benign    Hyperlipidemia     Peptic reflux disease        History reviewed  No pertinent surgical history      Family History   Problem Relation Age of Onset    Mental illness Mother     Thyroid disease Mother     Drug abuse Mother         illicit drug    Diabetes Father     Cancer Father      I have reviewed and agree with the history as documented  Social History     Tobacco Use    Smoking status: Former Smoker     Last attempt to quit: 3/30/2015     Years since quittin 5    Smokeless tobacco: Never Used   Substance Use Topics    Alcohol use: Not Currently     Comment: at social events    Drug use: Not Currently     Frequency: 2 0 times per week     Types: Marijuana, Heroin     Comment: Pt reports using THC daily x3, Heroin every other day and will alternate between using pills  Pt snorts heroin        Review of Systems   Constitutional: Negative for diaphoresis, fatigue and fever  HENT: Negative for congestion, ear pain, nosebleeds and sore throat  Eyes: Negative for photophobia, pain, discharge and visual disturbance  Respiratory: Negative for cough, choking, chest tightness, shortness of breath and wheezing  Cardiovascular: Negative for chest pain and palpitations  Gastrointestinal: Positive for nausea and vomiting  Negative for abdominal distention, abdominal pain and diarrhea  Genitourinary: Negative for dysuria, flank pain and frequency  Musculoskeletal: Negative for back pain, gait problem and joint swelling  Skin: Negative for color change and rash  Neurological: Negative for dizziness, syncope and headaches  Psychiatric/Behavioral: Negative for behavioral problems and confusion  The patient is not nervous/anxious  All other systems reviewed and are negative  Physical Exam  Physical Exam   Constitutional: He is oriented to person, place, and time  He appears well-developed and well-nourished  HENT:   Head: Normocephalic     Right Ear: External ear normal    Left Ear: External ear normal    Nose: Nose normal    Mouth/Throat: Oropharynx is clear and moist    Eyes: Pupils are equal, round, and reactive to light  EOM and lids are normal    Neck: Normal range of motion  Neck supple  Cardiovascular: Normal rate, regular rhythm, normal heart sounds and intact distal pulses  Pulmonary/Chest: Effort normal and breath sounds normal  No respiratory distress  Abdominal: Soft  Bowel sounds are normal  He exhibits no distension  There is no tenderness  No real abdominal tenderness no rebound no guarding   Musculoskeletal: Normal range of motion  He exhibits no deformity  Neurological: He is alert and oriented to person, place, and time  Skin: Skin is warm and dry  Psychiatric: He has a normal mood and affect  Nursing note and vitals reviewed     Pulse oximetry normal at 98% adequate oxygenation, there is no hypoxia    Vital Signs  ED Triage Vitals   Temperature Pulse Respirations Blood Pressure SpO2   09/29/19 0800 09/29/19 0759 09/29/19 0759 09/29/19 0759 09/29/19 0759   98 3 °F (36 8 °C) 64 18 (!) 161/102 98 %      Temp Source Heart Rate Source Patient Position - Orthostatic VS BP Location FiO2 (%)   09/29/19 0800 09/29/19 0759 09/29/19 0759 09/29/19 0800 --   Oral Monitor Lying Right arm       Pain Score       09/29/19 0839       2           Vitals:    09/29/19 0759 09/29/19 0800 09/29/19 0900 09/29/19 1000   BP: (!) 161/102 148/93 147/88 125/72   Pulse: 64  56 58   Patient Position - Orthostatic VS: Lying Lying Lying Lying         Visual Acuity      ED Medications  Medications   sodium chloride 0 9 % bolus 1,000 mL (0 mL Intravenous Stopped 9/29/19 0956)   ondansetron (ZOFRAN) injection 4 mg (4 mg Intravenous Given 9/29/19 0809)   morphine (PF) 10 mg/mL injection 6 mg (6 mg Intravenous Given 9/29/19 0809)   metoclopramide (REGLAN) injection 10 mg (10 mg Intravenous Given 9/29/19 0917)   diphenhydrAMINE (BENADRYL) injection 25 mg (25 mg Intravenous Given 9/29/19 0917)   LORazepam (ATIVAN) 2 mg/mL injection 0 5 mg (0 5 mg Intravenous Given 9/29/19 8600)       Diagnostic Studies  Results Reviewed Procedure Component Value Units Date/Time    Basic metabolic panel [[id number]]  (Abnormal) Collected:  09/29/19 0807    Lab Status:  Final result Specimen:  Blood from Arm, Left Updated:  09/29/19 7715     Sodium 136 mmol/L      Potassium 3 4 mmol/L      Chloride 89 mmol/L      CO2 32 mmol/L      ANION GAP 15 mmol/L      BUN 29 mg/dL      Creatinine 1 23 mg/dL      Glucose 158 mg/dL      Calcium 10 4 mg/dL      eGFR 79 ml/min/1 73sq m     Narrative:       [place] guidelines for Chronic Kidney Disease (CKD):     Stage 1 with normal or high GFR (GFR > 90 mL/min/1 73 square meters)    Stage 2 Mild CKD (GFR = 60-89 mL/min/1 73 square meters)    Stage 3A Moderate CKD (GFR = 45-59 mL/min/1 73 square meters)    Stage 3B Moderate CKD (GFR = 30-44 mL/min/1 73 square meters)    Stage 4 Severe CKD (GFR = 15-29 mL/min/1 73 square meters)    Stage 5 End Stage CKD (GFR <15 mL/min/1 73 square meters)  Note: GFR calculation is accurate only with a steady state creatinine    Hepatic function panel [[id number]]  (Abnormal) Collected:  09/29/19 0807    Lab Status:  Final result Specimen:  Blood from Arm, Left Updated:  09/29/19 0832     Total Bilirubin 1 70 mg/dL      Bilirubin, Direct 0 33 mg/dL      Alkaline Phosphatase 88 U/L      AST 20 U/L      ALT 17 U/L      Total Protein 9 0 g/dL      Albumin 5 2 g/dL     Lipase [[id number]]  (Normal) Collected:  09/29/19 0807    Lab Status:  Final result Specimen:  Blood from Arm, Left Updated:  09/29/19 0832     Lipase 237 u/L     CBC and differential [[id number]]  (Abnormal) Collected:  09/29/19 0807    Lab Status:  Final result Specimen:  Blood from Arm, Left Updated:  09/29/19 0815     WBC 14 31 Thousand/uL      RBC 6 43 Million/uL      Hemoglobin 17 8 g/dL      Hematocrit 50 8 %      MCV 79 fL      MCH 27 7 pg      MCHC 35 0 g/dL      RDW 13 0 %      MPV 10 3 fL      Platelets 491 Thousands/uL      nRBC 0 /100 WBCs      Neutrophils Relative 79 %      Immat GRANS % 0 %      Lymphocytes Relative 14 %      Monocytes Relative 7 %      Eosinophils Relative 0 %      Basophils Relative 0 %      Neutrophils Absolute 11 35 Thousands/µL      Immature Grans Absolute 0 04 Thousand/uL      Lymphocytes Absolute 1 97 Thousands/µL      Monocytes Absolute 0 94 Thousand/µL      Eosinophils Absolute 0 00 Thousand/µL      Basophils Absolute 0 01 Thousands/µL                  No orders to display              Procedures  Procedures       ED Course        improved after morphine and Zofran but still some nausea, given Reglan and Benadryl but the patient reports he became very jittery after that and felt distressed, somewhat like a panic attack  Given some Ativan with good relief  Patient reports feeling markedly improved with hydration  Electrolytes showed a potassium at 3 4 minimally reduced, BUN creatinine were abnormal, BUN was 29 consistent with some mild dehydration  Patient's liver functions were minimally elevated  White count was elevated 14,000 nonspecific finding, hemoglobin was elevated consistent with dehydration  Patient was able to tolerate oral liquids                           [facility] medical decision making 21-year-old male presents emergency department with no abdominal pain but nausea and vomiting, reports that he was missing his medications because of the vomiting and now feels somewhat shaky  Patient is normally on Suboxone  Treated with antiemetics and IV narcotics with marked improvement  Discussed with patient, advised he restart his medication  We discussed outpatient follow-up and indications to return  Patient also smokes marijuana advised reducing his marijuana or taking warm showers to reduce the toxicity associated with marijuana hyperemesis syndrome      Disposition  Final diagnoses:   Nausea & vomiting     Time reflects when diagnosis was documented in both MDM as applicable and the Disposition within this note     Time User Action Codes Description Comment    9/29/2019  9:53 AM Henrik Peoples [R11 2] Nausea & vomiting       ED Disposition     ED Disposition Condition Date/Time Comment    Discharge Stable Sun Sep 29, 2019  9:53 AM Sandro Nixon discharge to home/self care  Follow-up Information     Follow up With Specialties Details Why Contact Info    Yovanafrankdann Schaefer, [address], Nurse Practitioner   [place] [facility] [address]  [phone]            Patient's Medications   Discharge Prescriptions    ONDANSETRON (ZOFRAN) 4 MG TABLET    Take 1 tablet (4 mg total) by mouth every 6 (six) hours as needed for nausea or vomiting for up to 10 doses       Start Date: 9/29/2019 End Date: --       Order Dose: 4 mg       Quantity: 10 tablet    Refills: 0     No discharge procedures on file      ED Provider  Electronically Signed by           Rebecca Mcclendon MD  09/29/19 1011

## 2019-10-01 ENCOUNTER — APPOINTMENT (EMERGENCY)
Dept: CT IMAGING | Facility: HOSPITAL | Age: 29
End: 2019-10-01
Payer: COMMERCIAL

## 2019-10-01 ENCOUNTER — HOSPITAL ENCOUNTER (OUTPATIENT)
Facility: HOSPITAL | Age: 29
Setting detail: OBSERVATION
Discharge: HOME | End: 2019-10-02
Attending: EMERGENCY MEDICINE | Admitting: INTERNAL MEDICINE
Payer: COMMERCIAL

## 2019-10-01 DIAGNOSIS — R11.10 VOMITING: ICD-10-CM

## 2019-10-01 DIAGNOSIS — E87.6 HYPOKALEMIA: Primary | ICD-10-CM

## 2019-10-01 PROBLEM — R11.2 INTRACTABLE VOMITING WITH NAUSEA: Status: ACTIVE | Noted: 2019-10-01

## 2019-10-01 PROBLEM — K21.9 GASTROESOPHAGEAL REFLUX DISEASE WITHOUT ESOPHAGITIS: Status: ACTIVE | Noted: 2019-10-01

## 2019-10-01 LAB
ALBUMIN SERPL BCP-MCNC: 4.4 G/DL (ref 3.5–5)
ALP SERPL-CCNC: 72 U/L (ref 46–116)
ALT SERPL W P-5'-P-CCNC: 13 U/L (ref 12–78)
AMPHETAMINES SERPL QL SCN: NEGATIVE
ANION GAP SERPL CALCULATED.3IONS-SCNC: 14 MMOL/L (ref 4–13)
AST SERPL W P-5'-P-CCNC: 16 U/L (ref 5–45)
ATRIAL RATE: 71 BPM
BARBITURATES UR QL: NEGATIVE
BASOPHILS # BLD AUTO: 0.03 THOUSANDS/ΜL (ref 0–0.1)
BASOPHILS NFR BLD AUTO: 0 % (ref 0–1)
BENZODIAZ UR QL: NEGATIVE
BILIRUB SERPL-MCNC: 1.7 MG/DL (ref 0.2–1)
BUN SERPL-MCNC: 16 MG/DL (ref 5–25)
CALCIUM SERPL-MCNC: 9.4 MG/DL (ref 8.3–10.1)
CHLORIDE SERPL-SCNC: 87 MMOL/L (ref 100–108)
CO2 SERPL-SCNC: 32 MMOL/L (ref 21–32)
COCAINE UR QL: NEGATIVE
CREAT SERPL-MCNC: 0.93 MG/DL (ref 0.6–1.3)
EOSINOPHIL # BLD AUTO: 0.01 THOUSAND/ΜL (ref 0–0.61)
EOSINOPHIL NFR BLD AUTO: 0 % (ref 0–6)
ERYTHROCYTE [DISTWIDTH] IN BLOOD BY AUTOMATED COUNT: 12.5 % (ref 11.6–15.1)
GFR SERPL CREATININE-BSD FRML MDRD: 111 ML/MIN/1.73SQ M
GLUCOSE SERPL-MCNC: 132 MG/DL (ref 65–140)
HCT VFR BLD AUTO: 47.8 % (ref 36.5–49.3)
HGB BLD-MCNC: 17.2 G/DL (ref 12–17)
IMM GRANULOCYTES # BLD AUTO: 0.04 THOUSAND/UL (ref 0–0.2)
IMM GRANULOCYTES NFR BLD AUTO: 0 % (ref 0–2)
LIPASE SERPL-CCNC: 260 U/L (ref 73–393)
LYMPHOCYTES # BLD AUTO: 1.88 THOUSANDS/ΜL (ref 0.6–4.47)
LYMPHOCYTES NFR BLD AUTO: 17 % (ref 14–44)
MAGNESIUM SERPL-MCNC: 2.2 MG/DL (ref 1.6–2.6)
MCH RBC QN AUTO: 27.9 PG (ref 26.8–34.3)
MCHC RBC AUTO-ENTMCNC: 36 G/DL (ref 31.4–37.4)
MCV RBC AUTO: 78 FL (ref 82–98)
METHADONE UR QL: NEGATIVE
MONOCYTES # BLD AUTO: 1.38 THOUSAND/ΜL (ref 0.17–1.22)
MONOCYTES NFR BLD AUTO: 13 % (ref 4–12)
NEUTROPHILS # BLD AUTO: 7.64 THOUSANDS/ΜL (ref 1.85–7.62)
NEUTS SEG NFR BLD AUTO: 70 % (ref 43–75)
NRBC BLD AUTO-RTO: 0 /100 WBCS
OPIATES UR QL SCN: NEGATIVE
P AXIS: 61 DEGREES
PCP UR QL: NEGATIVE
PLATELET # BLD AUTO: 365 THOUSANDS/UL (ref 149–390)
PMV BLD AUTO: 10.1 FL (ref 8.9–12.7)
POTASSIUM SERPL-SCNC: 2.6 MMOL/L (ref 3.5–5.3)
POTASSIUM SERPL-SCNC: 3.2 MMOL/L (ref 3.5–5.3)
PR INTERVAL: 136 MS
PROT SERPL-MCNC: 8.2 G/DL (ref 6.4–8.2)
QRS AXIS: 34 DEGREES
QRSD INTERVAL: 100 MS
QT INTERVAL: 424 MS
QTC INTERVAL: 460 MS
RBC # BLD AUTO: 6.17 MILLION/UL (ref 3.88–5.62)
SODIUM SERPL-SCNC: 133 MMOL/L (ref 136–145)
T WAVE AXIS: 25 DEGREES
THC UR QL: POSITIVE
VENTRICULAR RATE: 71 BPM
WBC # BLD AUTO: 10.98 THOUSAND/UL (ref 4.31–10.16)

## 2019-10-01 PROCEDURE — 80307 DRUG TEST PRSMV CHEM ANLYZR: CPT | Performed by: EMERGENCY MEDICINE

## 2019-10-01 PROCEDURE — 80053 COMPREHEN METABOLIC PANEL: CPT | Performed by: EMERGENCY MEDICINE

## 2019-10-01 PROCEDURE — 93010 ELECTROCARDIOGRAM REPORT: CPT | Performed by: INTERNAL MEDICINE

## 2019-10-01 PROCEDURE — 99220 PR INITIAL OBSERVATION CARE/DAY 70 MINUTES: CPT | Performed by: FAMILY MEDICINE

## 2019-10-01 PROCEDURE — 84132 ASSAY OF SERUM POTASSIUM: CPT | Performed by: FAMILY MEDICINE

## 2019-10-01 PROCEDURE — 83690 ASSAY OF LIPASE: CPT | Performed by: EMERGENCY MEDICINE

## 2019-10-01 PROCEDURE — 36415 COLL VENOUS BLD VENIPUNCTURE: CPT | Performed by: EMERGENCY MEDICINE

## 2019-10-01 PROCEDURE — 74177 CT ABD & PELVIS W/CONTRAST: CPT

## 2019-10-01 PROCEDURE — 96374 THER/PROPH/DIAG INJ IV PUSH: CPT

## 2019-10-01 PROCEDURE — 96361 HYDRATE IV INFUSION ADD-ON: CPT

## 2019-10-01 PROCEDURE — 99285 EMERGENCY DEPT VISIT HI MDM: CPT

## 2019-10-01 PROCEDURE — 93005 ELECTROCARDIOGRAM TRACING: CPT

## 2019-10-01 PROCEDURE — 96365 THER/PROPH/DIAG IV INF INIT: CPT

## 2019-10-01 PROCEDURE — 99283 EMERGENCY DEPT VISIT LOW MDM: CPT | Performed by: EMERGENCY MEDICINE

## 2019-10-01 PROCEDURE — 85025 COMPLETE CBC W/AUTO DIFF WBC: CPT | Performed by: EMERGENCY MEDICINE

## 2019-10-01 PROCEDURE — 83735 ASSAY OF MAGNESIUM: CPT | Performed by: EMERGENCY MEDICINE

## 2019-10-01 RX ORDER — OXCARBAZEPINE 150 MG/1
300 TABLET, FILM COATED ORAL EVERY 12 HOURS SCHEDULED
Status: DISCONTINUED | OUTPATIENT
Start: 2019-10-01 | End: 2019-10-02 | Stop reason: HOSPADM

## 2019-10-01 RX ORDER — SODIUM CHLORIDE AND POTASSIUM CHLORIDE .9; .15 G/100ML; G/100ML
100 SOLUTION INTRAVENOUS CONTINUOUS
Status: DISCONTINUED | OUTPATIENT
Start: 2019-10-01 | End: 2019-10-02 | Stop reason: HOSPADM

## 2019-10-01 RX ORDER — ONDANSETRON 2 MG/ML
4 INJECTION INTRAMUSCULAR; INTRAVENOUS ONCE
Status: COMPLETED | OUTPATIENT
Start: 2019-10-01 | End: 2019-10-01

## 2019-10-01 RX ORDER — POTASSIUM CHLORIDE 20 MEQ/1
40 TABLET, EXTENDED RELEASE ORAL ONCE
Status: COMPLETED | OUTPATIENT
Start: 2019-10-01 | End: 2019-10-01

## 2019-10-01 RX ORDER — QUETIAPINE FUMARATE 100 MG/1
300 TABLET, FILM COATED ORAL
Status: DISCONTINUED | OUTPATIENT
Start: 2019-10-01 | End: 2019-10-02 | Stop reason: HOSPADM

## 2019-10-01 RX ORDER — SODIUM CHLORIDE 9 MG/ML
1000 INJECTION, SOLUTION INTRAVENOUS ONCE
Status: COMPLETED | OUTPATIENT
Start: 2019-10-01 | End: 2019-10-01

## 2019-10-01 RX ORDER — LANOLIN ALCOHOL/MO/W.PET/CERES
6 CREAM (GRAM) TOPICAL
Status: DISCONTINUED | OUTPATIENT
Start: 2019-10-01 | End: 2019-10-02 | Stop reason: HOSPADM

## 2019-10-01 RX ORDER — PANTOPRAZOLE SODIUM 40 MG/1
40 TABLET, DELAYED RELEASE ORAL
Status: DISCONTINUED | OUTPATIENT
Start: 2019-10-01 | End: 2019-10-02 | Stop reason: HOSPADM

## 2019-10-01 RX ORDER — BUPRENORPHINE AND NALOXONE 2; .5 MG/1; MG/1
4 FILM, SOLUBLE BUCCAL; SUBLINGUAL DAILY
Status: DISCONTINUED | OUTPATIENT
Start: 2019-10-01 | End: 2019-10-02 | Stop reason: HOSPADM

## 2019-10-01 RX ORDER — POTASSIUM CHLORIDE 14.9 MG/ML
20 INJECTION INTRAVENOUS ONCE
Status: COMPLETED | OUTPATIENT
Start: 2019-10-01 | End: 2019-10-01

## 2019-10-01 RX ADMIN — PANTOPRAZOLE SODIUM 40 MG: 40 TABLET, DELAYED RELEASE ORAL at 11:59

## 2019-10-01 RX ADMIN — SODIUM CHLORIDE AND POTASSIUM CHLORIDE 100 ML/HR: .9; .15 SOLUTION INTRAVENOUS at 23:18

## 2019-10-01 RX ADMIN — OXCARBAZEPINE 300 MG: 150 TABLET, FILM COATED ORAL at 11:59

## 2019-10-01 RX ADMIN — QUETIAPINE FUMARATE 300 MG: 100 TABLET ORAL at 21:10

## 2019-10-01 RX ADMIN — OXCARBAZEPINE 300 MG: 150 TABLET, FILM COATED ORAL at 21:11

## 2019-10-01 RX ADMIN — SODIUM CHLORIDE 1000 ML/HR: 0.9 INJECTION, SOLUTION INTRAVENOUS at 08:09

## 2019-10-01 RX ADMIN — ONDANSETRON 4 MG: 2 INJECTION INTRAMUSCULAR; INTRAVENOUS at 07:49

## 2019-10-01 RX ADMIN — IOHEXOL 100 ML: 350 INJECTION, SOLUTION INTRAVENOUS at 09:16

## 2019-10-01 RX ADMIN — SODIUM CHLORIDE AND POTASSIUM CHLORIDE 100 ML/HR: .9; .15 SOLUTION INTRAVENOUS at 12:34

## 2019-10-01 RX ADMIN — BUPRENORPHINE HYDROCHLORIDE, NALOXONE HYDROCHLORIDE 4 FILM: 2; .5 FILM, SOLUBLE BUCCAL; SUBLINGUAL at 11:59

## 2019-10-01 RX ADMIN — POTASSIUM CHLORIDE 20 MEQ: 14.9 INJECTION, SOLUTION INTRAVENOUS at 08:41

## 2019-10-01 RX ADMIN — MELATONIN 6 MG: 3 TAB ORAL at 21:11

## 2019-10-01 RX ADMIN — POTASSIUM CHLORIDE 40 MEQ: 1500 TABLET, EXTENDED RELEASE ORAL at 08:41

## 2019-10-01 NOTE — ASSESSMENT & PLAN NOTE
Potassium level of 2 6 on admission due to intractable vomiting and poor p o  Intake  Patient has already received 20 mEq of potassium IV and 40 mEq p o  Will recheck potassium level at this time and replace as needed

## 2019-10-01 NOTE — PLAN OF CARE
Problem: PAIN - ADULT  Goal: Verbalizes/displays adequate comfort level or baseline comfort level  Description  Interventions:  - Encourage patient to monitor pain and request assistance  - Assess pain using appropriate pain scale  - Administer analgesics based on type and severity of pain and evaluate response  - Implement non-pharmacological measures as appropriate and evaluate response  - Consider cultural and social influences on pain and pain management  - Notify physician/advanced practitioner if interventions unsuccessful or patient reports new pain  Outcome: Progressing     Problem: INFECTION - ADULT  Goal: Absence or prevention of progression during hospitalization  Description  INTERVENTIONS:  - Assess and monitor for signs and symptoms of infection  - Monitor lab/diagnostic results  - Monitor all insertion sites, i e  indwelling lines, tubes, and drains  - Monitor endotracheal if appropriate and nasal secretions for changes in amount and color  - Van appropriate cooling/warming therapies per order  - Administer medications as ordered  - Instruct and encourage patient and family to use good hand hygiene technique  - Identify and instruct in appropriate isolation precautions for identified infection/condition  Outcome: Progressing  Goal: Absence of fever/infection during neutropenic period  Description  INTERVENTIONS:  - Monitor WBC    Outcome: Progressing     Problem: SAFETY ADULT  Goal: Patient will remain free of falls  Description  INTERVENTIONS:  - Assess patient frequently for physical needs  -  Identify cognitive and physical deficits and behaviors that affect risk of falls    -  [place] fall precautions as indicated by assessment   - Educate patient/family on patient safety including physical limitations  - Instruct patient to call for assistance with activity based on assessment  - Modify environment to reduce risk of injury  - Consider OT/PT consult to assist with strengthening/mobility  Outcome: Progressing  Goal: Maintain or return to baseline ADL function  Description  INTERVENTIONS:  -  Assess patient's ability to carry out ADLs; assess patient's baseline for ADL function and identify physical deficits which impact ability to perform ADLs (bathing, care of mouth/teeth, toileting, grooming, dressing, etc )  - Assess/evaluate cause of self-care deficits   - Assess range of motion  - Assess patient's mobility; develop plan if impaired  - Assess patient's need for assistive devices and provide as appropriate  - Encourage maximum independence but intervene and supervise when necessary  - Involve family in performance of ADLs  - Assess for home care needs following discharge   - Consider OT consult to assist with ADL evaluation and planning for discharge  - Provide patient education as appropriate  Outcome: Progressing  Goal: Maintain or return mobility status to optimal level  Description  INTERVENTIONS:  - Assess patient's baseline mobility status (ambulation, transfers, stairs, etc )    - Identify cognitive and physical deficits and behaviors that affect mobility  - Identify mobility aids required to assist with transfers and/or ambulation (gait belt, sit-to-stand, lift, walker, cane, etc )  - [place] fall precautions as indicated by assessment  - Record patient progress and toleration of activity level on Mobility SBAR; progress patient to next Phase/Stage  - Instruct patient to call for assistance with activity based on assessment  - Consider rehabilitation consult to assist with strengthening/weightbearing, etc   Outcome: Progressing     Problem: DISCHARGE PLANNING  Goal: Discharge to home or other facility with appropriate resources  Description  INTERVENTIONS:  - Identify barriers to discharge w/patient and caregiver  - Arrange for needed discharge resources and transportation as appropriate  - Identify discharge learning needs (meds, wound care, etc )  - Arrange for interpretive services to assist at discharge as needed  - Refer to Case Management Department for coordinating discharge planning if the patient needs post-hospital services based on physician/advanced practitioner order or complex needs related to functional status, cognitive ability, or social support system  Outcome: Progressing     Problem: Knowledge Deficit  Goal: Patient/family/caregiver demonstrates understanding of disease process, treatment plan, medications, and discharge instructions  Description  Complete learning assessment and assess knowledge base    Interventions:  - Provide teaching at level of understanding  - Provide teaching via preferred learning methods  Outcome: Progressing

## 2019-10-01 NOTE — LETTER
[address] [address]  No information on file  October 2, 2019     Patient: Lucía Cervantes   YOB: 1990   Date of Visit: 10/1/2019       To Whom it May Concern:    Lucía Cervantes is under my professional care  He was seen in the hospital from 10/1/2019   to 10/02/19  He may return to work on Thursday October 4th 2019 without limitations  If you have any questions or concerns, please don't hesitate to call           Sincerely,          Lisa Soliz

## 2019-10-01 NOTE — ED PROVIDER NOTES
History  Chief Complaint   Patient presents with    Vomiting     Patient states he was seen here Friday for food poisoning but has not gotten any better  Unable to keep any of his medications down  HPI  28-year-old male presents vomiting  States he has had this last 5 days  Seen in the ED on 09/29, had unremarkable workup, was discharged with Zofran, states he takes this sometimes  States he is having trouble keeping down his Trileptal and Seroquel  Vomits multiple times per day, nonbloody and nonbilious  Also on Suboxone which she states he has been able to take  Denies abdominal pain  No diarrhea or constipation  Denies fevers or chills  Does smoke marijuana but has been cutting back  Prior to Admission Medications   Prescriptions Last Dose Informant Patient Reported? Taking? OXcarbazepine (TRILEPTAL) 300 mg tablet   No No   Sig: TAKE 1 TABLET EVERY 12 HOURS   QUEtiapine (SEROquel) 300 mg tablet   No No   Sig: Take 1 tablet (300 mg total) by mouth daily at bedtime for 100 days   amphetamine-dextroamphetamine (ADDERALL XR) 20 MG 24 hr capsule   No No   Sig: Take 1 pill early in the morning and a 2nd pill at 2:00 p m   Daily   buprenorphine-naloxone (SUBOXONE) 8-2 mg per SL tablet   No No   Sig: One daily   omeprazole (PriLOSEC) 20 mg delayed release capsule   No No   Sig: Take 1 capsule (20 mg total) by mouth daily for 90 days   ondansetron (ZOFRAN) 4 mg tablet   No No   Sig: Take 1 tablet (4 mg total) by mouth every 6 (six) hours as needed for nausea or vomiting for up to 10 doses   ondansetron (ZOFRAN-ODT) 8 mg disintegrating tablet   No No   Sig: Take 1 tablet (8 mg total) by mouth every 8 (eight) hours as needed for nausea or vomiting      Facility-Administered Medications: None       Past Medical History:   Diagnosis Date    ADHD     Anxiety     Bipolar disorder ([facility] [address] )     Depression     Drug use     Essential hypertension     benign    Hyperlipidemia     Peptic reflux disease History reviewed  No pertinent surgical history  Family History   Problem Relation Age of Onset    Mental illness Mother     Thyroid disease Mother     Drug abuse Mother         illicit drug    Diabetes Father     Cancer Father      I have reviewed and agree with the history as documented  Social History     Tobacco Use    Smoking status: Former Smoker     Last attempt to quit: 3/30/2015     Years since quittin 5    Smokeless tobacco: Never Used   Substance Use Topics    Alcohol use: Not Currently     Comment: at social events    Drug use: Not Currently     Frequency: 2 0 times per week     Types: Marijuana, Heroin     Comment: Pt reports using THC daily x3, Heroin every other day and will alternate between using pills  Pt snorts heroin        Review of Systems   Constitutional: Negative for chills and fever  HENT: Negative for dental problem and ear pain  Eyes: Negative for pain and redness  Respiratory: Negative for cough and shortness of breath  Cardiovascular: Negative for chest pain and palpitations  Gastrointestinal: Positive for nausea and vomiting  Negative for abdominal pain  Endocrine: Negative for polydipsia and polyphagia  Genitourinary: Negative for dysuria and frequency  Musculoskeletal: Negative for arthralgias and joint swelling  Skin: Negative for color change and rash  Neurological: Negative for dizziness and headaches  Psychiatric/Behavioral: Negative for behavioral problems and confusion  All other systems reviewed and are negative  Physical Exam  Physical Exam   Constitutional: He is oriented to person, place, and time  He appears well-developed and well-nourished  No distress  HENT:   Head: Atraumatic  Right Ear: External ear normal    Left Ear: External ear normal    Nose: Nose normal    Eyes: Pupils are equal, round, and reactive to light  Conjunctivae and EOM are normal    Neck: Normal range of motion  Neck supple  No JVD present  Cardiovascular: Normal rate, regular rhythm and normal heart sounds  No murmur heard  Pulmonary/Chest: Effort normal and breath sounds normal  No respiratory distress  He has no wheezes  Abdominal: Soft  Bowel sounds are normal  He exhibits no distension  There is no tenderness  Musculoskeletal: Normal range of motion  He exhibits no edema  Neurological: He is alert and oriented to person, place, and time  No cranial nerve deficit  Skin: Skin is warm and dry  Capillary refill takes less than 2 seconds  He is not diaphoretic  Psychiatric: He has a normal mood and affect  His behavior is normal    Nursing note and vitals reviewed        Vital Signs  ED Triage Vitals [10/01/19 0724]   Temperature Pulse Respirations Blood Pressure SpO2   98 2 °F (36 8 °C) 75 18 144/97 97 %      Temp Source Heart Rate Source Patient Position - Orthostatic VS BP Location FiO2 (%)   Oral Monitor Lying Right arm --      Pain Score       5           Vitals:    10/01/19 0724 10/01/19 0810 10/01/19 0955   BP: 144/97 124/71 125/81   Pulse: 75 68 71   Patient Position - Orthostatic VS: Lying Lying Lying         Visual Acuity      ED Medications  Medications   ondansetron (ZOFRAN) injection 4 mg (4 mg Intravenous Given 10/1/19 0749)   sodium chloride 0 9 % infusion (0 mL/hr Intravenous Stopped 10/1/19 1027)   potassium chloride (K-DUR,KLOR-CON) CR tablet 40 mEq (40 mEq Oral Given 10/1/19 0841)   potassium chloride 20 mEq IVPB (premix) (0 mEq Intravenous Stopped 10/1/19 1027)   iohexol (OMNIPAQUE) 350 MG/ML injection (MULTI-DOSE) 100 mL (100 mL Intravenous Given 10/1/19 0916)       Diagnostic Studies  Results Reviewed     Procedure Component Value Units Date/Time    Potassium [[id number]]     Lab Status:  No result Specimen:  Blood     Rapid drug screen, urine [[id number]]     Lab Status:  No result Specimen:  Urine     Magnesium [[id number]]  (Normal) Collected:  10/01/19 0824    Lab Status:  Final result Specimen:  Blood Updated: 10/01/19 0832     Magnesium 2 2 mg/dL     Comprehensive metabolic panel [[id number]]  (Abnormal) Collected:  10/01/19 0751    Lab Status:  Final result Specimen:  Blood from Arm, Right Updated:  10/01/19 0819     Sodium 133 mmol/L      Potassium 2 6 mmol/L      Chloride 87 mmol/L      CO2 32 mmol/L      ANION GAP 14 mmol/L      BUN 16 mg/dL      Creatinine 0 93 mg/dL      Glucose 132 mg/dL      Calcium 9 4 mg/dL      AST 16 U/L      ALT 13 U/L      Alkaline Phosphatase 72 U/L      Total Protein 8 2 g/dL      Albumin 4 4 g/dL      Total Bilirubin 1 70 mg/dL      eGFR 111 ml/min/1 73sq m     Narrative:       National Kidney Disease Foundation guidelines for Chronic Kidney Disease (CKD):     Stage 1 with normal or high GFR (GFR > 90 mL/min/1 73 square meters)    Stage 2 Mild CKD (GFR = 60-89 mL/min/1 73 square meters)    Stage 3A Moderate CKD (GFR = 45-59 mL/min/1 73 square meters)    Stage 3B Moderate CKD (GFR = 30-44 mL/min/1 73 square meters)    Stage 4 Severe CKD (GFR = 15-29 mL/min/1 73 square meters)    Stage 5 End Stage CKD (GFR <15 mL/min/1 73 square meters)  Note: GFR calculation is accurate only with a steady state creatinine    Lipase [[id number]]  (Normal) Collected:  10/01/19 0751    Lab Status:  Final result Specimen:  Blood from Arm, Right Updated:  10/01/19 0813     Lipase 260 u/L     CBC and differential [[id number]]  (Abnormal) Collected:  10/01/19 0751    Lab Status:  Final result Specimen:  Blood from Arm, Right Updated:  10/01/19 0755     WBC 10 98 Thousand/uL      RBC 6 17 Million/uL      Hemoglobin 17 2 g/dL      Hematocrit 47 8 %      MCV 78 fL      MCH 27 9 pg      MCHC 36 0 g/dL      RDW 12 5 %      MPV 10 1 fL      Platelets 142 Thousands/uL      nRBC 0 /100 WBCs      Neutrophils Relative 70 %      Immat GRANS % 0 %      Lymphocytes Relative 17 %      Monocytes Relative 13 %      Eosinophils Relative 0 %      Basophils Relative 0 %      Neutrophils Absolute 7 64 Thousands/µL      Immature Grans Absolute 0 04 Thousand/uL      Lymphocytes Absolute 1 88 Thousands/µL      Monocytes Absolute 1 38 Thousand/µL      Eosinophils Absolute 0 01 Thousand/µL      Basophils Absolute 0 03 Thousands/µL                  CT abdomen pelvis with contrast   Final Result by Mike Lee MD (10/01 2660)      Unremarkable examination  No acute CT abnormality in the abdomen or pelvis to account for the patient's symptoms  Normal appendix  Workstation performed: [id number]                    Procedures  ECG 12 Lead Documentation Only  Date/Time: 10/1/2019 8:48 AM  Performed by: Neida Beck MD  Authorized by: Neida Beck MD     Comments:      NSR rate of 71, normal axis and intervals, questionable u waves no acute ST elevations or depressions           ED Course                               MDM  29 yo M presents with vomiting for the last several days  Failed outpatient management with zofran  Found to be hypokalemic at 2 6 with subtle u waves on EKG  CT abdomen pelvis obtained at hospitalist request which is negative  Will admit for repletion of potassium and symptomatic management  Disposition  Final diagnoses:   Hypokalemia   Vomiting     Time reflects when diagnosis was documented in both MDM as applicable and the Disposition within this note     Time User Action Codes Description Comment    10/1/2019  9:08 AM [place] Add [E87 6] Hypokalemia     10/1/2019  9:08 AM [place] Add [R11 10] Vomiting       ED Disposition     ED Disposition Condition Date/Time Comment    Admit Stable Tue Oct 1, 2019  9:42 AM Case was discussed with Dr Aurea Osborne and the patient's admission status was agreed to be Admission Status: observation status to the service of Dr Aurea Osborne   Follow-up Information    None         Patient's Medications   Discharge Prescriptions    No medications on file     No discharge procedures on file      ED Provider  Electronically Signed by           Neida Beck MD  10/01/19 Mingo Shii

## 2019-10-01 NOTE — ASSESSMENT & PLAN NOTE
Patient reports anxiety, but does not take Adderall for ADHD control at this time  Will hold the medication for now

## 2019-10-01 NOTE — ASSESSMENT & PLAN NOTE
Likely related to gastroenteritis, possibly viral   Reportedly, it is related to an meal that patient had on Thursday  CT of the abdomen was normal   Continue to provide with antiemetics  Will provide with clear liquid diet as tolerated

## 2019-10-01 NOTE — ED NOTES
CC- Hypokalemia, Vomiting    Admission related to injury? - N/A    Orientation status- AOx4    Abnormal labs/abnormal focused assessment/vitals- Potassium 3 2 (after IV and PO given), +THC in urine    Medication/drips- NSS 20mEq /hr    Last time narcotics given- N/A  Patient on Suboxone     IV lines/drains/etc- 20G LAC done by US    Isolation status- N/A    Skin- WDL    Ambulation-? Self     ED nurse's name and phone number- [phone] [address], RN  10/01/19 2680

## 2019-10-01 NOTE — ASSESSMENT & PLAN NOTE
Patient takes 20 mg of Protonix at home  Will provide with 40 mg during the hospital stay due to acute vomiting that may be related to worsening of GERD

## 2019-10-01 NOTE — ED NOTES
Placed order for dinner  Clear liquid diet in effect  Ordered chicken broth, lemon italian ice and a ginger ale         Tanya Vazquez  10/01/19 8374

## 2019-10-01 NOTE — SOCIAL WORK
Cm met with patient at bedside, patient alert and oriented during interview  Patient reports residing in a private home with is zeina Peck, his wedding is in February 2020  Patient reports working, is completely independent with ADL's  Patient mentioned experiencing vomiting several days ago denies any changes in his medications or daily regimin  Patient mentioned filling his medications as [facility] no co-payment barriers and takes them as prescribed   Patient denies any SI/HI reports substance abuse and currently participating in outpatient NA meetings, patient refused [address] resources, reports outpatient services for SA is sufficient  Patient denies any SI/HI  Patient mentioned his zeina can make medical decisions on his behalf if needed  CM reviewed discharge planning process including the following: identifying help at home, patient preference for discharge planning needs, pharmacy preference, and availability of treatment team to discuss questions or concerns patient and/or family may have regarding understanding medications and recognizing signs and symptoms once discharged  CM also encouraged patient to follow up with all recommended appointments after discharge  Patient advised of importance for patient and family to participate in managing patients medical well being

## 2019-10-02 VITALS
DIASTOLIC BLOOD PRESSURE: 75 MMHG | TEMPERATURE: 98.4 F | HEIGHT: 72 IN | WEIGHT: 239.86 LBS | SYSTOLIC BLOOD PRESSURE: 120 MMHG | HEART RATE: 71 BPM | OXYGEN SATURATION: 97 % | RESPIRATION RATE: 18 BRPM | BODY MASS INDEX: 32.49 KG/M2

## 2019-10-02 LAB
ANION GAP SERPL CALCULATED.3IONS-SCNC: 10 MMOL/L (ref 4–13)
ANION GAP SERPL CALCULATED.3IONS-SCNC: 8 MMOL/L (ref 4–13)
BASOPHILS # BLD AUTO: 0.03 THOUSANDS/ΜL (ref 0–0.1)
BASOPHILS NFR BLD AUTO: 0 % (ref 0–1)
BUN SERPL-MCNC: 10 MG/DL (ref 5–25)
BUN SERPL-MCNC: 12 MG/DL (ref 5–25)
CALCIUM SERPL-MCNC: 8.6 MG/DL (ref 8.3–10.1)
CALCIUM SERPL-MCNC: 8.6 MG/DL (ref 8.3–10.1)
CHLORIDE SERPL-SCNC: 97 MMOL/L (ref 100–108)
CHLORIDE SERPL-SCNC: 98 MMOL/L (ref 100–108)
CO2 SERPL-SCNC: 29 MMOL/L (ref 21–32)
CO2 SERPL-SCNC: 31 MMOL/L (ref 21–32)
CREAT SERPL-MCNC: 0.87 MG/DL (ref 0.6–1.3)
CREAT SERPL-MCNC: 0.89 MG/DL (ref 0.6–1.3)
EOSINOPHIL # BLD AUTO: 0.09 THOUSAND/ΜL (ref 0–0.61)
EOSINOPHIL NFR BLD AUTO: 1 % (ref 0–6)
ERYTHROCYTE [DISTWIDTH] IN BLOOD BY AUTOMATED COUNT: 12.7 % (ref 11.6–15.1)
GFR SERPL CREATININE-BSD FRML MDRD: 116 ML/MIN/1.73SQ M
GFR SERPL CREATININE-BSD FRML MDRD: 117 ML/MIN/1.73SQ M
GLUCOSE SERPL-MCNC: 106 MG/DL (ref 65–140)
GLUCOSE SERPL-MCNC: 123 MG/DL (ref 65–140)
HCT VFR BLD AUTO: 42.1 % (ref 36.5–49.3)
HGB BLD-MCNC: 14.7 G/DL (ref 12–17)
IMM GRANULOCYTES # BLD AUTO: 0.04 THOUSAND/UL (ref 0–0.2)
IMM GRANULOCYTES NFR BLD AUTO: 0 % (ref 0–2)
LYMPHOCYTES # BLD AUTO: 3.88 THOUSANDS/ΜL (ref 0.6–4.47)
LYMPHOCYTES NFR BLD AUTO: 43 % (ref 14–44)
MCH RBC QN AUTO: 27.6 PG (ref 26.8–34.3)
MCHC RBC AUTO-ENTMCNC: 34.9 G/DL (ref 31.4–37.4)
MCV RBC AUTO: 79 FL (ref 82–98)
MONOCYTES # BLD AUTO: 0.95 THOUSAND/ΜL (ref 0.17–1.22)
MONOCYTES NFR BLD AUTO: 10 % (ref 4–12)
NEUTROPHILS # BLD AUTO: 4.15 THOUSANDS/ΜL (ref 1.85–7.62)
NEUTS SEG NFR BLD AUTO: 46 % (ref 43–75)
NRBC BLD AUTO-RTO: 0 /100 WBCS
PLATELET # BLD AUTO: 289 THOUSANDS/UL (ref 149–390)
PMV BLD AUTO: 10 FL (ref 8.9–12.7)
POTASSIUM SERPL-SCNC: 2.8 MMOL/L (ref 3.5–5.3)
POTASSIUM SERPL-SCNC: 3 MMOL/L (ref 3.5–5.3)
RBC # BLD AUTO: 5.33 MILLION/UL (ref 3.88–5.62)
SODIUM SERPL-SCNC: 136 MMOL/L (ref 136–145)
SODIUM SERPL-SCNC: 137 MMOL/L (ref 136–145)
WBC # BLD AUTO: 9.14 THOUSAND/UL (ref 4.31–10.16)

## 2019-10-02 PROCEDURE — 85025 COMPLETE CBC W/AUTO DIFF WBC: CPT | Performed by: FAMILY MEDICINE

## 2019-10-02 PROCEDURE — 80048 BASIC METABOLIC PNL TOTAL CA: CPT | Performed by: NURSE PRACTITIONER

## 2019-10-02 PROCEDURE — 99217 PR OBSERVATION CARE DISCHARGE MANAGEMENT: CPT | Performed by: NURSE PRACTITIONER

## 2019-10-02 PROCEDURE — 80048 BASIC METABOLIC PNL TOTAL CA: CPT | Performed by: FAMILY MEDICINE

## 2019-10-02 RX ORDER — POTASSIUM CHLORIDE 20 MEQ/1
40 TABLET, EXTENDED RELEASE ORAL ONCE
Status: COMPLETED | OUTPATIENT
Start: 2019-10-02 | End: 2019-10-02

## 2019-10-02 RX ORDER — POTASSIUM CHLORIDE 20 MEQ/1
40 TABLET, EXTENDED RELEASE ORAL 2 TIMES DAILY
Qty: 4 TABLET | Refills: 0 | Status: SHIPPED | OUTPATIENT
Start: 2019-10-02 | End: 2019-11-07 | Stop reason: ALTCHOICE

## 2019-10-02 RX ORDER — POTASSIUM CHLORIDE 14.9 MG/ML
20 INJECTION INTRAVENOUS ONCE
Status: COMPLETED | OUTPATIENT
Start: 2019-10-02 | End: 2019-10-02

## 2019-10-02 RX ADMIN — OXCARBAZEPINE 300 MG: 150 TABLET, FILM COATED ORAL at 09:33

## 2019-10-02 RX ADMIN — BUPRENORPHINE HYDROCHLORIDE, NALOXONE HYDROCHLORIDE 4 FILM: 2; .5 FILM, SOLUBLE BUCCAL; SUBLINGUAL at 09:33

## 2019-10-02 RX ADMIN — PANTOPRAZOLE SODIUM 40 MG: 40 TABLET, DELAYED RELEASE ORAL at 06:50

## 2019-10-02 RX ADMIN — POTASSIUM CHLORIDE 40 MEQ: 1500 TABLET, EXTENDED RELEASE ORAL at 09:33

## 2019-10-02 RX ADMIN — POTASSIUM CHLORIDE 20 MEQ: 200 INJECTION, SOLUTION INTRAVENOUS at 09:33

## 2019-10-02 NOTE — PLAN OF CARE
Problem: PAIN - ADULT  Goal: Verbalizes/displays adequate comfort level or baseline comfort level  Description  Interventions:  - Encourage patient to monitor pain and request assistance  - Assess pain using appropriate pain scale  - Administer analgesics based on type and severity of pain and evaluate response  - Implement non-pharmacological measures as appropriate and evaluate response  - Consider cultural and social influences on pain and pain management  - Notify physician/advanced practitioner if interventions unsuccessful or patient reports new pain  Outcome: Progressing     Problem: INFECTION - ADULT  Goal: Absence or prevention of progression during hospitalization  Description  INTERVENTIONS:  - Assess and monitor for signs and symptoms of infection  - Monitor lab/diagnostic results  - Monitor all insertion sites, i e  indwelling lines, tubes, and drains  - Monitor endotracheal if appropriate and nasal secretions for changes in amount and color  - [place] appropriate cooling/warming therapies per order  - Administer medications as ordered  - Instruct and encourage patient and family to use good hand hygiene technique  - Identify and instruct in appropriate isolation precautions for identified infection/condition  Outcome: Progressing  Goal: Absence of fever/infection during neutropenic period  Description  INTERVENTIONS:  - Monitor WBC    Outcome: Progressing     Problem: SAFETY ADULT  Goal: Patient will remain free of falls  Description  INTERVENTIONS:  - Assess patient frequently for physical needs  -  Identify cognitive and physical deficits and behaviors that affect risk of falls    -  [place] fall precautions as indicated by assessment   - Educate patient/family on patient safety including physical limitations  - Instruct patient to call for assistance with activity based on assessment  - Modify environment to reduce risk of injury  - Consider OT/PT consult to assist with strengthening/mobility  Outcome: Progressing  Goal: Maintain or return to baseline ADL function  Description  INTERVENTIONS:  -  Assess patient's ability to carry out ADLs; assess patient's baseline for ADL function and identify physical deficits which impact ability to perform ADLs (bathing, care of mouth/teeth, toileting, grooming, dressing, etc )  - Assess/evaluate cause of self-care deficits   - Assess range of motion  - Assess patient's mobility; develop plan if impaired  - Assess patient's need for assistive devices and provide as appropriate  - Encourage maximum independence but intervene and supervise when necessary  - Involve family in performance of ADLs  - Assess for home care needs following discharge   - Consider OT consult to assist with ADL evaluation and planning for discharge  - Provide patient education as appropriate  Outcome: Progressing  Goal: Maintain or return mobility status to optimal level  Description  INTERVENTIONS:  - Assess patient's baseline mobility status (ambulation, transfers, stairs, etc )    - Identify cognitive and physical deficits and behaviors that affect mobility  - Identify mobility aids required to assist with transfers and/or ambulation (gait belt, sit-to-stand, lift, walker, cane, etc )  - [place] fall precautions as indicated by assessment  - Record patient progress and toleration of activity level on Mobility SBAR; progress patient to next Phase/Stage  - Instruct patient to call for assistance with activity based on assessment  - Consider rehabilitation consult to assist with strengthening/weightbearing, etc   Outcome: Progressing     Problem: DISCHARGE PLANNING  Goal: Discharge to home or other facility with appropriate resources  Description  INTERVENTIONS:  - Identify barriers to discharge w/patient and caregiver  - Arrange for needed discharge resources and transportation as appropriate  - Identify discharge learning needs (meds, wound care, etc )  - Arrange for interpretive services to assist at discharge as needed  - Refer to Case Management Department for coordinating discharge planning if the patient needs post-hospital services based on physician/advanced practitioner order or complex needs related to functional status, cognitive ability, or social support system  Outcome: Progressing     Problem: Knowledge Deficit  Goal: Patient/family/caregiver demonstrates understanding of disease process, treatment plan, medications, and discharge instructions  Description  Complete learning assessment and assess knowledge base    Interventions:  - Provide teaching at level of understanding  - Provide teaching via preferred learning methods  Outcome: Progressing

## 2019-10-02 NOTE — PROGRESS NOTES
Patient is visible on the unit but quiet and withdrawn to self  Patient declined morning meal  Cooperative with medications  Good eye contact  Patient does not exhibit irritable behaviors  Patient denies SI and HI currently  Patient verbalized feeling ready for discharge  Affect flat  Alert and oriented  Able to make needs known  No signs or symptoms of distress  SP1 and Q 7 minute checks will be maintained for patient safety  Will continue to monitor  no

## 2019-10-02 NOTE — ASSESSMENT & PLAN NOTE
· Patient takes 20 mg of Protonix at home  · Continue 40 mg PPI here in setting of worsening of GERD

## 2019-10-02 NOTE — ASSESSMENT & PLAN NOTE
· Potassium level of 2 6 on admission due to intractable vomiting and poor p o  Intake    · Patient did not tolerate replete of IV potassium today give another 40 mEq  · Repeat BMP  · Patient overall clinically stable for discharge

## 2019-10-02 NOTE — UTILIZATION REVIEW
Initial Clinical Review    Admission: Date/Time/Statement: OBS 10/1  0943  Orders Placed This Encounter   Procedures    Place in Observation (expected length of stay for this patient is less than two midnights)     Standing Status:   Standing     Number of Occurrences:   1     Order Specific Question:   Admitting Physician     Answer:   Sarwat Saleh [[id number]]     Order Specific Question:   Level of Care     Answer:   Med Surg [[id number]]     ED Arrival Information     Expected Arrival Acuity Means of Arrival Escorted By Service Admission Type    - 10/1/2019 07:12 Urgent Walk-In Self General Medicine Urgent    Arrival Complaint    VOMITING        Chief Complaint   Patient presents with    Vomiting     Patient states he was seen here Friday for food poisoning but has not gotten any better  Unable to keep any of his medications down  Assessment/Plan: 29 yo male to ED from home w/ N/V for 4 days   Unable to take any of is anxiolytics medications  Likely gastroenteritis possibly viral   Admitted OBS status cont antiemetics, clear liq diet   K 2 6 replete and recheck in am   Heroin addiction resume suboxone and medications for bipolar  GERD - protonix  PE : anxious     ED Triage Vitals [10/01/19 0724]   Temperature Pulse Respirations Blood Pressure SpO2   98 2 °F (36 8 °C) 75 18 144/97 97 %      Temp Source Heart Rate Source Patient Position - Orthostatic VS BP Location FiO2 (%)   Oral Monitor Lying Right arm --      Pain Score       5        Wt Readings from Last 1 Encounters:   10/01/19 109 kg (239 lb 13 8 oz)     Additional Vital Signs:   Pertinent Labs/Diagnostic Test Results:   10/1 Ct abd -   Unremarkable examination  No acute CT abnormality in the abdomen or pelvis to account for the patient's symptoms    Normal appendix          Results from last 7 days   Lab Units 10/02/19  0442 10/01/19  0751 09/29/19  0807   WBC Thousand/uL 9 14 10 98* 14 31*   HEMOGLOBIN g/dL 14 7 17 2* 17 8*   HEMATOCRIT % 42 1 47 8 50 8*   PLATELETS Thousands/uL 289 365 392*   NEUTROS ABS Thousands/µL 4 15 7 64* 11 35*     Results from last 7 days   Lab Units 10/02/19  0442 10/01/19  1142 10/01/19  0824 10/01/19  0751 09/29/19  0807   SODIUM mmol/L 136  --   --  133* 136   POTASSIUM mmol/L 2 8* 3 2*  --  2 6* 3 4*   CHLORIDE mmol/L 97*  --   --  87* 89*   CO2 mmol/L 31  --   --  32 32   ANION GAP mmol/L 8  --   --  14* 15*   BUN mg/dL 12  --   --  16 29*   CREATININE mg/dL 0 89  --   --  0 93 1 23   EGFR ml/min/1 73sq m 116  --   --  111 79   CALCIUM mg/dL 8 6  --   --  9 4 10 4*   MAGNESIUM mg/dL  --   --  2 2  --   --      Results from last 7 days   Lab Units 10/01/19  0751 09/29/19  0807   AST U/L 16 20   ALT U/L 13 17   ALK PHOS U/L 72 88   TOTAL PROTEIN g/dL 8 2 9 0*   ALBUMIN g/dL 4 4 5 2*   TOTAL BILIRUBIN mg/dL 1 70* 1 70*   BILIRUBIN DIRECT mg/dL  --  0 33*     Results from last 7 days   Lab Units 10/02/19  0442 10/01/19  0751 09/29/19  0807   GLUCOSE RANDOM mg/dL 106 132 158*     Results from last 7 days   Lab Units 10/01/19  0751 09/29/19  0807   LIPASE u/L 260 237     Results from last 7 days   Lab Units 10/01/19  1142   AMPH/METH  Negative   BARBITURATE UR  Negative   BENZODIAZEPINE UR  Negative   COCAINE UR  Negative   METHADONE URINE  Negative   OPIATE UR  Negative   PCP UR  Negative   THC UR  Positive*     ED Treatment:   Medication Administration from 10/01/2019 0712 to 10/01/2019 1827       Date/Time Order Dose Route Action     10/01/2019 0749 ondansetron (ZOFRAN) injection 4 mg 4 mg Intravenous Given     10/01/2019 0809 sodium chloride 0 9 % infusion 1,000 mL/hr Intravenous New Bag     10/01/2019 0841 potassium chloride (K-DUR,KLOR-CON) CR tablet 40 mEq 40 mEq Oral Given     10/01/2019 0841 potassium chloride 20 mEq IVPB (premix) 20 mEq Intravenous New Bag     10/01/2019 1159 buprenorphine-naloxone (SUBOXONE) 2-0 5 mg per SL film 4 Film 4 Film Sublingual Given     10/01/2019 1159 pantoprazole (PROTONIX) EC tablet 40 mg 40 mg Oral Given     10/01/2019 1159 OXcarbazepine (TRILEPTAL) tablet 300 mg 300 mg Oral Given     10/01/2019 1234 sodium chloride 0 9 % with KCl 20 mEq/L infusion (premix) 100 mL/hr Intravenous New Bag        Past Medical History:   Diagnosis Date    ADHD     Anxiety     [facility]ipolar disorder ([facility] )     Depression     Drug use     Essential hypertension     benign    Hyperlipidemia     Peptic reflux disease      Present on Admission:   ADHD   Heroin addiction ([facility] )   Bipolar disorder, current episode mixed, moderate ([place] [address] )      Admitting Diagnosis: Hypokalemia [E87 6]  Vomiting [R11 10]  Age/Sex: 30 y o  male  Admission Orders:    Current Facility-Administered Medications:  buprenorphine-naloxone 4 Film Sublingual Daily     melatonin 6 mg Oral HS     OXcarbazepine 300 mg Oral Q12H [address]     pantoprazole 40 mg Oral Early Morning     QUEtiapine 300 mg Oral HS     sodium chloride 0 9 % with KCl 20 mEq/L 100 mL/hr Intravenous Continuous       Clear liq to Surgical soft diet   Tele   SCD  Up w/ assist     Network Utilization Review Department  Phone: [phone]; Fax [phone]  [email or web address]  ATTENTION: Please call with any questions or concerns to [phone]  and carefully listen to the prompts so that you are directed to the right person  Send all requests for admission clinical reviews, approved or denied determinations and any other requests to fax [phone]   All voicemails are confidential

## 2019-10-02 NOTE — ASSESSMENT & PLAN NOTE
· Suspected in setting of gastroenteritis, possibly viral   · Reportedly, it is related to an meal that patient had on Thursday  · Overall symptomatically improved  · CT of the abdomen was normal   · Continue to provide with antiemetics    · Patient tolerating surgical soft diet  · Of further noted urine screen is positive for THC question cyclic vomiting syndrome

## 2019-10-02 NOTE — DISCHARGE SUMMARY
Discharge- Delilah Vázquez 1990, 30 y o  male MRN: [id number]    Unit/Bed#: -01 Encounter: [id number]    Primary Care Provider: PETER King   Date and time admitted to hospital: 10/1/2019  7:20 AM        * Intractable vomiting with nausea  Assessment & Plan  · Suspected in setting of gastroenteritis, possibly viral   · Reportedly, it is related to an meal that patient had on Thursday  · Overall symptomatically improved  · CT of the abdomen was normal   · Continue to provide with antiemetics  · Patient tolerating surgical soft diet  · Of further noted urine screen is positive for THC question cyclic vomiting syndrome    Hypokalemia due to excessive gastrointestinal loss of potassium  Assessment & Plan  · Potassium level of 2 6 on admission due to intractable vomiting and poor p o  Intake  · Patient did not tolerate replete of IV potassium today give another 40 mEq  · Repeat BMP  · Patient overall clinically stable for discharge    Gastroesophageal reflux disease without esophagitis  Assessment & Plan  · Patient takes 20 mg of Protonix at home  · Continue 40 mg PPI here in setting of worsening of GERD  Bipolar disorder, current episode mixed, moderate ([address] )  Assessment & Plan  · Patient with a mood disorder associated with severe anxiety  · Continue Trileptal and Seroquel    Heroin addiction ([facility] [address] )  Assessment & Plan  · Continue Suboxone  ADHD  Assessment & Plan  · Patient reports anxiety, but does not take Adderall for ADHD control at this time    · Continue to monitor          Discharging Physician / Practitioner: PETER Prieto  PCP: Angel Luis Vazquez [address] [place] [address]  Admission Date:   Admission Orders (From admission, onward)     Ordered        10/01/19 0943  Place in Observation (expected length of stay for this patient is less than two midnights)  Once                   Discharge Date: 10/02/19    Resolved Problems  Date Reviewed: 10/2/2019    None          Consultations During [facility]:  · None    Procedures Performed:   · CT abdomen pelvis 10/1:  Unremarkable examination  Significant Findings / Test Results:   · Vomiting in setting of suspected gastroenteritis  · Hypokalemia in setting of GI losses status post replete improved    Incidental Findings:   · None     Test Results Pending at Discharge (will require follow up): · None     Outpatient Tests Requested:  · none    Complications:  None    Reason for Admission:  Vomiting    Hospital Course:     Jett Grimes is a 30 y o  male patient who originally presented to the hospital on 10/1/2019 due to ongoing persistent vomiting since Thursday after patient reports he had a bad male  Patient came and had a CT of the abdomen that was normal patient was noted to have significant hypokalemia was repleted both IV and orally showing improvement of his potassium prior to discharge  After IV hydration and repletion patient saw significant improvement of his vomiting and was discharged home  Please see above list of diagnoses and related plan for additional information  Condition at Discharge: good     Discharge Day Visit / Exam:     Subjective:  Patient reports he has not had any further vomiting overnight has tolerated a regular diet without difficulty  Patient overall reports that he would like to discharge home denies fevers chills abdominal pain diarrhea further nausea or vomiting  Vitals: Blood Pressure: 122/94 (10/02/19 0650)  Pulse: 71 (10/02/19 0650)  Temperature: (!) 96 2 °F (35 7 °C) (10/02/19 0650)  Temp Source: Oral (10/02/19 0254)  Respirations: 18 (10/02/19 0254)  Height: 6' (182 9 cm) (10/01/19 0723)  Weight - Scale: 109 kg (239 lb 13 8 oz) (10/01/19 0723)  SpO2: 96 % (10/02/19 0650)  Exam:   Physical Exam   Constitutional: He is oriented to person, place, and time  He appears well-developed and well-nourished  HENT:   Head: Normocephalic and atraumatic     Eyes: Conjunctivae and EOM are normal    Neck: Normal range of motion  Cardiovascular: Normal rate, regular rhythm and normal heart sounds  Pulmonary/Chest: Effort normal and breath sounds normal    Abdominal: Soft  Bowel sounds are normal    Musculoskeletal: Normal range of motion  Neurological: He is alert and oriented to person, place, and time  Skin: Skin is warm and dry  Psychiatric: He has a normal mood and affect  His behavior is normal            Discharge instructions/Information to patient and family:   See after visit summary for information provided to patient and family  Provisions for Follow-Up Care:  See after visit summary for information related to follow-up care and any pertinent home health orders  Disposition:     Home    For Discharges to [facility] SNF:   · Not Applicable to this Patient - Not Applicable to this Patient    Planned Readmission:  None     Discharge Statement:  I spent 28 minutes discharging the patient  This time was spent on the day of discharge  I had direct contact with the patient on the day of discharge  Greater than 50% of the total time was spent examining patient, answering all patient questions, arranging and discussing plan of care with patient as well as directly providing post-discharge instructions  Additional time then spent on discharge activities  Discharge Medications:  See after visit summary for reconciled discharge medications provided to patient and family        ** Please Note: This note has been constructed using a voice recognition system **

## 2019-10-03 ENCOUNTER — TRANSITIONAL CARE MANAGEMENT (OUTPATIENT)
Dept: FAMILY MEDICINE CLINIC | Facility: CLINIC | Age: 29
End: 2019-10-03

## 2019-10-10 ENCOUNTER — OFFICE VISIT (OUTPATIENT)
Dept: FAMILY MEDICINE CLINIC | Facility: CLINIC | Age: 29
End: 2019-10-10
Payer: COMMERCIAL

## 2019-10-10 VITALS
WEIGHT: 260 LBS | HEART RATE: 102 BPM | BODY MASS INDEX: 35.21 KG/M2 | OXYGEN SATURATION: 98 % | DIASTOLIC BLOOD PRESSURE: 82 MMHG | HEIGHT: 72 IN | SYSTOLIC BLOOD PRESSURE: 126 MMHG

## 2019-10-10 DIAGNOSIS — K21.9 GASTROESOPHAGEAL REFLUX DISEASE WITHOUT ESOPHAGITIS: ICD-10-CM

## 2019-10-10 DIAGNOSIS — F31.4 BIPOLAR 1 DISORDER, DEPRESSED, SEVERE (HCC): Primary | ICD-10-CM

## 2019-10-10 DIAGNOSIS — F32.2 CURRENT SEVERE EPISODE OF MAJOR DEPRESSIVE DISORDER WITHOUT PSYCHOTIC FEATURES, UNSPECIFIED WHETHER RECURRENT (HCC): ICD-10-CM

## 2019-10-10 DIAGNOSIS — F31.62 BIPOLAR DISORDER, CURRENT EPISODE MIXED, MODERATE (HCC): ICD-10-CM

## 2019-10-10 DIAGNOSIS — E87.6 HYPOKALEMIA: ICD-10-CM

## 2019-10-10 PROCEDURE — 99214 OFFICE O/P EST MOD 30 MIN: CPT | Performed by: FAMILY MEDICINE

## 2019-10-10 RX ORDER — QUETIAPINE FUMARATE 300 MG/1
300 TABLET, FILM COATED ORAL
Qty: 90 TABLET | Refills: 1 | Status: SHIPPED | OUTPATIENT
Start: 2019-10-10 | End: 2019-10-29 | Stop reason: SDUPTHER

## 2019-10-10 RX ORDER — OXCARBAZEPINE 300 MG/1
TABLET, FILM COATED ORAL
Qty: 180 TABLET | Refills: 1 | Status: SHIPPED | OUTPATIENT
Start: 2019-10-10 | End: 2019-11-07 | Stop reason: SDUPTHER

## 2019-10-10 RX ORDER — OMEPRAZOLE 20 MG/1
20 CAPSULE, DELAYED RELEASE ORAL DAILY
Qty: 90 CAPSULE | Refills: 1 | Status: SHIPPED | OUTPATIENT
Start: 2019-10-10 | End: 2019-11-07 | Stop reason: SDUPTHER

## 2019-10-10 NOTE — PROGRESS NOTES
Standard Visit   Assessment/Plan:     Visit Diagnosis:  Diagnoses and all orders for this visit:    Bipolar 1 disorder, depressed, severe ([facility] [address] )    Current severe episode of major depressive disorder without psychotic features, unspecified whether recurrent ([facility] [address] )    Hypokalemia     Remember to continue your your Seroquel as ordered  All meds have been refilled   follow-up with your Suboxone doctor  Do not try to taper off this by yourself  Speak to him about any alterations of that schedule   her note for work at the         Subjective:    Patient ID: Jett Grimes is a 30 y o  male  Patient is here with the following concerns:    Here for follow-up hospitalization  Several days of vomiting secondary to acute gastroenteritis  He becomes very hypokalemic with vomiting  He needed IV therapy and potassium replacements  During this time he also was not really absorbing his Seroquel    so this caused insomnia          The following portions of the patient's history were reviewed and updated as appropriate: allergies, current medications, past family history, past medical history, past social history, past surgical history and problem list     Review of Systems   Constitutional: Negative for activity change and fever  HENT: Negative for nosebleeds and trouble swallowing  Eyes: Negative  Respiratory: Negative  Cardiovascular: Negative for palpitations  Gastrointestinal: Negative for abdominal pain, blood in stool and vomiting  Endocrine: Negative for cold intolerance  Genitourinary: Negative  Musculoskeletal: Negative for neck stiffness  Skin: Negative for pallor  Allergic/Immunologic: Negative for immunocompromised state  Neurological: Negative for seizures and facial asymmetry  Hematological: Negative for adenopathy  Psychiatric/Behavioral: Negative for agitation and suicidal ideas           /82   Pulse 102   Ht 6' (1 829 m)   Wt 118 kg (260 lb)   SpO2 98% BMI 35 26 kg/m²   Social History     Socioeconomic History    Marital status: Single     Spouse name: Not on file    Number of children: Not on file    Years of education: Not on file    Highest education level: Not on file   Occupational History    Occupation: fulltime   Social Needs    Financial resource strain: Not on file    Food insecurity:     Worry: Not on file     Inability: Not on file    Transportation needs:     Medical: Not on file     Non-medical: Not on file   Tobacco Use    Smoking status: Former Smoker     Last attempt to quit: 3/30/2015     Years since quittin 5    Smokeless tobacco: Never Used   Substance and Sexual Activity    Alcohol use: Not Currently     Comment: at social events    Drug use: Not Currently     Frequency: 2 0 times per week     Types: Marijuana, Heroin     Comment: Pt reports using THC daily x3, Heroin every other day and will alternate between using pills   Pt snorts heroin    Sexual activity: Yes     Partners: Female   Lifestyle    Physical activity:     Days per week: Not on file     Minutes per session: Not on file    Stress: Not on file   Relationships    Social connections:     Talks on phone: Not on file     Gets together: Not on file     Attends Rastafarian service: Not on file     Active member of club or organization: Not on file     Attends meetings of clubs or organizations: Not on file     Relationship status: Not on file    Intimate partner violence:     Fear of current or ex partner: Not on file     Emotionally abused: Not on file     Physically abused: Not on file     Forced sexual activity: Not on file   Other Topics Concern    Not on file   Social History Narrative    Not on file     Past Medical History:   Diagnosis Date    ADHD     Anxiety     B[facility] disorder ([facility] [address] )     Depression     Drug use     Essential hypertension     benign    Hyperlipidemia     Peptic reflux disease      Family History   Problem Relation Age of Onset    Mental illness Mother     Thyroid disease Mother     Drug abuse Mother         illicit drug    Diabetes Father     Cancer Father      History reviewed  No pertinent surgical history  Current Outpatient Medications:     buprenorphine-naloxone (SUBOXONE) 8-2 mg per SL tablet, One daily, Disp: , Rfl:     omeprazole (PriLOSEC) 20 mg delayed release capsule, Take 1 capsule (20 mg total) by mouth daily for 90 days, Disp: 90 capsule, Rfl: 1    ondansetron (ZOFRAN) 4 mg tablet, Take 1 tablet (4 mg total) by mouth every 6 (six) hours as needed for nausea or vomiting for up to 10 doses, Disp: 10 tablet, Rfl: 0    ondansetron (ZOFRAN-ODT) 8 mg disintegrating tablet, Take 1 tablet (8 mg total) by mouth every 8 (eight) hours as needed for nausea or vomiting, Disp: 20 tablet, Rfl: 0    OXcarbazepine (TRILEPTAL) 300 mg tablet, TAKE 1 TABLET EVERY 12 HOURS, Disp: 180 tablet, Rfl: 1    QUEtiapine (SEROquel) 300 mg tablet, Take 1 tablet (300 mg total) by mouth daily at bedtime for 100 days, Disp: 90 tablet, Rfl: 1    potassium chloride (K-DUR,KLOR-CON) 20 mEq tablet, Take 2 tablets (40 mEq total) by mouth 2 (two) times a day for 2 doses, Disp: 4 tablet, Rfl: 0      Objective:     Physical Exam   Constitutional: He is oriented to person, place, and time  He appears well-developed and well-nourished  HENT:   Head: Normocephalic and atraumatic  Eyes: Pupils are equal, round, and reactive to light  Neck: Normal range of motion  Neck supple  Cardiovascular: Normal rate  Pulmonary/Chest: Effort normal and breath sounds normal    Abdominal: Soft  Musculoskeletal: Normal range of motion  Neurological: He is alert and oriented to person, place, and time  Skin: Skin is warm and dry  Psychiatric: He has a normal mood and affect  Nursing note and vitals reviewed        Social History     Socioeconomic History    Marital status: Single     Spouse name: Not on file    Number of children: Not on file    Years of education: Not on file    Highest education level: Not on file   Occupational History    Occupation: fulltime   Social Needs    Financial resource strain: Not on file    Food insecurity:     Worry: Not on file     Inability: Not on file    Transportation needs:     Medical: Not on file     Non-medical: Not on file   Tobacco Use    Smoking status: Former Smoker     Last attempt to quit: 3/30/2015     Years since quittin 5    Smokeless tobacco: Never Used   Substance and Sexual Activity    Alcohol use: Not Currently     Comment: at social events    Drug use: Not Currently     Frequency: 2 0 times per week     Types: Marijuana, Heroin     Comment: Pt reports using THC daily x3, Heroin every other day and will alternate between using pills   Pt snorts heroin    Sexual activity: Yes     Partners: Female   Lifestyle    Physical activity:     Days per week: Not on file     Minutes per session: Not on file    Stress: Not on file   Relationships    Social connections:     Talks on phone: Not on file     Gets together: Not on file     Attends Zoroastrian service: Not on file     Active member of club or organization: Not on file     Attends meetings of clubs or organizations: Not on file     Relationship status: Not on file    Intimate partner violence:     Fear of current or ex partner: Not on file     Emotionally abused: Not on file     Physically abused: Not on file     Forced sexual activity: Not on file   Other Topics Concern    Not on file   Social History Narrative    Not on file     Past Medical History:   Diagnosis Date    ADHD     Anxiety     Bipolar disorder ([facility] [address] )     Depression     Drug use     Essential hypertension     benign    Hyperlipidemia     Peptic reflux disease        Current Outpatient Medications:     buprenorphine-naloxone (SUBOXONE) 8-2 mg per SL tablet, One daily, Disp: , Rfl:     omeprazole (PriLOSEC) 20 mg delayed release capsule, Take 1 capsule (20 mg total) by mouth daily for 90 days, Disp: 90 capsule, Rfl: 1    ondansetron (ZOFRAN) 4 mg tablet, Take 1 tablet (4 mg total) by mouth every 6 (six) hours as needed for nausea or vomiting for up to 10 doses, Disp: 10 tablet, Rfl: 0    ondansetron (ZOFRAN-ODT) 8 mg disintegrating tablet, Take 1 tablet (8 mg total) by mouth every 8 (eight) hours as needed for nausea or vomiting, Disp: 20 tablet, Rfl: 0    OXcarbazepine (TRILEPTAL) 300 mg tablet, TAKE 1 TABLET EVERY 12 HOURS, Disp: 180 tablet, Rfl: 1    QUEtiapine (SEROquel) 300 mg tablet, Take 1 tablet (300 mg total) by mouth daily at bedtime for 100 days, Disp: 90 tablet, Rfl: 1    potassium chloride (K-DUR,KLOR-CON) 20 mEq tablet, Take 2 tablets (40 mEq total) by mouth 2 (two) times a day for 2 doses, Disp: 4 tablet, Rfl: 0  Family History   Problem Relation Age of Onset    Mental illness Mother     Thyroid disease Mother     Drug abuse Mother         illicit drug    Diabetes Father     Cancer Father      Patient Instructions   Please write a note for Hilaria Kern he needs a note that he was out  September 27th through October 3rd  This included 2 hospitalizations for IV therapy and potassium replacements  He was treated for acute gastro enteritis which required the IV therapy  During that time he was not medically cleared for work  Currently he is medically cleared to return to work without any restrictions            PETER Wilson

## 2019-10-10 NOTE — PATIENT INSTRUCTIONS
Please write a note for Malka Lockhart he needs a note that he was out  September 27th through October 3rd  This included 2 hospitalizations for IV therapy and potassium replacements  He was treated for acute gastro enteritis which required the IV therapy  During that time he was not medically cleared for work  Currently he is medically cleared to return to work without any restrictions

## 2019-10-22 ENCOUNTER — HOSPITAL ENCOUNTER (INPATIENT)
Facility: HOSPITAL | Age: 29
LOS: 2 days | Discharge: HOME | DRG: 641 | End: 2019-10-25
Attending: EMERGENCY MEDICINE | Admitting: INTERNAL MEDICINE
Payer: COMMERCIAL

## 2019-10-22 ENCOUNTER — APPOINTMENT (EMERGENCY)
Dept: RADIOLOGY | Facility: HOSPITAL | Age: 29
DRG: 641 | End: 2019-10-22
Payer: COMMERCIAL

## 2019-10-22 ENCOUNTER — APPOINTMENT (EMERGENCY)
Dept: CT IMAGING | Facility: HOSPITAL | Age: 29
DRG: 641 | End: 2019-10-22
Payer: COMMERCIAL

## 2019-10-22 DIAGNOSIS — E87.6 HYPOKALEMIA: Primary | ICD-10-CM

## 2019-10-22 DIAGNOSIS — F41.9 ANXIETY: ICD-10-CM

## 2019-10-22 DIAGNOSIS — R11.10 VOMITING: ICD-10-CM

## 2019-10-22 DIAGNOSIS — F31.4 BIPOLAR 1 DISORDER, DEPRESSED, SEVERE (HCC): ICD-10-CM

## 2019-10-22 DIAGNOSIS — F31.62 BIPOLAR DISORDER, CURRENT EPISODE MIXED, MODERATE (HCC): ICD-10-CM

## 2019-10-22 LAB
ALBUMIN SERPL BCP-MCNC: 4.6 G/DL (ref 3.5–5)
ALP SERPL-CCNC: 84 U/L (ref 46–116)
ALT SERPL W P-5'-P-CCNC: 23 U/L (ref 12–78)
ANION GAP SERPL CALCULATED.3IONS-SCNC: 13 MMOL/L (ref 4–13)
AST SERPL W P-5'-P-CCNC: 17 U/L (ref 5–45)
BASOPHILS # BLD AUTO: 0.03 THOUSANDS/ΜL (ref 0–0.1)
BASOPHILS NFR BLD AUTO: 0 % (ref 0–1)
BILIRUB SERPL-MCNC: 1.5 MG/DL (ref 0.2–1)
BUN SERPL-MCNC: 15 MG/DL (ref 5–25)
CALCIUM SERPL-MCNC: 10 MG/DL (ref 8.3–10.1)
CHLORIDE SERPL-SCNC: 90 MMOL/L (ref 100–108)
CO2 SERPL-SCNC: 30 MMOL/L (ref 21–32)
CREAT SERPL-MCNC: 1.05 MG/DL (ref 0.6–1.3)
EOSINOPHIL # BLD AUTO: 0 THOUSAND/ΜL (ref 0–0.61)
EOSINOPHIL NFR BLD AUTO: 0 % (ref 0–6)
ERYTHROCYTE [DISTWIDTH] IN BLOOD BY AUTOMATED COUNT: 12.9 % (ref 11.6–15.1)
GFR SERPL CREATININE-BSD FRML MDRD: 96 ML/MIN/1.73SQ M
GLUCOSE SERPL-MCNC: 123 MG/DL (ref 65–140)
HCT VFR BLD AUTO: 46.4 % (ref 36.5–49.3)
HGB BLD-MCNC: 16.5 G/DL (ref 12–17)
IMM GRANULOCYTES # BLD AUTO: 0.05 THOUSAND/UL (ref 0–0.2)
IMM GRANULOCYTES NFR BLD AUTO: 0 % (ref 0–2)
LIPASE SERPL-CCNC: 550 U/L (ref 73–393)
LYMPHOCYTES # BLD AUTO: 1.94 THOUSANDS/ΜL (ref 0.6–4.47)
LYMPHOCYTES NFR BLD AUTO: 15 % (ref 14–44)
MCH RBC QN AUTO: 28 PG (ref 26.8–34.3)
MCHC RBC AUTO-ENTMCNC: 35.6 G/DL (ref 31.4–37.4)
MCV RBC AUTO: 79 FL (ref 82–98)
MONOCYTES # BLD AUTO: 1.21 THOUSAND/ΜL (ref 0.17–1.22)
MONOCYTES NFR BLD AUTO: 9 % (ref 4–12)
NEUTROPHILS # BLD AUTO: 10.08 THOUSANDS/ΜL (ref 1.85–7.62)
NEUTS SEG NFR BLD AUTO: 76 % (ref 43–75)
NRBC BLD AUTO-RTO: 0 /100 WBCS
PLATELET # BLD AUTO: 450 THOUSANDS/UL (ref 149–390)
PMV BLD AUTO: 10.2 FL (ref 8.9–12.7)
POTASSIUM SERPL-SCNC: 2.4 MMOL/L (ref 3.5–5.3)
PROT SERPL-MCNC: 8.7 G/DL (ref 6.4–8.2)
RBC # BLD AUTO: 5.9 MILLION/UL (ref 3.88–5.62)
SODIUM SERPL-SCNC: 133 MMOL/L (ref 136–145)
TROPONIN I SERPL-MCNC: <0.02 NG/ML
WBC # BLD AUTO: 13.31 THOUSAND/UL (ref 4.31–10.16)

## 2019-10-22 PROCEDURE — 83690 ASSAY OF LIPASE: CPT | Performed by: EMERGENCY MEDICINE

## 2019-10-22 PROCEDURE — 99220 PR INITIAL OBSERVATION CARE/DAY 70 MINUTES: CPT | Performed by: INTERNAL MEDICINE

## 2019-10-22 PROCEDURE — 93005 ELECTROCARDIOGRAM TRACING: CPT

## 2019-10-22 PROCEDURE — 80053 COMPREHEN METABOLIC PANEL: CPT | Performed by: EMERGENCY MEDICINE

## 2019-10-22 PROCEDURE — 84484 ASSAY OF TROPONIN QUANT: CPT | Performed by: EMERGENCY MEDICINE

## 2019-10-22 PROCEDURE — 96365 THER/PROPH/DIAG IV INF INIT: CPT

## 2019-10-22 PROCEDURE — 99284 EMERGENCY DEPT VISIT MOD MDM: CPT | Performed by: EMERGENCY MEDICINE

## 2019-10-22 PROCEDURE — 71046 X-RAY EXAM CHEST 2 VIEWS: CPT

## 2019-10-22 PROCEDURE — 74177 CT ABD & PELVIS W/CONTRAST: CPT

## 2019-10-22 PROCEDURE — 96375 TX/PRO/DX INJ NEW DRUG ADDON: CPT

## 2019-10-22 PROCEDURE — 99285 EMERGENCY DEPT VISIT HI MDM: CPT

## 2019-10-22 PROCEDURE — 85025 COMPLETE CBC W/AUTO DIFF WBC: CPT | Performed by: EMERGENCY MEDICINE

## 2019-10-22 PROCEDURE — 96361 HYDRATE IV INFUSION ADD-ON: CPT

## 2019-10-22 PROCEDURE — 36415 COLL VENOUS BLD VENIPUNCTURE: CPT | Performed by: EMERGENCY MEDICINE

## 2019-10-22 RX ORDER — OXCARBAZEPINE 150 MG/1
300 TABLET, FILM COATED ORAL EVERY 12 HOURS SCHEDULED
Status: DISCONTINUED | OUTPATIENT
Start: 2019-10-22 | End: 2019-10-25 | Stop reason: HOSPADM

## 2019-10-22 RX ORDER — MORPHINE SULFATE 4 MG/ML
4 INJECTION, SOLUTION INTRAMUSCULAR; INTRAVENOUS ONCE
Status: COMPLETED | OUTPATIENT
Start: 2019-10-22 | End: 2019-10-22

## 2019-10-22 RX ORDER — SODIUM CHLORIDE 9 MG/ML
1000 INJECTION, SOLUTION INTRAVENOUS ONCE
Status: COMPLETED | OUTPATIENT
Start: 2019-10-22 | End: 2019-10-22

## 2019-10-22 RX ORDER — PANTOPRAZOLE SODIUM 40 MG/1
40 TABLET, DELAYED RELEASE ORAL
Status: DISCONTINUED | OUTPATIENT
Start: 2019-10-23 | End: 2019-10-25 | Stop reason: HOSPADM

## 2019-10-22 RX ORDER — LANOLIN ALCOHOL/MO/W.PET/CERES
3 CREAM (GRAM) TOPICAL
Status: DISCONTINUED | OUTPATIENT
Start: 2019-10-22 | End: 2019-10-25 | Stop reason: HOSPADM

## 2019-10-22 RX ORDER — QUETIAPINE FUMARATE 100 MG/1
300 TABLET, FILM COATED ORAL
Status: DISCONTINUED | OUTPATIENT
Start: 2019-10-22 | End: 2019-10-25 | Stop reason: HOSPADM

## 2019-10-22 RX ORDER — ONDANSETRON 2 MG/ML
4 INJECTION INTRAMUSCULAR; INTRAVENOUS ONCE
Status: DISCONTINUED | OUTPATIENT
Start: 2019-10-22 | End: 2019-10-25 | Stop reason: HOSPADM

## 2019-10-22 RX ORDER — POTASSIUM CHLORIDE 14.9 MG/ML
20 INJECTION INTRAVENOUS ONCE
Status: COMPLETED | OUTPATIENT
Start: 2019-10-22 | End: 2019-10-22

## 2019-10-22 RX ORDER — POTASSIUM CHLORIDE 20 MEQ/1
40 TABLET, EXTENDED RELEASE ORAL ONCE
Status: COMPLETED | OUTPATIENT
Start: 2019-10-22 | End: 2019-10-22

## 2019-10-22 RX ORDER — KETOROLAC TROMETHAMINE 30 MG/ML
15 INJECTION, SOLUTION INTRAMUSCULAR; INTRAVENOUS ONCE
Status: COMPLETED | OUTPATIENT
Start: 2019-10-22 | End: 2019-10-22

## 2019-10-22 RX ORDER — HEPARIN SODIUM 5000 [USP'U]/ML
5000 INJECTION, SOLUTION INTRAVENOUS; SUBCUTANEOUS EVERY 8 HOURS SCHEDULED
Status: DISCONTINUED | OUTPATIENT
Start: 2019-10-22 | End: 2019-10-25 | Stop reason: HOSPADM

## 2019-10-22 RX ORDER — BUPRENORPHINE AND NALOXONE 2; .5 MG/1; MG/1
1 FILM, SOLUBLE BUCCAL; SUBLINGUAL DAILY
Status: DISCONTINUED | OUTPATIENT
Start: 2019-10-23 | End: 2019-10-24

## 2019-10-22 RX ADMIN — KETOROLAC TROMETHAMINE 15 MG: 30 INJECTION, SOLUTION INTRAMUSCULAR at 18:48

## 2019-10-22 RX ADMIN — MORPHINE SULFATE 4 MG: 4 INJECTION INTRAVENOUS at 18:46

## 2019-10-22 RX ADMIN — QUETIAPINE FUMARATE 300 MG: 100 TABLET ORAL at 22:43

## 2019-10-22 RX ADMIN — POTASSIUM CHLORIDE 20 MEQ: 200 INJECTION, SOLUTION INTRAVENOUS at 19:23

## 2019-10-22 RX ADMIN — POTASSIUM CHLORIDE 40 MEQ: 1500 TABLET, EXTENDED RELEASE ORAL at 19:19

## 2019-10-22 RX ADMIN — FAMOTIDINE 20 MG: 10 INJECTION, SOLUTION INTRAVENOUS at 18:49

## 2019-10-22 RX ADMIN — MELATONIN 3 MG: at 22:43

## 2019-10-22 RX ADMIN — SODIUM CHLORIDE 1000 ML/HR: 0.9 INJECTION, SOLUTION INTRAVENOUS at 18:34

## 2019-10-22 RX ADMIN — IOHEXOL 100 ML: 350 INJECTION, SOLUTION INTRAVENOUS at 19:37

## 2019-10-22 RX ADMIN — OXCARBAZEPINE 300 MG: 150 TABLET, FILM COATED ORAL at 22:44

## 2019-10-22 NOTE — LETTER
[address] [address] [facility]  [address] [id number]  No information on file  October 25, 2019     Patient: Kristian Stern   YOB: 1990   Date of Visit: 10/22/2019       To Whom it May Concern:    Kristian Stern is under my professional care  He was seen in the hospital from 10/22/2019   to 10/25/19  He may return to work on 10/28/2019 without limitations  He was admitted with nausea and vomiting resulting in low potassium  If you have any questions or concerns, please don't hesitate to call           Sincerely,          Ari Higgins MD

## 2019-10-22 NOTE — ED PROVIDER NOTES
History  Chief Complaint   Patient presents with    Chest Pain     Pt c/o chest pain x 2 days  Pt presents with cramping of arms which he states is 10/10 pain  HPI  16-year-old male past medical history of bipolar disorder, heroin addiction on Suboxone, GERD, recent admission for intractable vomiting with nausea and hypokalemia presents with vomiting for the last 4 days in addition to bilateral arm cramping and chest pain for the last 2 days  He states he also feels depressed because his son  on this date  He denies suicidal ideation  Denies shortness of breath, leg pain or swelling  Prior to Admission Medications   Prescriptions Last Dose Informant Patient Reported? Taking? OXcarbazepine (TRILEPTAL) 300 mg tablet   No No   Sig: Take 1 pill twice a day   QUEtiapine (SEROquel) 300 mg tablet   No No   Sig: Take 1 tablet (300 mg total) by mouth daily at bedtime   buprenorphine-naloxone (SUBOXONE) 8-2 mg per SL tablet   No No   Sig: One daily   omeprazole (PriLOSEC) 20 mg delayed release capsule   No No   Sig: Take 1 capsule (20 mg total) by mouth daily   ondansetron (ZOFRAN) 4 mg tablet   No No   Sig: Take 1 tablet (4 mg total) by mouth every 6 (six) hours as needed for nausea or vomiting for up to 10 doses   ondansetron (ZOFRAN-ODT) 8 mg disintegrating tablet   No No   Sig: Take 1 tablet (8 mg total) by mouth every 8 (eight) hours as needed for nausea or vomiting   potassium chloride (K-DUR,KLOR-CON) 20 mEq tablet   No No   Sig: Take 2 tablets (40 mEq total) by mouth 2 (two) times a day for 2 doses      Facility-Administered Medications: None       Past Medical History:   Diagnosis Date    ADHD     Anxiety     Bipolar disorder ([facility] [address] )     Depression     Drug use     Essential hypertension     benign    Hyperlipidemia     Peptic reflux disease        History reviewed  No pertinent surgical history      Family History   Problem Relation Age of Onset    Mental illness Mother     Thyroid disease Mother     Drug abuse Mother         illicit drug    Diabetes Father     Cancer Father      I have reviewed and agree with the history as documented  Social History     Tobacco Use    Smoking status: Former Smoker     Last attempt to quit: 3/30/2015     Years since quittin 5    Smokeless tobacco: Never Used   Substance Use Topics    Alcohol use: Not Currently     Comment: at social events    Drug use: Not Currently     Frequency: 2 0 times per week     Types: Marijuana, Heroin     Comment: Pt reports using THC daily x3, Heroin every other day and will alternate between using pills  Pt snorts heroin        Review of Systems   Constitutional: Negative for chills and fever  HENT: Negative for dental problem and ear pain  Eyes: Negative for pain and redness  Respiratory: Negative for cough and shortness of breath  Cardiovascular: Positive for chest pain  Negative for palpitations  Gastrointestinal: Negative for abdominal pain and nausea  Endocrine: Negative for polydipsia and polyphagia  Genitourinary: Negative for dysuria and frequency  Musculoskeletal: Positive for myalgias  Negative for arthralgias and joint swelling  Skin: Negative for color change and rash  Neurological: Negative for dizziness and headaches  Psychiatric/Behavioral: Positive for dysphoric mood  Negative for behavioral problems and confusion  All other systems reviewed and are negative  Physical Exam  Physical Exam   Constitutional: He is oriented to person, place, and time  He appears well-developed and well-nourished  No distress  HENT:   Head: Atraumatic  Right Ear: External ear normal    Left Ear: External ear normal    Nose: Nose normal    Eyes: Pupils are equal, round, and reactive to light  Conjunctivae and EOM are normal    Neck: Normal range of motion  Neck supple  No JVD present  Cardiovascular: Normal rate, regular rhythm and normal heart sounds  No murmur heard    Pulmonary/Chest: Effort normal and breath sounds normal  No respiratory distress  He has no wheezes  Abdominal: Soft  Bowel sounds are normal  He exhibits no distension  There is no tenderness  Musculoskeletal: Normal range of motion  He exhibits no edema  Neurological: He is alert and oriented to person, place, and time  No cranial nerve deficit  Skin: Skin is warm and dry  Capillary refill takes less than 2 seconds  He is not diaphoretic  Psychiatric: He has a normal mood and affect  His behavior is normal    Nursing note and vitals reviewed        Vital Signs  ED Triage Vitals [10/22/19 1750]   Temperature Pulse Respirations Blood Pressure SpO2   97 7 °F (36 5 °C) 85 20 163/87 98 %      Temp Source Heart Rate Source Patient Position - Orthostatic VS BP Location FiO2 (%)   Oral Monitor Lying Right arm --      Pain Score       7           Vitals:    10/22/19 1750 10/22/19 1920   BP: 163/87 135/79   Pulse: 85 76   Patient Position - Orthostatic VS: Lying          Visual Acuity      ED Medications  Medications   ondansetron (ZOFRAN) injection 4 mg (4 mg Intravenous Not Given 10/22/19 1849)   potassium chloride 20 mEq IVPB (premix) (20 mEq Intravenous New Bag 10/22/19 1923)   sodium chloride 0 9 % infusion (1,000 mL/hr Intravenous New Bag 10/22/19 1834)   ketorolac (TORADOL) injection 15 mg (15 mg Intravenous Given 10/22/19 1848)   famotidine (PEPCID) injection 20 mg (20 mg Intravenous Given 10/22/19 1849)   morphine (PF) 4 mg/mL injection 4 mg (4 mg Intravenous Given 10/22/19 1846)   potassium chloride (K-DUR,KLOR-CON) CR tablet 40 mEq (40 mEq Oral Given 10/22/19 1919)   iohexol (OMNIPAQUE) 350 MG/ML injection (MULTI-DOSE) 100 mL (100 mL Intravenous Given 10/22/19 1937)       Diagnostic Studies  Results Reviewed     Procedure Component Value Units Date/Time    Comprehensive metabolic panel [[id number]]  (Abnormal) Collected:  10/22/19 1833    Lab Status:  Final result Specimen:  Blood from Arm, Left Updated:  10/22/19 1905 Sodium 133 mmol/L      Potassium 2 4 mmol/L      Chloride 90 mmol/L      CO2 30 mmol/L      ANION GAP 13 mmol/L      BUN 15 mg/dL      Creatinine 1 05 mg/dL      Glucose 123 mg/dL      Calcium 10 0 mg/dL      AST 17 U/L      ALT 23 U/L      Alkaline Phosphatase 84 U/L      Total Protein 8 7 g/dL      Albumin 4 6 g/dL      Total Bilirubin 1 50 mg/dL      eGFR 96 ml/min/1 73sq m     Narrative:       National Kidney Disease Foundation guidelines for Chronic Kidney Disease (CKD):     Stage 1 with normal or high GFR (GFR > 90 mL/min/1 73 square meters)    Stage 2 Mild CKD (GFR = 60-89 mL/min/1 73 square meters)    Stage 3A Moderate CKD (GFR = 45-59 mL/min/1 73 square meters)    Stage 3B Moderate CKD (GFR = 30-44 mL/min/1 73 square meters)    Stage 4 Severe CKD (GFR = 15-29 mL/min/1 73 square meters)    Stage 5 End Stage CKD (GFR <15 mL/min/1 73 square meters)  Note: GFR calculation is accurate only with a steady state creatinine    Troponin I [[id number]]  (Normal) Collected:  10/22/19 1833    Lab Status:  Final result Specimen:  Blood from Arm, Left Updated:  10/22/19 1903     Troponin I <0 02 ng/mL     Lipase [[id number]]  (Abnormal) Collected:  10/22/19 1833    Lab Status:  Final result Specimen:  Blood from Arm, Left Updated:  10/22/19 1903     Lipase 550 u/L     CBC and differential [[id number]]  (Abnormal) Collected:  10/22/19 1833    Lab Status:  Final result Specimen:  Blood from Arm, Left Updated:  10/22/19 1846     WBC 13 31 Thousand/uL      RBC 5 90 Million/uL      Hemoglobin 16 5 g/dL      Hematocrit 46 4 %      MCV 79 fL      MCH 28 0 pg      MCHC 35 6 g/dL      RDW 12 9 %      MPV 10 2 fL      Platelets 862 Thousands/uL      nRBC 0 /100 WBCs      Neutrophils Relative 76 %      Immat GRANS % 0 %      Lymphocytes Relative 15 %      Monocytes Relative 9 %      Eosinophils Relative 0 %      Basophils Relative 0 %      Neutrophils Absolute 10 08 Thousands/µL      Immature Grans Absolute 0 05 Thousand/uL Lymphocytes Absolute 1 94 Thousands/µL      Monocytes Absolute 1 21 Thousand/µL      Eosinophils Absolute 0 00 Thousand/µL      Basophils Absolute 0 03 Thousands/µL     Rapid drug screen, urine [[id number]]     Lab Status:  No result Specimen:  Urine                  CT abdomen pelvis with contrast   Final Result by Nohelia Parson MD (10/22 2003)      No acute inflammatory process  Workstation performed: [id number]         XR chest 2 views    (Results Pending)              Procedures  ECG 12 Lead Documentation Only  Date/Time: 10/22/2019 6:06 PM  Performed by: Vicky Mustafa MD  Authorized by: Vicky Mustafa MD     Comments:      Normal sinus rhythm rate of 92, normal axis, prolonged QTC of 536, no acute ST elevations or depressions           ED Course         HEART Risk Score      Most Recent Value   History  0 Filed at: 10/22/2019 2014   ECG  0 Filed at: 10/22/2019 2014   Age  0 Filed at: 10/22/2019 2014   Risk Factors  0 Filed at: 10/22/2019 2014   Troponin  0 Filed at: 10/22/2019 2014   Heart Score Risk Calculator   History  0 Filed at: 10/22/2019 2014   ECG  0 Filed at: 10/22/2019 2014   Age  0 Filed at: 10/22/2019 2014   Risk Factors  0 Filed at: 10/22/2019 2014   Troponin  0 Filed at: 10/22/2019 2014   HEART Score  0 Filed at: 10/22/2019 2014   HEART Score  0 Filed at: 10/22/2019 2014                Initial Sepsis Screening     Row Name 10/22/19 2021                Is the patient's history suggestive of a new or worsening infection? No  -CE        Suspected source of infection          Are two or more of the following signs & symptoms of infection both present and new to the patient?   No  -CE        Indicate SIRS criteria          If the answer is yes to both questions, suspicion of sepsis is present          If severe sepsis is present AND tissue hypoperfusion perists in the hour after fluid resuscitation or lactate > 4, the patient meets criteria for SEPTIC SHOCK          Are any of the following organ dysfunction criteria present within 6 hours of suspected infection and SIRS criteria that are NOT considered to be chronic conditions?         Organ dysfunction          Date of presentation of severe sepsis          Time of presentation of severe sepsis          Tissue hypoperfusion persists in the hour after crystalloid fluid administration, evidenced, by either:          Was hypotension present within one hour of the conclusion of crystalloid fluid administration?         Date of presentation of septic shock          Time of presentation of septic shock            User Key  (r) = Recorded By, (t) = Taken By, (c) = Cosigned By    [address] Name Provider Basil Rodrigez MD Physician                  MDM  Labs show hypokalemia, consistent with patient's vomiting, elevated lipase CT shows no pancreatitis  Have ordered potassium for repletion and will admit observation for intractable vomiting and hypokalemia  Disposition  Final diagnoses:   Hypokalemia   Vomiting     Time reflects when diagnosis was documented in both MDM as applicable and the Disposition within this note     Time User Action Codes Description Comment    10/22/2019  8:15 PM Ephriam Roxanney [E87 6] Hypokalemia     10/22/2019  8:15 PM Kj Peoples [R11 10] Vomiting       ED Disposition     ED Disposition Condition Date/Time Comment    Admit Stable Tue Oct 22, 2019  8:15 PM Case was discussed with Dr Clark Prieto and the patient's admission status was agreed to be Admission Status: observation status to the service of Dr Clark Prieto   Follow-up Information    None         Patient's Medications   Discharge Prescriptions    No medications on file     No discharge procedures on file      ED Provider  Electronically Signed by           Danish Barton MD  10/22/19 2025

## 2019-10-23 LAB
AMPHETAMINES SERPL QL SCN: NEGATIVE
ANION GAP SERPL CALCULATED.3IONS-SCNC: 10 MMOL/L (ref 4–13)
ATRIAL RATE: 92 BPM
BARBITURATES UR QL: NEGATIVE
BASOPHILS # BLD AUTO: 0.03 THOUSANDS/ΜL (ref 0–0.1)
BASOPHILS NFR BLD AUTO: 0 % (ref 0–1)
BENZODIAZ UR QL: NEGATIVE
BUN SERPL-MCNC: 15 MG/DL (ref 5–25)
CALCIUM SERPL-MCNC: 9 MG/DL (ref 8.3–10.1)
CHLORIDE SERPL-SCNC: 95 MMOL/L (ref 100–108)
CO2 SERPL-SCNC: 28 MMOL/L (ref 21–32)
COCAINE UR QL: NEGATIVE
CREAT SERPL-MCNC: 0.91 MG/DL (ref 0.6–1.3)
EOSINOPHIL # BLD AUTO: 0.02 THOUSAND/ΜL (ref 0–0.61)
EOSINOPHIL NFR BLD AUTO: 0 % (ref 0–6)
ERYTHROCYTE [DISTWIDTH] IN BLOOD BY AUTOMATED COUNT: 13 % (ref 11.6–15.1)
GFR SERPL CREATININE-BSD FRML MDRD: 114 ML/MIN/1.73SQ M
GLUCOSE P FAST SERPL-MCNC: 125 MG/DL (ref 65–99)
GLUCOSE SERPL-MCNC: 125 MG/DL (ref 65–140)
HCT VFR BLD AUTO: 41.2 % (ref 36.5–49.3)
HGB BLD-MCNC: 14.6 G/DL (ref 12–17)
IMM GRANULOCYTES # BLD AUTO: 0.02 THOUSAND/UL (ref 0–0.2)
IMM GRANULOCYTES NFR BLD AUTO: 0 % (ref 0–2)
LYMPHOCYTES # BLD AUTO: 2.76 THOUSANDS/ΜL (ref 0.6–4.47)
LYMPHOCYTES NFR BLD AUTO: 29 % (ref 14–44)
MAGNESIUM SERPL-MCNC: 2.1 MG/DL (ref 1.6–2.6)
MCH RBC QN AUTO: 28.6 PG (ref 26.8–34.3)
MCHC RBC AUTO-ENTMCNC: 35.4 G/DL (ref 31.4–37.4)
MCV RBC AUTO: 81 FL (ref 82–98)
METHADONE UR QL: NEGATIVE
MONOCYTES # BLD AUTO: 0.99 THOUSAND/ΜL (ref 0.17–1.22)
MONOCYTES NFR BLD AUTO: 11 % (ref 4–12)
NEUTROPHILS # BLD AUTO: 5.64 THOUSANDS/ΜL (ref 1.85–7.62)
NEUTS SEG NFR BLD AUTO: 60 % (ref 43–75)
NRBC BLD AUTO-RTO: 0 /100 WBCS
OPIATES UR QL SCN: POSITIVE
P AXIS: 68 DEGREES
PCP UR QL: NEGATIVE
PLATELET # BLD AUTO: 309 THOUSANDS/UL (ref 149–390)
PMV BLD AUTO: 10.2 FL (ref 8.9–12.7)
POTASSIUM SERPL-SCNC: 2.6 MMOL/L (ref 3.5–5.3)
POTASSIUM SERPL-SCNC: 3.1 MMOL/L (ref 3.5–5.3)
PR INTERVAL: 132 MS
QRS AXIS: 59 DEGREES
QRSD INTERVAL: 82 MS
QT INTERVAL: 434 MS
QTC INTERVAL: 536 MS
RBC # BLD AUTO: 5.1 MILLION/UL (ref 3.88–5.62)
SODIUM SERPL-SCNC: 133 MMOL/L (ref 136–145)
T WAVE AXIS: 72 DEGREES
THC UR QL: POSITIVE
VENTRICULAR RATE: 92 BPM
WBC # BLD AUTO: 9.46 THOUSAND/UL (ref 4.31–10.16)

## 2019-10-23 PROCEDURE — 84132 ASSAY OF SERUM POTASSIUM: CPT | Performed by: INTERNAL MEDICINE

## 2019-10-23 PROCEDURE — 80048 BASIC METABOLIC PNL TOTAL CA: CPT | Performed by: INTERNAL MEDICINE

## 2019-10-23 PROCEDURE — 99232 SBSQ HOSP IP/OBS MODERATE 35: CPT | Performed by: INTERNAL MEDICINE

## 2019-10-23 PROCEDURE — 83735 ASSAY OF MAGNESIUM: CPT | Performed by: INTERNAL MEDICINE

## 2019-10-23 PROCEDURE — 85025 COMPLETE CBC W/AUTO DIFF WBC: CPT | Performed by: INTERNAL MEDICINE

## 2019-10-23 PROCEDURE — 93010 ELECTROCARDIOGRAM REPORT: CPT | Performed by: INTERNAL MEDICINE

## 2019-10-23 PROCEDURE — 80307 DRUG TEST PRSMV CHEM ANLYZR: CPT | Performed by: INTERNAL MEDICINE

## 2019-10-23 RX ORDER — POTASSIUM CHLORIDE 20 MEQ/1
40 TABLET, EXTENDED RELEASE ORAL 2 TIMES DAILY
Status: DISCONTINUED | OUTPATIENT
Start: 2019-10-23 | End: 2019-10-23

## 2019-10-23 RX ORDER — POTASSIUM CHLORIDE 14.9 MG/ML
20 INJECTION INTRAVENOUS ONCE
Status: DISCONTINUED | OUTPATIENT
Start: 2019-10-23 | End: 2019-10-23

## 2019-10-23 RX ORDER — HYDROXYZINE HYDROCHLORIDE 25 MG/1
25 TABLET, FILM COATED ORAL EVERY 6 HOURS PRN
Status: DISCONTINUED | OUTPATIENT
Start: 2019-10-23 | End: 2019-10-25 | Stop reason: HOSPADM

## 2019-10-23 RX ORDER — POTASSIUM CHLORIDE 20 MEQ/1
40 TABLET, EXTENDED RELEASE ORAL EVERY 4 HOURS
Status: COMPLETED | OUTPATIENT
Start: 2019-10-23 | End: 2019-10-23

## 2019-10-23 RX ADMIN — HYDROXYZINE HYDROCHLORIDE 25 MG: 25 TABLET ORAL at 15:22

## 2019-10-23 RX ADMIN — PANTOPRAZOLE SODIUM 40 MG: 40 TABLET, DELAYED RELEASE ORAL at 05:52

## 2019-10-23 RX ADMIN — POTASSIUM CHLORIDE 40 MEQ: 1500 TABLET, EXTENDED RELEASE ORAL at 06:02

## 2019-10-23 RX ADMIN — POTASSIUM CHLORIDE 40 MEQ: 1500 TABLET, EXTENDED RELEASE ORAL at 14:19

## 2019-10-23 RX ADMIN — MELATONIN 3 MG: at 22:25

## 2019-10-23 RX ADMIN — HYDROXYZINE HYDROCHLORIDE 25 MG: 25 TABLET ORAL at 22:25

## 2019-10-23 RX ADMIN — OXCARBAZEPINE 300 MG: 150 TABLET, FILM COATED ORAL at 09:15

## 2019-10-23 RX ADMIN — QUETIAPINE FUMARATE 300 MG: 100 TABLET ORAL at 22:25

## 2019-10-23 RX ADMIN — BUPRENORPHINE HYDROCHLORIDE, NALOXONE HYDROCHLORIDE 1 FILM: 2; .5 FILM, SOLUBLE BUCCAL; SUBLINGUAL at 09:16

## 2019-10-23 RX ADMIN — OXCARBAZEPINE 300 MG: 150 TABLET, FILM COATED ORAL at 21:34

## 2019-10-23 RX ADMIN — POTASSIUM CHLORIDE 40 MEQ: 1500 TABLET, EXTENDED RELEASE ORAL at 09:58

## 2019-10-23 NOTE — PLAN OF CARE
Problem: PAIN - ADULT  Goal: Verbalizes/displays adequate comfort level or baseline comfort level  Description  Interventions:  - Encourage patient to monitor pain and request assistance  - Assess pain using appropriate pain scale  - Administer analgesics based on type and severity of pain and evaluate response  - Implement non-pharmacological measures as appropriate and evaluate response  - Consider cultural and social influences on pain and pain management  - Notify physician/advanced practitioner if interventions unsuccessful or patient reports new pain  Outcome: Progressing     Problem: INFECTION - ADULT  Goal: Absence or prevention of progression during hospitalization  Description  INTERVENTIONS:  - Assess and monitor for signs and symptoms of infection  - Monitor lab/diagnostic results  - Monitor all insertion sites, i e  indwelling lines, tubes, and drains  - Monitor endotracheal if appropriate and nasal secretions for changes in amount and color  - [place] appropriate cooling/warming therapies per order  - Administer medications as ordered  - Instruct and encourage patient and family to use good hand hygiene technique  - Identify and instruct in appropriate isolation precautions for identified infection/condition  Outcome: Progressing  Goal: Absence of fever/infection during neutropenic period  Description  INTERVENTIONS:  - Monitor WBC    Outcome: Progressing     Problem: SAFETY ADULT  Goal: Patient will remain free of falls  Description  INTERVENTIONS:  - Assess patient frequently for physical needs  -  Identify cognitive and physical deficits and behaviors that affect risk of falls    -  [place] fall precautions as indicated by assessment   - Educate patient/family on patient safety including physical limitations  - Instruct patient to call for assistance with activity based on assessment  - Modify environment to reduce risk of injury  - Consider OT/PT consult to assist with strengthening/mobility  Outcome: Progressing  Goal: Maintain or return to baseline ADL function  Description  INTERVENTIONS:  -  Assess patient's ability to carry out ADLs; assess patient's baseline for ADL function and identify physical deficits which impact ability to perform ADLs (bathing, care of mouth/teeth, toileting, grooming, dressing, etc )  - Assess/evaluate cause of self-care deficits   - Assess range of motion  - Assess patient's mobility; develop plan if impaired  - Assess patient's need for assistive devices and provide as appropriate  - Encourage maximum independence but intervene and supervise when necessary  - Involve family in performance of ADLs  - Assess for home care needs following discharge   - Consider OT consult to assist with ADL evaluation and planning for discharge  - Provide patient education as appropriate  Outcome: Progressing  Goal: Maintain or return mobility status to optimal level  Description  INTERVENTIONS:  - Assess patient's baseline mobility status (ambulation, transfers, stairs, etc )    - Identify cognitive and physical deficits and behaviors that affect mobility  - Identify mobility aids required to assist with transfers and/or ambulation (gait belt, sit-to-stand, lift, walker, cane, etc )  - [place] fall precautions as indicated by assessment  - Record patient progress and toleration of activity level on Mobility SBAR; progress patient to next Phase/Stage  - Instruct patient to call for assistance with activity based on assessment  - Consider rehabilitation consult to assist with strengthening/weightbearing, etc   Outcome: Progressing     Problem: DISCHARGE PLANNING  Goal: Discharge to home or other facility with appropriate resources  Description  INTERVENTIONS:  - Identify barriers to discharge w/patient and caregiver  - Arrange for needed discharge resources and transportation as appropriate  - Identify discharge learning needs (meds, wound care, etc )  - Arrange for interpretive services to assist at discharge as needed  - Refer to Case Management Department for coordinating discharge planning if the patient needs post-hospital services based on physician/advanced practitioner order or complex needs related to functional status, cognitive ability, or social support system  Outcome: Progressing     Problem: Knowledge Deficit  Goal: Patient/family/caregiver demonstrates understanding of disease process, treatment plan, medications, and discharge instructions  Description  Complete learning assessment and assess knowledge base    Interventions:  - Provide teaching at level of understanding  - Provide teaching via preferred learning methods  Outcome: Progressing

## 2019-10-23 NOTE — PLAN OF CARE
Problem: Nutrition/Hydration-ADULT  Goal: Nutrient/Hydration intake appropriate for improving, restoring or maintaining nutritional needs  Description  Monitor and assess patient's nutrition/hydration status for malnutrition  Collaborate with interdisciplinary team and initiate plan and interventions as ordered  Monitor patient's weight and dietary intake as ordered or per policy  Utilize nutrition screening tool and intervene as necessary  Determine patient's food preferences and provide high-protein, high-caloric foods as appropriate       INTERVENTIONS:  - Monitor oral intake, urinary output, labs, and treatment plans  - Assess nutrition and hydration status and recommend course of action  - Evaluate amount of meals eaten  - Assist patient with eating if necessary   - Allow adequate time for meals  - Recommend/ encourage appropriate diets, oral nutritional supplements, and vitamin/mineral supplements  - Order, calculate, and assess calorie counts as needed  - Recommend, monitor, and adjust tube feedings and TPN/PPN based on assessed needs  - Assess need for intravenous fluids  - Provide specific nutrition/hydration education as appropriate  - Include patient/family/caregiver in decisions related to nutrition  10/22/2019 2209 by Susana Felipe RN  Outcome: Not Progressing  10/22/2019 2125 by Susana Felipe RN  Outcome: Not Progressing

## 2019-10-23 NOTE — UTILIZATION REVIEW
Initial Clinical Review    Admission: Date/Time/Statement: OBS 10/22 2015 converted to IP on 10/23 @ 1102 for continued treatment of hypokalemia   Admitting Physician DEANNA ORTIZ    Level of Care Med Surg    Estimated length of stay More than 2 Midnights    Certification I certify that inpatient services are medically necessary for this patient for a duration of greater than two midnights  See H&P and MD Progress Notes for additional information about the patient's course of treatment  ED Arrival Information     Expected Arrival Acuity Means of Arrival Escorted By Service Admission Type    - 10/22/2019 17:38 Emergent Wheelchair Self General Medicine Emergency    Arrival Complaint    chest pain        Chief Complaint   Patient presents with    Chest Pain     Pt c/o chest pain x 2 days  Pt presents with cramping of arms which he states is 10/10 pain  Assessment/Plan: 31 yo male to ED from home hx of heroin abuse on suboxone w/ c/o N/V x4 days   Starts started w/ panic attack  Then progressed to N/V , minimal intake for the last 4 days   In ED found to have a K of 2 6  Admitted OBS status with plan to place on tele , replete K, recheck and supportive care   Intractable vomiting treat w/ prn zofran , IVF   Cont meds for bipolar and suboxone       ED Triage Vitals [10/22/19 1750]   Temperature Pulse Respirations Blood Pressure SpO2   97 7 °F (36 5 °C) 85 20 163/87 98 %      Temp Source Heart Rate Source Patient Position - Orthostatic VS BP Location FiO2 (%)   Oral Monitor Lying Right arm --      Pain Score       7        Wt Readings from Last 1 Encounters:   10/22/19 113 kg (250 lb)     Additional Vital Signs:   10/23/19 07:26:52  97 4 °F (36 3 °C)Abnormal   60  18  139/77  98  100 %       10/23/19 03:26:24  97 9 °F (36 6 °C)  90  18  136/89  105  96 %       10/22/19 23:22:59  99 5 °F (37 5 °C)  76  18  133/78  96  95 %       10/22/19 21:00:51  99 2 °F (37 3 °C)  65    151/88  109  97 %     10/22/19 1920    76  18  135/79    97 %  None (Room air         Pertinent Labs/Diagnostic Test Results:   10/22 CT abd - - wnl   10/22 EKG - NSR   Results from last 7 days   Lab Units 10/23/19  0502 10/22/19  1833   WBC Thousand/uL 9 46 13 31*   HEMOGLOBIN g/dL 14 6 16 5   HEMATOCRIT % 41 2 46 4   PLATELETS Thousands/uL 309 450*   NEUTROS ABS Thousands/µL 5 64 10 08*     Results from last 7 days   Lab Units 10/23/19  0502 10/22/19  1833   SODIUM mmol/L 133* 133*   POTASSIUM mmol/L 2 6* 2 4*   CHLORIDE mmol/L 95* 90*   CO2 mmol/L 28 30   ANION GAP mmol/L 10 13   BUN mg/dL 15 15   CREATININE mg/dL 0 91 1 05   EGFR ml/min/1 73sq m 114 96   CALCIUM mg/dL 9 0 10 0   MAGNESIUM mg/dL 2 1  --      Results from last 7 days   Lab Units 10/22/19  1833   AST U/L 17   ALT U/L 23   ALK PHOS U/L 84   TOTAL PROTEIN g/dL 8 7*   ALBUMIN g/dL 4 6   TOTAL BILIRUBIN mg/dL 1 50*     Results from last 7 days   Lab Units 10/23/19  0502 10/22/19  1833   GLUCOSE RANDOM mg/dL 125 123     Results from last 7 days   Lab Units 10/22/19  1833   TROPONIN I ng/mL <0 02     Results from last 7 days   Lab Units 10/22/19  1833   LIPASE u/L 550*     ED Treatment:   Medication Administration from 10/22/2019 1738 to 10/22/2019 2035       Date/Time Order Dose Route Action     10/22/2019 1834 sodium chloride 0 9 % infusion 1,000 mL/hr Intravenous New Bag     10/22/2019 1848 ketorolac (TORADOL) injection 15 mg 15 mg Intravenous Given     10/22/2019 1849 famotidine (PEPCID) injection 20 mg 20 mg Intravenous Given     10/22/2019 1846 morphine (PF) 4 mg/mL injection 4 mg 4 mg Intravenous Given     10/22/2019 1919 potassium chloride (K-DUR,KLOR-CON) CR tablet 40 mEq 40 mEq Oral Given     10/22/2019 1923 potassium chloride 20 mEq IVPB (premix) 20 mEq Intravenous New Bag        Past Medical History:   Diagnosis Date    ADHD     Anxiety     Bipolar disorder ([address] )     Depression     Drug use     Essential hypertension     benign    Hyperlipidemia     Peptic reflux disease      Present on Admission:   Intractable vomiting with nausea   Heroin addiction ([address] )   Gastroesophageal reflux disease without esophagitis   Bipolar disorder, current episode mixed, moderate (HCC)      Admitting Diagnosis: Hypokalemia [E87 6]  Vomiting [R11 10]  Chest pain [R07 9]  Age/Sex: 30 y o  male  Admission Orders:    Medications:  buprenorphine-naloxone 1 Film Sublingual Daily   heparin (porcine) 5,000 Units Subcutaneous Q8H [address]   melatonin 3 mg Oral HS   ondansetron 4 mg Intravenous Once   OXcarbazepine 300 mg Oral Q12H YUSEF   pantoprazole 40 mg Oral Early Morning   potassium chloride 40 mEq Oral Q4H   QUEtiapine 300 mg Oral HS     Tele   Act as pilar   SCD  I&O   Reg diet     Network Utilization Review Department  [email or web address]  ATTENTION: Please call with any questions or concerns to [phone] and carefully listen to the prompts so that you are directed to the right person  All voicemails are confidential   Perlita Child all requests for admission clinical reviews, approved or denied determinations and any other requests to dedicated fax number below belonging to the campus where the patient is receiving treatment    FACILITY NAME UR FAX NUMBER   ADMISSION DENIALS (Administrative/Medical Necessity) [address] [facility] (Maternity/NICU/Pediatrics) [phone]   [facility] [address] [facility] [phone]   [facility] [address] [facility] [phone]   [facility] [address] [phone]

## 2019-10-23 NOTE — SOCIAL WORK
CM name and role reviewed  Discharge Checklist reviewed and CM will continue to monitor for progress toward discharge goals in nursing and provider rounds  CM met with pt at bedside  Pt alert  CM reviewed observation status with pt  CM provided pt with a copy of obs notice  Pt signed obs notice  Obs placed in medical records  Pt reported that he lives at home with his finance  Pt is completely independent  Pt reported that he lost his son at birth about a year ago at this time  Pt stated that he has bipolar, anxiety and depression  Pt stated that he is on medications prescribed by his PCP, Grace Poole  Pt denied hx of drug use  When asked about alcohol use, pt stated "not really " CM offered outpatient MH/D&A resources but pt did not want resources at this time  Pt stated that he uses [facility] in [place]  [place]  Pt drives  CM reviewed discharge planning process including the following: identifying help at home, patient preference for discharge planning needs, pharmacy preference, and availability of treatment team to discuss questions or concerns patient and/or family may have regarding understanding medications and recognizing signs and symptoms once discharged  CM also encouraged patient to follow up with all recommended appointments after discharge  Patient advised of importance for patient and family to participate in managing patients medical well being

## 2019-10-23 NOTE — ASSESSMENT & PLAN NOTE
Telemetry monitoring due to severe hypokalemia, to monitor for life-threatening arrhythmias that may arise from hypokalemia  Supplement potassium both orally and IV  Recheck potassium  Supportive care

## 2019-10-23 NOTE — H&P
H&P- Delilah Vázquez 1990, 30 y o  male MRN: [id number]    Unit/Bed#: ED 15 Encounter: [id number]    Primary Care Provider: PETER King   Date and time admitted to hospital: 10/22/2019  5:48 PM        * Hypokalemia  Assessment & Plan  Telemetry monitoring due to severe hypokalemia, to monitor for life-threatening arrhythmias that may arise from hypokalemia  Supplement potassium both orally and IV  Recheck potassium  Supportive care    Gastroesophageal reflux disease without esophagitis  Assessment & Plan  Continue PPI    Intractable vomiting with nausea  Assessment & Plan  P r n  Zofran  IV fluids  Regular diet as tolerated    Bipolar disorder, current episode mixed, moderate (HCC)  Assessment & Plan  Continue trileptal and Seroquel at home dosing    Heroin addiction (HCC)  Assessment & Plan  Continue Suboxone at home dosing      VTE Prophylaxis: Heparin  / sequential compression device   Code Status:  Full code  POLST: There is no POLST form on file for this patient (pre-hospital)  Discussion with family:  Bedside    Anticipated Length of Stay:  Patient will be admitted on an Observation basis with an anticipated length of stay of  less than 2 midnights  Justification for Hospital Stay:  Hypokalemia    Total Time for Visit, including Counseling / Coordination of Care: 30 minutes  Greater than 50% of this total time spent on direct patient counseling and coordination of care  Chief Complaint:   Nausea vomiting    History of Present Illness:    Delilah Vázquez is a 30 y o  male past medical history significant for bipolar disorder, history of heroin abuse on Suboxone therapy presents with nausea vomiting x4 days  Patient has cyclical episodes of this nausea vomiting in the past, it always starts with a panic attack but then progresses to nausea and vomiting  Patient denies fevers chills bowel disturbance abdominal pain during these episodes, reports minimal p o  Intake for the past 4 days    He denies chest pain palpitations shortness of breath, syncope, lightheadedness dizziness  He is otherwise without complaint  Review of Systems:    Review of Systems   Constitutional: Negative  Negative for activity change, appetite change, chills, diaphoresis, fatigue, fever and unexpected weight change  HENT: Negative  Negative for congestion, rhinorrhea, sinus pressure, sinus pain and sore throat  Eyes: Negative  Negative for photophobia, pain, discharge and visual disturbance  Respiratory: Negative  Negative for cough, chest tightness, shortness of breath, wheezing and stridor  Cardiovascular: Negative  Negative for chest pain, palpitations and leg swelling  Gastrointestinal: Positive for nausea and vomiting  Negative for abdominal distention, abdominal pain, constipation and diarrhea  Endocrine: Negative  Negative for cold intolerance, heat intolerance and polyuria  Genitourinary: Negative  Negative for difficulty urinating, dysuria, flank pain, frequency, hematuria and urgency  Musculoskeletal: Negative  Negative for arthralgias, gait problem, joint swelling, myalgias and neck stiffness  Skin: Negative  Negative for color change, pallor, rash and wound  Allergic/Immunologic: Negative  Negative for immunocompromised state  Neurological: Negative  Negative for dizziness, seizures, syncope, weakness, light-headedness, numbness and headaches  Hematological: Negative  Negative for adenopathy  Does not bruise/bleed easily  Psychiatric/Behavioral: Negative  Negative for confusion and hallucinations  The patient is not nervous/anxious  Past Medical and Surgical History:     Past Medical History:   Diagnosis Date    ADHD     Anxiety     Bipolar disorder ([facility] [address] )     Depression     Drug use     Essential hypertension     benign    Hyperlipidemia     Peptic reflux disease        History reviewed  No pertinent surgical history      Meds/Allergies:    Prior to Admission medications    Medication Sig Start Date End Date Taking? Authorizing Provider   buprenorphine-naloxone (SUBOXONE) 8-2 mg per SL tablet One daily 19   PETER Pereira   omeprazole (PriLOSEC) 20 mg delayed release capsule Take 1 capsule (20 mg total) by mouth daily 10/10/19 1/8/20  PETER Pereira   ondansetron (ZOFRAN) 4 mg tablet Take 1 tablet (4 mg total) by mouth every 6 (six) hours as needed for nausea or vomiting for up to 10 doses 19   Aida Jean Baptiste MD   ondansetron (ZOFRAN-ODT) 8 mg disintegrating tablet Take 1 tablet (8 mg total) by mouth every 8 (eight) hours as needed for nausea or vomiting 10/12/18   Tracy Molina MD   OXcarbazepine (TRILEPTAL) 300 mg tablet Take 1 pill twice a day 10/10/19   PETER Pereira   potassium chloride (K-DUR,KLOR-CON) 20 mEq tablet Take 2 tablets (40 mEq total) by mouth 2 (two) times a day for 2 doses 10/2/19 10/3/19  Gladystine Eisenmenger, CRNP   QUEtiapine (SEROquel) 300 mg tablet Take 1 tablet (300 mg total) by mouth daily at bedtime 10/10/19 1/18/20  PETER Pereira   Fluoxetine HCl, PMDD, 20 MG CAPS Take 1 pill daily every morning 2/5/18 3/5/18  PETER Pereira     I have reviewed home medications using allscripts      Allergies: No Known Allergies    Social History:     Marital Status: Single   Occupation:    Patient Pre-hospital Living Situation:  Independent  Patient Pre-hospital Level of Mobility:  Ambulatory  Patient Pre-hospital Diet Restrictions:  None  Substance Use History:   Social History     Substance and Sexual Activity   Alcohol Use Not Currently    Comment: at social events     Social History     Tobacco Use   Smoking Status Former Smoker    Last attempt to quit: 3/30/2015    Years since quittin 5   Smokeless Tobacco Never Used     Social History     Substance and Sexual Activity   Drug Use Not Currently    Frequency: 2 0 times per week    Types: Marijuana, Heroin    Comment: Pt reports using THC daily x3, Heroin every other day and will alternate between using pills  Pt snorts heroin       Family History:    non-contributory    Physical Exam:     Vitals:   Blood Pressure: 135/79 (10/22/19 1920)  Pulse: 76 (10/22/19 1920)  Temperature: 97 7 °F (36 5 °C) (10/22/19 1750)  Temp Source: Oral (10/22/19 1750)  Respirations: 18 (10/22/19 1920)  SpO2: 97 % (10/22/19 1920)    Physical Exam   Constitutional: He is oriented to person, place, and time  He appears well-developed and well-nourished  No distress  HENT:   Head: Normocephalic and atraumatic  Right Ear: External ear normal    Left Ear: External ear normal    Nose: Nose normal    Mouth/Throat: Oropharynx is clear and moist  No oropharyngeal exudate  Eyes: Conjunctivae are normal  Right eye exhibits no discharge  Left eye exhibits no discharge  No scleral icterus  Neck: Normal range of motion  Neck supple  No JVD present  No tracheal deviation present  No thyromegaly present  Cardiovascular: Normal rate, regular rhythm, normal heart sounds and intact distal pulses  Exam reveals no gallop and no friction rub  No murmur heard  Pulmonary/Chest: Effort normal and breath sounds normal  No stridor  No respiratory distress  He has no wheezes  He has no rales  Abdominal: Soft  Bowel sounds are normal  He exhibits no distension  There is no tenderness  There is no rebound and no guarding  Musculoskeletal: Normal range of motion  He exhibits no edema, tenderness or deformity  Neurological: He is alert and oriented to person, place, and time  He has normal reflexes  He exhibits normal muscle tone  Coordination normal    Skin: Skin is warm and dry  No rash noted  He is not diaphoretic  No erythema  No pallor  Psychiatric: He has a normal mood and affect  His behavior is normal  Judgment and thought content normal    Nursing note and vitals reviewed  Additional Data:     Lab Results: I have personally reviewed pertinent reports        Results from last 7 days   Lab Units 10/22/19  1833   WBC Thousand/uL 13 31*   HEMOGLOBIN g/dL 16 5   HEMATOCRIT % 46 4   PLATELETS Thousands/uL 450*   NEUTROS PCT % 76*   LYMPHS PCT % 15   MONOS PCT % 9   EOS PCT % 0     Results from last 7 days   Lab Units 10/22/19  1833   SODIUM mmol/L 133*   POTASSIUM mmol/L 2 4*   CHLORIDE mmol/L 90*   CO2 mmol/L 30   BUN mg/dL 15   CREATININE mg/dL 1 05   ANION GAP mmol/L 13   CALCIUM mg/dL 10 0   ALBUMIN g/dL 4 6   TOTAL BILIRUBIN mg/dL 1 50*   ALK PHOS U/L 84   ALT U/L 23   AST U/L 17   GLUCOSE RANDOM mg/dL 123                       Imaging: I have personally reviewed pertinent reports  CT abdomen pelvis with contrast   Final Result by Mandi Thornton MD (10/22 2003)      No acute inflammatory process  Workstation performed: [id number]         XR chest 2 views    (Results Pending)       EKG, Pathology, and Other Studies Reviewed on Admission:   · EKG:  Sinus rhythm with a rate of 92    Allscripts / Epic Records Reviewed: Yes     ** Please Note: This note has been constructed using a voice recognition system   **

## 2019-10-23 NOTE — ED NOTES
1  CC: chest pain   2  Orientation status: A/O X4   3  Abnormal labs/vitals/assessment: potassium 2 4; lipase 550; WBC 13 31  4  Iv/drains/etc : 20 left AC   5  Last time narcotics given: morphine at 1846  6  Medications/drips: NSS and potassium at 50ml/hr   7  Ambulation status: independent   8  Isolation status: none   9  Skin: not assessed   10  Trauma: none    11   ED phone number: [phone]       Monet Dunn RN  10/22/19 2024

## 2019-10-24 PROBLEM — F11.90 CHRONIC, CONTINUOUS USE OF OPIOIDS: Status: ACTIVE | Noted: 2019-10-24

## 2019-10-24 LAB
ANION GAP SERPL CALCULATED.3IONS-SCNC: 11 MMOL/L (ref 4–13)
BUN SERPL-MCNC: 12 MG/DL (ref 5–25)
CALCIUM SERPL-MCNC: 9 MG/DL (ref 8.3–10.1)
CHLORIDE SERPL-SCNC: 97 MMOL/L (ref 100–108)
CO2 SERPL-SCNC: 26 MMOL/L (ref 21–32)
CREAT SERPL-MCNC: 0.96 MG/DL (ref 0.6–1.3)
ERYTHROCYTE [DISTWIDTH] IN BLOOD BY AUTOMATED COUNT: 12.9 % (ref 11.6–15.1)
GFR SERPL CREATININE-BSD FRML MDRD: 107 ML/MIN/1.73SQ M
GLUCOSE SERPL-MCNC: 161 MG/DL (ref 65–140)
HCT VFR BLD AUTO: 42.4 % (ref 36.5–49.3)
HGB BLD-MCNC: 14.7 G/DL (ref 12–17)
MAGNESIUM SERPL-MCNC: 2.2 MG/DL (ref 1.6–2.6)
MCH RBC QN AUTO: 28 PG (ref 26.8–34.3)
MCHC RBC AUTO-ENTMCNC: 34.7 G/DL (ref 31.4–37.4)
MCV RBC AUTO: 81 FL (ref 82–98)
PLATELET # BLD AUTO: 313 THOUSANDS/UL (ref 149–390)
PMV BLD AUTO: 10.1 FL (ref 8.9–12.7)
POTASSIUM SERPL-SCNC: 2.7 MMOL/L (ref 3.5–5.3)
POTASSIUM SERPL-SCNC: 3.2 MMOL/L (ref 3.5–5.3)
RBC # BLD AUTO: 5.25 MILLION/UL (ref 3.88–5.62)
SODIUM SERPL-SCNC: 134 MMOL/L (ref 136–145)
WBC # BLD AUTO: 9.38 THOUSAND/UL (ref 4.31–10.16)

## 2019-10-24 PROCEDURE — 85027 COMPLETE CBC AUTOMATED: CPT | Performed by: INTERNAL MEDICINE

## 2019-10-24 PROCEDURE — 99232 SBSQ HOSP IP/OBS MODERATE 35: CPT | Performed by: INTERNAL MEDICINE

## 2019-10-24 PROCEDURE — 80048 BASIC METABOLIC PNL TOTAL CA: CPT | Performed by: INTERNAL MEDICINE

## 2019-10-24 PROCEDURE — 83735 ASSAY OF MAGNESIUM: CPT | Performed by: INTERNAL MEDICINE

## 2019-10-24 PROCEDURE — 84132 ASSAY OF SERUM POTASSIUM: CPT | Performed by: INTERNAL MEDICINE

## 2019-10-24 RX ORDER — BUPRENORPHINE AND NALOXONE 2; .5 MG/1; MG/1
4 FILM, SOLUBLE BUCCAL; SUBLINGUAL DAILY
Status: DISCONTINUED | OUTPATIENT
Start: 2019-10-25 | End: 2019-10-25 | Stop reason: HOSPADM

## 2019-10-24 RX ORDER — QUETIAPINE FUMARATE 25 MG/1
50 TABLET, FILM COATED ORAL DAILY
Status: DISCONTINUED | OUTPATIENT
Start: 2019-10-25 | End: 2019-10-25 | Stop reason: HOSPADM

## 2019-10-24 RX ORDER — POTASSIUM CHLORIDE 20 MEQ/1
40 TABLET, EXTENDED RELEASE ORAL 2 TIMES DAILY
Status: COMPLETED | OUTPATIENT
Start: 2019-10-24 | End: 2019-10-24

## 2019-10-24 RX ORDER — BUPRENORPHINE AND NALOXONE 8; 2 MG/1; MG/1
1 FILM, SOLUBLE BUCCAL; SUBLINGUAL DAILY
Status: DISCONTINUED | OUTPATIENT
Start: 2019-10-24 | End: 2019-10-24

## 2019-10-24 RX ORDER — HYDROXYZINE HYDROCHLORIDE 25 MG/1
50 TABLET, FILM COATED ORAL 2 TIMES DAILY PRN
Status: DISCONTINUED | OUTPATIENT
Start: 2019-10-24 | End: 2019-10-24

## 2019-10-24 RX ORDER — BUPRENORPHINE AND NALOXONE 2; .5 MG/1; MG/1
3 FILM, SOLUBLE BUCCAL; SUBLINGUAL ONCE
Status: COMPLETED | OUTPATIENT
Start: 2019-10-24 | End: 2019-10-24

## 2019-10-24 RX ORDER — POTASSIUM CHLORIDE 14.9 MG/ML
20 INJECTION INTRAVENOUS
Status: COMPLETED | OUTPATIENT
Start: 2019-10-24 | End: 2019-10-24

## 2019-10-24 RX ADMIN — QUETIAPINE FUMARATE 300 MG: 100 TABLET ORAL at 21:39

## 2019-10-24 RX ADMIN — BUPRENORPHINE HYDROCHLORIDE, NALOXONE HYDROCHLORIDE 3 FILM: 2; .5 FILM, SOLUBLE BUCCAL; SUBLINGUAL at 13:01

## 2019-10-24 RX ADMIN — MELATONIN 3 MG: at 21:39

## 2019-10-24 RX ADMIN — OXCARBAZEPINE 300 MG: 150 TABLET, FILM COATED ORAL at 21:39

## 2019-10-24 RX ADMIN — POTASSIUM CHLORIDE 20 MEQ: 200 INJECTION, SOLUTION INTRAVENOUS at 09:21

## 2019-10-24 RX ADMIN — HYDROXYZINE HYDROCHLORIDE 25 MG: 25 TABLET ORAL at 13:01

## 2019-10-24 RX ADMIN — POTASSIUM CHLORIDE 40 MEQ: 1500 TABLET, EXTENDED RELEASE ORAL at 06:22

## 2019-10-24 RX ADMIN — BUPRENORPHINE HYDROCHLORIDE, NALOXONE HYDROCHLORIDE 1 FILM: 2; .5 FILM, SOLUBLE BUCCAL; SUBLINGUAL at 09:39

## 2019-10-24 RX ADMIN — POTASSIUM CHLORIDE 40 MEQ: 1500 TABLET, EXTENDED RELEASE ORAL at 17:51

## 2019-10-24 RX ADMIN — POTASSIUM CHLORIDE 20 MEQ: 200 INJECTION, SOLUTION INTRAVENOUS at 06:22

## 2019-10-24 RX ADMIN — OXCARBAZEPINE 300 MG: 150 TABLET, FILM COATED ORAL at 09:39

## 2019-10-24 RX ADMIN — PANTOPRAZOLE SODIUM 40 MG: 40 TABLET, DELAYED RELEASE ORAL at 05:20

## 2019-10-24 NOTE — PROGRESS NOTES
Progress Note - Karen Leal 1990, 30 y o  male MRN: [id number]    Unit/Bed#: -01 Encounter: [id number]    Primary Care Provider: PETER Mao   Date and time admitted to hospital: 10/22/2019  5:48 PM        Intractable vomiting with nausea  Assessment & Plan  Patient is still reporting nausea  Observed by significant other to be close lying liquids in the bathroom in an attempt to what she reports as to make himself vomit  Patient supported holistic Imani and observed  Psychiatric consult will be obtained  Continue hydroxyzine for now for anxiety  IV fluids will continue  Regular diet as tolerated    * Hypokalemia  Assessment & Plan  Telemetry monitoring due to severe hypokalemia, to monitor for life-threatening arrhythmias that may arise from hypokalemia  Supplement potassium both orally and IV  Recheck potassium  Supportive care  Magnesium within normal limits    Chronic, continuous use of opioids  Assessment & Plan  Need to confirm home dosing with Dr New Pollack    Gastroesophageal reflux disease without esophagitis  Assessment & Plan  Continue PPI    Bipolar disorder, current episode mixed, moderate (HCC)  Assessment & Plan  Continue trileptal and Seroquel at home dosing  Patient tearful and agreeable to psychiatric consultation, history of inpatient psychiatric care 1 year ago  Patient does not vocalize any desire to harm himself or any other  Significant other is concerned with his noncompliance with medications and inability to recognize his symptoms  Heroin addiction [facility])  Assessment & Plan  Continue Suboxone at home dosing which needs to be confirmed  For now continue current dosing  VTE Pharmacologic Prophylaxis:   Pharmacologic: Patient has refused VTE prophylaxis  Mechanical: Mechanical VTE prophylaxis in place  Patient Centered Rounds: I have performed bedside rounds with nursing staff today    Discussions with Specialists or Other Care Team Provider: None  Education and Discussions with Family / Patient:  Updated patient and significant other  Time Spent for Care: Thirty-five  If More than 50% of total time spent on counseling and coordination of care as described above indicate yes or no and described the counseling and coordination:  No    Current Length of Stay: 1 day(s)  Current Patient Status: Inpatient   Certification Statement: The patient will continue to require additional inpatient hospital stay due to Repletion of potassium p o  And IV, control of current mood through psychiatric consultation adjustment of medications    Discharge Plan: To be determined  Code Status: Level 1 - Full Code    Subjective:   Patient is tearful and distraught  He is hopeless  He states that he does feels very down  Does not desire to hurt himself  Open to psychiatric consultation to titrate medications    Objective:   Vitals:   Temp (24hrs), Av 4 °F (36 9 °C), Min:97 5 °F (36 4 °C), Max:99 8 °F (37 7 °C)    Temp:  [97 5 °F (36 4 °C)-99 8 °F (37 7 °C)] 99 1 °F (37 3 °C)  HR:  [] 67  Resp:  [16-20] 16  BP: (130-154)/() 136/83  SpO2:  [95 %-99 %] 95 %  Body mass index is 33 91 kg/m²  Input and Output Summary (last 24 hours): Intake/Output Summary (Last 24 hours)  Gross per 24 hour   Intake 240 ml   Output 900 ml   Net -660 ml       Physical Exam:     Physical Exam   Constitutional: He is oriented to person, place, and time  He appears well-developed  No distress  HENT:   Head: Normocephalic and atraumatic  Right Ear: External ear normal    Left Ear: External ear normal    Nose: Nose normal    Mouth/Throat: Oropharynx is clear and moist  No oropharyngeal exudate  Eyes: Pupils are equal, round, and reactive to light  Conjunctivae and EOM are normal  Right eye exhibits no discharge  Left eye exhibits no discharge  No scleral icterus  Neck: Normal range of motion  Neck supple  No JVD present  No thyromegaly present     Cardiovascular: Normal rate, regular rhythm, normal heart sounds and intact distal pulses  Exam reveals no gallop and no friction rub  No murmur heard  Pulmonary/Chest: Breath sounds normal  No respiratory distress  He has no wheezes  He has no rales  Abdominal: Soft  Bowel sounds are normal  He exhibits no distension  There is no tenderness  There is no rebound and no guarding  Musculoskeletal: Normal range of motion  He exhibits no edema or deformity  Lymphadenopathy:     He has no cervical adenopathy  Neurological: He is alert and oriented to person, place, and time  He has normal reflexes  He displays normal reflexes  No cranial nerve deficit  He exhibits normal muscle tone  Skin: Skin is warm and dry  No rash noted  He is not diaphoretic  No erythema  Psychiatric:   Flat affect depressed     Vitals reviewed  Additional Data:   Labs:  Results from last 7 days   Lab Units 10/23/19  0502   WBC Thousand/uL 9 46   HEMOGLOBIN g/dL 14 6   HEMATOCRIT % 41 2   PLATELETS Thousands/uL 309   NEUTROS PCT % 60   LYMPHS PCT % 29   MONOS PCT % 11   EOS PCT % 0     Results from last 7 days   Lab 10/23/19     POTASSIUM 2 6   CHLORIDE 95   CO2 28   BUN 15   CREATININE   91   CALCIUM 9 0   ALK PHOS    ALT    AST     < > = values in this interval not displayed  * I Have Reviewed All Lab Data Listed Above  * Additional Pertinent Lab Tests Reviewed: All Labs Within Last 24 Hours Reviewed    Imaging:    Imaging Reports Reviewed Today Include: none    Cultures:   Blood Culture:   Lab Results   Component Value Date    BLOODCX No Growth After 5 Days  04/21/2019    BLOODCX No Growth After 5 Days   04/21/2019     Urine Culture: No results found for: URINECX  Sputum Culture: No components found for: SPUTUMCX  Wound Culture: No results found for: WOUNDCULT    Last 24 Hours Medication List:     Current Facility-Administered Medications:  [START ON 10/25/2019] buprenorphine-naloxone 4 Film Sublingual Daily Princess Ibarra MD   heparin (porcine) 5,000 Units Subcutaneous [facility] Trevon Quarles MD   hydrOXYzine HCL 25 mg Oral Q6H PRN Ari Higgins MD   melatonin 3 mg Oral HS Kaylyn Perales MD   ondansetron 4 mg Intravenous Once Trevon Quarles MD   OXcarbazepine 300 mg Oral Q12H Tiffany Wyman MD   pantoprazole 40 mg Oral Early Morning Trevon Quarles MD   potassium chloride 40 mEq Oral BID Apollo Zafar PA-C   QUEtiapine 300 mg Oral HS Trevon Quarles MD        Today, Patient Was Seen By: Ari Higgins MD    ** Please Note: Dragon 360 Dictation voice to text software may have been used in the creation of this document   **

## 2019-10-24 NOTE — ASSESSMENT & PLAN NOTE
Continue Suboxone at home dosing have confirmed that he takes 8/2 mg will adjust admitting dose to this level

## 2019-10-24 NOTE — QUICK NOTE
Called with potassium level of 2 7  Will supplement with 40 mEq IV x1 dose and 80 mEq PO in divided doses

## 2019-10-24 NOTE — ASSESSMENT & PLAN NOTE
Continue trileptal and Seroquel at home dosing  Patient tearful and agreeable to psychiatric consultation, history of inpatient psychiatric care 1 year ago  Patient does not vocalize any desire to harm himself or any other  Significant other is concerned with his noncompliance with medications and inability to recognize his symptoms

## 2019-10-24 NOTE — ASSESSMENT & PLAN NOTE
Telemetry monitoring due to severe hypokalemia, to monitor for life-threatening arrhythmias that may arise from hypokalemia  Supplement potassium both orally and IV  Recheck potassium  Supportive care  Magnesium within normal limits

## 2019-10-24 NOTE — PLAN OF CARE
Problem: Nutrition/Hydration-ADULT  Goal: Nutrient/Hydration intake appropriate for improving, restoring or maintaining nutritional needs  Description  Monitor and assess patient's nutrition/hydration status for malnutrition  Collaborate with interdisciplinary team and initiate plan and interventions as ordered  Monitor patient's weight and dietary intake as ordered or per policy  Utilize nutrition screening tool and intervene as necessary  Determine patient's food preferences and provide high-protein, high-caloric foods as appropriate  INTERVENTIONS:  - Monitor oral intake, urinary output, labs, and treatment plans  - Assess nutrition and hydration status and recommend course of action  - Evaluate amount of meals eaten  - Assist patient with eating if necessary   - Allow adequate time for meals  - Recommend/ encourage appropriate diets, oral nutritional supplements, and vitamin/mineral supplements  - Order, calculate, and assess calorie counts as needed  - Recommend, monitor, and adjust tube feedings and TPN/PPN based on assessed needs  - Assess need for intravenous fluids  - Provide specific nutrition/hydration education as appropriate  - Include patient/family/caregiver in decisions related to nutrition  Outcome: Progressing  Note:   Patient has poor appetite  He has been eating [place]       Problem: PAIN - ADULT  Goal: Verbalizes/displays adequate comfort level or baseline comfort level  Description  Interventions:  - Encourage patient to monitor pain and request assistance  - Assess pain using appropriate pain scale  - Administer analgesics based on type and severity of pain and evaluate response  - Implement non-pharmacological measures as appropriate and evaluate response  - Consider cultural and social influences on pain and pain management  - Notify physician/advanced practitioner if interventions unsuccessful or patient reports new pain  Outcome: Progressing  Note:   No pain reported Problem: INFECTION - ADULT  Goal: Absence or prevention of progression during hospitalization  Description  INTERVENTIONS:  - Assess and monitor for signs and symptoms of infection  - Monitor lab/diagnostic results  - Monitor all insertion sites, i e  indwelling lines, tubes, and drains  - Monitor endotracheal if appropriate and nasal secretions for changes in amount and color  - [place] appropriate cooling/warming therapies per order  - Administer medications as ordered  - Instruct and encourage patient and family to use good hand hygiene technique  - Identify and instruct in appropriate isolation precautions for identified infection/condition  Outcome: Progressing  Goal: Absence of fever/infection during neutropenic period  Description  INTERVENTIONS:  - Monitor WBC    Outcome: Progressing     Problem: SAFETY ADULT  Goal: Patient will remain free of falls  Description  INTERVENTIONS:  - Assess patient frequently for physical needs  -  Identify cognitive and physical deficits and behaviors that affect risk of falls    -  [place] fall precautions as indicated by assessment   - Educate patient/family on patient safety including physical limitations  - Instruct patient to call for assistance with activity based on assessment  - Modify environment to reduce risk of injury  - Consider OT/PT consult to assist with strengthening/mobility  Outcome: Progressing  Goal: Maintain or return to baseline ADL function  Description  INTERVENTIONS:  -  Assess patient's ability to carry out ADLs; assess patient's baseline for ADL function and identify physical deficits which impact ability to perform ADLs (bathing, care of mouth/teeth, toileting, grooming, dressing, etc )  - Assess/evaluate cause of self-care deficits   - Assess range of motion  - Assess patient's mobility; develop plan if impaired  - Assess patient's need for assistive devices and provide as appropriate  - Encourage maximum independence but intervene and supervise when necessary  - Involve family in performance of ADLs  - Assess for home care needs following discharge   - Consider OT consult to assist with ADL evaluation and planning for discharge  - Provide patient education as appropriate  Outcome: Progressing  Goal: Maintain or return mobility status to optimal level  Description  INTERVENTIONS:  - Assess patient's baseline mobility status (ambulation, transfers, stairs, etc )    - Identify cognitive and physical deficits and behaviors that affect mobility  - Identify mobility aids required to assist with transfers and/or ambulation (gait belt, sit-to-stand, lift, walker, cane, etc )  - [place] fall precautions as indicated by assessment  - Record patient progress and toleration of activity level on Mobility SBAR; progress patient to next Phase/Stage  - Instruct patient to call for assistance with activity based on assessment  - Consider rehabilitation consult to assist with strengthening/weightbearing, etc   Outcome: Progressing     Problem: DISCHARGE PLANNING  Goal: Discharge to home or other facility with appropriate resources  Description  INTERVENTIONS:  - Identify barriers to discharge w/patient and caregiver  - Arrange for needed discharge resources and transportation as appropriate  - Identify discharge learning needs (meds, wound care, etc )  - Arrange for interpretive services to assist at discharge as needed  - Refer to Case Management Department for coordinating discharge planning if the patient needs post-hospital services based on physician/advanced practitioner order or complex needs related to functional status, cognitive ability, or social support system  Outcome: Progressing     Problem: Knowledge Deficit  Goal: Patient/family/caregiver demonstrates understanding of disease process, treatment plan, medications, and discharge instructions  Description  Complete learning assessment and assess knowledge base    Interventions:  - Provide teaching at level of understanding  - Provide teaching via preferred learning methods  Outcome: Progressing     Problem: Potential for Falls  Goal: Patient will remain free of falls  Description  INTERVENTIONS:  - Assess patient frequently for physical needs  -  Identify cognitive and physical deficits and behaviors that affect risk of falls    -  [place] fall precautions as indicated by assessment   - Educate patient/family on patient safety including physical limitations  - Instruct patient to call for assistance with activity based on assessment  - Modify environment to reduce risk of injury  - Consider OT/PT consult to assist with strengthening/mobility  Outcome: Progressing

## 2019-10-24 NOTE — ASSESSMENT & PLAN NOTE
Affirmed by a most recent notes that patient is indeed taking Suboxone 8 -20 mg  Will upgrade dosing at this time  Patient reports some sweating which may be signs of slight withdrawal after being on lower dose at admission

## 2019-10-24 NOTE — PROGRESS NOTES
Progress Note - Ginny Le 1990, 30 y o  male MRN: [id number]    Unit/Bed#: -01 Encounter: [id number]    Primary Care Provider: PETER Alvarado   Date and time admitted to hospital: 10/22/2019  5:48 PM        Intractable vomiting with nausea  Assessment & Plan  Patient reports no further vomiting since yesterday  P r n  Zofran  IV fluids will continue  Regular diet as tolerated    * Hypokalemia  Assessment & Plan  Telemetry monitoring due to severe hypokalemia, to monitor for life-threatening arrhythmias that may arise from hypokalemia  Supplement potassium both orally and IV  Recheck potassium  Supportive care  Magnesium within normal limits    Chronic, continuous use of opioids  Assessment & Plan  Affirmed by a most recent notes that patient is indeed taking Suboxone 8 -20 mg  Will upgrade dosing at this time  Patient reports some sweating which may be signs of slight withdrawal after being on lower dose at admission  Gastroesophageal reflux disease without esophagitis  Assessment & Plan  Continue PPI    Bipolar disorder, current episode mixed, moderate (HCC)  Assessment & Plan  Continue trileptal and Seroquel at home dosing  Patient tearful and agreeable to psychiatric consultation, history of inpatient psychiatric care 1 year ago  Patient does not localize any desire to harm himself or any other  Significant other is concerned with his noncompliance with medications and inability to recognize his symptoms  Heroin addiction [facility])  Assessment & Plan  Continue Suboxone at home dosing have confirmed that he takes 8/2 mg will adjust admitting dose to this level  VTE Pharmacologic Prophylaxis:   Pharmacologic: Patient has refused VTE prophylaxis  Mechanical: Mechanical VTE prophylaxis in place  Patient Centered Rounds: I have performed bedside rounds with nursing staff today    Discussions with Specialists or Other Care Team Provider:  None  Education and Discussions with Family / Patient:  Updated significant other at bedside  Time Spent for Care: 30 minutes  If More than 50% of total time spent on counseling and coordination of care as described above indicate yes or no and described the counseling and coordination:  None    Current Length of Stay: 1 day(s)  Current Patient Status: Inpatient   Certification Statement: The patient will continue to require additional inpatient hospital stay due to Treatment for nausea vomiting, addressing psychiatric concerns which are significantly associated with his admission    Discharge Plan: To be determined diet being advanced  Code Status: Level 1 - Full Code    Subjective:   Patient is still somewhat for Eather Dural and tearful today but is interactive  Patient states he is feeling a little bit better  No further vomiting overnight  Asked him to try to advance his diet slightly  Still agreeable to psychiatric consultation which is pending    Objective:   Vitals:   Temp (24hrs), Av 4 °F (36 9 °C), Min:97 5 °F (36 4 °C), Max:99 8 °F (37 7 °C)    Temp:  [97 5 °F (36 4 °C)-99 8 °F (37 7 °C)] 99 1 °F (37 3 °C)  HR:  [] 67  Resp:  [16-20] 16  BP: (130-154)/() 136/83  SpO2:  [95 %-99 %] 95 %  Body mass index is 33 91 kg/m²  Input and Output Summary (last 24 hours):        Intake/Output Summary (Last 24 hours) at 10/24/2019 1629  Last data filed at 10/24/2019 0545  Gross per 24 hour   Intake 240 ml   Output 900 ml   Net -660 ml       Physical Exam:  Generally obese white male no acute distress but affect is flat and he is emotionally depressed normocephalic atraumatic pupils equal round and reactive to light extraocular muscles intact mucous membranes are moist neck is supple there is no JVD no lymph nodes no carotid bruits chest is decreased but clear to auscultation is no rhonchi rales or wheezes  Cardiovascular regular rate rhythm positive S1 and S2 no S3-S4 murmur or gallop  Abdomen obese soft nontender nondistended with positive bowel sounds no hepatosplenomegaly no guarding or rebound  Neurologically awake alert oriented cranial nerves 2-12 intact    Physical Exam    Additional Data:   Labs:  Results from last 7 days   Lab Units 10/24/19  0517 10/23/19  0502   WBC Thousand/uL 9 38 9 46   HEMOGLOBIN g/dL 14 7 14 6   HEMATOCRIT % 42 4 41 2   PLATELETS Thousands/uL 313 309   NEUTROS PCT %  --  60   LYMPHS PCT %  --  29   MONOS PCT %  --  11   EOS PCT %  --  0     Results from last 7 days   Lab Units 10/24/19  0517  10/22/19  1833   POTASSIUM mmol/L 2 7*   < > 2 4*   CHLORIDE mmol/L 97*   < > 90*   CO2 mmol/L 26   < > 30   BUN mg/dL 12   < > 15   CREATININE mg/dL 0 96   < > 1 05   CALCIUM mg/dL 9 0   < > 10 0   ALK PHOS U/L  --   --  84   ALT U/L  --   --  23   AST U/L  --   --  17    < > = values in this interval not displayed  * I Have Reviewed All Lab Data Listed Above  * Additional Pertinent Lab Tests Reviewed: All Labs Within Last 24 Hours Reviewed    Imaging:    Imaging Reports Reviewed Today Include:  None    Cultures:   Blood Culture:   Lab Results   Component Value Date    BLOODCX No Growth After 5 Days  04/21/2019    BLOODCX No Growth After 5 Days   04/21/2019     Urine Culture: No results found for: URINECX  Sputum Culture: No components found for: SPUTUMCX  Wound Culture: No results found for: WOUNDCULT    Last 24 Hours Medication List:     Current Facility-Administered Medications:  [START ON 10/25/2019] buprenorphine-naloxone 4 Film Sublingual Daily Rachel Young MD   heparin (porcine) 5,000 Units Subcutaneous Q8H [address] Moncia Bradford MD   hydrOXYzine HCL 25 mg Oral Q6H PRN Rachel Young MD   melatonin 3 mg Oral HS Jordy Silver MD   ondansetron 4 mg Intravenous Once Monica Bradford MD   OXcarbazepine 300 mg Oral Q12H [address] Monica Bradford MD   pantoprazole 40 mg Oral Early Morning Monica Bradford MD   potassium chloride 40 mEq Oral BID Jatin Hanks PA-C   QUEtiapine 300 mg Oral HS Demarcus Powell Marylu Goldman MD        Today, Patient Was Seen By: Jon Harden MD    ** Please Note: Dragon 360 Dictation voice to text software may have been used in the creation of this document   **

## 2019-10-24 NOTE — PLAN OF CARE
Problem: Nutrition/Hydration-ADULT  Goal: Nutrient/Hydration intake appropriate for improving, restoring or maintaining nutritional needs  Description  Monitor and assess patient's nutrition/hydration status for malnutrition  Collaborate with interdisciplinary team and initiate plan and interventions as ordered  Monitor patient's weight and dietary intake as ordered or per policy  Utilize nutrition screening tool and intervene as necessary  Determine patient's food preferences and provide high-protein, high-caloric foods as appropriate       INTERVENTIONS:  - Monitor oral intake, urinary output, labs, and treatment plans  - Assess nutrition and hydration status and recommend course of action  - Evaluate amount of meals eaten  - Assist patient with eating if necessary   - Allow adequate time for meals  - Recommend/ encourage appropriate diets, oral nutritional supplements, and vitamin/mineral supplements  - Order, calculate, and assess calorie counts as needed  - Recommend, monitor, and adjust tube feedings and TPN/PPN based on assessed needs  - Assess need for intravenous fluids  - Provide specific nutrition/hydration education as appropriate  - Include patient/family/caregiver in decisions related to nutrition  Outcome: Progressing     Problem: PAIN - ADULT  Goal: Verbalizes/displays adequate comfort level or baseline comfort level  Description  Interventions:  - Encourage patient to monitor pain and request assistance  - Assess pain using appropriate pain scale  - Administer analgesics based on type and severity of pain and evaluate response  - Implement non-pharmacological measures as appropriate and evaluate response  - Consider cultural and social influences on pain and pain management  - Notify physician/advanced practitioner if interventions unsuccessful or patient reports new pain  Outcome: Progressing     Problem: INFECTION - ADULT  Goal: Absence or prevention of progression during hospitalization  Description  INTERVENTIONS:  - Assess and monitor for signs and symptoms of infection  - Monitor lab/diagnostic results  - Monitor all insertion sites, i e  indwelling lines, tubes, and drains  - Monitor endotracheal if appropriate and nasal secretions for changes in amount and color  - [place] appropriate cooling/warming therapies per order  - Administer medications as ordered  - Instruct and encourage patient and family to use good hand hygiene technique  - Identify and instruct in appropriate isolation precautions for identified infection/condition  Outcome: Progressing  Goal: Absence of fever/infection during neutropenic period  Description  INTERVENTIONS:  - Monitor WBC    Outcome: Progressing     Problem: SAFETY ADULT  Goal: Patient will remain free of falls  Description  INTERVENTIONS:  - Assess patient frequently for physical needs  -  Identify cognitive and physical deficits and behaviors that affect risk of falls    -  [place] fall precautions as indicated by assessment   - Educate patient/family on patient safety including physical limitations  - Instruct patient to call for assistance with activity based on assessment  - Modify environment to reduce risk of injury  - Consider OT/PT consult to assist with strengthening/mobility  Outcome: Progressing  Goal: Maintain or return to baseline ADL function  Description  INTERVENTIONS:  -  Assess patient's ability to carry out ADLs; assess patient's baseline for ADL function and identify physical deficits which impact ability to perform ADLs (bathing, care of mouth/teeth, toileting, grooming, dressing, etc )  - Assess/evaluate cause of self-care deficits   - Assess range of motion  - Assess patient's mobility; develop plan if impaired  - Assess patient's need for assistive devices and provide as appropriate  - Encourage maximum independence but intervene and supervise when necessary  - Involve family in performance of ADLs  - Assess for home care needs following discharge   - Consider OT consult to assist with ADL evaluation and planning for discharge  - Provide patient education as appropriate  Outcome: Progressing  Goal: Maintain or return mobility status to optimal level  Description  INTERVENTIONS:  - Assess patient's baseline mobility status (ambulation, transfers, stairs, etc )    - Identify cognitive and physical deficits and behaviors that affect mobility  - Identify mobility aids required to assist with transfers and/or ambulation (gait belt, sit-to-stand, lift, walker, cane, etc )  - [place] fall precautions as indicated by assessment  - Record patient progress and toleration of activity level on Mobility SBAR; progress patient to next Phase/Stage  - Instruct patient to call for assistance with activity based on assessment  - Consider rehabilitation consult to assist with strengthening/weightbearing, etc   Outcome: Progressing     Problem: DISCHARGE PLANNING  Goal: Discharge to home or other facility with appropriate resources  Description  INTERVENTIONS:  - Identify barriers to discharge w/patient and caregiver  - Arrange for needed discharge resources and transportation as appropriate  - Identify discharge learning needs (meds, wound care, etc )  - Arrange for interpretive services to assist at discharge as needed  - Refer to Case Management Department for coordinating discharge planning if the patient needs post-hospital services based on physician/advanced practitioner order or complex needs related to functional status, cognitive ability, or social support system  Outcome: Progressing     Problem: Knowledge Deficit  Goal: Patient/family/caregiver demonstrates understanding of disease process, treatment plan, medications, and discharge instructions  Description  Complete learning assessment and assess knowledge base    Interventions:  - Provide teaching at level of understanding  - Provide teaching via preferred learning methods  Outcome: Progressing     Problem: Potential for Falls  Goal: Patient will remain free of falls  Description  INTERVENTIONS:  - Assess patient frequently for physical needs  -  Identify cognitive and physical deficits and behaviors that affect risk of falls    -  [place] fall precautions as indicated by assessment   - Educate patient/family on patient safety including physical limitations  - Instruct patient to call for assistance with activity based on assessment  - Modify environment to reduce risk of injury  - Consider OT/PT consult to assist with strengthening/mobility  Outcome: Progressing

## 2019-10-24 NOTE — ASSESSMENT & PLAN NOTE
Continue trileptal and Seroquel at home dosing  Patient tearful and agreeable to psychiatric consultation, history of inpatient psychiatric care 1 year ago  Patient does not localize any desire to harm himself or any other  Significant other is concerned with his noncompliance with medications and inability to recognize his symptoms

## 2019-10-24 NOTE — ASSESSMENT & PLAN NOTE
Patient is still reporting nausea  Observed by significant other to be close lying liquids in the bathroom in an attempt to what she reports as to make himself vomit  Patient supported holistic Imani and observed  Psychiatric consult will be obtained  Continue hydroxyzine for now for anxiety    IV fluids will continue  Regular diet as tolerated

## 2019-10-24 NOTE — ASSESSMENT & PLAN NOTE
Patient reports no further vomiting since yesterday  P r n   Zofran  IV fluids will continue  Regular diet as tolerated

## 2019-10-25 VITALS
RESPIRATION RATE: 17 BRPM | HEIGHT: 72 IN | TEMPERATURE: 97.8 F | HEART RATE: 98 BPM | OXYGEN SATURATION: 98 % | SYSTOLIC BLOOD PRESSURE: 145 MMHG | BODY MASS INDEX: 33.86 KG/M2 | WEIGHT: 250 LBS | DIASTOLIC BLOOD PRESSURE: 96 MMHG

## 2019-10-25 PROBLEM — R11.2 INTRACTABLE VOMITING WITH NAUSEA: Status: RESOLVED | Noted: 2019-10-01 | Resolved: 2019-10-25

## 2019-10-25 LAB
ANION GAP SERPL CALCULATED.3IONS-SCNC: 12 MMOL/L (ref 4–13)
BUN SERPL-MCNC: 12 MG/DL (ref 5–25)
CALCIUM SERPL-MCNC: 8.7 MG/DL (ref 8.3–10.1)
CHLORIDE SERPL-SCNC: 100 MMOL/L (ref 100–108)
CO2 SERPL-SCNC: 26 MMOL/L (ref 21–32)
CREAT SERPL-MCNC: 0.77 MG/DL (ref 0.6–1.3)
GFR SERPL CREATININE-BSD FRML MDRD: 123 ML/MIN/1.73SQ M
GLUCOSE SERPL-MCNC: 143 MG/DL (ref 65–140)
POTASSIUM SERPL-SCNC: 3.2 MMOL/L (ref 3.5–5.3)
SODIUM SERPL-SCNC: 138 MMOL/L (ref 136–145)

## 2019-10-25 PROCEDURE — 99238 HOSP IP/OBS DSCHRG MGMT 30/<: CPT | Performed by: INTERNAL MEDICINE

## 2019-10-25 PROCEDURE — 80048 BASIC METABOLIC PNL TOTAL CA: CPT | Performed by: INTERNAL MEDICINE

## 2019-10-25 RX ORDER — QUETIAPINE FUMARATE 50 MG/1
50 TABLET, FILM COATED ORAL EVERY MORNING
Qty: 30 TABLET | Refills: 0 | Status: SHIPPED | OUTPATIENT
Start: 2019-10-25 | End: 2019-11-07 | Stop reason: SDUPTHER

## 2019-10-25 RX ORDER — HYDROXYZINE HYDROCHLORIDE 25 MG/1
25 TABLET, FILM COATED ORAL EVERY 6 HOURS PRN
Qty: 30 TABLET | Refills: 0 | Status: SHIPPED | OUTPATIENT
Start: 2019-10-25 | End: 2020-07-22 | Stop reason: ALTCHOICE

## 2019-10-25 RX ORDER — POTASSIUM CHLORIDE 20 MEQ/1
40 TABLET, EXTENDED RELEASE ORAL 2 TIMES DAILY
Status: DISCONTINUED | OUTPATIENT
Start: 2019-10-25 | End: 2019-10-25 | Stop reason: HOSPADM

## 2019-10-25 RX ORDER — LANOLIN ALCOHOL/MO/W.PET/CERES
3 CREAM (GRAM) TOPICAL
Qty: 30 TABLET | Refills: 0 | Status: SHIPPED | OUTPATIENT
Start: 2019-10-25

## 2019-10-25 RX ADMIN — OXCARBAZEPINE 300 MG: 150 TABLET, FILM COATED ORAL at 08:30

## 2019-10-25 RX ADMIN — PANTOPRAZOLE SODIUM 40 MG: 40 TABLET, DELAYED RELEASE ORAL at 06:39

## 2019-10-25 RX ADMIN — QUETIAPINE FUMARATE 50 MG: 25 TABLET ORAL at 08:31

## 2019-10-25 RX ADMIN — POTASSIUM CHLORIDE 40 MEQ: 1500 TABLET, EXTENDED RELEASE ORAL at 08:31

## 2019-10-25 RX ADMIN — BUPRENORPHINE AND NALOXONE 4 FILM: 2; .5 FILM BUCCAL; SUBLINGUAL at 08:31

## 2019-10-25 NOTE — ASSESSMENT & PLAN NOTE
No further nausea vomiting  Patient able to eat regular food  States feeling somewhat better  Able to sleep last night  Feels ready to go homde

## 2019-10-25 NOTE — UTILIZATION REVIEW
Continued Stay Review    Date: 10/25                       Current Patient Class: IP  Current Level of Care: Tele    HPI:29 y o  male initially admitted on 10/22    Assessment/Plan: admitted w/ Hypokalemia sec to N/V, K is slowing improving  Pt cont on suboxone , inc dose yest  Pt on low dose, possible causing some of withdraw sx on admission   10/24 Psych consult  Inc stress d/t 2 recent weddings in the family and dealing with the loss of his child w/ his fiance 1 year ago   Admits to fidgety and hyperactive behavior  Dx unspecified bipolar disorder , opoid use disorder, cannabis use disorder  Plan to start seroquel ,cont trileptal and vistaril prn      Pertinent Labs/Diagnostic Results:   Results from last 7 days   Lab Units 10/24/19  0517 10/23/19  0502 10/22/19  1833   WBC Thousand/uL 9 38 9 46 13 31*   HEMOGLOBIN g/dL 14 7 14 6 16 5   HEMATOCRIT % 42 4 41 2 46 4   PLATELETS Thousands/uL 313 309 450*   NEUTROS ABS Thousands/µL  --  5 64 10 08*     Results from last 7 days   Lab Units 10/25/19  0451 10/24/19  1640 10/24/19  0517 10/23/19  1714 10/23/19  0502 10/22/19  1833   SODIUM mmol/L 138  --  134*  --  133* 133*   POTASSIUM mmol/L 3 2* 3 2* 2 7* 3 1* 2 6* 2 4*   CHLORIDE mmol/L 100  --  97*  --  95* 90*   CO2 mmol/L 26  --  26  --  28 30   ANION GAP mmol/L 12  --  11  --  10 13   BUN mg/dL 12  --  12  --  15 15   CREATININE mg/dL 0 77  --  0 96  --  0 91 1 05   EGFR ml/min/1 73sq m 123  --  107  --  114 96   CALCIUM mg/dL 8 7  --  9 0  --  9 0 10 0   MAGNESIUM mg/dL  --   --  2 2  --  2 1  --      Results from last 7 days   Lab Units 10/22/19  1833   AST U/L 17   ALT U/L 23   ALK PHOS U/L 84   TOTAL PROTEIN g/dL 8 7*   ALBUMIN g/dL 4 6   TOTAL BILIRUBIN mg/dL 1 50*     Results from last 7 days   Lab Units 10/25/19  0451 10/24/19  0517 10/23/19  0502 10/22/19  1833   GLUCOSE RANDOM mg/dL 143* 161* 125 123       Results from last 7 days   Lab Units 10/22/19  1833   TROPONIN I ng/mL <0 02     Results from last 7 days   Lab Units 10/22/19  1833   LIPASE u/L 550*     Results from last 7 days   Lab Units 10/23/19  1718   AMPH/METH  Negative   BARBITURATE UR  Negative   BENZODIAZEPINE UR  Negative   COCAINE UR  Negative   METHADONE URINE  Negative   OPIATE UR  Positive*   PCP UR  Negative   THC UR  Positive*     Vital Signs:  10/25/19 11:09:25    98    145/96  112  98 %       10/25/19 11:09:02    93  17  145/96  112  98 %       10/25/19 0839              None (Room air)     10/25/19 07:14:24    76  17  130/75  93  98 %       10/25/19 04:50:53    85  18  123/82  96  97 %         Medications:     Medications:  buprenorphine-naloxone 4 Film Sublingual Daily   heparin (porcine) 5,000 Units Subcutaneous Q8H [address]   melatonin 3 mg Oral HS   ondansetron 4 mg Intravenous Once   OXcarbazepine 300 mg Oral Q12H YUSEF   pantoprazole 40 mg Oral Early Morning   potassium chloride 40 mEq Oral BID   QUEtiapine 300 mg Oral HS   QUEtiapine 50 mg Oral Daily     hydrOXYzine HCL 25 mg Oral Q6H PRN       Discharge Plan: TBD     Network Utilization Review Department  [email or web address]  ATTENTION: Please call with any questions or concerns to [phone] and carefully listen to the prompts so that you are directed to the right person  All voicemails are confidential   Darshnaa Solo all requests for admission clinical reviews, approved or denied determinations and any other requests to dedicated fax number below belonging to the campus where the patient is receiving treatment    FACILITY NAME UR FAX NUMBER   ADMISSION DENIALS (Administrative/Medical Necessity) [phone]   PARENT CHILD HEALTH (Maternity/NICU/Pediatrics) [phone]   [facility] [phone]   Stuart Powell [phone]   Toby Bonner [address] [phone]   Roxanne Nolen [place] [address] [facility] [phone] [address],  [phone]

## 2019-10-25 NOTE — PLAN OF CARE
Problem: Nutrition/Hydration-ADULT  Goal: Nutrient/Hydration intake appropriate for improving, restoring or maintaining nutritional needs  Description  Monitor and assess patient's nutrition/hydration status for malnutrition  Collaborate with interdisciplinary team and initiate plan and interventions as ordered  Monitor patient's weight and dietary intake as ordered or per policy  Utilize nutrition screening tool and intervene as necessary  Determine patient's food preferences and provide high-protein, high-caloric foods as appropriate       INTERVENTIONS:  - Monitor oral intake, urinary output, labs, and treatment plans  - Assess nutrition and hydration status and recommend course of action  - Evaluate amount of meals eaten  - Assist patient with eating if necessary   - Allow adequate time for meals  - Recommend/ encourage appropriate diets, oral nutritional supplements, and vitamin/mineral supplements  - Order, calculate, and assess calorie counts as needed  - Recommend, monitor, and adjust tube feedings and TPN/PPN based on assessed needs  - Assess need for intravenous fluids  - Provide specific nutrition/hydration education as appropriate  - Include patient/family/caregiver in decisions related to nutrition  Outcome: Progressing     Problem: PAIN - ADULT  Goal: Verbalizes/displays adequate comfort level or baseline comfort level  Description  Interventions:  - Encourage patient to monitor pain and request assistance  - Assess pain using appropriate pain scale  - Administer analgesics based on type and severity of pain and evaluate response  - Implement non-pharmacological measures as appropriate and evaluate response  - Consider cultural and social influences on pain and pain management  - Notify physician/advanced practitioner if interventions unsuccessful or patient reports new pain  Outcome: Progressing     Problem: INFECTION - ADULT  Goal: Absence or prevention of progression during hospitalization  Description  INTERVENTIONS:  - Assess and monitor for signs and symptoms of infection  - Monitor lab/diagnostic results  - Monitor all insertion sites, i e  indwelling lines, tubes, and drains  - Monitor endotracheal if appropriate and nasal secretions for changes in amount and color  - [place] appropriate cooling/warming therapies per order  - Administer medications as ordered  - Instruct and encourage patient and family to use good hand hygiene technique  - Identify and instruct in appropriate isolation precautions for identified infection/condition  Outcome: Progressing  Goal: Absence of fever/infection during neutropenic period  Description  INTERVENTIONS:  - Monitor WBC    Outcome: Progressing     Problem: SAFETY ADULT  Goal: Patient will remain free of falls  Description  INTERVENTIONS:  - Assess patient frequently for physical needs  -  Identify cognitive and physical deficits and behaviors that affect risk of falls    -  [place] fall precautions as indicated by assessment   - Educate patient/family on patient safety including physical limitations  - Instruct patient to call for assistance with activity based on assessment  - Modify environment to reduce risk of injury  - Consider OT/PT consult to assist with strengthening/mobility  Outcome: Progressing  Goal: Maintain or return to baseline ADL function  Description  INTERVENTIONS:  -  Assess patient's ability to carry out ADLs; assess patient's baseline for ADL function and identify physical deficits which impact ability to perform ADLs (bathing, care of mouth/teeth, toileting, grooming, dressing, etc )  - Assess/evaluate cause of self-care deficits   - Assess range of motion  - Assess patient's mobility; develop plan if impaired  - Assess patient's need for assistive devices and provide as appropriate  - Encourage maximum independence but intervene and supervise when necessary  - Involve family in performance of ADLs  - Assess for home care needs following discharge   - Consider OT consult to assist with ADL evaluation and planning for discharge  - Provide patient education as appropriate  Outcome: Progressing  Goal: Maintain or return mobility status to optimal level  Description  INTERVENTIONS:  - Assess patient's baseline mobility status (ambulation, transfers, stairs, etc )    - Identify cognitive and physical deficits and behaviors that affect mobility  - Identify mobility aids required to assist with transfers and/or ambulation (gait belt, sit-to-stand, lift, walker, cane, etc )  - [place] fall precautions as indicated by assessment  - Record patient progress and toleration of activity level on Mobility SBAR; progress patient to next Phase/Stage  - Instruct patient to call for assistance with activity based on assessment  - Consider rehabilitation consult to assist with strengthening/weightbearing, etc   Outcome: Progressing     Problem: DISCHARGE PLANNING  Goal: Discharge to home or other facility with appropriate resources  Description  INTERVENTIONS:  - Identify barriers to discharge w/patient and caregiver  - Arrange for needed discharge resources and transportation as appropriate  - Identify discharge learning needs (meds, wound care, etc )  - Arrange for interpretive services to assist at discharge as needed  - Refer to Case Management Department for coordinating discharge planning if the patient needs post-hospital services based on physician/advanced practitioner order or complex needs related to functional status, cognitive ability, or social support system  Outcome: Progressing     Problem: Knowledge Deficit  Goal: Patient/family/caregiver demonstrates understanding of disease process, treatment plan, medications, and discharge instructions  Description  Complete learning assessment and assess knowledge base    Interventions:  - Provide teaching at level of understanding  - Provide teaching via preferred learning methods  Outcome: Progressing     Problem: Potential for Falls  Goal: Patient will remain free of falls  Description  INTERVENTIONS:  - Assess patient frequently for physical needs  -  Identify cognitive and physical deficits and behaviors that affect risk of falls    -  [place] fall precautions as indicated by assessment   - Educate patient/family on patient safety including physical limitations  - Instruct patient to call for assistance with activity based on assessment  - Modify environment to reduce risk of injury  - Consider OT/PT consult to assist with strengthening/mobility  Outcome: Progressing

## 2019-10-25 NOTE — CONSULTS
Name: Aida Cahse      : 1990  Date: 10/24/19       Time:  8:38pm   Location of patient: [facility] IP  Location of doctor: [place], [place]   This evaluation was conducted via telepsychiatry with the assistance of onsite staff    Chief Complaint: depression, anxiety  History of Present Illness: The patient is a 42-year-old  male admitted to the hospital for hypokalemia secondary to nausea and vomiting  He asked for a psychiatric consult to address possible medication changes due to continued anxiety and depression  When interviewed, the patient reported that he was stressed due to two recent weddings in the family and dealing with the loss of his child with the fiancé one year ago  "I get sick during stressful times " The patient admitted to fidgety and hyperactive behavior  He denied racing thoughts, suicidal thoughts, or psychotic symptoms  The patient did report that he has episodic periods of depression and anxiety during stressful events in life  Collateral: The fiancé was present for the interview and she had no safety concerns  SI/ Self harm: none  HI/Violence: none  Trauma History: none  Access to weapons: none  Legal: none  Psychiatric History/Treatment History: No prior admissions  Gets meds from PCP  Drug/Alcohol History: Heroin-clean for 8 months  Prescribed Suboxone 8 mg daily  MJ-once a week, last used 2 weeks ago    Medical History: none  Medications & Freq: Trileptal 300 mg Q 12 hours, Seroquel 20 Mulligan's HS, Vistaril 25 mg Q6 hours PRN and anxiety, Suboxone 8 mg daily, Melatonin 3 mg HS, protonix 40 mg daily  Allergies: NKDA  Sleep Quantity/Quality: trouble falling asleep  Family Psych History/History of suicide: none  Social History: engaged, lives with pat   Employment: works in [employer],    Education: HS grad, no college   Stressors: see HPI   Strengths/supports: family, friends  Mental Status Exam:   Appearance and attire: appears stated age, dressed in hospital attire  Attitude and behavior: cooperative  Speech: WNL  Affect and mood: anxious affect, sad mood  Association and thought processes: linear  Thought content: no SI, no HI, no delusions  Perception: no AVH  Sensorium, memory, and orientation: AAOx3  Intellectual functioning: average  Insight and judgment: poor  Impression/Risk Assessment: The patient is a 28-year-old  male with a history of Bipolar Disorder was admitted to the hospital for hypokalemia secondary to persistent vomiting  Patient reports that he gets overly anxious and stressed due to life stressors  Most recently patient reports 2 weddings in the family and he and his fiancée are still dealing with the loss of their child last year  The patient denies suicidal thoughts or psychotic symptoms  He is suggesting a medication change as he has been on the same regimen of Seroquel and Trileptal for several months  Conservative medication adjustments recommended by psychiatry to address anxiety and depression  The patient can be referred to outpatient care once medically cleared  Diagnosis: Unspecified Bipolar Disorder, Opioid Use Disorder, Cannabis Use Disorder  Treatment Recommendations: Start Seroquel 50 mg daily  Start Seroquel 100 mg daily in 2 days  Continue Seroquel 300 mg HS and Trileptal 300 mg Q12hr  Continue Vistaril 25 mg Q6 hours PRN anxiety  Refer to therapist and psychiatrist once medically cleared  Therapy: Supportive  Level of Care: outpatient care  AGUS Abernathy    [facility] Telepsychiatry

## 2019-10-28 ENCOUNTER — OFFICE VISIT (OUTPATIENT)
Dept: FAMILY MEDICINE CLINIC | Facility: CLINIC | Age: 29
End: 2019-10-28
Payer: COMMERCIAL

## 2019-10-28 ENCOUNTER — TRANSITIONAL CARE MANAGEMENT (OUTPATIENT)
Dept: FAMILY MEDICINE CLINIC | Facility: CLINIC | Age: 29
End: 2019-10-28

## 2019-10-28 VITALS
TEMPERATURE: 97 F | HEIGHT: 72 IN | SYSTOLIC BLOOD PRESSURE: 120 MMHG | WEIGHT: 251.6 LBS | HEART RATE: 106 BPM | RESPIRATION RATE: 14 BRPM | DIASTOLIC BLOOD PRESSURE: 90 MMHG | OXYGEN SATURATION: 98 % | BODY MASS INDEX: 34.08 KG/M2

## 2019-10-28 DIAGNOSIS — K21.9 GASTROESOPHAGEAL REFLUX DISEASE WITHOUT ESOPHAGITIS: ICD-10-CM

## 2019-10-28 DIAGNOSIS — F31.4 BIPOLAR 1 DISORDER, DEPRESSED, SEVERE (HCC): Primary | ICD-10-CM

## 2019-10-28 DIAGNOSIS — Z63.4 GRIEF AT LOSS OF CHILD: ICD-10-CM

## 2019-10-28 DIAGNOSIS — F11.24 OPIOID DEPENDENCE WITH OPIOID-INDUCED MOOD DISORDER (HCC): ICD-10-CM

## 2019-10-28 DIAGNOSIS — F43.21 GRIEF AT LOSS OF CHILD: ICD-10-CM

## 2019-10-28 PROCEDURE — 99496 TRANSJ CARE MGMT HIGH F2F 7D: CPT | Performed by: FAMILY MEDICINE

## 2019-10-28 RX ORDER — QUETIAPINE FUMARATE 50 MG/1
50 TABLET, FILM COATED ORAL
Qty: 90 TABLET | Refills: 1 | Status: SHIPPED | OUTPATIENT
Start: 2019-10-28 | End: 2019-11-07 | Stop reason: SDUPTHER

## 2019-10-28 SDOH — SOCIAL STABILITY - SOCIAL INSECURITY: DISSAPEARANCE AND DEATH OF FAMILY MEMBER: Z63.4

## 2019-10-28 NOTE — UTILIZATION REVIEW
Notification of Discharge  This is a Notification of Discharge from our facility [address]  Please be advised that this patient has been discharge from our facility  Below you will find the admission and discharge date and time including the patients disposition  PRESENTATION DATE: 10/22/2019  5:48 PM  OBS ADMISSION DATE: 10/22/2019    IP ADMISSION DATE: 10/23/19 1102   DISCHARGE DATE: 10/25/2019  1:53 PM  DISPOSITION: Home/Self Care Home/Self Care   Admission Orders listed below:  Admission Orders (From admission, onward)     Ordered        10/23/19 1102  Inpatient Admission  Once         10/22/19 2015  Place in Observation (expected length of stay for this patient is less than two midnights)  Once                   Please contact the UR Department if additional information is required to close this patient's authorization/case  [address]  Utilization Review Department  Phone: [phone]; Fax [phone]  [email or web address]  ATTENTION: Please call with any questions or concerns to [phone]  and carefully listen to the prompts so that you are directed to the right person  Send all requests for admission clinical reviews, approved or denied determinations and any other requests to fax [phone]   All voicemails are confidential

## 2019-10-29 DIAGNOSIS — F31.62 BIPOLAR DISORDER, CURRENT EPISODE MIXED, MODERATE (HCC): ICD-10-CM

## 2019-10-31 RX ORDER — QUETIAPINE FUMARATE 300 MG/1
TABLET, FILM COATED ORAL
Qty: 90 TABLET | Refills: 1 | Status: SHIPPED | OUTPATIENT
Start: 2019-10-31 | End: 2020-07-22

## 2019-11-04 NOTE — ASSESSMENT & PLAN NOTE
Repleted hypokalemia during hospital stay  Will continue chronic supplementation with potassium bid and see his pcp to review levels

## 2019-11-04 NOTE — DISCHARGE SUMMARY
Discharge- Anne Marie Justice 1990, 30 y o  male MRN: [id number]    Unit/Bed#: -01 Encounter: [id number]    Primary Care Provider: PETER Talamantes   Date and time admitted to hospital: 10/22/2019  5:48 PM        Intractable vomiting with nausearesolved as of 10/25/2019  Assessment & Plan  No further nausea vomiting  Patient able to eat regular food  States feeling somewhat better  Able to sleep last night  Feels ready to go home  * Hypokalemia  Assessment & Plan  Repleted hypokalemia during hospital stay  Will continue chronic supplementation with potassium bid and see his pcp to review levels  Chronic, continuous use of opioids  Assessment & Plan  Continue home dose of suboxone  F/u as out patient    Gastroesophageal reflux disease without esophagitis  Assessment & Plan  Continue PPI    Bipolar disorder, current episode mixed, moderate (HCC)  Assessment & Plan  Continue trileptal and Seroquel with adjustments to Seroquel  Patient tearful and agreeable to psychiatric consultation, history of inpatient psychiatric care 1 year ago  Patient does not vocalize any desire to harm himself or any other  Significant other is concerned with his noncompliance with medications and inability to recognize his symptoms  Eval recommended to increase his seroquel to include an 50 mg am dose  Which can be up titrated to 100 mg in next few days  He is to continue PM dose at 300 mg and no change to trileptal  He is to use hydroxyzine prn  Heroin addiction [facility])  Assessment & Plan  Continue Suboxone at home dosing which needs to be confirmed  For now continue current dosing  Patient aware that he will need to f/u with his addiction specialist for continued dosing of his suboxone            Discharging Physician / Practitioner: Arelis Figueroa MD  PCP: Marcy Talamantes [facility]   Admission Date:   Admission Orders (From admission, onward)     Ordered        10/23/19 1102  Inpatient Admission  Once         10/22/19 2015  Place in Observation (expected length of stay for this patient is less than two midnights)  Once                   Discharge Date: 11/04/19    Resolved Problems  Date Reviewed: 10/22/2019          Resolved    Intractable vomiting with nausea 10/25/2019     Resolved by  Connie Bhardwaj MD    Overview Signed 10/22/2019  8:23 PM by Sami Rodriguez MD     Patient has a cyclic vomiting/marijuana hyperemesis type picture  No clear infectious component, no bowel disturbance  Nausea vomiting has resolved at time of evaluation               Consultations During Hospital Stay:  · Psychiatry    Procedures Performed:   · Chest xray: no acute pulmonary disease  · CT abdomen and pelvis:      Significant Findings / Test Results:   · As above  · Dc creatinine: 77  · Dc potassium; 3 2 repleted  · UDS positive for THC , counseled against    Incidental Findings:   · None    Test Results Pending at Discharge (will require follow up): · None     Outpatient Tests Requested:  · none    Complications:  none    Reason for Admission: intractable nausea and vomitingf    Hospital Course:     Lavelle Medrano is a 30 y o  male patient who originally presented to the hospital on 10/22/2019 due to intractable nausea and vomiting  Patient has known substance abuse issues and admitted that when he gets stressed he gets sick  His significant other confinded that the patient has not been compliant with medications  He was willing to speak with psychiatry who recommended up titration of medications  Patient will follow up as an outpatient with psychiatry  He was noted to have full capacity for decision making and did not express any behaviors or thoughts that would warrant emergent psychiatric treatment  Please see above list of diagnoses and related plan for additional information  Condition at Discharge: good     Discharge Day Visit / Exam:     Subjective:  Patient is more up beat today   He states that he is feeling better   He slept and ate without difficulty  Vitals: Blood Pressure: 145/96 (10/25/19 1109)  Pulse: 98 (10/25/19 1109)  Temperature: 97 8 °F (36 6 °C) (10/24/19 2345)  Temp Source: Oral (10/24/19 0719)  Respirations: 17 (10/25/19 1109)  Height: 6' (182 9 cm) (10/23/19 1117)  Weight - Scale: 113 kg (250 lb) (10/23/19 1117)  SpO2: 98 % (10/25/19 1109)  Exam:   Physical Exam   Constitutional: He is oriented to person, place, and time  He appears well-developed  No distress  HENT:   Head: Normocephalic and atraumatic  Right Ear: External ear normal    Left Ear: External ear normal    Nose: Nose normal    Mouth/Throat: Oropharynx is clear and moist  No oropharyngeal exudate  Eyes: Pupils are equal, round, and reactive to light  Conjunctivae and EOM are normal  Right eye exhibits no discharge  Left eye exhibits no discharge  No scleral icterus  Neck: Normal range of motion  Neck supple  No JVD present  No thyromegaly present  Cardiovascular: Normal rate, regular rhythm, normal heart sounds and intact distal pulses  Exam reveals no gallop and no friction rub  No murmur heard  Pulmonary/Chest: Effort normal and breath sounds normal  No respiratory distress  He has no wheezes  He has no rales  Abdominal: Soft  Bowel sounds are normal  He exhibits no distension  There is no tenderness  There is no rebound and no guarding  Musculoskeletal: Normal range of motion  He exhibits no edema or deformity  Lymphadenopathy:     He has no cervical adenopathy  Neurological: He is alert and oriented to person, place, and time  He has normal reflexes  He displays normal reflexes  No cranial nerve deficit  He exhibits normal muscle tone  Skin: Skin is warm and dry  No rash noted  He is not diaphoretic  No erythema  Psychiatric: He has a normal mood and affect  Vitals reviewed        Discussion with Family: Nob    Discharge instructions/Information to patient and family:   See after visit summary for information provided to patient and family  Provisions for Follow-Up Care:  See after visit summary for information related to follow-up care and any pertinent home health orders  Disposition:     Home    For Discharges to [facility] SNF:   · Not Applicable to this Patient - Not Applicable to this Patient    Planned Readmission: none     Discharge Statement:  I spent 25 minutes discharging the patient  This time was spent on the day of discharge  I had direct contact with the patient on the day of discharge  Greater than 50% of the total time was spent examining patient, answering all patient questions, arranging and discussing plan of care with patient as well as directly providing post-discharge instructions  Additional time then spent on discharge activities  Discharge Medications:  See after visit summary for reconciled discharge medications provided to patient and family        ** Please Note: This note has been constructed using a voice recognition system **

## 2019-11-04 NOTE — ASSESSMENT & PLAN NOTE
Continue Suboxone at home dosing which needs to be confirmed  For now continue current dosing  Patient aware that he will need to f/u with his addiction specialist for continued dosing of his suboxone

## 2019-11-04 NOTE — ASSESSMENT & PLAN NOTE
Continue trileptal and Seroquel with adjustments to Seroquel  Patient tearful and agreeable to psychiatric consultation, history of inpatient psychiatric care 1 year ago  Patient does not vocalize any desire to harm himself or any other  Significant other is concerned with his noncompliance with medications and inability to recognize his symptoms  Lydia recommended to increase his seroquel to include an 50 mg am dose  Which can be up titrated to 100 mg in next few days  He is to continue PM dose at 300 mg and no change to trileptal  He is to use hydroxyzine prn

## 2019-11-07 RX ORDER — QUETIAPINE FUMARATE 50 MG/1
50 TABLET, FILM COATED ORAL
Qty: 90 TABLET | Refills: 1 | Status: SHIPPED | OUTPATIENT
Start: 2019-11-07 | End: 2019-11-25 | Stop reason: SDUPTHER

## 2019-11-07 RX ORDER — OXCARBAZEPINE 300 MG/1
TABLET, FILM COATED ORAL
Qty: 180 TABLET | Refills: 1 | Status: SHIPPED | OUTPATIENT
Start: 2019-11-07 | End: 2020-07-13

## 2019-11-07 RX ORDER — OMEPRAZOLE 20 MG/1
20 CAPSULE, DELAYED RELEASE ORAL DAILY
Qty: 90 CAPSULE | Refills: 1 | Status: SHIPPED | OUTPATIENT
Start: 2019-11-07 | End: 2020-05-11 | Stop reason: SDUPTHER

## 2019-11-07 NOTE — PROGRESS NOTES
TCM Call (since 10/7/2019)     Date and time call was made  10/28/2019  2:15 PM    Hospital care reviewed  Records reviewed    Patient was hospitialized at  [facility]    Date of Admission  10/22/19    Date of discharge  10/24/19    Diagnosis  Hypokalemia    Disposition  Home    Were the patients medications reviewed and updated  Yes    Current Symptoms  None      TCM Call (since 10/7/2019)     Post hospital issues  None    Should patient be enrolled in anticoag monitoring? No    Scheduled for follow up? Yes    Did you obtain your prescribed medications  Yes    Do you need help managing your prescriptions or medications  No    Is transportation to your appointment needed  No    I have advised the patient to call PCP with any new or worsening symptoms  Latesha Savage/albert  Living Arrangements  Alone    Support System  None    Are you recieving any outpatient services  No    Are you recieving home care services  No    Are you using any community resources  No    Current waiver services  No    Have you fallen in the last 12 months  No    Interperter language line needed  No    Counseling  Patient    Counseling topics  patient and family education; Importance of RX compliance; Activities of daily living    Comments  Spoke with pt on 10/28/19 after his hospital d/c on 10/25/19  pt states he feels "[place]" and much better since he left the hospital  he will see PCP on 10/28/19 for a hospital follow up  I reviewed and updated his medication list  he is aware to call our office if he has any questions or concern  pt denied any vomiting, head ache, chest pain, weakness, dizziness  ER/CMA  Assessment and Plan   1  Bipolar 1 disorder, depressed, severe ([address] )  Continue with weekly therapy  - OXcarbazepine (TRILEPTAL) 300 mg tablet; Take 1 pill twice a day  Dispense: 180 tablet; Refill: 1  - QUEtiapine (SEROquel) 50 mg tablet; Take 1 tablet (50 mg total) by mouth daily at bedtime  Dispense: 90 tablet;  Refill: 1    2  Opioid dependence with opioid-induced mood disorder (HCC)  Continue with Suboxone MD    3  Gastroesophageal reflux disease without esophagitis  Watch diet  Do not eat right before bed  No alcohol    - omeprazole (PriLOSEC) 20 mg delayed release capsule; Take 1 capsule (20 mg total) by mouth daily  Dispense: 90 capsule; Refill: 1    4  Grief at loss of child  Continue weekly therapy      Return to office in:  2 weeks    Transitional Care Management Review   Audrey Schroeder is a 30 y o  male here for TCM follow-up    During the TCM phone call patient stated:    TCM Call (since 10/7/2019)     Date and time call was made  10/28/2019  2:15 PM    Hospital care reviewed  Records reviewed    Patient was hospitialized at  [facility]    Date of Admission  10/22/19    Date of discharge  10/24/19    Diagnosis  Hypokalemia    Disposition  Home    Were the patients medications reviewed and updated  Yes    Current Symptoms  None      TCM Call (since 10/7/2019)     Post hospital issues  None    Should patient be enrolled in anticoag monitoring? No    Scheduled for follow up? Yes    Did you obtain your prescribed medications  Yes    Do you need help managing your prescriptions or medications  No    Is transportation to your appointment needed  No    I have advised the patient to call PCP with any new or worsening symptoms  Latesha Savage/albert  Living Arrangements  Alone    Support System  None    Are you recieving any outpatient services  No    Are you recieving home care services  No    Are you using any community resources  No    Current waiver services  No    Have you fallen in the last 12 months  No    Interperter language line needed  No    Counseling  Patient    Counseling topics  patient and family education; Importance of RX compliance; Activities of daily living    Comments  Spoke with pt on 10/28/19 after his hospital d/c on 10/25/19   pt states he feels "Great" and much better since he left the hospital  he will see PCP on 10/28/19 for a hospital follow up  I reviewed and updated his medication list  he is aware to call our office if he has any questions or concern  pt denied any vomiting, head ache, chest pain, weakness, dizziness  ER/CMA  History of Present Illness     Amy Barth has a long history bipolar 1 disorder  He had several weddings back to back  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 in [place] that  in the delivery room  His wedding to [place] is coming up in 3 months  He is seeing counselors weekly  Seroquel 300 mg at bedtime  Seroquel 50 mg in the morning  Suboxone he is ordered by a Suboxone MD which he is stable on   Trileptal 300 mg once a day in the morning          The following portions of the patient's history were reviewed and updated as appropriate: allergies, current medications, past family history, past medical history, past social history, past surgical history and problem list     Review of Systems   Review of Systems   Constitutional: Positive for activity change and fatigue  HENT: Negative  Respiratory: Negative for shortness of breath  Cardiovascular: Negative for chest pain and palpitations  Genitourinary: Negative  Musculoskeletal: Negative  Skin: Negative  Neurological: Negative  Psychiatric/Behavioral: Positive for behavioral problems, decreased concentration, dysphoric mood and sleep disturbance  Negative for agitation, hallucinations, self-injury and suicidal ideas   The patient is nervous/anxious and is hyperactive          Active Problem List     Patient Active Problem List   Diagnosis    Grief at loss of child    ADHD    Depression    Hypokalemia    Bipolar 1 disorder, depressed, severe ([facility] )    Heroin addiction ([facility] )    Bipolar disorder, current episode mixed, moderate ([facility] )    Gastroesophageal reflux disease without esophagitis    Chronic, continuous use of opioids       Objective   /90   Pulse (!) 106   Temp (!) 97 °F (36 1 °C)   Resp 14   Ht 6' (1 829 m)   Wt 114 kg (251 lb 9 6 oz)   SpO2 98%   BMI 34 12 kg/m²     Physical Exam    Pertinent Laboratory/Diagnostic Studies:  CBC:   Lab Results   Component Value Date/Time    WBC 9 38 10/24/2019 05:17 AM    WBC 10 03 05/29/2014 06:21 PM    RBC 5 25 10/24/2019 05:17 AM    RBC 6 11 (H) 05/29/2014 06:21 PM    HGB 14 7 10/24/2019 05:17 AM    HGB 17 7 (H) 05/30/2014 01:17 AM    HGB 17 7 (H) 05/29/2014 06:21 PM    HCT 42 4 10/24/2019 05:17 AM    HCT 47 3 05/29/2014 06:21 PM    MCV 81 (L) 10/24/2019 05:17 AM    MCV 77 (L) 05/29/2014 06:21 PM    MCH 28 0 10/24/2019 05:17 AM    MCH 29 0 05/29/2014 06:21 PM    MCHC 34 7 10/24/2019 05:17 AM    MCHC 37 4 05/29/2014 06:21 PM    RDW 12 9 10/24/2019 05:17 AM    RDW 12 2 05/29/2014 06:21 PM    MPV 10 1 10/24/2019 05:17 AM    MPV 10 7 05/29/2014 06:21 PM     10/24/2019 05:17 AM     05/29/2014 06:21 PM    NRBC 0 10/23/2019 05:02 AM    NEUTOPHILPCT 60 10/23/2019 05:02 AM    NEUTOPHILPCT 64 05/29/2014 06:21 PM    LYMPHOPCT 29 10/23/2019 05:02 AM    LYMPHOPCT 22 05/29/2014 06:21 PM    MONOPCT 11 10/23/2019 05:02 AM    MONOPCT 14 (H) 05/29/2014 06:21 PM    EOSPCT 0 10/23/2019 05:02 AM    EOSPCT 0 05/29/2014 06:21 PM    BASOPCT 0 10/23/2019 05:02 AM    BASOPCT 0 05/29/2014 06:21 PM    NEUTROABS 5 64 10/23/2019 05:02 AM    NEUTROABS 6 42 05/29/2014 06:21 PM    LYMPHSABS 2 76 10/23/2019 05:02 AM    LYMPHSABS 2 21 05/29/2014 06:21 PM    MONOSABS 0 99 10/23/2019 05:02 AM    MONOSABS 1 40 (H) 05/29/2014 06:21 PM    EOSABS 0 02 10/23/2019 05:02 AM    EOSABS 0 00 05/29/2014 06:21 PM     Chemistry Profile:   Lab Results   Component Value Date/Time     (L) 05/29/2014 06:21 PM    K 3 2 (L) 10/25/2019 04:51 AM    K 3 4 (L) 05/30/2014 05:11 AM     10/25/2019 04:51 AM    CL 90 (L) 05/29/2014 06:21 PM    CO2 26 10/25/2019 04:51 AM    CO2 38 (H) 10/12/2018 08:37 AM    ANIONGAP 13 05/30/2014 01:17 AM    BUN 12 10/25/2019 04:51 AM    BUN 12 05/29/2014 06:21 PM    CREATININE 0 77 10/25/2019 04:51 AM    CREATININE 0 89 05/29/2014 06:21 PM    GLUC 143 (H) 10/25/2019 04:51 AM    GLUF 125 (H) 10/23/2019 05:02 AM    GLUCOSE 139 10/12/2018 08:37 AM    GLUCOSE 109 05/29/2014 06:21 PM    CALCIUM 8 7 10/25/2019 04:51 AM    CALCIUM 9 8 05/29/2014 06:21 PM    MG 2 2 10/24/2019 05:17 AM    PHOS 2 2 (L) 03/18/2019 05:37 PM    AST 17 10/22/2019 06:33 PM    ALT 23 10/22/2019 06:33 PM    ALKPHOS 84 10/22/2019 06:33 PM    EGFR 123 10/25/2019 04:51 AM    EGFR 82 10/12/2018 08:37 AM     Coagulation Studies: No results found for: PROTIME, INR, PTT  Cardiac Studies:   Lab Results   Component Value Date/Time    TROPONINI <0 02 10/22/2019 06:33 PM     Hepatology:   Lab Results   Component Value Date/Time    LIPASE 550 (H) 10/22/2019 06:33 PM     Endocrine Studies: No results found for: HGBA1C, [id number], T3FREE, S4VXCHE, FREET4, THYMICROANTI, THGAB, TRIG, CHOL, CHOLESTEROL, HDL, LDLC, LDLCALC, LDL, LDLDIRECT, NONHDLC, [id number], PTH, THLX350DNAX  Iron Studies: No results found for: LABIRON, IRON, TIBC, FERRITIN  Health Maintenance:   Lab Results   Component Value Date/Time    HEPCAB Non-reactive 04/22/2019 05:53 AM     Toxicology:   Lab Results   Component Value Date/Time    AMPHETUR Negative 05/29/2014 06:04 PM    BARBTUR Negative 10/23/2019 05:18 PM    BARBTUR Negative 05/29/2014 06:04 PM    BDZUR Negative 10/23/2019 05:18 PM    BDZUR Negative 05/29/2014 06:04 PM    COCAINEUR Negative 10/23/2019 05:18 PM    COCAINEUR Negative 05/29/2014 06:04 PM    OPIATEUR Positive (A) 10/23/2019 05:18 PM    OPIATEUR Negative 05/29/2014 06:04 PM    PCPUR Negative 10/23/2019 05:18 PM    PCPUR Negative 05/29/2014 06:04 PM    THCUR Positive (A) 10/23/2019 05:18 PM    THCUR Positive (A) 05/29/2014 06:04 PM    ETOH <3 11/17/2018 08:07 AM     Urine Protein/Creatinine Ratio: No results found for: CREATININEUR, PROTUR, UTPCR, LABMICR, MICROALBCRE, MICROCREAT  Urinalysis:   Lab Results   Component Value Date/Time    COLORU Yellow 03/18/2019 08:55 PM    CLARITYU Clear 03/18/2019 08:55 PM    SPECGRAV 1 010 03/18/2019 08:55 PM    PHUR 8 0 03/18/2019 08:55 PM    PHUR 7 5 11/17/2018 10:53 AM    LEUKOCYTESUR Negative 03/18/2019 08:55 PM    NITRITE Negative 03/18/2019 08:55 PM    GLUCOSEU Negative 03/18/2019 08:55 PM    KETONESU 15 (1+) (A) 03/18/2019 08:55 PM    BILIRUBINUR Negative 03/18/2019 08:55 PM    BLOODU Negative 03/18/2019 08:55 PM      Urine Micro:   Lab Results   Component Value Date/Time    RBCUA None Seen 03/18/2019 08:55 PM    WBCUA None Seen 03/18/2019 08:55 PM    EPIS None Seen 03/18/2019 08:55 PM    BACTERIA None Seen 03/18/2019 08:55 PM     Urine Dip: No components found for: SLAMBCOLORUA, SLAMBCLAIRTY, SLAMBSG, SLAMBPHUA, SLAMBLEUKOUA, [address] [facility], [address] [facility], [address] [facility], [address] [facility], [address]  Hematology: No results found for: FOLATE, [id number], LDH, IRF, RETICCTPCT, RETIC, RETICHGB  ID Studies:   Lab Results   Component Value Date/Time    LACTICACID 1 5 04/21/2019 11:09 AM    HEPBSAG Non-reactive 04/22/2019 05:53 AM    HEPAIGM Non-reactive 04/22/2019 05:53 AM    HEPCAB Non-reactive 04/22/2019 05:53 AM    HEPBIGM Non-reactive 04/22/2019 05:53 AM    [id number] Non-Reactive 04/21/2019 12:27 PM     Cultures:   Lab Results   Component Value Date/Time    BLOODCX No Growth After 5 Days  04/21/2019 12:27 PM    BLOODCX No Growth After 5 Days   04/21/2019 12:08 PM    SPUTUMCULTUR 3+ Growth of  04/21/2019 02:18 PM    SPUTUMCULTUR  04/21/2019 02:18 PM     Commensal respiratory chavez only; No significant growth of Staph aureus/MRSA or Pseudomonas aeruginosa      GRAMSTAIN Rare Epithelial Cells (A) 04/21/2019 02:18 PM    GRAMSTAIN No polys seen (A) 04/21/2019 02:18 PM    GRAMSTAIN 3+ Gram positive cocci in pairs (A) 04/21/2019 02:18 PM    LEGIONELLAUR Negative 04/21/2019 03:05 PM    STPU Negative 04/21/2019 03:05 PM      Rheumatologic Studies:   Lab Results   Component Value Date/Time    HEPBSAG Non-reactive 04/22/2019 05:53 AM    HEPAIGM Non-reactive 04/22/2019 05:53 AM    HEPCAB Non-reactive 04/22/2019 05:53 AM    HEPBIGM Non-reactive 04/22/2019 05:53 AM     CSF Studies:   Lab Results   Component Value Date/Time    GRAMSTAIN Rare Epithelial Cells (A) 04/21/2019 02:18 PM    GRAMSTAIN No polys seen (A) 04/21/2019 02:18 PM    GRAMSTAIN 3+ Gram positive cocci in pairs (A) 04/21/2019 02:18 PM     Hypercoagulable Studies: No results found for: FACVLM, AT3ACT, PROTEINC, CRDLPNIGA, CRDLPNIGG, CRDLPNIGM      Current Medications     Current Outpatient Medications:     buprenorphine-naloxone (SUBOXONE) 8-2 mg per SL tablet, One daily, Disp: , Rfl:     hydrOXYzine HCL (ATARAX) 25 mg tablet, Take 1 tablet (25 mg total) by mouth every 6 (six) hours as needed for anxiety, Disp: 30 tablet, Rfl: 0    melatonin 3 mg, Take 1 tablet (3 mg total) by mouth daily at bedtime, Disp: 30 tablet, Rfl: 0    omeprazole (PriLOSEC) 20 mg delayed release capsule, Take 1 capsule (20 mg total) by mouth daily, Disp: 90 capsule, Rfl: 1    OXcarbazepine (TRILEPTAL) 300 mg tablet, Take 1 pill twice a day, Disp: 180 tablet, Rfl: 1    ondansetron (ZOFRAN) 4 mg tablet, Take 1 tablet (4 mg total) by mouth every 6 (six) hours as needed for nausea or vomiting for up to 10 doses, Disp: 10 tablet, Rfl: 0    QUEtiapine (SEROquel) 300 mg tablet, TAKE 1 TABLET BY MOUTH DAILY AT BEDTIME, Disp: 90 tablet, Rfl: 1    QUEtiapine (SEROquel) 50 mg tablet, Take 1 tablet (50 mg total) by mouth daily at bedtime, Disp: 90 tablet, Rfl: 1

## 2019-11-15 ENCOUNTER — OFFICE VISIT (OUTPATIENT)
Dept: FAMILY MEDICINE CLINIC | Facility: CLINIC | Age: 29
End: 2019-11-15
Payer: COMMERCIAL

## 2019-11-15 VITALS
DIASTOLIC BLOOD PRESSURE: 84 MMHG | BODY MASS INDEX: 32.97 KG/M2 | OXYGEN SATURATION: 99 % | TEMPERATURE: 97.9 F | WEIGHT: 243.4 LBS | SYSTOLIC BLOOD PRESSURE: 120 MMHG | HEART RATE: 83 BPM | HEIGHT: 72 IN

## 2019-11-15 DIAGNOSIS — K29.00 ACUTE GASTRITIS WITHOUT HEMORRHAGE, UNSPECIFIED GASTRITIS TYPE: Primary | ICD-10-CM

## 2019-11-15 PROCEDURE — 99213 OFFICE O/P EST LOW 20 MIN: CPT | Performed by: NURSE PRACTITIONER

## 2019-11-15 PROCEDURE — 1036F TOBACCO NON-USER: CPT | Performed by: NURSE PRACTITIONER

## 2019-11-15 NOTE — LETTER
November 15, 2019     Patient: Radha Upton   YOB: 1990   Date of Visit: 11/15/2019       To Whom it May Concern:    Radha Upton is under my professional care  He was seen in my office on 11/15/2019  He may return to work on 11/18/2019  If you have any questions or concerns, please don't hesitate to call           Sincerely,          PETER Higginbotham        CC: No Recipients

## 2019-11-15 NOTE — PROGRESS NOTES
Assessment/Plan:     Chronic Problems:  No problem-specific Assessment & Plan notes found for this encounter  Visit Diagnosis:  Diagnoses and all orders for this visit:    Acute gastritis without hemorrhage, unspecified gastritis type  Comments:  Doing much better and able to keep down food  Increase hydration  Needs note work  Subjective:    Patient ID: Willie Francis is a 30 y o  male  Here with nausea, diarrhea and vomiting for about 4 days  Keeping down gatorade, fluids and eating ok  Feeling much better now, needs note for work  Missed work all week for NVD  Said he's doing well otherwise and has no concerns  The following portions of the patient's history were reviewed and updated as appropriate: allergies, current medications, past family history, past medical history, past social history, past surgical history and problem list     Review of Systems   Constitutional: Negative for chills, fatigue and fever  HENT: Negative  Respiratory: Negative for shortness of breath and wheezing  Cardiovascular: Negative for chest pain and palpitations  Gastrointestinal: Negative for abdominal pain, diarrhea, nausea and rectal pain  Psychiatric/Behavioral: Negative for sleep disturbance  The patient is not nervous/anxious            /84   Pulse 83   Temp 97 9 °F (36 6 °C)   Ht 6' (1 829 m)   Wt 110 kg (243 lb 6 4 oz)   SpO2 99%   BMI 33 01 kg/m²   Social History     Socioeconomic History    Marital status: Single     Spouse name: Not on file    Number of children: Not on file    Years of education: Not on file    Highest education level: Not on file   Occupational History    Occupation: fulltime   Social Needs    Financial resource strain: Not on file    Food insecurity:     Worry: Not on file     Inability: Not on file    Transportation needs:     Medical: Not on file     Non-medical: Not on file   Tobacco Use    Smoking status: Former Smoker     Last attempt to quit: 3/30/2015     Years since quittin 6    Smokeless tobacco: Never Used   Substance and Sexual Activity    Alcohol use: Not Currently     Comment: at social events    Drug use: Not Currently     Frequency: 2 0 times per week     Types: Marijuana, Heroin     Comment: Pt reports using THC daily x3, Heroin every other day and will alternate between using pills  Pt snorts heroin    Sexual activity: Yes     Partners: Female   Lifestyle    Physical activity:     Days per week: Not on file     Minutes per session: Not on file    Stress: Not on file   Relationships    Social connections:     Talks on phone: Not on file     Gets together: Not on file     Attends Buddhist service: Not on file     Active member of club or organization: Not on file     Attends meetings of clubs or organizations: Not on file     Relationship status: Not on file    Intimate partner violence:     Fear of current or ex partner: Not on file     Emotionally abused: Not on file     Physically abused: Not on file     Forced sexual activity: Not on file   Other Topics Concern    Not on file   Social History Narrative    Not on file     Past Medical History:   Diagnosis Date    ADHD     Anxiety     Bipolar disorder ([facility] [address] )     Depression     Drug use     Essential hypertension     benign    Hyperlipidemia     Peptic reflux disease      Family History   Problem Relation Age of Onset    Mental illness Mother     Thyroid disease Mother     Drug abuse Mother         illicit drug    Diabetes Father     Cancer Father      History reviewed  No pertinent surgical history      Current Outpatient Medications:     buprenorphine-naloxone (SUBOXONE) 8-2 mg per SL tablet, One daily, Disp: , Rfl:     hydrOXYzine HCL (ATARAX) 25 mg tablet, Take 1 tablet (25 mg total) by mouth every 6 (six) hours as needed for anxiety, Disp: 30 tablet, Rfl: 0    melatonin 3 mg, Take 1 tablet (3 mg total) by mouth daily at bedtime, Disp: 30 tablet, Rfl: 0   omeprazole (PriLOSEC) 20 mg delayed release capsule, Take 1 capsule (20 mg total) by mouth daily, Disp: 90 capsule, Rfl: 1    ondansetron (ZOFRAN) 4 mg tablet, Take 1 tablet (4 mg total) by mouth every 6 (six) hours as needed for nausea or vomiting for up to 10 doses, Disp: 10 tablet, Rfl: 0    OXcarbazepine (TRILEPTAL) 300 mg tablet, Take 1 pill twice a day, Disp: 180 tablet, Rfl: 1    QUEtiapine (SEROquel) 300 mg tablet, TAKE 1 TABLET BY MOUTH DAILY AT BEDTIME, Disp: 90 tablet, Rfl: 1    QUEtiapine (SEROquel) 50 mg tablet, Take 1 tablet (50 mg total) by mouth daily at bedtime, Disp: 90 tablet, Rfl: 1    No Known Allergies       Lab Review   Admission on 10/22/2019, Discharged on 10/25/2019   Component Date Value    WBC 10/22/2019 13 31*    RBC 10/22/2019 5 90*    Hemoglobin 10/22/2019 16 5     Hematocrit 10/22/2019 46 4     MCV 10/22/2019 79*    MCH 10/22/2019 28 0     MCHC 10/22/2019 35 6     RDW 10/22/2019 12 9     MPV 10/22/2019 10 2     Platelets 38/70/8333 450*    nRBC 10/22/2019 0     Neutrophils Relative 10/22/2019 76*    Immat GRANS % 10/22/2019 0     Lymphocytes Relative 10/22/2019 15     Monocytes Relative 10/22/2019 9     Eosinophils Relative 10/22/2019 0     Basophils Relative 10/22/2019 0     Neutrophils Absolute 10/22/2019 10 08*    Immature Grans Absolute 10/22/2019 0 05     Lymphocytes Absolute 10/22/2019 1 94     Monocytes Absolute 10/22/2019 1 21     Eosinophils Absolute 10/22/2019 0 00     Basophils Absolute 10/22/2019 0 03     Sodium 10/22/2019 133*    Potassium 10/22/2019 2 4*    Chloride 10/22/2019 90*    CO2 10/22/2019 30     ANION GAP 10/22/2019 13     BUN 10/22/2019 15     Creatinine 10/22/2019 1 05     Glucose 10/22/2019 123     Calcium 10/22/2019 10 0     AST 10/22/2019 17     ALT 10/22/2019 23     Alkaline Phosphatase 10/22/2019 84     Total Protein 10/22/2019 8 7*    Albumin 10/22/2019 4 6     Total Bilirubin 10/22/2019 1 50*    eGFR 10/22/2019 96     Lipase 10/22/2019 550*    Amph/Meth UR 10/23/2019 Negative     Barbiturate Ur 10/23/2019 Negative     Benzodiazepine Urine 10/23/2019 Negative     Cocaine Urine 10/23/2019 Negative     Methadone Urine 10/23/2019 Negative     Opiate Urine 10/23/2019 Positive*    PCP Ur 10/23/2019 Negative     THC Urine 10/23/2019 Positive*    Troponin I 10/22/2019 <0 02     Sodium 10/23/2019 133*    Potassium 10/23/2019 2 6*    Chloride 10/23/2019 95*    CO2 10/23/2019 28     ANION GAP 10/23/2019 10     BUN 10/23/2019 15     Creatinine 10/23/2019 0 91     Glucose 10/23/2019 125     Glucose, Fasting 10/23/2019 125*    Calcium 10/23/2019 9 0     eGFR 10/23/2019 114     WBC 10/23/2019 9 46     RBC 10/23/2019 5 10     Hemoglobin 10/23/2019 14 6     Hematocrit 10/23/2019 41 2     MCV 10/23/2019 81*    MCH 10/23/2019 28 6     MCHC 10/23/2019 35 4     RDW 10/23/2019 13 0     MPV 10/23/2019 10 2     Platelets 09/02/6052 309     nRBC 10/23/2019 0     Neutrophils Relative 10/23/2019 60     Immat GRANS % 10/23/2019 0     Lymphocytes Relative 10/23/2019 29     Monocytes Relative 10/23/2019 11     Eosinophils Relative 10/23/2019 0     Basophils Relative 10/23/2019 0     Neutrophils Absolute 10/23/2019 5 64     Immature Grans Absolute 10/23/2019 0 02     Lymphocytes Absolute 10/23/2019 2 76     Monocytes Absolute 10/23/2019 0 99     Eosinophils Absolute 10/23/2019 0 02     Basophils Absolute 10/23/2019 0 03     Magnesium 10/23/2019 2 1     Ventricular Rate 10/22/2019 92     Atrial Rate 10/22/2019 92     [place] Interval 10/22/2019 132     QRSD Interval 10/22/2019 82     QT Interval 10/22/2019 434     QTC Interval 10/22/2019 536     P Axis 10/22/2019 68     QRS Axis 10/22/2019 59     T Wave Axis 10/22/2019 72     Potassium 10/23/2019 3 1*    WBC 10/24/2019 9 38     RBC 10/24/2019 5 25     Hemoglobin 10/24/2019 14 7     Hematocrit 10/24/2019 42 4     MCV 10/24/2019 81*    MCH 10/24/2019 28 0     MCHC 10/24/2019 34 7     RDW 10/24/2019 12 9     Platelets 41/06/9077 313     MPV 10/24/2019 10 1     Sodium 10/24/2019 134*    Potassium 10/24/2019 2 7*    Chloride 10/24/2019 97*    CO2 10/24/2019 26     ANION GAP 10/24/2019 11     BUN 10/24/2019 12     Creatinine 10/24/2019 0 96     Glucose 10/24/2019 161*    Calcium 10/24/2019 9 0     eGFR 10/24/2019 107     Magnesium 10/24/2019 2 2     Potassium 10/24/2019 3 2*    Sodium 10/25/2019 138     Potassium 10/25/2019 3 2*    Chloride 10/25/2019 100     CO2 10/25/2019 26     ANION GAP 10/25/2019 12     BUN 10/25/2019 12     Creatinine 10/25/2019 0 77     Glucose 10/25/2019 143*    Calcium 10/25/2019 8 7     eGFR 10/25/2019 123    Admission on 10/01/2019, Discharged on 10/02/2019   Component Date Value    Sodium 10/01/2019 133*    Potassium 10/01/2019 2 6*    Chloride 10/01/2019 87*    CO2 10/01/2019 32     ANION GAP 10/01/2019 14*    BUN 10/01/2019 16     Creatinine 10/01/2019 0 93     Glucose 10/01/2019 132     Calcium 10/01/2019 9 4     AST 10/01/2019 16     ALT 10/01/2019 13     Alkaline Phosphatase 10/01/2019 72     Total Protein 10/01/2019 8 2     Albumin 10/01/2019 4 4     Total Bilirubin 10/01/2019 1 70*    eGFR 10/01/2019 111     Lipase 10/01/2019 260     WBC 10/01/2019 10 98*    RBC 10/01/2019 6 17*    Hemoglobin 10/01/2019 17 2*    Hematocrit 10/01/2019 47 8     MCV 10/01/2019 78*    MCH 10/01/2019 27 9     MCHC 10/01/2019 36 0     RDW 10/01/2019 12 5     MPV 10/01/2019 10 1     Platelets 75/02/0301 365     nRBC 10/01/2019 0     Neutrophils Relative 10/01/2019 70     Immat GRANS % 10/01/2019 0     Lymphocytes Relative 10/01/2019 17     Monocytes Relative 10/01/2019 13*    Eosinophils Relative 10/01/2019 0     Basophils Relative 10/01/2019 0     Neutrophils Absolute 10/01/2019 7 64*    Immature Grans Absolute 10/01/2019 0 04     Lymphocytes Absolute 10/01/2019 1 88     Monocytes Absolute 10/01/2019 1 38*    Eosinophils Absolute 10/01/2019 0 01     Basophils Absolute 10/01/2019 0 03     Magnesium 10/01/2019 2 2     Amph/Meth UR 10/01/2019 Negative     Barbiturate Ur 10/01/2019 Negative     Benzodiazepine Urine 10/01/2019 Negative     Cocaine Urine 10/01/2019 Negative     Methadone Urine 10/01/2019 Negative     Opiate Urine 10/01/2019 Negative     PCP Ur 10/01/2019 Negative     THC Urine 10/01/2019 Positive*    Potassium 10/01/2019 3 2*    Ventricular Rate 10/01/2019 71     Atrial Rate 10/01/2019 71     SD Interval 10/01/2019 136     QRSD Interval 10/01/2019 100     QT Interval 10/01/2019 424     QTC Interval 10/01/2019 460     P Axis 10/01/2019 61     QRS Axis 10/01/2019 34     T Wave Axis 10/01/2019 25     WBC 10/02/2019 9 14     RBC 10/02/2019 5 33     Hemoglobin 10/02/2019 14 7     Hematocrit 10/02/2019 42 1     MCV 10/02/2019 79*    MCH 10/02/2019 27 6     MCHC 10/02/2019 34 9     RDW 10/02/2019 12 7     MPV 10/02/2019 10 0     Platelets 17/44/1673 289     nRBC 10/02/2019 0     Neutrophils Relative 10/02/2019 46     Immat GRANS % 10/02/2019 0     Lymphocytes Relative 10/02/2019 43     Monocytes Relative 10/02/2019 10     Eosinophils Relative 10/02/2019 1     Basophils Relative 10/02/2019 0     Neutrophils Absolute 10/02/2019 4 15     Immature Grans Absolute 10/02/2019 0 04     Lymphocytes Absolute 10/02/2019 3 88     Monocytes Absolute 10/02/2019 0 95     Eosinophils Absolute 10/02/2019 0 09     Basophils Absolute 10/02/2019 0 03     Sodium 10/02/2019 136     Potassium 10/02/2019 2 8*    Chloride 10/02/2019 97*    CO2 10/02/2019 31     ANION GAP 10/02/2019 8     BUN 10/02/2019 12     Creatinine 10/02/2019 0 89     Glucose 10/02/2019 106     Calcium 10/02/2019 8 6     eGFR 10/02/2019 116     Sodium 10/02/2019 137     Potassium 10/02/2019 3 0*    Chloride 10/02/2019 98*    CO2 10/02/2019 29     ANION GAP 10/02/2019 10  BUN 10/02/2019 10     Creatinine 10/02/2019 0 87     Glucose 10/02/2019 123     Calcium 10/02/2019 8 6     eGFR 10/02/2019 117    Admission on 09/29/2019, Discharged on 09/29/2019   Component Date Value    WBC 09/29/2019 14 31*    RBC 09/29/2019 6 43*    Hemoglobin 09/29/2019 17 8*    Hematocrit 09/29/2019 50 8*    MCV 09/29/2019 79*    MCH 09/29/2019 27 7     MCHC 09/29/2019 35 0     RDW 09/29/2019 13 0     MPV 09/29/2019 10 3     Platelets 51/94/5652 392*    nRBC 09/29/2019 0     Neutrophils Relative 09/29/2019 79*    Immat GRANS % 09/29/2019 0     Lymphocytes Relative 09/29/2019 14     Monocytes Relative 09/29/2019 7     Eosinophils Relative 09/29/2019 0     Basophils Relative 09/29/2019 0     Neutrophils Absolute 09/29/2019 11 35*    Immature Grans Absolute 09/29/2019 0 04     Lymphocytes Absolute 09/29/2019 1 97     Monocytes Absolute 09/29/2019 0 94     Eosinophils Absolute 09/29/2019 0 00     Basophils Absolute 09/29/2019 0 01     Sodium 09/29/2019 136     Potassium 09/29/2019 3 4*    Chloride 09/29/2019 89*    CO2 09/29/2019 32     ANION GAP 09/29/2019 15*    BUN 09/29/2019 29*    Creatinine 09/29/2019 1 23     Glucose 09/29/2019 158*    Calcium 09/29/2019 10 4*    eGFR 09/29/2019 79     Total Bilirubin 09/29/2019 1 70*    Bilirubin, Direct 09/29/2019 0 33*    Alkaline Phosphatase 09/29/2019 88     AST 09/29/2019 20     ALT 09/29/2019 17     Total Protein 09/29/2019 9 0*    Albumin 09/29/2019 5 2*    Lipase 09/29/2019 237         Imaging: Xr Chest 2 Views    Result Date: 10/22/2019  Narrative: CHEST INDICATION:   chest pain  COMPARISON:  April 21, 2019 EXAM PERFORMED/VIEWS:  XR CHEST PA & LATERAL Images: 2 FINDINGS:  Lungs adequately aerated  No consolidation or effusion  Cardiac size within normal limits  No vascular congestion or peribronchial thickening  No pneumothorax or free air  Osseous structures appear within normal limits for patient age  Impression: No acute cardiopulmonary disease  Workstation performed: [id number]     Ct Abdomen Pelvis With Contrast    Result Date: 10/22/2019  Narrative: CT ABDOMEN AND PELVIS WITH IV CONTRAST INDICATION:   vomiting and elevated lipase eval pancreatitis  COMPARISON:  CT abdomen pelvis 10/1/2019 TECHNIQUE:  CT examination of the abdomen and pelvis was performed  Axial, sagittal, and coronal 2D reformatted images were created from the source data and submitted for interpretation  Radiation dose length product (DLP) for this visit:  1113 87 mGy-cm   This examination, like all CT scans performed in the [facility], was performed utilizing techniques to minimize radiation dose exposure, including the use of iterative reconstruction and automated exposure control  IV Contrast:  100 mL of iohexol (OMNIPAQUE) Enteric Contrast:  Enteric contrast was not administered  FINDINGS: ABDOMEN LOWER CHEST:  No clinically significant abnormality identified in the visualized lower chest  LIVER/BILIARY TREE:  Unremarkable  GALLBLADDER:  No calcified gallstones  No pericholecystic inflammatory change  SPLEEN:  Unremarkable  PANCREAS:  Unremarkable  ADRENAL GLANDS:  Unremarkable  KIDNEYS/URETERS:  Unremarkable  No hydronephrosis  STOMACH AND BOWEL:  Unremarkable  APPENDIX:  A normal appendix was visualized  ABDOMINOPELVIC CAVITY:  No ascites or free intraperitoneal air  No lymphadenopathy  VESSELS:  Unremarkable for patient's age  PELVIS REPRODUCTIVE ORGANS:  Unremarkable for patient's age  URINARY BLADDER:  Unremarkable  ABDOMINAL WALL/INGUINAL REGIONS:  Unremarkable  OSSEOUS STRUCTURES:  No acute fracture or destructive osseous lesion  Impression: No acute inflammatory process  Workstation performed: [id number]       Objective:     Physical Exam   Constitutional: He is oriented to person, place, and time  He appears well-developed and well-nourished  No distress  HENT:   Head: Normocephalic     Right Ear: External ear normal    Left Ear: External ear normal    Mouth/Throat: No oropharyngeal exudate  Cardiovascular: Normal rate, regular rhythm and normal heart sounds  Exam reveals no gallop  No murmur heard  Pulmonary/Chest: Effort normal and breath sounds normal  No respiratory distress  He has no wheezes  Abdominal: Soft  There is no tenderness  Neurological: He is alert and oriented to person, place, and time  Skin: Skin is warm and dry  Psychiatric: He has a normal mood and affect  Patient Instructions   Note printed for work  Continue increasing fluids  PETER Higginbotham    Portions of the record may have been created with voice recognition software  Occasional wrong word or "sound a like" substitutions may have occurred due to the inherent limitations of voice recognition software  Read the chart carefully and recognize, using context, where substitutions have occurred

## 2019-11-25 DIAGNOSIS — F31.4 BIPOLAR 1 DISORDER, DEPRESSED, SEVERE (HCC): ICD-10-CM

## 2019-11-26 RX ORDER — QUETIAPINE FUMARATE 50 MG/1
50 TABLET, FILM COATED ORAL
Qty: 90 TABLET | Refills: 1 | Status: SHIPPED | OUTPATIENT
Start: 2019-11-26 | End: 2020-07-22

## 2020-02-12 ENCOUNTER — HOSPITAL ENCOUNTER (EMERGENCY)
Facility: HOSPITAL | Age: 30
Discharge: HOME | End: 2020-02-12
Attending: EMERGENCY MEDICINE | Admitting: EMERGENCY MEDICINE
Payer: COMMERCIAL

## 2020-02-12 VITALS
RESPIRATION RATE: 18 BRPM | DIASTOLIC BLOOD PRESSURE: 80 MMHG | HEART RATE: 81 BPM | SYSTOLIC BLOOD PRESSURE: 140 MMHG | BODY MASS INDEX: 33.01 KG/M2 | OXYGEN SATURATION: 99 % | HEIGHT: 72 IN | TEMPERATURE: 97.6 F

## 2020-02-12 DIAGNOSIS — E86.0 DEHYDRATION: ICD-10-CM

## 2020-02-12 DIAGNOSIS — R11.2 NAUSEA & VOMITING: Primary | ICD-10-CM

## 2020-02-12 LAB
ALBUMIN SERPL BCP-MCNC: 4.5 G/DL (ref 3.5–5)
ALP SERPL-CCNC: 91 U/L (ref 46–116)
ALT SERPL W P-5'-P-CCNC: 33 U/L (ref 12–78)
ANION GAP SERPL CALCULATED.3IONS-SCNC: 10 MMOL/L (ref 4–13)
AST SERPL W P-5'-P-CCNC: 35 U/L (ref 5–45)
BASOPHILS # BLD AUTO: 0.03 THOUSANDS/ΜL (ref 0–0.1)
BASOPHILS NFR BLD AUTO: 0 % (ref 0–1)
BILIRUB SERPL-MCNC: 0.9 MG/DL (ref 0.2–1)
BUN SERPL-MCNC: 18 MG/DL (ref 5–25)
CALCIUM SERPL-MCNC: 9.6 MG/DL (ref 8.3–10.1)
CHLORIDE SERPL-SCNC: 93 MMOL/L (ref 100–108)
CO2 SERPL-SCNC: 31 MMOL/L (ref 21–32)
CREAT SERPL-MCNC: 1.13 MG/DL (ref 0.6–1.3)
EOSINOPHIL # BLD AUTO: 0.02 THOUSAND/ΜL (ref 0–0.61)
EOSINOPHIL NFR BLD AUTO: 0 % (ref 0–6)
ERYTHROCYTE [DISTWIDTH] IN BLOOD BY AUTOMATED COUNT: 12.7 % (ref 11.6–15.1)
GFR SERPL CREATININE-BSD FRML MDRD: 87 ML/MIN/1.73SQ M
GLUCOSE SERPL-MCNC: 139 MG/DL (ref 65–140)
HCT VFR BLD AUTO: 50.3 % (ref 36.5–49.3)
HGB BLD-MCNC: 17.8 G/DL (ref 12–17)
IMM GRANULOCYTES # BLD AUTO: 0.03 THOUSAND/UL (ref 0–0.2)
IMM GRANULOCYTES NFR BLD AUTO: 0 % (ref 0–2)
LIPASE SERPL-CCNC: 162 U/L (ref 73–393)
LYMPHOCYTES # BLD AUTO: 2.42 THOUSANDS/ΜL (ref 0.6–4.47)
LYMPHOCYTES NFR BLD AUTO: 22 % (ref 14–44)
MCH RBC QN AUTO: 28.3 PG (ref 26.8–34.3)
MCHC RBC AUTO-ENTMCNC: 35.4 G/DL (ref 31.4–37.4)
MCV RBC AUTO: 80 FL (ref 82–98)
MONOCYTES # BLD AUTO: 1.02 THOUSAND/ΜL (ref 0.17–1.22)
MONOCYTES NFR BLD AUTO: 9 % (ref 4–12)
NEUTROPHILS # BLD AUTO: 7.43 THOUSANDS/ΜL (ref 1.85–7.62)
NEUTS SEG NFR BLD AUTO: 69 % (ref 43–75)
NRBC BLD AUTO-RTO: 0 /100 WBCS
PLATELET # BLD AUTO: 388 THOUSANDS/UL (ref 149–390)
PMV BLD AUTO: 10.4 FL (ref 8.9–12.7)
POTASSIUM SERPL-SCNC: 3.2 MMOL/L (ref 3.5–5.3)
PROT SERPL-MCNC: 8.6 G/DL (ref 6.4–8.2)
RBC # BLD AUTO: 6.29 MILLION/UL (ref 3.88–5.62)
SODIUM SERPL-SCNC: 134 MMOL/L (ref 136–145)
WBC # BLD AUTO: 10.95 THOUSAND/UL (ref 4.31–10.16)

## 2020-02-12 PROCEDURE — 83690 ASSAY OF LIPASE: CPT | Performed by: PHYSICIAN ASSISTANT

## 2020-02-12 PROCEDURE — 99283 EMERGENCY DEPT VISIT LOW MDM: CPT | Performed by: PHYSICIAN ASSISTANT

## 2020-02-12 PROCEDURE — 80053 COMPREHEN METABOLIC PANEL: CPT | Performed by: PHYSICIAN ASSISTANT

## 2020-02-12 PROCEDURE — 85025 COMPLETE CBC W/AUTO DIFF WBC: CPT | Performed by: PHYSICIAN ASSISTANT

## 2020-02-12 PROCEDURE — 96374 THER/PROPH/DIAG INJ IV PUSH: CPT

## 2020-02-12 PROCEDURE — 96361 HYDRATE IV INFUSION ADD-ON: CPT

## 2020-02-12 PROCEDURE — 36415 COLL VENOUS BLD VENIPUNCTURE: CPT | Performed by: PHYSICIAN ASSISTANT

## 2020-02-12 PROCEDURE — 99283 EMERGENCY DEPT VISIT LOW MDM: CPT

## 2020-02-12 RX ORDER — ONDANSETRON 4 MG/1
4 TABLET, ORALLY DISINTEGRATING ORAL EVERY 8 HOURS PRN
Qty: 15 TABLET | Refills: 0 | Status: SHIPPED | OUTPATIENT
Start: 2020-02-12 | End: 2020-07-22 | Stop reason: ALTCHOICE

## 2020-02-12 RX ORDER — DICYCLOMINE HCL 20 MG
20 TABLET ORAL EVERY 6 HOURS PRN
Qty: 20 TABLET | Refills: 0 | Status: SHIPPED | OUTPATIENT
Start: 2020-02-12 | End: 2020-09-14 | Stop reason: ALTCHOICE

## 2020-02-12 RX ORDER — ONDANSETRON 2 MG/ML
4 INJECTION INTRAMUSCULAR; INTRAVENOUS ONCE
Status: COMPLETED | OUTPATIENT
Start: 2020-02-12 | End: 2020-02-12

## 2020-02-12 RX ORDER — POTASSIUM CHLORIDE 20 MEQ/1
40 TABLET, EXTENDED RELEASE ORAL ONCE
Status: COMPLETED | OUTPATIENT
Start: 2020-02-12 | End: 2020-02-12

## 2020-02-12 RX ADMIN — POTASSIUM CHLORIDE 40 MEQ: 1500 TABLET, EXTENDED RELEASE ORAL at 15:20

## 2020-02-12 RX ADMIN — SODIUM CHLORIDE 2000 ML: 0.9 INJECTION, SOLUTION INTRAVENOUS at 14:35

## 2020-02-12 RX ADMIN — ONDANSETRON 4 MG: 2 INJECTION INTRAMUSCULAR; INTRAVENOUS at 14:36

## 2020-02-12 NOTE — ED PROVIDER NOTES
History  Chief Complaint   Patient presents with    Vomiting     x 5 days; denies sick contacts; no diarrhea     36 yo male with n/v and abd pain  Started 5 days ago  States he gets this all the time when he stops taking his medications which he had done so  He is on suboxone, trileptal, seroquel  States he stops taking them because he does not feel like taking them  He gets fluids and antiemetics and usually improves  Denies any new or unusual symptoms with this  No fever, chills, chest pain or sob  Normal bowel movement and urination  He reports about 50 episodes of vomiting since symptoms began  No hematemesis or bilious vomitus  Stomach contents only  History provided by:  Patient   used: No    Vomiting   Severity:  Moderate  Duration:  5 days  Timing:  Constant  Number of daily episodes:  "50 episodes since symptoms started"  Quality:  Stomach contents  Onset of vomiting after eating: a few minutes  Progression:  Unchanged  Chronicity:  Recurrent  Recent urination:  Normal  Context: not post-tussive and not self-induced    Relieved by:  Nothing  Worsened by:  Nothing  Ineffective treatments:  None tried  Associated symptoms: no abdominal pain, no arthralgias, no chills, no cough, no diarrhea, no fever, no headaches, no myalgias, no sore throat and no URI        Prior to Admission Medications   Prescriptions Last Dose Informant Patient Reported? Taking?    OXcarbazepine (TRILEPTAL) 300 mg tablet   No Yes   Sig: Take 1 pill twice a day   QUEtiapine (SEROquel) 300 mg tablet   No Yes   Sig: TAKE 1 TABLET BY MOUTH DAILY AT BEDTIME   QUEtiapine (SEROquel) 50 mg tablet   No Yes   Sig: Take 1 tablet (50 mg total) by mouth daily at bedtime   buprenorphine-naloxone (SUBOXONE) 8-2 mg per SL tablet   No Yes   Sig: One daily   hydrOXYzine HCL (ATARAX) 25 mg tablet   No Yes   Sig: Take 1 tablet (25 mg total) by mouth every 6 (six) hours as needed for anxiety   melatonin 3 mg   No Yes   Sig: Take 1 tablet (3 mg total) by mouth daily at bedtime   omeprazole (PriLOSEC) 20 mg delayed release capsule   No Yes   Sig: Take 1 capsule (20 mg total) by mouth daily   ondansetron (ZOFRAN) 4 mg tablet   No Yes   Sig: Take 1 tablet (4 mg total) by mouth every 6 (six) hours as needed for nausea or vomiting for up to 10 doses      Facility-Administered Medications: None       Past Medical History:   Diagnosis Date    ADHD     Anxiety     Bipolar disorder ([facility] )     Depression     Drug use     Essential hypertension     benign    Hyperlipidemia     Peptic reflux disease        History reviewed  No pertinent surgical history  Family History   Problem Relation Age of Onset    Mental illness Mother     Thyroid disease Mother     Drug abuse Mother         illicit drug    Diabetes Father     Cancer Father      I have reviewed and agree with the history as documented  Social History     Tobacco Use    Smoking status: Former Smoker     Last attempt to quit: 3/30/2015     Years since quittin 8    Smokeless tobacco: Never Used   Substance Use Topics    Alcohol use: Not Currently     Comment: at social events    Drug use: Not Currently     Frequency: 2 0 times per week     Types: Marijuana, Heroin     Comment: Pt reports using THC daily x3, Heroin every other day and will alternate between using pills  Pt snorts heroin       Review of Systems   Constitutional: Negative for activity change, appetite change, chills, diaphoresis, fatigue, fever and unexpected weight change  HENT: Negative for congestion, rhinorrhea, sinus pressure, sore throat and trouble swallowing  Eyes: Negative for photophobia and visual disturbance  Respiratory: Negative for apnea, cough, choking, chest tightness, shortness of breath, wheezing and stridor  Cardiovascular: Negative for chest pain, palpitations and leg swelling  Gastrointestinal: Positive for nausea and vomiting   Negative for abdominal distention, abdominal pain, blood in stool, constipation and diarrhea  Genitourinary: Negative for decreased urine volume, difficulty urinating, dysuria, enuresis, flank pain, frequency, hematuria and urgency  Musculoskeletal: Negative for arthralgias, myalgias, neck pain and neck stiffness  Skin: Negative for color change, pallor, rash and wound  Allergic/Immunologic: Negative  Neurological: Negative for dizziness, tremors, syncope, weakness, light-headedness, numbness and headaches  Hematological: Negative  Psychiatric/Behavioral: Negative  All other systems reviewed and are negative  Physical Exam  Physical Exam   Constitutional: He is oriented to person, place, and time  He appears well-developed and well-nourished  Non-toxic appearance  He does not have a sickly appearance  He does not appear ill  No distress  HENT:   Head: Normocephalic and atraumatic  Eyes: Pupils are equal, round, and reactive to light  EOM and lids are normal    Neck: Normal range of motion  Neck supple  Cardiovascular: Normal rate, regular rhythm, S1 normal, S2 normal, normal heart sounds, intact distal pulses and normal pulses  Exam reveals no gallop, no distant heart sounds, no friction rub and no decreased pulses  No murmur heard  Pulses:       Radial pulses are 2+ on the right side, and 2+ on the left side  Pulmonary/Chest: Effort normal and breath sounds normal  No accessory muscle usage  No apnea, no tachypnea and no bradypnea  No respiratory distress  He has no decreased breath sounds  He has no wheezes  He has no rhonchi  He has no rales  Abdominal: Soft  Normal appearance  He exhibits no distension  There is no tenderness  There is no rigidity, no rebound and no guarding  Musculoskeletal: Normal range of motion  He exhibits no edema, tenderness or deformity  Neurological: He is alert and oriented to person, place, and time  No cranial nerve deficit  GCS eye subscore is 4  GCS verbal subscore is 5   GCS motor subscore is 6    Skin: Skin is warm, dry and intact  No rash noted  He is not diaphoretic  No erythema  No pallor  Psychiatric: His speech is normal    Nursing note and vitals reviewed        Vital Signs  ED Triage Vitals [02/12/20 1417]   Temperature Pulse Respirations Blood Pressure SpO2   97 6 °F (36 4 °C) 90 18 140/86 95 %      Temp Source Heart Rate Source Patient Position - Orthostatic VS BP Location FiO2 (%)   Oral Monitor Lying Right arm --      Pain Score       No Pain           Vitals:    02/12/20 1417 02/12/20 1600   BP: 140/86 140/80   Pulse: 90 81   Patient Position - Orthostatic VS: Lying          Visual Acuity      ED Medications  Medications   sodium chloride 0 9 % bolus 2,000 mL (0 mL Intravenous Stopped 2/12/20 1558)   ondansetron (ZOFRAN) injection 4 mg (4 mg Intravenous Given 2/12/20 1436)   potassium chloride (K-DUR,KLOR-CON) CR tablet 40 mEq (40 mEq Oral Given 2/12/20 1520)       Diagnostic Studies  Results Reviewed     Procedure Component Value Units Date/Time    Comprehensive metabolic panel [[id number]]  (Abnormal) Collected:  02/12/20 1435    Lab Status:  Final result Specimen:  Blood from Arm, Left Updated:  02/12/20 1458     Sodium 134 mmol/L      Potassium 3 2 mmol/L      Chloride 93 mmol/L      CO2 31 mmol/L      ANION GAP 10 mmol/L      BUN 18 mg/dL      Creatinine 1 13 mg/dL      Glucose 139 mg/dL      Calcium 9 6 mg/dL      AST 35 U/L      ALT 33 U/L      Alkaline Phosphatase 91 U/L      Total Protein 8 6 g/dL      Albumin 4 5 g/dL      Total Bilirubin 0 90 mg/dL      eGFR 87 ml/min/1 73sq m     Narrative:       Andriy guidelines for Chronic Kidney Disease (CKD):     Stage 1 with normal or high GFR (GFR > 90 mL/min/1 73 square meters)    Stage 2 Mild CKD (GFR = 60-89 mL/min/1 73 square meters)    Stage 3A Moderate CKD (GFR = 45-59 mL/min/1 73 square meters)    Stage 3B Moderate CKD (GFR = 30-44 mL/min/1 73 square meters)    Stage 4 Severe CKD (GFR = 15-29 mL/min/1 73 square meters)    Stage 5 End Stage CKD (GFR <15 mL/min/1 73 square meters)  Note: GFR calculation is accurate only with a steady state creatinine    Lipase [[id number]]  (Normal) Collected:  02/12/20 1435    Lab Status:  Final result Specimen:  Blood from Arm, Left Updated:  02/12/20 1458     Lipase 162 u/L     CBC and differential [[id number]]  (Abnormal) Collected:  02/12/20 1435    Lab Status:  Final result Specimen:  Blood from Arm, Left Updated:  02/12/20 1444     WBC 10 95 Thousand/uL      RBC 6 29 Million/uL      Hemoglobin 17 8 g/dL      Hematocrit 50 3 %      MCV 80 fL      MCH 28 3 pg      MCHC 35 4 g/dL      RDW 12 7 %      MPV 10 4 fL      Platelets 291 Thousands/uL      nRBC 0 /100 WBCs      Neutrophils Relative 69 %      Immat GRANS % 0 %      Lymphocytes Relative 22 %      Monocytes Relative 9 %      Eosinophils Relative 0 %      Basophils Relative 0 %      Neutrophils Absolute 7 43 Thousands/µL      Immature Grans Absolute 0 03 Thousand/uL      Lymphocytes Absolute 2 42 Thousands/µL      Monocytes Absolute 1 02 Thousand/µL      Eosinophils Absolute 0 02 Thousand/µL      Basophils Absolute 0 03 Thousands/µL                  No orders to display              Procedures  Procedures         ED Course                               MDM  Number of Diagnoses or Management Options  Dehydration: new and requires workup  Nausea & vomiting: new and requires workup  Diagnosis management comments: DDx including but not limited to: food poisoning, viral illness, metabolic abnormality, dehydration, intracranial process, GI etiology, adverse reaction of medication, medication withdrawal; doubt acute surgical intraabdominal process, Boorhave's syndrome, mediastinitis  Plan: IV fluids, labs  dispo pending          Amount and/or Complexity of Data Reviewed  Clinical lab tests: ordered and reviewed    Risk of Complications, Morbidity, and/or Mortality  Presenting problems: moderate  Management options: low  General comments: 71-year-old male patient here for evaluation of nausea vomiting x5 days  Likely the effect of stopping his medications, likely Suboxone  He was given IV fluids antiemetics and is feeling better and would like to go home  We discussed outpatient follow-up  We discussed importance of medication compliance  We discussed return parameters  Patient understands and agrees with this plan  This time he is clinically well-appearing afebrile  Reassuring vitals  Patient Progress  Patient progress: stable        Disposition  Final diagnoses:   Nausea & vomiting   Dehydration     Time reflects when diagnosis was documented in both MDM as applicable and the Disposition within this note     Time User Action Codes Description Comment    2/12/2020  3:56 PM Kingsley ULLOA Add [R11 2] Nausea & vomiting     2/12/2020  3:56 PM Nidia Badillo Add [E86 0] Dehydration       ED Disposition     ED Disposition Condition Date/Time Comment    Discharge Stable Wed Feb 12, 2020  3:56 PM Tonimassimo Ambriz discharge to home/self care              Follow-up Information     Follow up With Specialties Details Why Contact Info    Etelvina Houser, [address], Nurse Practitioner Call  for routine follow up and medication management [address] [facility] [address]  [phone]            Discharge Medication List as of 2/12/2020  3:58 PM      START taking these medications    Details   dicyclomine (BENTYL) 20 mg tablet Take 1 tablet (20 mg total) by mouth every 6 (six) hours as needed (abdominal pain), Starting Wed 2/12/2020, Print      ondansetron (ZOFRAN-ODT) 4 mg disintegrating tablet Take 1 tablet (4 mg total) by mouth every 8 (eight) hours as needed for nausea, Starting Wed 2/12/2020, Print         CONTINUE these medications which have NOT CHANGED    Details   buprenorphine-naloxone (SUBOXONE) 8-2 mg per SL tablet One daily, No Print      hydrOXYzine HCL (ATARAX) 25 mg tablet Take 1 tablet (25 mg total) by mouth every 6 (six) hours as needed for anxiety, Starting Fri 10/25/2019, Normal      melatonin 3 mg Take 1 tablet (3 mg total) by mouth daily at bedtime, Starting Fri 10/25/2019, Normal      omeprazole (PriLOSEC) 20 mg delayed release capsule Take 1 capsule (20 mg total) by mouth daily, Starting Thu 11/7/2019, Until Wed 2/12/2020, Print      ondansetron (ZOFRAN) 4 mg tablet Take 1 tablet (4 mg total) by mouth every 6 (six) hours as needed for nausea or vomiting for up to 10 doses, Starting Sun 9/29/2019, Print      OXcarbazepine (TRILEPTAL) 300 mg tablet Take 1 pill twice a day, Print      !! QUEtiapine (SEROquel) 300 mg tablet TAKE 1 TABLET BY MOUTH DAILY AT BEDTIME, Normal      !! QUEtiapine (SEROquel) 50 mg tablet Take 1 tablet (50 mg total) by mouth daily at bedtime, Starting Tue 11/26/2019, Print       !! - Potential duplicate medications found  Please discuss with provider  No discharge procedures on file      PDMP Review       Value Time User    PDMP Reviewed  Yes 10/25/2019  1:01 PM Kaleb Llanos MD          ED Provider  Electronically Signed by           Bryan Novak PA-C  02/12/20 [id number]

## 2020-02-21 ENCOUNTER — OFFICE VISIT (OUTPATIENT)
Dept: FAMILY MEDICINE CLINIC | Facility: CLINIC | Age: 30
End: 2020-02-21
Payer: COMMERCIAL

## 2020-02-21 VITALS
TEMPERATURE: 98.4 F | BODY MASS INDEX: 35.76 KG/M2 | WEIGHT: 264 LBS | OXYGEN SATURATION: 96 % | SYSTOLIC BLOOD PRESSURE: 122 MMHG | HEIGHT: 72 IN | DIASTOLIC BLOOD PRESSURE: 88 MMHG | HEART RATE: 124 BPM | RESPIRATION RATE: 18 BRPM

## 2020-02-21 DIAGNOSIS — F31.62 BIPOLAR DISORDER, CURRENT EPISODE MIXED, MODERATE (HCC): ICD-10-CM

## 2020-02-21 PROCEDURE — 1036F TOBACCO NON-USER: CPT | Performed by: NURSE PRACTITIONER

## 2020-02-21 PROCEDURE — 3008F BODY MASS INDEX DOCD: CPT | Performed by: NURSE PRACTITIONER

## 2020-02-21 PROCEDURE — 99213 OFFICE O/P EST LOW 20 MIN: CPT | Performed by: NURSE PRACTITIONER

## 2020-02-21 NOTE — PROGRESS NOTES
Assessment/Plan:  BMI Counseling: Body mass index is 35 8 kg/m²  The BMI is above normal  Nutrition recommendations include decreasing portion sizes, encouraging healthy choices of fruits and vegetables, decreasing fast food intake, consuming healthier snacks, limiting drinks that contain sugar, moderation in carbohydrate intake, increasing intake of lean protein, reducing intake of saturated and trans fat and reducing intake of cholesterol  Exercise recommendations include exercising 3-5 times per week  No pharmacotherapy was ordered  Bipolar disorder, current episode mixed, moderate ([facility] )  Patient had a manic episode starting on the 10th  Was unable to take her Seroquel because of nausea vomiting diarrhea  Had withdrawal symptoms  Was unable to work for a week  Was seen in the emergency room on February 12th  Patient has extensive history of bipolar disorder  Reports feeling better today  Work note given  BMI 35 0-35 9,adult  Education provided on the benefits of weight loss  Printed information given  Patient is aware that he has gained weight recently  Aware that will add to his anxiety and depression  States he is going to make a conscious effort to watch his diet and increased exercise activity         Problem List Items Addressed This Visit        Other    [facility], [facility] episode mixed, [facility]     Patient had a manic episode starting on the 10th  Was unable to take her Seroquel because of nausea vomiting diarrhea  Had withdrawal symptoms  Was unable to work for a week  Was seen in the emergency room on February 12th  Patient has extensive history of bipolar disorder  Reports feeling better today  Work note given  BMI 35 0-35 9,adult - Primary     Education provided on the benefits of weight loss  Printed information given  Patient is aware that he has gained weight recently  Aware that will add to his anxiety and depression    States he is going to make a conscious effort to watch his diet and increased exercise activity                 Subjective:      Patient ID: Charissa Germain is a 35 y o  male  Patient is here because he was out   of work from February 10th to 17th  He was having a manic episode  He had nausea, vomiting diarrhea  He was unable to take his medication and then went into withdrawal from the Seroquel  He was seen in the emergency room  Did return back to work on the 17th but is employed needs a letter  Reports feeling better  The following portions of the patient's history were reviewed and updated as appropriate: allergies, current medications, past family history, past medical history, past social history, past surgical history and problem list     Review of Systems   Constitutional: Negative  HENT: Negative  Eyes: Negative  Respiratory: Negative  Cardiovascular: Negative  Gastrointestinal: Negative  Endocrine: Negative  Genitourinary: Negative  Musculoskeletal: Negative  Skin: Negative  Allergic/Immunologic: Negative  Neurological: Negative  Hematological: Negative  Psychiatric/Behavioral: Negative  Objective:      /88   Pulse (!) 124   Temp 98 4 °F (36 9 °C) (Tympanic)   Resp 18   Ht 6' (1 829 m)   Wt 120 kg (264 lb)   SpO2 96%   BMI 35 80 kg/m²          Physical Exam   Constitutional: He is oriented to person, place, and time  He appears well-developed and well-nourished  HENT:   Head: Normocephalic and atraumatic  Eyes: Pupils are equal, round, and reactive to light  Neck: Normal range of motion  Cardiovascular: Normal rate and regular rhythm  Pulmonary/Chest: Effort normal and breath sounds normal    Abdominal: Soft  Bowel sounds are normal    Musculoskeletal: Normal range of motion  Neurological: He is oriented to person, place, and time  Skin: Skin is warm and dry  Psychiatric: He has a normal mood and affect   His behavior is normal  Judgment and thought content normal    Nursing note and vitals reviewed  Labs:    Lab Results   Component Value Date    WBC 10 95 (H) 02/12/2020    HGB 17 8 (H) 02/12/2020    HCT 50 3 (H) 02/12/2020    MCV 80 (L) 02/12/2020     02/12/2020     Lab Results   Component Value Date     (L) 05/29/2014    K 3 2 (L) 02/12/2020    CL 93 (L) 02/12/2020    CO2 31 02/12/2020    ANIONGAP 13 05/30/2014    BUN 18 02/12/2020    CREATININE 1 13 02/12/2020    GLUCOSE 139 10/12/2018    GLUF 125 (H) 10/23/2019    CALCIUM 9 6 02/12/2020    AST 35 02/12/2020    ALT 33 02/12/2020    ALKPHOS 91 02/12/2020    EGFR 87 02/12/2020     Lab Results   Component Value Date    GLUCOSE 139 10/12/2018    CALCIUM 9 6 02/12/2020     (L) 05/29/2014    K 3 2 (L) 02/12/2020    CO2 31 02/12/2020    CL 93 (L) 02/12/2020    BUN 18 02/12/2020    CREATININE 1 13 02/12/2020       BMI Counseling: Body mass index is 35 8 kg/m²  The BMI is above normal  Nutrition recommendations include reducing portion sizes, decreasing overall calorie intake, 3-5 servings of fruits/vegetables daily, reducing fast food intake, consuming healthier snacks, decreasing soda and/or juice intake, moderation in carbohydrate intake, increasing intake of lean protein, reducing intake of saturated fat and trans fat and reducing intake of cholesterol  Exercise recommendations include moderate aerobic physical activity for 150 minutes/week, vigorous aerobic physical activity for 75 minutes/week, exercising 3-5 times per week, joining a gym and strength training exercises

## 2020-02-21 NOTE — ASSESSMENT & PLAN NOTE
Education provided on the benefits of weight loss  Printed information given  Patient is aware that he has gained weight recently  Aware that will add to his anxiety and depression    States he is going to make a conscious effort to watch his diet and increased exercise activity

## 2020-02-21 NOTE — PATIENT INSTRUCTIONS
Continue medication as ordered  Call with any episodes of nausea or vomiting  Obesity   AMBULATORY CARE:   Obesity  is when your body mass index (BMI) is greater than 30  Your healthcare provider will use your height and weight to measure your BMI  The risks of obesity include  many health problems, such as injuries or physical disability  You may need tests to check for the following:  · Diabetes     · High blood pressure or high cholesterol     · Heart disease     · Gallbladder or liver disease     · Cancer of the colon, breast, prostate, liver, or kidney     · Sleep apnea     · Arthritis or gout  Seek care immediately if:   · You have a severe headache, confusion, or difficulty speaking  · You have weakness on one side of your body  · You have chest pain, sweating, or shortness of breath  Contact your healthcare provider if:   · You have symptoms of gallbladder or liver disease, such as pain in your upper abdomen  · You have knee or hip pain and discomfort while walking  · You have symptoms of diabetes, such as intense hunger and thirst, and frequent urination  · You have symptoms of sleep apnea, such as snoring or daytime sleepiness  · You have questions or concerns about your condition or care  Treatment for obesity  focuses on helping you lose weight to improve your health  Even a small decrease in BMI can reduce the risk for many health problems  Your healthcare provider will help you set a weight-loss goal   · Lifestyle changes  are the first step in treating obesity  These include making healthy food choices and getting regular physical activity  Your healthcare provider may suggest a weight-loss program that involves coaching, education, and therapy  · Medicine  may help you lose weight when it is used with a healthy diet and physical activity  · Surgery  can help you lose weight if you are very obese and have other health problems   There are several types of weight-loss surgery  Ask your healthcare provider for more information  Be successful losing weight:   · Set small, realistic goals  An example of a small goal is to walk for 20 minutes 5 days a week  Anther goal is to lose 5% of your body weight  · Tell friends, family members, and coworkers about your goals  and ask for their support  Ask a friend to lose weight with you, or join a weight-loss support group  · Identify foods or triggers that may cause you to overeat , and find ways to avoid them  Remove tempting high-calorie foods from your home and workplace  Place a bowl of fresh fruit on your kitchen counter  If stress causes you to eat, then find other ways to cope with stress  · Keep a diary to track what you eat and drink  Also write down how many minutes of physical activity you do each day  Weigh yourself once a week and record it in your diary  Eating changes: You will need to eat 500 to 1,000 fewer calories each day than you currently eat to lose 1 to 2 pounds a week  The following changes will help you cut calories:  · Eat smaller portions  Use small plates, no larger than 9 inches in diameter  Fill your plate half full of fruits and vegetables  Measure your food using measuring cups until you know what a serving size looks like  · Eat 3 meals and 1 or 2 snacks each day  Plan your meals in advance  Maurice Guardian and eat at home most of the time  Eat slowly  · Eat fruits and vegetables at every meal   They are low in calories and high in fiber, which makes you feel full  Do not add butter, margarine, or cream sauce to vegetables  Use herbs to season steamed vegetables  · Eat less fat and fewer fried foods  Eat more baked or grilled chicken and fish  These protein sources are lower in calories and fat than red meat  Limit fast food  Dress your salads with olive oil and vinegar instead of bottled dressing  · Limit the amount of sugar you eat  Do not drink sugary beverages   Limit alcohol  Activity changes:  Physical activity is good for your body in many ways  It helps you burn calories and build strong muscles  It decreases stress and depression, and improves your mood  It can also help you sleep better  Talk to your healthcare provider before you begin an exercise program   · Exercise for at least 30 minutes 5 days a week  Start slowly  Set aside time each day for physical activity that you enjoy and that is convenient for you  It is best to do both weight training and an activity that increases your heart rate, such as walking, bicycling, or swimming  · Find ways to be more active  Do yard work and housecleaning  Walk up the stairs instead of using elevators  Spend your leisure time going to events that require walking, such as outdoor festivals or fairs  This extra physical activity can help you lose weight and keep it off  Follow up with your healthcare provider as directed: You may need to meet with a dietitian  Write down your questions so you remember to ask them during your visits  © 2017 [address] Information is for End User's use only and may not be sold, redistributed or otherwise used for commercial purposes  All illustrations and images included in CareNotes® are the copyrighted property of [employer]  or [employer] Hung  The above information is an  only  It is not intended as medical advice for individual conditions or treatments  Talk to your doctor, nurse or pharmacist before following any medical regimen to see if it is safe and effective for you  Weight Management   AMBULATORY CARE:   Why it is important to manage your weight:  Being overweight increases your risk of health conditions such as heart disease, high blood pressure, type 2 diabetes, and certain types of cancer  It can also increase your risk for osteoarthritis, sleep apnea, and other respiratory problems  Aim for a slow, steady weight loss   Even a small amount of weight loss can lower your risk of health problems  How to lose weight safely:  A safe and healthy way to lose weight is to eat fewer calories and get regular exercise  You can lose up about 1 pound a week by decreasing the number of calories you eat by 500 calories each day  You can decrease calories by eating smaller portion sizes or by cutting out high-calorie foods  Read labels to find out how many calories are in the foods you eat  You can also burn calories with exercise such as walking, swimming, or biking  You will be more likely to keep weight off if you make these changes part of your lifestyle  Healthy meal plan for weight management:  A healthy meal plan includes a variety of foods, contains fewer calories, and helps you stay healthy  A healthy meal plan includes the following:  · Eat whole-grain foods more often  A healthy meal plan should contain fiber  Fiber is the part of grains, fruits, and vegetables that is not broken down by your body  Whole-grain foods are healthy and provide extra fiber in your diet  Some examples of whole-grain foods are whole-wheat breads and pastas, oatmeal, brown rice, and bulgur  · Eat a variety of vegetables every day  Include dark, leafy greens such as spinach, kale, chuy greens, and mustard greens  Eat yellow and orange vegetables such as carrots, sweet potatoes, and winter squash  · Eat a variety of fruits every day  Choose fresh or canned fruit (canned in its own juice or light syrup) instead of juice  Fruit juice has very little or no fiber  · Eat low-fat dairy foods  Drink fat-free (skim) milk or 1% milk  Eat fat-free yogurt and low-fat cottage cheese  Try low-fat cheeses such as mozzarella and other reduced-fat cheeses  · Choose meat and other protein foods that are low in fat  Choose beans or other legumes such as split peas or lentils   Choose fish, skinless poultry (chicken or turkey), or lean cuts of red meat (beef or pork)  Before you cook meat or poultry, cut off any visible fat  · Use less fat and oil  Try baking foods instead of frying them  Add less fat, such as margarine, sour cream, regular salad dressing and mayonnaise to foods  Eat fewer high-fat foods  Some examples of high-fat foods include french fries, doughnuts, ice cream, and cakes  · Eat fewer sweets  Limit foods and drinks that are high in sugar  This includes candy, cookies, regular soda, and sweetened drinks  Ways to decrease calories:   · Eat smaller portions  ¨ Use a small plate with smaller servings  ¨ Do not eat second helpings  ¨ When you eat at a restaurant, ask for a box and place half of your meal in the box before you eat  ¨ Share an entrée with someone else  · Replace high-calorie snacks with healthy, low-calorie snacks  ¨ Choose fresh fruit, vegetables, fat-free rice cakes, or air-popped popcorn instead of potato chips, nuts, or chocolate  ¨ Choose water or calorie-free drinks instead of soda or sweetened drinks  · Eat regular meals  Skipping meals can lead to overeating later in the day  Eat a healthy snack in place of a meal if you do not have time to eat a regular meal      · Do not shop for groceries when you are hungry  You may be more likely to make unhealthy food choices  Take a grocery list of healthy foods and shop after you have eaten  Exercise:  Exercise at least 30 minutes per day on most days of the week  Some examples of exercise include walking, biking, dancing, and swimming  You can also fit in more physical activity by taking the stairs instead of the elevator or parking farther away from stores  Ask your healthcare provider about the best exercise plan for you  Other things to consider as you try to lose weight:   · Be aware of situations that may give you the urge to overeat, such as eating while watching television  Find ways to avoid these situations   For example, read a book, go for a walk, or do crafts  · Meet with a weight loss support group or friends who are also trying to lose weight  This may help you stay motivated to continue working on your weight loss goals  © 2017 [address]  Information is for End User's use only and may not be sold, redistributed or otherwise used for commercial purposes  All illustrations and images included in CareNotes® are the copyrighted property of [employer]  or Basilio Hung  The above information is an  only  It is not intended as medical advice for individual conditions or treatments  Talk to your doctor, nurse or pharmacist before following any medical regimen to see if it is safe and effective for you  Low Fat Diet   AMBULATORY CARE:   A low-fat diet  is an eating plan that is low in total fat, unhealthy fat, and cholesterol  You may need to follow a low-fat diet if you have trouble digesting or absorbing fat  You may also need to follow this diet if you have high cholesterol  You can also lower your cholesterol by increasing the amount of fiber in your diet  Soluble fiber is a type of fiber that helps to decrease cholesterol levels  Different types of fat in food:   · Limit unhealthy fats  A diet that is high in cholesterol, saturated fat, and trans fat may cause unhealthy cholesterol levels  Unhealthy cholesterol levels increase your risk of heart disease  ¨ Cholesterol:  Limit intake of cholesterol to less than 200 mg per day  Cholesterol is found in meat, eggs, and dairy  ¨ Saturated fat:  Limit saturated fat to less than 7% of your total daily calories  Ask your dietitian how many calories you need each day  Saturated fat is found in butter, cheese, ice cream, whole milk, and palm oil  Saturated fat is also found in meat, such as beef, pork, chicken skin, and processed meats  Processed meats include sausage, hot dogs, and bologna  ¨ Trans fat:  Avoid trans fat as much as possible   Trans fat is used in fried and baked foods  Foods that say trans fat free on the label may still have up to 0 5 grams of trans fat per serving  · Include healthy fats  Replace foods that are high in saturated and trans fat with foods high in healthy fats  This may help to decrease high cholesterol levels  ¨ Monounsaturated fats: These are found in avocados, nuts, and vegetable oils, such as olive, canola, and sunflower oil  ¨ Polyunsaturated fats: These can be found in vegetable oils, such as soybean or corn oil  Omega-3 fats can help to decrease the risk of heart disease  Omega-3 fats are found in fish, such as salmon, herring, trout, and tuna  Omega-3 fats can also be found in plant foods, such as walnuts, flaxseed, soybeans, and canola oil    Foods to limit or avoid:   · Grains:      ¨ Snacks that are made with partially hydrogenated oils, such as chips, regular crackers, and butter-flavored popcorn    ¨ High-fat baked goods, such as biscuits, croissants, doughnuts, pies, cookies, and pastries    · Dairy:      ¨ Whole milk, 2% milk, and yogurt and ice cream made with whole milk    ¨ Half and half creamer, heavy cream, and whipping cream    ¨ Cheese, cream cheese, and sour cream    · Meats and proteins:      ¨ High-fat cuts of meat (T-bone steak, regular hamburger, and ribs)    ¨ Fried meat, poultry (turkey and chicken), and fish    ¨ Poultry (chicken and turkey) with skin    ¨ Cold cuts (salami or bologna), hot dogs, lundberg, and sausage    ¨ Whole eggs and egg yolks    · Vegetables and fruits with added fat:      ¨ Fried vegetables or vegetables in butter or high-fat sauces, such as cream or cheese sauces    ¨ Fried fruit or fruit served with butter or cream    · Fats:      ¨ Butter, stick margarine, and shortening    ¨ Coconut, palm oil, and palm kernel oil  Foods to include:   · Grains:      ¨ Whole-grain breads, cereals, pasta, and brown rice    ¨ Low-fat crackers and pretzels    · Vegetables and fruits: ¨ Fresh, frozen, or canned vegetables (no salt or low-sodium)    ¨ Fresh, frozen, dried, or canned fruit (canned in light syrup or fruit juice)    ¨ Avocado    · Low-fat dairy products:      ¨ Nonfat (skim) or 1% milk    ¨ Nonfat or low-fat cheese, yogurt, and cottage cheese    · Meats and proteins:      ¨ Chicken or turkey with no skin    ¨ Baked or broiled fish    ¨ Lean beef and pork (loin, round, extra lean hamburger)    ¨ Beans and peas, unsalted nuts, soy products    ¨ Egg whites and substitutes    ¨ Seeds and nuts    · Fats:      ¨ Unsaturated oil, such as canola, olive, peanut, soybean, or sunflower oil    ¨ Soft or liquid margarine and vegetable oil spread    ¨ Low-fat salad dressing  Other ways to decrease fat:   · Read food labels before you buy foods  Choose foods that have less than 30% of calories from fat  Choose low-fat or fat-free dairy products  Remember that fat free does not mean calorie free  These foods still contain calories, and too many calories can lead to weight gain  · Trim fat from meat and avoid fried food  Trim all visible fat from meat before you cook it  Remove the skin from poultry  Do not norman meat, fish, or poultry  Bake, roast, boil, or broil these foods instead  Avoid fried foods  Eat a baked potato instead of [place] fries  Steam vegetables instead of sautéing them in butter  · Add less fat to foods  Use imitation lundberg bits on salads and baked potatoes instead of regular lundberg bits  Use fat-free or low-fat salad dressings instead of regular dressings  Use low-fat or nonfat butter-flavored topping instead of regular butter or margarine on popcorn and other foods  Ways to decrease fat in recipes:  Replace high-fat ingredients with low-fat or nonfat ones  This may cause baked goods to be drier than usual  You may need to use nonfat cooking spray on pans to prevent food from sticking   You also may need to change the amount of other ingredients, such as water, in the recipe  Try the following:  · Use low-fat or light margarine instead of regular margarine or shortening  · Use lean ground turkey breast or chicken, or lean ground beef (less than 5% fat) instead of hamburger  · Add 1 teaspoon of canola oil to 8 ounces of skim milk instead of using cream or half and half  · Use grated zucchini, carrots, or apples in breads instead of coconut  · Use blenderized, low-fat cottage cheese, plain tofu, or low-fat ricotta cheese instead of cream cheese  · Use 1 egg white and 1 teaspoon of canola oil, or use ¼ cup (2 ounces) of fat-free egg substitute instead of a whole egg  · Replace half of the oil that is called for in a recipe with applesauce when you bake  Use 3 tablespoons of cocoa powder and 1 tablespoon of canola oil instead of a square of baking chocolate  How to increase fiber:  Eat enough high-fiber foods to get 20 to 30 grams of fiber every day  Slowly increase your fiber intake to avoid stomach cramps, gas, and other problems  · Eat 3 ounces of whole-grain foods each day  An ounce is about 1 slice of bread  Eat whole-grain breads, such as whole-wheat bread  Whole wheat, whole-wheat flour, or other whole grains should be listed as the first ingredient on the food label  Replace white flour with whole-grain flour or use half of each in recipes  Whole-grain flour is heavier than white flour, so you may have to add more yeast or baking powder  · Eat a high-fiber cereal for breakfast   Oatmeal is a good source of soluble fiber  Look for cereals that have bran or fiber in the name  Choose whole-grain products, such as brown rice, barley, and whole-wheat pasta  · Eat more beans, peas, and lentils  For example, add beans to soups or salads  Eat at least 5 cups of fruits and vegetables each day  Eat fruits and vegetables with the peel because the peel is high in fiber    © 2017 [address] Information is for End User's use only and may not be sold, redistributed or otherwise used for commercial purposes  All illustrations and images included in CareNotes® are the copyrighted property of [employer]  or [employer]ilio Hung  The above information is an  only  It is not intended as medical advice for individual conditions or treatments  Talk to your doctor, nurse or pharmacist before following any medical regimen to see if it is safe and effective for you  Heart Healthy Diet   AMBULATORY CARE:   A heart healthy diet  is an eating plan low in total fat, unhealthy fats, and sodium (salt)  A heart healthy diet helps decrease your risk for heart disease and stroke  Limit the amount of fat you eat to 25% to 35% of your total daily calories  Limit sodium to less than 2,300 mg each day  Healthy fats:  Healthy fats can help improve cholesterol levels  The risk for heart disease is decreased when cholesterol levels are normal  Choose healthy fats, such as the following:  · Unsaturated fat  is found in foods such as soybean, canola, olive, corn, and safflower oils  It is also found in soft tub margarine that is made with liquid vegetable oil  · Omega-3 fat  is found in certain fish, such as salmon, tuna, and trout, and in walnuts and flaxseed  Unhealthy fats:  Unhealthy fats can cause unhealthy cholesterol levels in your blood and increase your risk of heart disease  Limit unhealthy fats, such as the following:  · Cholesterol  is found in animal foods, such as eggs and lobster, and in dairy products made from whole milk  Limit cholesterol to less than 300 milligrams (mg) each day  You may need to limit cholesterol to 200 mg each day if you have heart disease  · Saturated fat  is found in meats, such as lundberg and hamburger  It is also found in chicken or turkey skin, whole milk, and butter  Limit saturated fat to less than 7% of your total daily calories   Limit saturated fat to less than 6% if you have heart disease or are at increased risk for it  · Trans fat  is found in packaged foods, such as potato chips and cookies  It is also in hard margarine, some fried foods, and shortening  Avoid trans fats as much as possible    Heart healthy foods and drinks to include:  Ask your dietitian or healthcare provider how many servings to have from each of the following food groups:  · Grains:      ¨ Whole-wheat breads, cereals, and pastas, and brown rice    ¨ Low-fat, low-sodium crackers and chips    · Vegetables:      ¨ Broccoli, green beans, green peas, and spinach    ¨ Collards, kale, and lima beans    ¨ Carrots, sweet potatoes, tomatoes, and peppers    ¨ Canned vegetables with no salt added    · Fruits:      ¨ Bananas, peaches, pears, and pineapple    ¨ Grapes, raisins, and dates    ¨ Oranges, tangerines, grapefruit, orange juice, and grapefruit juice    ¨ Apricots, mangoes, melons, and papaya    ¨ Raspberries and strawberries    ¨ Canned fruit with no added sugar    · Low-fat dairy products:      ¨ Nonfat (skim) milk, 1% milk, and low-fat almond, cashew, or soy milks fortified with calcium    ¨ Low-fat cheese, regular or frozen yogurt, and cottage cheese    · Meats and proteins , such as lean cuts of beef and pork (loin, leg, round), skinless chicken and turkey, legumes, soy products, egg whites, and nuts  Foods and drinks to limit or avoid:  Ask your dietitian or healthcare provider about these and other foods that are high in unhealthy fat, sodium, and sugar:  · Snack or packaged foods , such as frozen dinners, cookies, macaroni and cheese, and cereals with more than 300 mg of sodium per serving    · Canned or dry mixes  for cakes, soups, sauces, or gravies    · Vegetables with added sodium , such as instant potatoes, vegetables with added sauces, or regular canned vegetables    · Other foods high in sodium , such as ketchup, barbecue sauce, salad dressing, pickles, olives, soy sauce, and miso    · High-fat dairy foods  such as whole or 2% milk, cream cheese, or sour cream, and cheeses     · High-fat protein foods  such as high-fat cuts of beef (T-bone steaks, ribs), chicken or turkey with skin, and organ meats, such as liver    · Cured or smoked meats , such as hot dogs, lundberg, and sausage    · Unhealthy fats and oils , such as butter, stick margarine, shortening, and cooking oils such as coconut or palm oil    · Food and drinks high in sugar , such as soft drinks (soda), sports drinks, sweetened tea, candy, cake, cookies, pies, and doughnuts  Other diet guidelines to follow:   · Eat more foods containing omega-3 fats  Eat fish high in omega-3 fats at least 2 times a week  · Limit alcohol  Too much alcohol can damage your heart and raise your blood pressure  Women should limit alcohol to 1 drink a day  Men should limit alcohol to 2 drinks a day  A drink of alcohol is 12 ounces of beer, 5 ounces of wine, or 1½ ounces of liquor  · Choose low-sodium foods  High-sodium foods can lead to high blood pressure  Add little or no salt to food you prepare  Use herbs and spices in place of salt  · Eat more fiber  to help lower cholesterol levels  Eat at least 5 servings of fruits and vegetables each day  Eat 3 ounces of whole-grain foods each day  Legumes (beans) are also a good source of fiber  Lifestyle guidelines:   · Do not smoke  Nicotine and other chemicals in cigarettes and cigars can cause lung and heart damage  Ask your healthcare provider for information if you currently smoke and need help to quit  E-cigarettes or smokeless tobacco still contain nicotine  Talk to your healthcare provider before you use these products  · Exercise regularly  to help you maintain a healthy weight and improve your blood pressure and cholesterol levels  Ask your healthcare provider about the best exercise plan for you  Do not start an exercise program without asking your healthcare provider     Follow up with your healthcare provider as directed: Write down your questions so you remember to ask them during your visits  © 2017 [address] Information is for End User's use only and may not be sold, redistributed or otherwise used for commercial purposes  All illustrations and images included in CareNotes® are the copyrighted property of [employer]  or [employer]ilio Hung  The above information is an  only  It is not intended as medical advice for individual conditions or treatments  Talk to your doctor, nurse or pharmacist before following any medical regimen to see if it is safe and effective for you

## 2020-05-11 DIAGNOSIS — K21.9 GASTROESOPHAGEAL REFLUX DISEASE WITHOUT ESOPHAGITIS: ICD-10-CM

## 2020-05-11 RX ORDER — OMEPRAZOLE 20 MG/1
20 CAPSULE, DELAYED RELEASE ORAL DAILY
Qty: 90 CAPSULE | Refills: 1 | Status: SHIPPED | OUTPATIENT
Start: 2020-05-11 | End: 2020-05-11 | Stop reason: SDUPTHER

## 2020-05-11 RX ORDER — OMEPRAZOLE 20 MG/1
20 CAPSULE, DELAYED RELEASE ORAL DAILY
Qty: 90 CAPSULE | Refills: 1 | Status: SHIPPED | OUTPATIENT
Start: 2020-05-11 | End: 2020-05-12 | Stop reason: SDUPTHER

## 2020-05-12 DIAGNOSIS — K21.9 GASTROESOPHAGEAL REFLUX DISEASE WITHOUT ESOPHAGITIS: ICD-10-CM

## 2020-05-12 RX ORDER — OMEPRAZOLE 20 MG/1
20 CAPSULE, DELAYED RELEASE ORAL DAILY
Qty: 90 CAPSULE | Refills: 1 | Status: SHIPPED | OUTPATIENT
Start: 2020-05-12 | End: 2021-01-27

## 2020-06-28 ENCOUNTER — HOSPITAL ENCOUNTER (EMERGENCY)
Facility: HOSPITAL | Age: 30
Discharge: HOME | End: 2020-06-28
Attending: EMERGENCY MEDICINE | Admitting: EMERGENCY MEDICINE
Payer: COMMERCIAL

## 2020-06-28 VITALS
WEIGHT: 264.55 LBS | RESPIRATION RATE: 17 BRPM | HEART RATE: 78 BPM | TEMPERATURE: 97.7 F | SYSTOLIC BLOOD PRESSURE: 131 MMHG | OXYGEN SATURATION: 95 % | BODY MASS INDEX: 35.88 KG/M2 | DIASTOLIC BLOOD PRESSURE: 78 MMHG

## 2020-06-28 DIAGNOSIS — R11.2 NAUSEA AND VOMITING: Primary | ICD-10-CM

## 2020-06-28 LAB
ALBUMIN SERPL BCP-MCNC: 4.6 G/DL (ref 3.5–5)
ALP SERPL-CCNC: 95 U/L (ref 46–116)
ALT SERPL W P-5'-P-CCNC: 14 U/L (ref 12–78)
ANION GAP SERPL CALCULATED.3IONS-SCNC: 10 MMOL/L (ref 4–13)
AST SERPL W P-5'-P-CCNC: 17 U/L (ref 5–45)
BASOPHILS # BLD AUTO: 0.02 THOUSANDS/ΜL (ref 0–0.1)
BASOPHILS NFR BLD AUTO: 0 % (ref 0–1)
BILIRUB DIRECT SERPL-MCNC: 0.15 MG/DL (ref 0–0.2)
BILIRUB SERPL-MCNC: 0.8 MG/DL (ref 0.2–1)
BUN SERPL-MCNC: 17 MG/DL (ref 5–25)
CALCIUM SERPL-MCNC: 9.5 MG/DL (ref 8.3–10.1)
CHLORIDE SERPL-SCNC: 93 MMOL/L (ref 100–108)
CO2 SERPL-SCNC: 34 MMOL/L (ref 21–32)
CREAT SERPL-MCNC: 1.04 MG/DL (ref 0.6–1.3)
EOSINOPHIL # BLD AUTO: 0 THOUSAND/ΜL (ref 0–0.61)
EOSINOPHIL NFR BLD AUTO: 0 % (ref 0–6)
ERYTHROCYTE [DISTWIDTH] IN BLOOD BY AUTOMATED COUNT: 12.9 % (ref 11.6–15.1)
GFR SERPL CREATININE-BSD FRML MDRD: 97 ML/MIN/1.73SQ M
GLUCOSE SERPL-MCNC: 138 MG/DL (ref 65–140)
HCT VFR BLD AUTO: 47.8 % (ref 36.5–49.3)
HGB BLD-MCNC: 16.3 G/DL (ref 12–17)
IMM GRANULOCYTES # BLD AUTO: 0.05 THOUSAND/UL (ref 0–0.2)
IMM GRANULOCYTES NFR BLD AUTO: 0 % (ref 0–2)
LYMPHOCYTES # BLD AUTO: 1.63 THOUSANDS/ΜL (ref 0.6–4.47)
LYMPHOCYTES NFR BLD AUTO: 12 % (ref 14–44)
MCH RBC QN AUTO: 27.5 PG (ref 26.8–34.3)
MCHC RBC AUTO-ENTMCNC: 34.1 G/DL (ref 31.4–37.4)
MCV RBC AUTO: 81 FL (ref 82–98)
MONOCYTES # BLD AUTO: 0.92 THOUSAND/ΜL (ref 0.17–1.22)
MONOCYTES NFR BLD AUTO: 7 % (ref 4–12)
NEUTROPHILS # BLD AUTO: 11.3 THOUSANDS/ΜL (ref 1.85–7.62)
NEUTS SEG NFR BLD AUTO: 81 % (ref 43–75)
NRBC BLD AUTO-RTO: 0 /100 WBCS
PLATELET # BLD AUTO: 409 THOUSANDS/UL (ref 149–390)
PMV BLD AUTO: 10.2 FL (ref 8.9–12.7)
POTASSIUM SERPL-SCNC: 3.2 MMOL/L (ref 3.5–5.3)
PROT SERPL-MCNC: 8.5 G/DL (ref 6.4–8.2)
RBC # BLD AUTO: 5.93 MILLION/UL (ref 3.88–5.62)
SODIUM SERPL-SCNC: 137 MMOL/L (ref 136–145)
WBC # BLD AUTO: 13.92 THOUSAND/UL (ref 4.31–10.16)

## 2020-06-28 PROCEDURE — 96361 HYDRATE IV INFUSION ADD-ON: CPT

## 2020-06-28 PROCEDURE — 80048 BASIC METABOLIC PNL TOTAL CA: CPT | Performed by: EMERGENCY MEDICINE

## 2020-06-28 PROCEDURE — 85025 COMPLETE CBC W/AUTO DIFF WBC: CPT | Performed by: EMERGENCY MEDICINE

## 2020-06-28 PROCEDURE — 99283 EMERGENCY DEPT VISIT LOW MDM: CPT

## 2020-06-28 PROCEDURE — C9113 INJ PANTOPRAZOLE SODIUM, VIA: HCPCS | Performed by: EMERGENCY MEDICINE

## 2020-06-28 PROCEDURE — 36415 COLL VENOUS BLD VENIPUNCTURE: CPT | Performed by: EMERGENCY MEDICINE

## 2020-06-28 PROCEDURE — 96375 TX/PRO/DX INJ NEW DRUG ADDON: CPT

## 2020-06-28 PROCEDURE — 80076 HEPATIC FUNCTION PANEL: CPT | Performed by: EMERGENCY MEDICINE

## 2020-06-28 PROCEDURE — 96374 THER/PROPH/DIAG INJ IV PUSH: CPT

## 2020-06-28 PROCEDURE — 99284 EMERGENCY DEPT VISIT MOD MDM: CPT | Performed by: EMERGENCY MEDICINE

## 2020-06-28 RX ORDER — BUPRENORPHINE HYDROCHLORIDE AND NALOXONE HYDROCHLORIDE DIHYDRATE 8; 2 MG/1; MG/1
1 TABLET SUBLINGUAL DAILY
COMMUNITY

## 2020-06-28 RX ORDER — ONDANSETRON 2 MG/ML
4 INJECTION INTRAMUSCULAR; INTRAVENOUS ONCE
Status: COMPLETED | OUTPATIENT
Start: 2020-06-28 | End: 2020-06-28

## 2020-06-28 RX ORDER — MORPHINE SULFATE 10 MG/ML
6 INJECTION, SOLUTION INTRAMUSCULAR; INTRAVENOUS ONCE
Status: COMPLETED | OUTPATIENT
Start: 2020-06-28 | End: 2020-06-28

## 2020-06-28 RX ORDER — QUETIAPINE FUMARATE 25 MG/1
50 TABLET, FILM COATED ORAL ONCE
Status: COMPLETED | OUTPATIENT
Start: 2020-06-28 | End: 2020-06-28

## 2020-06-28 RX ORDER — PANTOPRAZOLE SODIUM 40 MG/1
40 INJECTION, POWDER, FOR SOLUTION INTRAVENOUS ONCE
Status: COMPLETED | OUTPATIENT
Start: 2020-06-28 | End: 2020-06-28

## 2020-06-28 RX ADMIN — ONDANSETRON 4 MG: 2 INJECTION INTRAMUSCULAR; INTRAVENOUS at 10:00

## 2020-06-28 RX ADMIN — QUETIAPINE FUMARATE 50 MG: 25 TABLET ORAL at 11:55

## 2020-06-28 RX ADMIN — MORPHINE SULFATE 6 MG: 10 INJECTION INTRAVENOUS at 10:01

## 2020-06-28 RX ADMIN — SODIUM CHLORIDE 1000 ML: 0.9 INJECTION, SOLUTION INTRAVENOUS at 10:00

## 2020-06-28 RX ADMIN — PANTOPRAZOLE SODIUM 40 MG: 40 INJECTION, POWDER, FOR SOLUTION INTRAVENOUS at 10:02

## 2020-06-28 RX ADMIN — SODIUM CHLORIDE 1000 ML: 0.9 INJECTION, SOLUTION INTRAVENOUS at 11:50

## 2020-06-28 NOTE — ED PROVIDER NOTES
History  Chief Complaint   Patient presents with    Vomiting     Pt reports nausea, vomiting, and diarhhea since thursday and cant keep any of his meds down  States hes very anxious  HPI patient is a 81-year-old male, reports history of bipolar disease and anxiety  Patient reports he at times gets nauseous and sick and starts to vomit gets diarrhea and then cannot hold down his meds  Patient reports when he cannot fall down his psychiatric meds he feels worse  He reports unable to hold down his Suboxone  Patient denies any real abdominal pain, he reports primarily just sick to his stomach nauseous and very anxious  Patient reports he cannot hold down his Seroquel  He reports normally takes pleural is sec but is unable to hold that down either  He reports just persistent vomiting and some diarrhea  He reports he feels very dehydrated  He denies any fever or chills  He denies any specific pain at this time  Past medical history anxiety bipolar disorder, depression, drug use  Family history noncontributory  Social history, former smoker, denies current illicit drug use  Reports marijuana use    Prior to Admission Medications   Prescriptions Last Dose Informant Patient Reported? Taking?    OXcarbazepine (TRILEPTAL) 300 mg tablet Past Week at Unknown time  No Yes   Sig: Take 1 pill twice a day   QUEtiapine (SEROquel) 300 mg tablet Past Week at Unknown time  No Yes   Sig: TAKE 1 TABLET BY MOUTH DAILY AT BEDTIME   QUEtiapine (SEROquel) 50 mg tablet Past Week at Unknown time  No Yes   Sig: Take 1 tablet (50 mg total) by mouth daily at bedtime   buprenorphine-naloxone (SUBOXONE) 8-2 mg per SL tablet   Yes Yes   Sig: Place 1 tablet under the tongue daily   dicyclomine (BENTYL) 20 mg tablet Past Week at Unknown time  No Yes   Sig: Take 1 tablet (20 mg total) by mouth every 6 (six) hours as needed (abdominal pain)   hydrOXYzine HCL (ATARAX) 25 mg tablet Past Week at Unknown time  No Yes   Sig: Take 1 tablet (25 mg total) by mouth every 6 (six) hours as needed for anxiety   melatonin 3 mg Past Week at Unknown time  No Yes   Sig: Take 1 tablet (3 mg total) by mouth daily at bedtime   omeprazole (PriLOSEC) 20 mg delayed release capsule 2020 at Unknown time  No Yes   Sig: Take 1 capsule (20 mg total) by mouth daily   ondansetron (ZOFRAN-ODT) 4 mg disintegrating tablet 2020 at Unknown time  No Yes   Sig: Take 1 tablet (4 mg total) by mouth every 8 (eight) hours as needed for nausea      Facility-Administered Medications: None       Past Medical History:   Diagnosis Date    ADHD     Anxiety     Bipolar disorder ([facility] [address] )     Depression     Drug use     Essential hypertension     benign    Hyperlipidemia     Peptic reflux disease        History reviewed  No pertinent surgical history  Family History   Problem Relation Age of Onset    Mental illness Mother     Thyroid disease Mother     Drug abuse Mother         illicit drug    Diabetes Father     Cancer Father      I have reviewed and agree with the history as documented  E-Cigarette/Vaping    E-Cigarette Use Never User      E-Cigarette/Vaping Substances     Social History     Tobacco Use    Smoking status: Former Smoker     Last attempt to quit: 3/30/2015     Years since quittin 2    Smokeless tobacco: Never Used   Substance Use Topics    Alcohol use: Not Currently     Comment: at social events    Drug use: Not Currently     Frequency: 2 0 times per week     Types: Marijuana, Heroin     Comment: Pt reports using THC daily x3, Heroin every other day and will alternate between using pills  Pt snorts heroin       Review of Systems   Constitutional: Negative for diaphoresis, fatigue and fever  HENT: Negative for congestion, ear pain, nosebleeds and sore throat  Eyes: Negative for photophobia, pain, discharge and visual disturbance  Respiratory: Negative for cough, choking, chest tightness, shortness of breath and wheezing      Cardiovascular: Negative for chest pain and palpitations  Gastrointestinal: Positive for nausea and vomiting  Negative for abdominal distention, abdominal pain and diarrhea  Genitourinary: Negative for dysuria, flank pain and frequency  Musculoskeletal: Negative for back pain, gait problem and joint swelling  Skin: Negative for color change and rash  Neurological: Negative for dizziness, syncope and headaches  Psychiatric/Behavioral: Negative for behavioral problems and confusion  The patient is nervous/anxious  All other systems reviewed and are negative  Physical Exam  Physical Exam   Constitutional: He is oriented to person, place, and time  He appears well-developed and well-nourished  HENT:   Head: Normocephalic  Right Ear: External ear normal    Left Ear: External ear normal    Nose: Nose normal    Mouth/Throat: Oropharynx is clear and moist    Eyes: Pupils are equal, round, and reactive to light  EOM and lids are normal    Neck: Normal range of motion  Neck supple  Cardiovascular: Normal rate, regular rhythm, normal heart sounds and intact distal pulses  Pulmonary/Chest: Effort normal and breath sounds normal  No respiratory distress  Abdominal: Soft  Bowel sounds are normal  He exhibits no distension  There is no tenderness  Musculoskeletal: Normal range of motion  He exhibits no deformity  Neurological: He is alert and oriented to person, place, and time  Skin: Skin is warm and dry  Psychiatric: He has a normal mood and affect  Nursing note and vitals reviewed      Pulse oximetry normal at 96% adequate oxygenation, there is no hypoxia  Vital Signs  ED Triage Vitals   Temperature Pulse Respirations Blood Pressure SpO2   06/28/20 0943 06/28/20 0943 06/28/20 0943 06/28/20 0943 06/28/20 0943   97 7 °F (36 5 °C) 62 18 131/81 96 %      Temp Source Heart Rate Source Patient Position - Orthostatic VS BP Location FiO2 (%)   06/28/20 0943 06/28/20 0943 06/28/20 1130 06/28/20 0943 --   Oral Monitor Sitting Right arm       Pain Score       06/28/20 1001       8           Vitals:    06/28/20 0943 06/28/20 1130   BP: 131/81 131/78   Pulse: 62 78   Patient Position - Orthostatic VS:  Sitting         Visual Acuity      ED Medications  Medications   sodium chloride 0 9 % bolus 1,000 mL (0 mL Intravenous Stopped 6/28/20 1133)   ondansetron (ZOFRAN) injection 4 mg (4 mg Intravenous Given 6/28/20 1000)   morphine (PF) 10 mg/mL injection 6 mg (6 mg Intravenous Given 6/28/20 1001)   pantoprazole (PROTONIX) injection 40 mg (40 mg Intravenous Given 6/28/20 1002)   sodium chloride 0 9 % bolus 1,000 mL (0 mL Intravenous Stopped 6/28/20 1238)   QUEtiapine (SEROquel) tablet 50 mg (50 mg Oral Given 6/28/20 1155)       Diagnostic Studies  Results Reviewed     Procedure Component Value Units Date/Time    Hepatic function panel [[id number]]  (Abnormal) Collected:  06/28/20 0959    Lab Status:  Final result Specimen:  Blood from Arm, Left Updated:  06/28/20 1032     Total Bilirubin 0 80 mg/dL      Bilirubin, Direct 0 15 mg/dL      Alkaline Phosphatase 95 U/L      AST 17 U/L      ALT 14 U/L      Total Protein 8 5 g/dL      Albumin 4 6 g/dL     Basic metabolic panel [[id number]]  (Abnormal) Collected:  06/28/20 0959    Lab Status:  Final result Specimen:  Blood from Arm, Left Updated:  06/28/20 1032     Sodium 137 mmol/L      Potassium 3 2 mmol/L      Chloride 93 mmol/L      CO2 34 mmol/L      ANION GAP 10 mmol/L      BUN 17 mg/dL      Creatinine 1 04 mg/dL      Glucose 138 mg/dL      Calcium 9 5 mg/dL      eGFR 97 ml/min/1 73sq m     Narrative:       [place] guidelines for Chronic Kidney Disease (CKD):     Stage 1 with normal or high GFR (GFR > 90 mL/min/1 73 square meters)    Stage 2 Mild CKD (GFR = 60-89 mL/min/1 73 square meters)    Stage 3A Moderate CKD (GFR = 45-59 mL/min/1 73 square meters)    Stage 3B Moderate CKD (GFR = 30-44 mL/min/1 73 square meters)    Stage 4 Severe CKD (GFR = 15-29 mL/min/1 73 square meters)    Stage 5 End Stage CKD (GFR <15 mL/min/1 73 square meters)  Note: GFR calculation is accurate only with a steady state creatinine    CBC and differential [[id number]]  (Abnormal) Collected:  06/28/20 0959    Lab Status:  Final result Specimen:  Blood from Arm, Left Updated:  06/28/20 1013     WBC 13 92 Thousand/uL      RBC 5 93 Million/uL      Hemoglobin 16 3 g/dL      Hematocrit 47 8 %      MCV 81 fL      MCH 27 5 pg      MCHC 34 1 g/dL      RDW 12 9 %      MPV 10 2 fL      Platelets 970 Thousands/uL      nRBC 0 /100 WBCs      Neutrophils Relative 81 %      Immat GRANS % 0 %      Lymphocytes Relative 12 %      Monocytes Relative 7 %      Eosinophils Relative 0 %      Basophils Relative 0 %      Neutrophils Absolute 11 30 Thousands/µL      Immature Grans Absolute 0 05 Thousand/uL      Lymphocytes Absolute 1 63 Thousands/µL      Monocytes Absolute 0 92 Thousand/µL      Eosinophils Absolute 0 00 Thousand/µL      Basophils Absolute 0 02 Thousands/µL                  No orders to display              Procedures  Procedures         ED Course       US AUDIT      Most Recent Value   Initial Alcohol Screen: US AUDIT-C    1  How often do you have a drink containing alcohol?  0 Filed at: 06/28/2020 1021   2  How many drinks containing alcohol do you have on a typical day you are drinking? 0 Filed at: 06/28/2020 1021   3a  Male UNDER 65: How often do you have five or more drinks on one occasion? 0 Filed at: 06/28/2020 1021   3b  FEMALE Any Age, or MALE 65+: How often do you have 4 or more drinks on one occassion? 0 Filed at: 06/28/2020 1021   Audit-C Score  0 Filed at: 06/28/2020 1021                  SHYLA/DAST-10      Most Recent Value   How many times in the past year have you    Used an illegal drug or used a prescription medication for non-medical reasons?   Never Filed at: 06/28/2020 1021             discussed with patient, frequent marijuana use, discussed taking hot showers  Liver functions were within normal limits no sign of hepatic injury  Electrolytes showed a normal BUN creatinine no sign of renal dysfunction, potassium was minimally reduced, discussed with patient  White count was elevated at 13 9, possible stress response came hemoglobin 16 no sign of anemia  Patient was hydrated with IV fluids, given antiemetics and narcotics  He improved greatly  There is no further vomiting  Discussed going home and taking his medicine will require oral Suboxone at home and we do not normally dispense that here  We discussed outpatient treatment and follow-up  No indication for CT scanning or further diagnostic testing  [facility] medical decision making 43-year-old male, episode of nausea vomiting unable to tolerate his normal oral medications  Patient was able tolerate oral liquids here after IV fluids and antiemetics  Discussed outpatient treatment follow-up discussed indications to return  Disposition  Final diagnoses:   Nausea and vomiting     Time reflects when diagnosis was documented in both MDM as applicable and the Disposition within this note     Time User Action Codes Description Comment    6/28/2020 12:24 PM [place] Lachelle Peoples [R11 2] Nausea and vomiting       ED Disposition     ED Disposition Condition Date/Time Comment    Discharge Stable Sun Jun 28, 2020 12:24 PM Sawyer Bradshaw discharge to home/self care              Follow-up Information    None         Discharge Medication List as of 6/28/2020 12:24 PM      CONTINUE these medications which have NOT CHANGED    Details   buprenorphine-naloxone (SUBOXONE) 8-2 mg per SL tablet Place 1 tablet under the tongue daily, Historical Med      dicyclomine (BENTYL) 20 mg tablet Take 1 tablet (20 mg total) by mouth every 6 (six) hours as needed (abdominal pain), Starting Wed 2/12/2020, Print      hydrOXYzine HCL (ATARAX) 25 mg tablet Take 1 tablet (25 mg total) by mouth every 6 (six) hours as needed for anxiety, Starting Fri 10/25/2019, Normal      melatonin 3 mg Take 1 tablet (3 mg total) by mouth daily at bedtime, Starting Fri 10/25/2019, Normal      omeprazole (PriLOSEC) 20 mg delayed release capsule Take 1 capsule (20 mg total) by mouth daily, Starting Tue 5/12/2020, Until Mon 8/10/2020, Normal      ondansetron (ZOFRAN-ODT) 4 mg disintegrating tablet Take 1 tablet (4 mg total) by mouth every 8 (eight) hours as needed for nausea, Starting Wed 2/12/2020, Print      OXcarbazepine (TRILEPTAL) 300 mg tablet Take 1 pill twice a day, Print      !! QUEtiapine (SEROquel) 300 mg tablet TAKE 1 TABLET BY MOUTH DAILY AT BEDTIME, Normal      !! QUEtiapine (SEROquel) 50 mg tablet Take 1 tablet (50 mg total) by mouth daily at bedtime, Starting Tue 11/26/2019, Print       !! - Potential duplicate medications found  Please discuss with provider  No discharge procedures on file      PDMP Review       Value Time User    PDMP Reviewed  Yes 10/25/2019  1:01 PM Jb Buckley MD          ED Provider  Electronically Signed by           Marcin Mae MD  06/28/20 4422

## 2020-06-28 NOTE — DISCHARGE INSTRUCTIONS
Taking medications at home  Use the Zofran you  have at home    Slowly advance diet  Warm shower after smoking marijuana  Follow up with your provider

## 2020-07-13 DIAGNOSIS — F31.4 BIPOLAR 1 DISORDER, DEPRESSED, SEVERE (HCC): ICD-10-CM

## 2020-07-13 RX ORDER — OXCARBAZEPINE 300 MG/1
TABLET, FILM COATED ORAL
Qty: 180 TABLET | Refills: 1 | Status: SHIPPED | OUTPATIENT
Start: 2020-07-13 | End: 2021-01-06

## 2020-07-22 ENCOUNTER — OFFICE VISIT (OUTPATIENT)
Dept: FAMILY MEDICINE CLINIC | Facility: CLINIC | Age: 30
End: 2020-07-22
Payer: COMMERCIAL

## 2020-07-22 VITALS
HEART RATE: 113 BPM | WEIGHT: 265 LBS | SYSTOLIC BLOOD PRESSURE: 122 MMHG | HEIGHT: 72 IN | TEMPERATURE: 98.2 F | OXYGEN SATURATION: 98 % | DIASTOLIC BLOOD PRESSURE: 72 MMHG | BODY MASS INDEX: 35.89 KG/M2

## 2020-07-22 DIAGNOSIS — F31.62 BIPOLAR DISORDER, CURRENT EPISODE MIXED, MODERATE (HCC): ICD-10-CM

## 2020-07-22 DIAGNOSIS — G44.229 CHRONIC TENSION-TYPE HEADACHE, NOT INTRACTABLE: Primary | ICD-10-CM
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
## 2020-07-22 DIAGNOSIS — F31.4 BIPOLAR 1 DISORDER, DEPRESSED, SEVERE (HCC): ICD-10-CM

## 2020-07-22 PROCEDURE — 1036F TOBACCO NON-USER: CPT | Performed by: FAMILY MEDICINE

## 2020-07-22 PROCEDURE — 99214 OFFICE O/P EST MOD 30 MIN: CPT | Performed by: FAMILY MEDICINE

## 2020-07-22 PROCEDURE — 3008F BODY MASS INDEX DOCD: CPT | Performed by: FAMILY MEDICINE

## 2020-07-22 RX ORDER — TOPIRAMATE 25 MG/1
25 TABLET ORAL 2 TIMES DAILY
Qty: 180 TABLET | Refills: 1 | Status: SHIPPED | OUTPATIENT
Start: 2020-07-22 | End: 2020-09-14 | Stop reason: SDDI

## 2020-07-22 RX ORDER — VENLAFAXINE HYDROCHLORIDE 75 MG/1
CAPSULE, EXTENDED RELEASE ORAL
COMMUNITY
Start: 2020-06-10 | End: 2020-09-16 | Stop reason: SDUPTHER

## 2020-07-22 RX ORDER — QUETIAPINE FUMARATE 50 MG/1
TABLET, FILM COATED ORAL
Qty: 90 TABLET | Refills: 1 | Status: SHIPPED | OUTPATIENT
Start: 2020-07-22 | End: 2020-10-27 | Stop reason: SDUPTHER

## 2020-07-22 RX ORDER — QUETIAPINE FUMARATE 300 MG/1
300 TABLET, FILM COATED ORAL
Qty: 90 TABLET | Refills: 1 | Status: SHIPPED | OUTPATIENT
Start: 2020-07-22 | End: 2020-10-27 | Stop reason: SDUPTHER

## 2020-07-22 NOTE — PROGRESS NOTES
Standard Visit   Assessment/Plan:     Visit Diagnosis:  There are no diagnoses linked to this encounter  Subjective:    Patient ID: Federico Mann is a 35 y o  male  Patient is here with the following concerns:    Here for checkup  Lenny Hendrix recently got    Continues to work at [employer] and Pinky Anderson are planning to start a family within the next 6 months   Continues to take 300 mg of Seroquel at bedtime and 50 in the morning   Takes Trileptal 300 mg once daily  75 mg of Effexor was started by Dr Severo Birkenhead ordered by Dr    From [employer] monthly          The following portions of the patient's history were reviewed and updated as appropriate: allergies, current medications, past family history, past medical history, past social history, past surgical history and problem list     Review of Systems   Constitutional: Negative for activity change and fever  HENT: Negative for nosebleeds and trouble swallowing  Eyes: Negative  Respiratory: Negative  Cardiovascular: Negative for palpitations  Gastrointestinal: Negative for abdominal pain, blood in stool and vomiting  Endocrine: Negative for cold intolerance  Genitourinary: Negative  Musculoskeletal: Negative for neck stiffness  Skin: Negative for pallor  Allergic/Immunologic: Negative for immunocompromised state  Neurological: Negative for seizures and facial asymmetry  Hematological: Negative for adenopathy  Psychiatric/Behavioral: Negative for agitation and suicidal ideas           /72   Pulse (!) 113   Temp 98 2 °F (36 8 °C)   Ht 6' (1 829 m)   Wt 120 kg (265 lb)   SpO2 98%   BMI 35 94 kg/m²   Social History     Socioeconomic History    Marital status: Single     Spouse name: Not on file    Number of children: Not on file    Years of education: Not on file    Highest education level: Not on file   Occupational History    Occupation: fulltime   Social Needs    Financial resource strain: Not on file    Food insecurity:     Worry: Not on file     Inability: Not on file    Transportation needs:     Medical: Not on file     Non-medical: Not on file   Tobacco Use    Smoking status: Former Smoker     Last attempt to quit: 3/30/2015     Years since quittin 3    Smokeless tobacco: Never Used   Substance and Sexual Activity    Alcohol use: Not Currently     Comment: at social events    Drug use: Not Currently     Frequency: 2 0 times per week     Types: Marijuana, Heroin     Comment: Pt reports using THC daily x3, Heroin every other day and will alternate between using pills  Pt snorts heroin    Sexual activity: Yes     Partners: Female   Lifestyle    Physical activity:     Days per week: Not on file     Minutes per session: Not on file    Stress: Not on file   Relationships    Social connections:     Talks on phone: Not on file     Gets together: Not on file     Attends [employer] service: Not on file     Active member of club or organization: Not on file     Attends meetings of clubs or organizations: Not on file     Relationship status: Not on file    Intimate partner violence:     Fear of current or ex partner: Not on file     Emotionally abused: Not on file     Physically abused: Not on file     Forced sexual activity: Not on file   Other Topics Concern    Not on file   Social History Narrative    Not on file     Past Medical History:   Diagnosis Date    ADHD     Anxiety     [facility] disorder [facility]     Depression     Drug use     Essential hypertension     benign    Hyperlipidemia     Peptic reflux disease      Family History   Problem Relation Age of Onset    Mental illness Mother     Thyroid disease Mother     Drug abuse Mother         illicit drug    Diabetes Father     Cancer Father      History reviewed  No pertinent surgical history      Current Outpatient Medications:     buprenorphine-naloxone (SUBOXONE) 8-2 mg per SL tablet, Place 1 tablet under the tongue daily, Disp: , Rfl:     dicyclomine (BENTYL) 20 mg tablet, Take 1 tablet (20 mg total) by mouth every 6 (six) hours as needed (abdominal pain), Disp: 20 tablet, Rfl: 0    hydrOXYzine HCL (ATARAX) 25 mg tablet, Take 1 tablet (25 mg total) by mouth every 6 (six) hours as needed for anxiety, Disp: 30 tablet, Rfl: 0    melatonin 3 mg, Take 1 tablet (3 mg total) by mouth daily at bedtime, Disp: 30 tablet, Rfl: 0    omeprazole (PriLOSEC) 20 mg delayed release capsule, Take 1 capsule (20 mg total) by mouth daily, Disp: 90 capsule, Rfl: 1    ondansetron (ZOFRAN-ODT) 4 mg disintegrating tablet, Take 1 tablet (4 mg total) by mouth every 8 (eight) hours as needed for nausea, Disp: 15 tablet, Rfl: 0    OXcarbazepine (TRILEPTAL) 300 mg tablet, TAKE 1 TABLET BY MOUTH TWICE A DAY, Disp: 180 tablet, Rfl: 1    QUEtiapine (SEROquel) 300 mg tablet, TAKE 1 TABLET BY MOUTH DAILY AT BEDTIME, Disp: 90 tablet, Rfl: 1    QUEtiapine (SEROquel) 50 mg tablet, Take 1 tablet (50 mg total) by mouth daily at bedtime, Disp: 90 tablet, Rfl: 1    venlafaxine (EFFEXOR-XR) 75 mg 24 hr capsule, , Disp: , Rfl:     Lab Review   Admission on 06/28/2020, Discharged on 06/28/2020   Component Date Value    WBC 06/28/2020 13 92*    RBC 06/28/2020 5 93*    Hemoglobin 06/28/2020 16 3     Hematocrit 06/28/2020 47 8     MCV 06/28/2020 81*    MCH 06/28/2020 27 5     MCHC 06/28/2020 34 1     RDW 06/28/2020 12 9     MPV 06/28/2020 10 2     Platelets 09/30/2106 409*    nRBC 06/28/2020 0     Neutrophils Relative 06/28/2020 81*    Immat GRANS % 06/28/2020 0     Lymphocytes Relative 06/28/2020 12*    Monocytes Relative 06/28/2020 7     Eosinophils Relative 06/28/2020 0     Basophils Relative 06/28/2020 0     Neutrophils Absolute 06/28/2020 11 30*    Immature Grans Absolute 06/28/2020 0 05     Lymphocytes Absolute 06/28/2020 1 63     Monocytes Absolute 06/28/2020 0 92     Eosinophils Absolute 06/28/2020 0 00     Basophils Absolute 06/28/2020 0 02     Sodium 06/28/2020 137     Potassium 06/28/2020 3 2*    Chloride 06/28/2020 93*    CO2 06/28/2020 34*    ANION GAP 06/28/2020 10     BUN 06/28/2020 17     Creatinine 06/28/2020 1 04     Glucose 06/28/2020 138     Calcium 06/28/2020 9 5     eGFR 06/28/2020 97     Total Bilirubin 06/28/2020 0 80     Bilirubin, Direct 06/28/2020 0 15     Alkaline Phosphatase 06/28/2020 95     AST 06/28/2020 17     ALT 06/28/2020 14     Total Protein 06/28/2020 8 5*    Albumin 06/28/2020 4 6    Admission on 02/12/2020, Discharged on 02/12/2020   Component Date Value    Sodium 02/12/2020 134*    Potassium 02/12/2020 3 2*    Chloride 02/12/2020 93*    CO2 02/12/2020 31     ANION GAP 02/12/2020 10     BUN 02/12/2020 18     Creatinine 02/12/2020 1 13     Glucose 02/12/2020 139     Calcium 02/12/2020 9 6     AST 02/12/2020 35     ALT 02/12/2020 33     Alkaline Phosphatase 02/12/2020 91     Total Protein 02/12/2020 8 6*    Albumin 02/12/2020 4 5     Total Bilirubin 02/12/2020 0 90     eGFR 02/12/2020 87     WBC 02/12/2020 10 95*    RBC 02/12/2020 6 29*    Hemoglobin 02/12/2020 17 8*    Hematocrit 02/12/2020 50 3*    MCV 02/12/2020 80*    MCH 02/12/2020 28 3     MCHC 02/12/2020 35 4     RDW 02/12/2020 12 7     MPV 02/12/2020 10 4     Platelets 60/97/9879 388     nRBC 02/12/2020 0     Neutrophils Relative 02/12/2020 69     Immat GRANS % 02/12/2020 0     Lymphocytes Relative 02/12/2020 22     Monocytes Relative 02/12/2020 9     Eosinophils Relative 02/12/2020 0     Basophils Relative 02/12/2020 0     Neutrophils Absolute 02/12/2020 7 43     Immature Grans Absolute 02/12/2020 0 03     Lymphocytes Absolute 02/12/2020 2 42     Monocytes Absolute 02/12/2020 1 02     Eosinophils Absolute 02/12/2020 0 02     Basophils Absolute 02/12/2020 0 03     Lipase 02/12/2020 162         Objective:     Physical Exam   Constitutional: He is oriented to person, place, and time   He appears well-developed and well-nourished  HENT:   Head: Normocephalic and atraumatic  Eyes: Pupils are equal, round, and reactive to light  Neck: Normal range of motion  Neck supple  Cardiovascular: Normal rate  Pulmonary/Chest: Effort normal and breath sounds normal    Abdominal: Soft  Musculoskeletal: Normal range of motion  Neurological: He is alert and oriented to person, place, and time  Skin: Skin is warm and dry  Psychiatric: He has a normal mood and affect  Nursing note and vitals reviewed  Social History     Socioeconomic History    Marital status: Single     Spouse name: Not on file    Number of children: Not on file    Years of education: Not on file    Highest education level: Not on file   Occupational History    Occupation: fulltime   Social Needs    Financial resource strain: Not on file    Food insecurity:     Worry: Not on file     Inability: Not on file    Transportation needs:     Medical: Not on file     Non-medical: Not on file   Tobacco Use    Smoking status: Former Smoker     Last attempt to quit: 3/30/2015     Years since quittin 3    Smokeless tobacco: Never Used   Substance and Sexual Activity    Alcohol use: Not Currently     Comment: at social events    Drug use: Not Currently     Frequency: 2 0 times per week     Types: Marijuana, Heroin     Comment: Pt reports using THC daily x3, Heroin every other day and will alternate between using pills   Pt snorts heroin    Sexual activity: Yes     Partners: Female   Lifestyle    Physical activity:     Days per week: Not on file     Minutes per session: Not on file    Stress: Not on file   Relationships    Social connections:     Talks on phone: Not on file     Gets together: Not on file     Attends [employer] service: Not on file     Active member of club or organization: Not on file     Attends meetings of clubs or organizations: Not on file     Relationship status: Not on file    Intimate partner violence:     Fear of current or ex partner: Not on file     Emotionally abused: Not on file     Physically abused: Not on file     Forced sexual activity: Not on file   Other Topics Concern    Not on file   Social History Narrative    Not on file     Past Medical History:   Diagnosis Date    ADHD     Anxiety     [facility] disorder ([facility] [address] [facility]     Depression     Drug use     Essential hypertension     benign    Hyperlipidemia     Peptic reflux disease        Current Outpatient Medications:     buprenorphine-naloxone (SUBOXONE) 8-2 mg per SL tablet, Place 1 tablet under the tongue daily, Disp: , Rfl:     dicyclomine (BENTYL) 20 mg tablet, Take 1 tablet (20 mg total) by mouth every 6 (six) hours as needed (abdominal pain), Disp: 20 tablet, Rfl: 0    melatonin 3 mg, Take 1 tablet (3 mg total) by mouth daily at bedtime, Disp: 30 tablet, Rfl: 0    omeprazole (PriLOSEC) 20 mg delayed release capsule, Take 1 capsule (20 mg total) by mouth daily, Disp: 90 capsule, Rfl: 1    OXcarbazepine (TRILEPTAL) 300 mg tablet, TAKE 1 TABLET BY MOUTH TWICE A DAY, Disp: 180 tablet, Rfl: 1    QUEtiapine (SEROquel) 300 mg tablet, Take 1 tablet (300 mg total) by mouth daily at bedtime, Disp: 90 tablet, Rfl: 1    QUEtiapine (SEROquel) 50 mg tablet, Take 1 pill daily in the morning, Disp: 90 tablet, Rfl: 1    venlafaxine (EFFEXOR-XR) 75 mg 24 hr capsule, , Disp: , Rfl:   Family History   Problem Relation Age of Onset    Mental illness Mother     Thyroid disease Mother     Drug abuse Mother         illicit drug    Diabetes Father     Cancer Father      There are no Patient Instructions on file for this visit          PETER Keene

## 2020-09-14 ENCOUNTER — OFFICE VISIT (OUTPATIENT)
Dept: FAMILY MEDICINE CLINIC | Facility: CLINIC | Age: 30
End: 2020-09-14
Payer: COMMERCIAL

## 2020-09-14 VITALS
HEIGHT: 72 IN | RESPIRATION RATE: 16 BRPM | HEART RATE: 108 BPM | TEMPERATURE: 97.5 F | BODY MASS INDEX: 35.05 KG/M2 | DIASTOLIC BLOOD PRESSURE: 90 MMHG | OXYGEN SATURATION: 97 % | SYSTOLIC BLOOD PRESSURE: 110 MMHG | WEIGHT: 258.8 LBS

## 2020-09-14 DIAGNOSIS — F11.20 HEROIN ADDICTION (HCC): ICD-10-CM

## 2020-09-14 DIAGNOSIS — F31.4 BIPOLAR 1 DISORDER, DEPRESSED, SEVERE (HCC): Primary | ICD-10-CM

## 2020-09-14 DIAGNOSIS — F41.9 ANXIETY: ICD-10-CM

## 2020-09-14 PROBLEM — F31.62 BIPOLAR DISORDER, CURRENT EPISODE MIXED, MODERATE (HCC): Status: RESOLVED | Noted: 2019-05-16 | Resolved: 2020-09-14

## 2020-09-14 PROCEDURE — 99214 OFFICE O/P EST MOD 30 MIN: CPT | Performed by: FAMILY MEDICINE

## 2020-09-14 NOTE — PATIENT INSTRUCTIONS
Discussed all with patient  This most recent episode of depression with anxiety has appeared to have passed however with his long history of bipolar depression would like to refer out to see if he is a candidate for transcranial magnetic stimulation  I would like you to go home in read about this as I have seen it worked in people  Make your appointment with Dr Parra Daily office but the reason I am sending you is to see if your candidate for [address] [place] [address]

## 2020-09-14 NOTE — ASSESSMENT & PLAN NOTE
Stable on meds, but recently over a bout of anxiety and depression  Will refer to psych for consideration of tms

## 2020-09-14 NOTE — PROGRESS NOTES
Assessment/Plan:     Chronic Problems:  Bipolar 1 disorder, depressed, severe ([facility])  Stable on meds, but recently over a bout of anxiety and depression  Will refer to psych for consideration of tms  Anxiety  Stay on current meds  [facility] ([facility] )  Pt has been heroin free for almost a year  Visit Diagnosis:  Diagnoses and all orders for this visit:    Bipolar 1 disorder, depressed, severe ([facility] [address] )  -     Ambulatory referral to Psychiatry; Future    Anxiety    Heroin addiction ([facility] )          Subjective:    Patient ID: Rodri Lane is a 35 y o  male  Pt is here with c/o panic attacks  Started with a panic attack last Tuesday  Had some diarrhea and abdominal pains and then developed a panic attack  Could not hold down food or drink and then since he was vomiting he did not take his meds  Tried the zofran but he still was panicky  Has been dealing with bad depression and anxiety for years  Feeling better now and wants to know if he is cleared to go back to work with a work note  Now taking his meds for the last day and 1/2  Also on suboxone  Sometimes could stare at a wall for 20 hours the entire day  Pt is not sure if he is on venlafaxine  Never started the topamax for weight loss  Takes all other meds as directed  No side effects noted  The following portions of the patient's history were reviewed and updated as appropriate: allergies, current medications, past family history, past medical history, past social history, past surgical history and problem list     Review of Systems   Constitutional: Negative for chills, diaphoresis, fatigue and fever  HENT: Negative  Eyes: Negative  Respiratory: Negative for cough, shortness of breath and wheezing  Cardiovascular: Negative for chest pain and palpitations  Gastrointestinal: Negative  Genitourinary: Negative  Neurological: Negative for dizziness, light-headedness and headaches     Psychiatric/Behavioral: Positive for dysphoric mood  The patient is nervous/anxious  /90   Pulse (!) 108   Temp 97 5 °F (36 4 °C)   Resp 16   Ht 6' (1 829 m)   Wt 117 kg (258 lb 12 8 oz)   SpO2 97%   BMI 35 10 kg/m²   Social History     Socioeconomic History    Marital status: Single     Spouse name: Not on file    Number of children: Not on file    Years of education: Not on file    Highest education level: Not on file   Occupational History    Occupation: fulltime   Social Needs    Financial resource strain: Not on file    Food insecurity     Worry: Not on file     Inability: Not on file    Transportation needs     Medical: Not on file     Non-medical: Not on file   Tobacco Use    Smoking status: Former Smoker     Last attempt to quit: 3/30/2015     Years since quittin 4    Smokeless tobacco: Never Used   Substance and Sexual Activity    Alcohol use: Not Currently     Comment: at social events    Drug use: Not Currently     Frequency: 2 0 times per week     Types: Marijuana, Heroin     Comment: Pt reports using THC daily x3, Heroin every other day and will alternate between using pills   Pt snorts heroin    Sexual activity: Yes     Partners: Female   Lifestyle    Physical activity     Days per week: Not on file     Minutes per session: Not on file    Stress: Not on file   Relationships    Social connections     Talks on phone: Not on file     Gets together: Not on file     Attends [employer] service: Not on file     Active member of club or organization: Not on file     Attends meetings of clubs or organizations: Not on file     Relationship status: Not on file    Intimate partner violence     Fear of current or ex partner: Not on file     Emotionally abused: Not on file     Physically abused: Not on file     Forced sexual activity: Not on file   Other Topics Concern    Not on file   Social History Narrative    Not on file     Past Medical History:   Diagnosis Date    ADHD     Anxiety     B[facility] disorder ([facility] [address] [facility]     Depression     Drug use     Essential hypertension     benign    Hyperlipidemia     Peptic reflux disease      Family History   Problem Relation Age of Onset    Mental illness Mother     Thyroid disease Mother     Drug abuse Mother         illicit drug    Diabetes Father     Cancer Father      History reviewed  No pertinent surgical history  Current Outpatient Medications:     buprenorphine-naloxone (SUBOXONE) 8-2 mg per SL tablet, Place 1 tablet under the tongue daily, Disp: , Rfl:     melatonin 3 mg, Take 1 tablet (3 mg total) by mouth daily at bedtime, Disp: 30 tablet, Rfl: 0    OXcarbazepine (TRILEPTAL) 300 mg tablet, TAKE 1 TABLET BY MOUTH TWICE A DAY, Disp: 180 tablet, Rfl: 1    QUEtiapine (SEROquel) 300 mg tablet, Take 1 tablet (300 mg total) by mouth daily at bedtime, Disp: 90 tablet, Rfl: 1    QUEtiapine (SEROquel) 50 mg tablet, Take 1 pill daily in the morning, Disp: 90 tablet, Rfl: 1    venlafaxine (EFFEXOR-XR) 75 mg 24 hr capsule, , Disp: , Rfl:     omeprazole (PriLOSEC) 20 mg delayed release capsule, Take 1 capsule (20 mg total) by mouth daily, Disp: 90 capsule, Rfl: 1    No Known Allergies       Lab Review   No visits with results within 2 Month(s) from this visit     Latest known visit with results is:   Admission on 06/28/2020, Discharged on 06/28/2020   Component Date Value    WBC 06/28/2020 13 92*    RBC 06/28/2020 5 93*    Hemoglobin 06/28/2020 16 3     Hematocrit 06/28/2020 47 8     MCV 06/28/2020 81*    MCH 06/28/2020 27 5     MCHC 06/28/2020 34 1     RDW 06/28/2020 12 9     MPV 06/28/2020 10 2     Platelets 57/36/5334 409*    nRBC 06/28/2020 0     Neutrophils Relative 06/28/2020 81*    Immat GRANS % 06/28/2020 0     Lymphocytes Relative 06/28/2020 12*    Monocytes Relative 06/28/2020 7     Eosinophils Relative 06/28/2020 0     Basophils Relative 06/28/2020 0     Neutrophils Absolute 06/28/2020 11 30*    Immature Grans Absolute 06/28/2020 0 05     Lymphocytes Absolute 06/28/2020 1 63     Monocytes Absolute 06/28/2020 0 92     Eosinophils Absolute 06/28/2020 0 00     Basophils Absolute 06/28/2020 0 02     Sodium 06/28/2020 137     Potassium 06/28/2020 3 2*    Chloride 06/28/2020 93*    CO2 06/28/2020 34*    ANION GAP 06/28/2020 10     BUN 06/28/2020 17     Creatinine 06/28/2020 1 04     Glucose 06/28/2020 138     Calcium 06/28/2020 9 5     eGFR 06/28/2020 97     Total Bilirubin 06/28/2020 0 80     Bilirubin, Direct 06/28/2020 0 15     Alkaline Phosphatase 06/28/2020 95     AST 06/28/2020 17     ALT 06/28/2020 14     Total Protein 06/28/2020 8 5*    Albumin 06/28/2020 4 6         Imaging: No results found  Objective:     Physical Exam  Vitals signs and nursing note reviewed  Constitutional:       General: He is not in acute distress  Appearance: Normal appearance  He is well-developed  He is obese  He is not ill-appearing, toxic-appearing or diaphoretic  HENT:      Head: Normocephalic and atraumatic  Right Ear: Tympanic membrane, ear canal and external ear normal       Left Ear: Tympanic membrane, ear canal and external ear normal       Nose: Nose normal  No congestion  Mouth/Throat:      Mouth: Mucous membranes are moist       Pharynx: No oropharyngeal exudate  Eyes:      General:         Right eye: No discharge  Left eye: No discharge  Pupils: Pupils are equal, round, and reactive to light  Neck:      Musculoskeletal: Normal range of motion and neck supple  Cardiovascular:      Rate and Rhythm: Normal rate and regular rhythm  Heart sounds: Normal heart sounds  No murmur  Pulmonary:      Effort: Pulmonary effort is normal  No respiratory distress  Breath sounds: No wheezing or rales  Abdominal:      General: Abdomen is flat  Bowel sounds are normal       Palpations: Abdomen is soft  Musculoskeletal: Normal range of motion  General: No swelling, tenderness or deformity  Lymphadenopathy:      Cervical: No cervical adenopathy  Skin:     General: Skin is warm and dry  Neurological:      General: No focal deficit present  Mental Status: He is alert and oriented to person, place, and time  Cranial Nerves: No cranial nerve deficit  Psychiatric:         Mood and Affect: Mood normal          Behavior: Behavior normal          Thought Content: Thought content normal          Judgment: Judgment normal            Patient Instructions   Discussed all with patient  This most recent episode of depression with anxiety has appeared to have passed however with his long history of bipolar depression would like to refer out to see if he is a candidate for transcranial magnetic stimulation  I would like you to go home in read about this as I have seen it worked in people  Make your appointment with Dr Mc Slider office but the reason I am sending you is to see if your candidate for [address] [place] [address]  PETER Vega    Portions of the record may have been created with voice recognition software  Occasional wrong word or "sound a like" substitutions may have occurred due to the inherent limitations of voice recognition software  Read the chart carefully and recognize, using context, where substitutions have occurred

## 2020-09-15 ENCOUNTER — TELEPHONE (OUTPATIENT)
Dept: FAMILY MEDICINE CLINIC | Facility: CLINIC | Age: 30
End: 2020-09-15

## 2020-09-15 NOTE — TELEPHONE ENCOUNTER
His job needs his excuse to say he is cleared of Covid to return to work  Can we add that to his note?

## 2020-09-16 ENCOUNTER — OFFICE VISIT (OUTPATIENT)
Dept: FAMILY MEDICINE CLINIC | Facility: CLINIC | Age: 30
End: 2020-09-16
Payer: COMMERCIAL

## 2020-09-16 VITALS
SYSTOLIC BLOOD PRESSURE: 110 MMHG | WEIGHT: 266 LBS | DIASTOLIC BLOOD PRESSURE: 64 MMHG | OXYGEN SATURATION: 99 % | TEMPERATURE: 97.6 F | HEIGHT: 72 IN | HEART RATE: 83 BPM | BODY MASS INDEX: 36.03 KG/M2

## 2020-09-16 DIAGNOSIS — R80.1 PERSISTENT PROTEINURIA: ICD-10-CM

## 2020-09-16 DIAGNOSIS — F51.01 PRIMARY INSOMNIA: ICD-10-CM

## 2020-09-16 DIAGNOSIS — R77.8 ABNORMAL SPEP: ICD-10-CM

## 2020-09-16 DIAGNOSIS — F31.75 BIPOLAR DISORDER, IN PARTIAL REMISSION, MOST RECENT EPISODE DEPRESSED (HCC): Primary | ICD-10-CM

## 2020-09-16 DIAGNOSIS — R53.82 CHRONIC FATIGUE: ICD-10-CM

## 2020-09-16 DIAGNOSIS — E78.01 FAMILIAL HYPERCHOLESTEROLEMIA: ICD-10-CM

## 2020-09-16 DIAGNOSIS — F33.41 RECURRENT MAJOR DEPRESSIVE DISORDER, IN PARTIAL REMISSION (HCC): ICD-10-CM

## 2020-09-16 DIAGNOSIS — R03.0 TRANSIENT ELEVATED BLOOD PRESSURE: ICD-10-CM

## 2020-09-16 DIAGNOSIS — Z12.11 SCREENING FOR COLON CANCER: ICD-10-CM

## 2020-09-16 DIAGNOSIS — K21.9 GASTROESOPHAGEAL REFLUX DISEASE WITHOUT ESOPHAGITIS: ICD-10-CM

## 2020-09-16 PROBLEM — F11.90 CHRONIC, CONTINUOUS USE OF OPIOIDS: Status: RESOLVED | Noted: 2019-10-24 | Resolved: 2020-09-16

## 2020-09-16 PROBLEM — F11.20 HEROIN ADDICTION (HCC): Status: RESOLVED | Noted: 2019-05-16 | Resolved: 2020-09-16

## 2020-09-16 PROCEDURE — 99214 OFFICE O/P EST MOD 30 MIN: CPT | Performed by: FAMILY MEDICINE

## 2020-09-16 PROCEDURE — 1036F TOBACCO NON-USER: CPT | Performed by: FAMILY MEDICINE

## 2020-09-16 RX ORDER — VENLAFAXINE HYDROCHLORIDE 75 MG/1
CAPSULE, EXTENDED RELEASE ORAL
Qty: 90 CAPSULE | Refills: 0 | Status: SHIPPED | OUTPATIENT
Start: 2020-09-16 | End: 2020-12-22

## 2020-09-16 NOTE — PATIENT INSTRUCTIONS

## 2020-09-16 NOTE — PROGRESS NOTES
Standard Visit   Assessment/Plan:     Visit Diagnosis:  Diagnoses and all orders for this visit:    Bipolar disorder, in partial remission, most recent episode depressed ([facility] [address] )    Recurrent major depressive disorder, in partial remission ([facility] )  -     venlafaxine (EFFEXOR-XR) 75 mg 24 hr capsule; Take 1 pill daily    Primary insomnia    BMI 35 0-35 9,adult    Transient elevated blood pressure  -     Microalbumin / creatinine urine ratio    Chronic fatigue  -     TSH, 3rd generation with Free T4 reflex; Future  -     Comprehensive metabolic panel; Future  -     CBC and differential; Future    Persistent proteinuria  -     Protein electrophoresis, urine  -     Protein electrophoresis, serum; Future    Screening for colon cancer  -     Occult Blood, Fecal Immunochemical; Future    Familial hypercholesterolemia  -     Lipid panel; Future    Abnormal SPEP    Gastroesophageal reflux disease without esophagitis     please write a note stating the following   to whom it May concern   Angel Elizabeth was seen in our care   He was seen for vomiting,  Diarrhea and fatigue  He was advised home care aggressive hydration and rest    the days that he was advised to be home were September 9th through September 16th  He is medically cleared to return to work without restrictions  He has no COVID is symptomatology no fever no cough      Subjective:    Patient ID: Cindy Lomas is a 35 y o  male       Patient is here with the following concerns:    Here for follow-up    was seen earlier this week for vomiting and fatigue   Was out of work for several days because of this   Battling bipolar depression and panic attacks  He had a panic attack that he just felt like he could not shake  And that gets him worked up and Then the vomiting starts   was able to get past this episode without going to the emergency room   He aggressively hydrated  He used Zofran    Of note, he and [place] recently got   They are going to try and have a baby in the spring  Their 1st baby  during the delivery   This was extremely traumatic for both Lindsay Lozano and Sraavn Lomeli    Needs a note for work  Needs to state that he is able to return to work without any restrictions   Off  through    Consult with Dr Huong Carmona set up        The following portions of the patient's history were reviewed and updated as appropriate: allergies, current medications, past family history, past medical history, past social history, past surgical history and problem list     Review of Systems   Constitutional: Negative for chills and fever  HENT: Negative for congestion  Respiratory: Negative for cough  Cardiovascular: Negative for chest pain and leg swelling  Gastrointestinal: Positive for vomiting  Negative for abdominal distention and nausea  Psychiatric/Behavioral: Positive for dysphoric mood  The patient is nervous/anxious  /64   Pulse 83   Temp 97 6 °F (36 4 °C)   Ht 6' (1 829 m)   Wt 121 kg (266 lb)   SpO2 99%   BMI 36 08 kg/m²   Social History     Socioeconomic History    Marital status: Single     Spouse name: Not on file    Number of children: Not on file    Years of education: Not on file    Highest education level: Not on file   Occupational History    Occupation: fulltime   Social Needs    Financial resource strain: Not on file    Food insecurity     Worry: Not on file     Inability: Not on file    Transportation needs     Medical: Not on file     Non-medical: Not on file   Tobacco Use    Smoking status: Former Smoker     Last attempt to quit: 3/30/2015     Years since quittin 4    Smokeless tobacco: Never Used   Substance and Sexual Activity    Alcohol use: Not Currently     Comment: at social events    Drug use: Not Currently     Frequency: 2 0 times per week     Types: Marijuana, Heroin     Comment: Pt reports using THC daily x3, Heroin every other day and will alternate between using pills   Pt snorts heroin    Sexual activity: Yes     Partners: Female   Lifestyle    Physical activity     Days per week: Not on file     Minutes per session: Not on file    Stress: Not on file   Relationships    Social connections     Talks on phone: Not on file     Gets together: Not on file     Attends Taoist service: Not on file     Active member of club or organization: Not on file     Attends meetings of clubs or organizations: Not on file     Relationship status: Not on file    Intimate partner violence     Fear of current or ex partner: Not on file     Emotionally abused: Not on file     Physically abused: Not on file     Forced sexual activity: Not on file   Other Topics Concern    Not on file   Social History Narrative    Not on file     Past Medical History:   Diagnosis Date    ADHD     Anxiety     Bipolar disorder ([facility] [address] )     Depression     Drug use     Essential hypertension     benign    Hyperlipidemia     Peptic reflux disease      Family History   Problem Relation Age of Onset    Mental illness Mother     Thyroid disease Mother     Drug abuse Mother         illicit drug    Diabetes Father     Cancer Father      History reviewed  No pertinent surgical history      Current Outpatient Medications:     buprenorphine-naloxone (SUBOXONE) 8-2 mg per SL tablet, Place 1 tablet under the tongue daily, Disp: , Rfl:     melatonin 3 mg, Take 1 tablet (3 mg total) by mouth daily at bedtime, Disp: 30 tablet, Rfl: 0    omeprazole (PriLOSEC) 20 mg delayed release capsule, Take 1 capsule (20 mg total) by mouth daily, Disp: 90 capsule, Rfl: 1    OXcarbazepine (TRILEPTAL) 300 mg tablet, TAKE 1 TABLET BY MOUTH TWICE A DAY, Disp: 180 tablet, Rfl: 1    QUEtiapine (SEROquel) 300 mg tablet, Take 1 tablet (300 mg total) by mouth daily at bedtime, Disp: 90 tablet, Rfl: 1    QUEtiapine (SEROquel) 50 mg tablet, Take 1 pill daily in the morning, Disp: 90 tablet, Rfl: 1    venlafaxine (EFFEXOR-XR) 75 mg 24 hr capsule, Take 1 pill daily, Disp: 90 capsule, Rfl: 0    Lab Review   Admission on 06/28/2020, Discharged on 06/28/2020   Component Date Value    WBC 06/28/2020 13 92*    RBC 06/28/2020 5 93*    Hemoglobin 06/28/2020 16 3     Hematocrit 06/28/2020 47 8     MCV 06/28/2020 81*    MCH 06/28/2020 27 5     MCHC 06/28/2020 34 1     RDW 06/28/2020 12 9     MPV 06/28/2020 10 2     Platelets 07/30/2376 409*    nRBC 06/28/2020 0     Neutrophils Relative 06/28/2020 81*    Immat GRANS % 06/28/2020 0     Lymphocytes Relative 06/28/2020 12*    Monocytes Relative 06/28/2020 7     Eosinophils Relative 06/28/2020 0     Basophils Relative 06/28/2020 0     Neutrophils Absolute 06/28/2020 11 30*    Immature Grans Absolute 06/28/2020 0 05     Lymphocytes Absolute 06/28/2020 1 63     Monocytes Absolute 06/28/2020 0 92     Eosinophils Absolute 06/28/2020 0 00     Basophils Absolute 06/28/2020 0 02     Sodium 06/28/2020 137     Potassium 06/28/2020 3 2*    Chloride 06/28/2020 93*    CO2 06/28/2020 34*    ANION GAP 06/28/2020 10     BUN 06/28/2020 17     Creatinine 06/28/2020 1 04     Glucose 06/28/2020 138     Calcium 06/28/2020 9 5     eGFR 06/28/2020 97     Total Bilirubin 06/28/2020 0 80     Bilirubin, Direct 06/28/2020 0 15     Alkaline Phosphatase 06/28/2020 95     AST 06/28/2020 17     ALT 06/28/2020 14     Total Protein 06/28/2020 8 5*    Albumin 06/28/2020 4 6         Objective:     Physical Exam  Vitals signs and nursing note reviewed  Constitutional:       Appearance: He is well-developed  HENT:      Head: Normocephalic and atraumatic  Right Ear: Tympanic membrane and external ear normal       Left Ear: Tympanic membrane and external ear normal       Mouth/Throat:      Pharynx: No oropharyngeal exudate or posterior oropharyngeal erythema  Eyes:      General: Scleral icterus present  Right eye: No discharge  Left eye: No discharge  Pupils: Pupils are equal, round, and reactive to light  Neck:      Musculoskeletal: Normal range of motion and neck supple  No neck rigidity or muscular tenderness  Cardiovascular:      Rate and Rhythm: Normal rate and regular rhythm  Pulses: Normal pulses  Pulmonary:      Effort: Pulmonary effort is normal       Breath sounds: Normal breath sounds  Abdominal:      General: There is no distension  Palpations: Abdomen is soft  There is no mass  Tenderness: There is no abdominal tenderness  There is no right CVA tenderness or left CVA tenderness  Hernia: No hernia is present  Musculoskeletal: Normal range of motion  General: No swelling, tenderness, deformity or signs of injury  Right lower leg: No edema  Left lower leg: No edema  Skin:     General: Skin is warm and dry  Findings: No lesion or rash  Neurological:      Mental Status: He is alert and oriented to person, place, and time  Cranial Nerves: No cranial nerve deficit  Sensory: No sensory deficit  Motor: No weakness  Coordination: Coordination normal       Gait: Gait abnormal       Deep Tendon Reflexes: Reflexes normal            Patient Instructions     Low Fat Diet   AMBULATORY CARE:   A low-fat diet  is an eating plan that is low in total fat, unhealthy fat, and cholesterol  You may need to follow a low-fat diet if you have trouble digesting or absorbing fat  You may also need to follow this diet if you have high cholesterol  You can also lower your cholesterol by increasing the amount of fiber in your diet  Soluble fiber is a type of fiber that helps to decrease cholesterol levels  Different types of fat in food:   · Limit unhealthy fats  A diet that is high in cholesterol, saturated fat, and trans fat may cause unhealthy cholesterol levels  Unhealthy cholesterol levels increase your risk of heart disease  ¨ Cholesterol:  Limit intake of cholesterol to less than 200 mg per day   Cholesterol is found in meat, eggs, and dairy     ¨ Saturated fat:  Limit saturated fat to less than 7% of your total daily calories  Ask your dietitian how many calories you need each day  Saturated fat is found in butter, cheese, ice cream, whole milk, and palm oil  Saturated fat is also found in meat, such as beef, pork, chicken skin, and processed meats  Processed meats include sausage, hot dogs, and bologna  ¨ Trans fat:  Avoid trans fat as much as possible  Trans fat is used in fried and baked foods  Foods that say trans fat free on the label may still have up to 0 5 grams of trans fat per serving  · Include healthy fats  Replace foods that are high in saturated and trans fat with foods high in healthy fats  This may help to decrease high cholesterol levels  ¨ Monounsaturated fats: These are found in avocados, nuts, and vegetable oils, such as olive, canola, and sunflower oil  ¨ Polyunsaturated fats: These can be found in vegetable oils, such as soybean or corn oil  Omega-3 fats can help to decrease the risk of heart disease  Omega-3 fats are found in fish, such as salmon, herring, trout, and tuna  Omega-3 fats can also be found in plant foods, such as walnuts, flaxseed, soybeans, and canola oil    Foods to limit or avoid:   · Grains:      ¨ Snacks that are made with partially hydrogenated oils, such as chips, regular crackers, and butter-flavored popcorn    ¨ High-fat baked goods, such as biscuits, croissants, doughnuts, pies, cookies, and pastries    · Dairy:      ¨ Whole milk, 2% milk, and yogurt and ice cream made with whole milk    ¨ Half and half creamer, heavy cream, and whipping cream    ¨ Cheese, cream cheese, and sour cream    · Meats and proteins:      ¨ High-fat cuts of meat (T-bone steak, regular hamburger, and ribs)    ¨ Fried meat, poultry (turkey and chicken), and fish    ¨ Poultry (chicken and turkey) with skin    ¨ Cold cuts (salami or bologna), hot dogs, lundberg, and sausage    ¨ Whole eggs and egg yolks    · Vegetables and fruits with added fat:      ¨ Fried vegetables or vegetables in butter or high-fat sauces, such as cream or cheese sauces    ¨ Fried fruit or fruit served with butter or cream    · Fats:      ¨ Butter, stick margarine, and shortening    ¨ Coconut, palm oil, and palm kernel oil  Foods to include:   · Grains:      ¨ Whole-grain breads, cereals, pasta, and brown rice    ¨ Low-fat crackers and pretzels    · Vegetables and fruits:      ¨ Fresh, frozen, or canned vegetables (no salt or low-sodium)    ¨ Fresh, frozen, dried, or canned fruit (canned in light syrup or fruit juice)    ¨ Avocado    · Low-fat dairy products:      ¨ Nonfat (skim) or 1% milk    ¨ Nonfat or low-fat cheese, yogurt, and cottage cheese    · Meats and proteins:      ¨ Chicken or turkey with no skin    ¨ Baked or broiled fish    ¨ Lean beef and pork (loin, round, extra lean hamburger)    ¨ Beans and peas, unsalted nuts, soy products    ¨ Egg whites and substitutes    ¨ Seeds and nuts    · Fats:      ¨ Unsaturated oil, such as canola, olive, peanut, soybean, or sunflower oil    ¨ Soft or liquid margarine and vegetable oil spread    ¨ Low-fat salad dressing  Other ways to decrease fat:   · Read food labels before you buy foods  Choose foods that have less than 30% of calories from fat  Choose low-fat or fat-free dairy products  Remember that fat free does not mean calorie free  These foods still contain calories, and too many calories can lead to weight gain  · Trim fat from meat and avoid fried food  Trim all visible fat from meat before you cook it  Remove the skin from poultry  Do not norman meat, fish, or poultry  Bake, roast, boil, or broil these foods instead  Avoid fried foods  Eat a baked potato instead of Western Elena fries  Steam vegetables instead of sautéing them in butter  · Add less fat to foods  Use imitation lundberg bits on salads and baked potatoes instead of regular lundberg bits   Use fat-free or low-fat salad dressings instead of regular dressings  Use low-fat or nonfat butter-flavored topping instead of regular butter or margarine on popcorn and other foods  Ways to decrease fat in recipes:  Replace high-fat ingredients with low-fat or nonfat ones  This may cause baked goods to be drier than usual  You may need to use nonfat cooking spray on pans to prevent food from sticking  You also may need to change the amount of other ingredients, such as water, in the recipe  Try the following:  · Use low-fat or light margarine instead of regular margarine or shortening  · Use lean ground turkey breast or chicken, or lean ground beef (less than 5% fat) instead of hamburger  · Add 1 teaspoon of canola oil to 8 ounces of skim milk instead of using cream or half and half  · Use grated zucchini, carrots, or apples in breads instead of coconut  · Use blenderized, low-fat cottage cheese, plain tofu, or low-fat ricotta cheese instead of cream cheese  · Use 1 egg white and 1 teaspoon of canola oil, or use ¼ cup (2 ounces) of fat-free egg substitute instead of a whole egg  · Replace half of the oil that is called for in a recipe with applesauce when you bake  Use 3 tablespoons of cocoa powder and 1 tablespoon of canola oil instead of a square of baking chocolate  How to increase fiber:  Eat enough high-fiber foods to get 20 to 30 grams of fiber every day  Slowly increase your fiber intake to avoid stomach cramps, gas, and other problems  · Eat 3 ounces of whole-grain foods each day  An ounce is about 1 slice of bread  Eat whole-grain breads, such as whole-wheat bread  Whole wheat, whole-wheat flour, or other whole grains should be listed as the first ingredient on the food label  Replace white flour with whole-grain flour or use half of each in recipes  Whole-grain flour is heavier than white flour, so you may have to add more yeast or baking powder       · Eat a high-fiber cereal for breakfast   Sorin Bello is a good source of soluble fiber  Look for cereals that have bran or fiber in the name  Choose whole-grain products, such as brown rice, barley, and whole-wheat pasta  · Eat more beans, peas, and lentils  For example, add beans to soups or salads  Eat at least 5 cups of fruits and vegetables each day  Eat fruits and vegetables with the peel because the peel is high in fiber  © 2017 [facility] [facility] Information is for End User's use only and may not be sold, redistributed or otherwise used for commercial purposes  All illustrations and images included in CareNotes® are the copyrighted property of A D A M [employer]  or [employer]  The above information is an  only  It is not intended as medical advice for individual conditions or treatments  Talk to your doctor, nurse or pharmacist before following any medical regimen to see if it is safe and effective for you  Heart Healthy Diet   AMBULATORY CARE:   A heart healthy diet  is an eating plan low in total fat, unhealthy fats, and sodium (salt)  A heart healthy diet helps decrease your risk for heart disease and stroke  Limit the amount of fat you eat to 25% to 35% of your total daily calories  Limit sodium to less than 2,300 mg each day  Healthy fats:  Healthy fats can help improve cholesterol levels  The risk for heart disease is decreased when cholesterol levels are normal  Choose healthy fats, such as the following:  · Unsaturated fat  is found in foods such as soybean, canola, olive, corn, and safflower oils  It is also found in soft tub margarine that is made with liquid vegetable oil  · Omega-3 fat  is found in certain fish, such as salmon, tuna, and trout, and in walnuts and flaxseed  Unhealthy fats:  Unhealthy fats can cause unhealthy cholesterol levels in your blood and increase your risk of heart disease   Limit unhealthy fats, such as the following:  · Cholesterol  is found in animal foods, such as eggs and lobster, and in dairy products made from whole milk  Limit cholesterol to less than 300 milligrams (mg) each day  You may need to limit cholesterol to 200 mg each day if you have heart disease  · Saturated fat  is found in meats, such as lundberg and hamburger  It is also found in chicken or turkey skin, whole milk, and butter  Limit saturated fat to less than 7% of your total daily calories  Limit saturated fat to less than 6% if you have heart disease or are at increased risk for it  · Trans fat  is found in packaged foods, such as potato chips and cookies  It is also in hard margarine, some fried foods, and shortening  Avoid trans fats as much as possible    Heart healthy foods and drinks to include:  Ask your dietitian or healthcare provider how many servings to have from each of the following food groups:  · Grains:      ¨ Whole-wheat breads, cereals, and pastas, and brown rice    ¨ Low-fat, low-sodium crackers and chips    · Vegetables:      ¨ Broccoli, green beans, green peas, and spinach    ¨ Collards, kale, and lima beans    ¨ Carrots, sweet potatoes, tomatoes, and peppers    ¨ Canned vegetables with no salt added    · Fruits:      ¨ Bananas, peaches, pears, and pineapple    ¨ Grapes, raisins, and dates    ¨ Oranges, tangerines, grapefruit, orange juice, and grapefruit juice    ¨ Apricots, mangoes, melons, and papaya    ¨ Raspberries and strawberries    ¨ Canned fruit with no added sugar    · Low-fat dairy products:      ¨ Nonfat (skim) milk, 1% milk, and low-fat almond, cashew, or soy milks fortified with calcium    ¨ Low-fat cheese, regular or frozen yogurt, and cottage cheese    · Meats and proteins , such as lean cuts of beef and pork (loin, leg, round), skinless chicken and turkey, legumes, soy products, egg whites, and nuts  Foods and drinks to limit or avoid:  Ask your dietitian or healthcare provider about these and other foods that are high in unhealthy fat, sodium, and sugar:  · Snack or packaged foods , such as frozen dinners, cookies, macaroni and cheese, and cereals with more than 300 mg of sodium per serving    · Canned or dry mixes  for cakes, soups, sauces, or gravies    · Vegetables with added sodium , such as instant potatoes, vegetables with added sauces, or regular canned vegetables    · Other foods high in sodium , such as ketchup, barbecue sauce, salad dressing, pickles, olives, soy sauce, and miso    · High-fat dairy foods  such as whole or 2% milk, cream cheese, or sour cream, and cheeses     · High-fat protein foods  such as high-fat cuts of beef (T-bone steaks, ribs), chicken or turkey with skin, and organ meats, such as liver    · Cured or smoked meats , such as hot dogs, lundberg, and sausage    · Unhealthy fats and oils , such as butter, stick margarine, shortening, and cooking oils such as coconut or palm oil    · Food and drinks high in sugar , such as soft drinks (soda), sports drinks, sweetened tea, candy, cake, cookies, pies, and doughnuts  Other diet guidelines to follow:   · Eat more foods containing omega-3 fats  Eat fish high in omega-3 fats at least 2 times a week  · Limit alcohol  Too much alcohol can damage your heart and raise your blood pressure  Women should limit alcohol to 1 drink a day  Men should limit alcohol to 2 drinks a day  A drink of alcohol is 12 ounces of beer, 5 ounces of wine, or 1½ ounces of liquor  · Choose low-sodium foods  High-sodium foods can lead to high blood pressure  Add little or no salt to food you prepare  Use herbs and spices in place of salt  · Eat more fiber  to help lower cholesterol levels  Eat at least 5 servings of fruits and vegetables each day  Eat 3 ounces of whole-grain foods each day  Legumes (beans) are also a good source of fiber  Lifestyle guidelines:   · Do not smoke  Nicotine and other chemicals in cigarettes and cigars can cause lung and heart damage   Ask your healthcare provider for information if you currently smoke and need help to quit  E-cigarettes or smokeless tobacco still contain nicotine  Talk to your healthcare provider before you use these products  · Exercise regularly  to help you maintain a healthy weight and improve your blood pressure and cholesterol levels  Ask your healthcare provider about the best exercise plan for you  Do not start an exercise program without asking your healthcare provider  Follow up with your healthcare provider as directed:  Write down your questions so you remember to ask them during your visits  © 2017 [facility] [facility] Information is for End User's use only and may not be sold, redistributed or otherwise used for commercial purposes  All illustrations and images included in CareNotes® are the copyrighted property of [employer] M [employer] Inc  or [employer]  The above information is an  only  It is not intended as medical advice for individual conditions or treatments  Talk to your doctor, nurse or pharmacist before following any medical regimen to see if it is safe and effective for you  PETER Nichols  BMI Counseling: Body mass index is 36 08 kg/m²  The BMI is above normal  Nutrition recommendations include reducing portion sizes, decreasing overall calorie intake, 3-5 servings of fruits/vegetables daily, reducing fast food intake, consuming healthier snacks, decreasing soda and/or juice intake, moderation in carbohydrate intake, increasing intake of lean protein, reducing intake of saturated fat and trans fat and reducing intake of cholesterol

## 2020-10-27 DIAGNOSIS — F31.4 BIPOLAR 1 DISORDER, DEPRESSED, SEVERE (HCC): ICD-10-CM

## 2020-10-27 DIAGNOSIS — F31.62 BIPOLAR DISORDER, CURRENT EPISODE MIXED, MODERATE (HCC): ICD-10-CM

## 2020-10-28 RX ORDER — QUETIAPINE FUMARATE 300 MG/1
300 TABLET, FILM COATED ORAL
Qty: 90 TABLET | Refills: 1 | Status: SHIPPED | OUTPATIENT
Start: 2020-10-28

## 2020-10-28 RX ORDER — QUETIAPINE FUMARATE 50 MG/1
TABLET, FILM COATED ORAL
Qty: 90 TABLET | Refills: 1 | Status: SHIPPED | OUTPATIENT
Start: 2020-10-28 | End: 2021-08-24

## 2020-11-07 ENCOUNTER — HOSPITAL ENCOUNTER (EMERGENCY)
Facility: HOSPITAL | Age: 30
Discharge: HOME | End: 2020-11-07
Attending: EMERGENCY MEDICINE | Admitting: EMERGENCY MEDICINE
Payer: COMMERCIAL

## 2020-11-07 VITALS
HEART RATE: 93 BPM | SYSTOLIC BLOOD PRESSURE: 154 MMHG | BODY MASS INDEX: 36.18 KG/M2 | DIASTOLIC BLOOD PRESSURE: 90 MMHG | RESPIRATION RATE: 17 BRPM | TEMPERATURE: 97.9 F | WEIGHT: 266.76 LBS | OXYGEN SATURATION: 96 %

## 2020-11-07 DIAGNOSIS — F41.9 ANXIETY: Primary | ICD-10-CM

## 2020-11-07 LAB
ANION GAP SERPL CALCULATED.3IONS-SCNC: 11 MMOL/L (ref 4–13)
BASOPHILS # BLD AUTO: 0.02 THOUSANDS/ΜL (ref 0–0.1)
BASOPHILS NFR BLD AUTO: 0 % (ref 0–1)
BUN SERPL-MCNC: 17 MG/DL (ref 5–25)
CALCIUM SERPL-MCNC: 9.8 MG/DL (ref 8.3–10.1)
CHLORIDE SERPL-SCNC: 91 MMOL/L (ref 100–108)
CO2 SERPL-SCNC: 31 MMOL/L (ref 21–32)
CREAT SERPL-MCNC: 1.2 MG/DL (ref 0.6–1.3)
EOSINOPHIL # BLD AUTO: 0.01 THOUSAND/ΜL (ref 0–0.61)
EOSINOPHIL NFR BLD AUTO: 0 % (ref 0–6)
ERYTHROCYTE [DISTWIDTH] IN BLOOD BY AUTOMATED COUNT: 12.8 % (ref 11.6–15.1)
GFR SERPL CREATININE-BSD FRML MDRD: 81 ML/MIN/1.73SQ M
GLUCOSE SERPL-MCNC: 116 MG/DL (ref 65–140)
HCT VFR BLD AUTO: 48.1 % (ref 36.5–49.3)
HGB BLD-MCNC: 16.8 G/DL (ref 12–17)
IMM GRANULOCYTES # BLD AUTO: 0.07 THOUSAND/UL (ref 0–0.2)
IMM GRANULOCYTES NFR BLD AUTO: 1 % (ref 0–2)
LYMPHOCYTES # BLD AUTO: 2.04 THOUSANDS/ΜL (ref 0.6–4.47)
LYMPHOCYTES NFR BLD AUTO: 16 % (ref 14–44)
MCH RBC QN AUTO: 28 PG (ref 26.8–34.3)
MCHC RBC AUTO-ENTMCNC: 34.9 G/DL (ref 31.4–37.4)
MCV RBC AUTO: 80 FL (ref 82–98)
MONOCYTES # BLD AUTO: 1.5 THOUSAND/ΜL (ref 0.17–1.22)
MONOCYTES NFR BLD AUTO: 12 % (ref 4–12)
NEUTROPHILS # BLD AUTO: 9.14 THOUSANDS/ΜL (ref 1.85–7.62)
NEUTS SEG NFR BLD AUTO: 71 % (ref 43–75)
NRBC BLD AUTO-RTO: 0 /100 WBCS
PLATELET # BLD AUTO: 337 THOUSANDS/UL (ref 149–390)
PMV BLD AUTO: 10.1 FL (ref 8.9–12.7)
POTASSIUM SERPL-SCNC: 3.1 MMOL/L (ref 3.5–5.3)
RBC # BLD AUTO: 6.01 MILLION/UL (ref 3.88–5.62)
SODIUM SERPL-SCNC: 133 MMOL/L (ref 136–145)
WBC # BLD AUTO: 12.78 THOUSAND/UL (ref 4.31–10.16)

## 2020-11-07 PROCEDURE — 96374 THER/PROPH/DIAG INJ IV PUSH: CPT

## 2020-11-07 PROCEDURE — 99284 EMERGENCY DEPT VISIT MOD MDM: CPT | Performed by: NURSE PRACTITIONER

## 2020-11-07 PROCEDURE — 36415 COLL VENOUS BLD VENIPUNCTURE: CPT | Performed by: NURSE PRACTITIONER

## 2020-11-07 PROCEDURE — 96375 TX/PRO/DX INJ NEW DRUG ADDON: CPT

## 2020-11-07 PROCEDURE — 85025 COMPLETE CBC W/AUTO DIFF WBC: CPT | Performed by: NURSE PRACTITIONER

## 2020-11-07 PROCEDURE — 96361 HYDRATE IV INFUSION ADD-ON: CPT

## 2020-11-07 PROCEDURE — 99283 EMERGENCY DEPT VISIT LOW MDM: CPT

## 2020-11-07 PROCEDURE — C9113 INJ PANTOPRAZOLE SODIUM, VIA: HCPCS | Performed by: NURSE PRACTITIONER

## 2020-11-07 PROCEDURE — 80048 BASIC METABOLIC PNL TOTAL CA: CPT | Performed by: NURSE PRACTITIONER

## 2020-11-07 RX ORDER — PANTOPRAZOLE SODIUM 40 MG/1
40 INJECTION, POWDER, FOR SOLUTION INTRAVENOUS ONCE
Status: COMPLETED | OUTPATIENT
Start: 2020-11-07 | End: 2020-11-07

## 2020-11-07 RX ORDER — POTASSIUM CHLORIDE 20 MEQ/1
40 TABLET, EXTENDED RELEASE ORAL ONCE
Status: COMPLETED | OUTPATIENT
Start: 2020-11-07 | End: 2020-11-07

## 2020-11-07 RX ORDER — LORAZEPAM 2 MG/ML
1 INJECTION INTRAMUSCULAR ONCE
Status: COMPLETED | OUTPATIENT
Start: 2020-11-07 | End: 2020-11-07

## 2020-11-07 RX ADMIN — PANTOPRAZOLE SODIUM 40 MG: 40 INJECTION, POWDER, FOR SOLUTION INTRAVENOUS at 11:14

## 2020-11-07 RX ADMIN — SODIUM CHLORIDE 1000 ML: 0.9 INJECTION, SOLUTION INTRAVENOUS at 11:13

## 2020-11-07 RX ADMIN — POTASSIUM CHLORIDE 40 MEQ: 1500 TABLET, EXTENDED RELEASE ORAL at 12:04

## 2020-11-07 RX ADMIN — LORAZEPAM 1 MG: 2 INJECTION INTRAMUSCULAR; INTRAVENOUS at 11:14

## 2020-11-09 ENCOUNTER — HOSPITAL ENCOUNTER (EMERGENCY)
Facility: HOSPITAL | Age: 30
Discharge: HOME | End: 2020-11-09
Attending: EMERGENCY MEDICINE | Admitting: EMERGENCY MEDICINE
Payer: COMMERCIAL

## 2020-11-09 VITALS
DIASTOLIC BLOOD PRESSURE: 83 MMHG | BODY MASS INDEX: 35.89 KG/M2 | RESPIRATION RATE: 17 BRPM | HEART RATE: 74 BPM | HEIGHT: 72 IN | TEMPERATURE: 97.8 F | SYSTOLIC BLOOD PRESSURE: 136 MMHG | WEIGHT: 265 LBS | OXYGEN SATURATION: 92 %

## 2020-11-09 DIAGNOSIS — R11.2 NAUSEA AND VOMITING: ICD-10-CM

## 2020-11-09 DIAGNOSIS — F41.9 ANXIETY: Primary | ICD-10-CM

## 2020-11-09 DIAGNOSIS — E87.6 HYPOKALEMIA: ICD-10-CM

## 2020-11-09 LAB
ANION GAP SERPL CALCULATED.3IONS-SCNC: 11 MMOL/L (ref 4–13)
BASOPHILS # BLD AUTO: 0.02 THOUSANDS/ΜL (ref 0–0.1)
BASOPHILS NFR BLD AUTO: 0 % (ref 0–1)
BUN SERPL-MCNC: 12 MG/DL (ref 5–25)
CALCIUM SERPL-MCNC: 9.2 MG/DL (ref 8.3–10.1)
CHLORIDE SERPL-SCNC: 96 MMOL/L (ref 100–108)
CO2 SERPL-SCNC: 28 MMOL/L (ref 21–32)
CREAT SERPL-MCNC: 0.98 MG/DL (ref 0.6–1.3)
EOSINOPHIL # BLD AUTO: 0.01 THOUSAND/ΜL (ref 0–0.61)
EOSINOPHIL NFR BLD AUTO: 0 % (ref 0–6)
ERYTHROCYTE [DISTWIDTH] IN BLOOD BY AUTOMATED COUNT: 12.4 % (ref 11.6–15.1)
GFR SERPL CREATININE-BSD FRML MDRD: 104 ML/MIN/1.73SQ M
GLUCOSE SERPL-MCNC: 116 MG/DL (ref 65–140)
HCT VFR BLD AUTO: 44.5 % (ref 36.5–49.3)
HGB BLD-MCNC: 15.7 G/DL (ref 12–17)
IMM GRANULOCYTES # BLD AUTO: 0.05 THOUSAND/UL (ref 0–0.2)
IMM GRANULOCYTES NFR BLD AUTO: 0 % (ref 0–2)
LYMPHOCYTES # BLD AUTO: 1.41 THOUSANDS/ΜL (ref 0.6–4.47)
LYMPHOCYTES NFR BLD AUTO: 12 % (ref 14–44)
MCH RBC QN AUTO: 28 PG (ref 26.8–34.3)
MCHC RBC AUTO-ENTMCNC: 35.3 G/DL (ref 31.4–37.4)
MCV RBC AUTO: 79 FL (ref 82–98)
MONOCYTES # BLD AUTO: 0.8 THOUSAND/ΜL (ref 0.17–1.22)
MONOCYTES NFR BLD AUTO: 7 % (ref 4–12)
NEUTROPHILS # BLD AUTO: 9.35 THOUSANDS/ΜL (ref 1.85–7.62)
NEUTS SEG NFR BLD AUTO: 81 % (ref 43–75)
NRBC BLD AUTO-RTO: 0 /100 WBCS
PLATELET # BLD AUTO: 337 THOUSANDS/UL (ref 149–390)
PMV BLD AUTO: 10.2 FL (ref 8.9–12.7)
POTASSIUM SERPL-SCNC: 3.2 MMOL/L (ref 3.5–5.3)
RBC # BLD AUTO: 5.61 MILLION/UL (ref 3.88–5.62)
SODIUM SERPL-SCNC: 135 MMOL/L (ref 136–145)
WBC # BLD AUTO: 11.64 THOUSAND/UL (ref 4.31–10.16)

## 2020-11-09 PROCEDURE — 85025 COMPLETE CBC W/AUTO DIFF WBC: CPT | Performed by: EMERGENCY MEDICINE

## 2020-11-09 PROCEDURE — 96375 TX/PRO/DX INJ NEW DRUG ADDON: CPT

## 2020-11-09 PROCEDURE — 96374 THER/PROPH/DIAG INJ IV PUSH: CPT

## 2020-11-09 PROCEDURE — 99284 EMERGENCY DEPT VISIT MOD MDM: CPT | Performed by: EMERGENCY MEDICINE

## 2020-11-09 PROCEDURE — 99284 EMERGENCY DEPT VISIT MOD MDM: CPT

## 2020-11-09 PROCEDURE — 80048 BASIC METABOLIC PNL TOTAL CA: CPT | Performed by: EMERGENCY MEDICINE

## 2020-11-09 PROCEDURE — 96361 HYDRATE IV INFUSION ADD-ON: CPT

## 2020-11-09 PROCEDURE — 36415 COLL VENOUS BLD VENIPUNCTURE: CPT | Performed by: EMERGENCY MEDICINE

## 2020-11-09 RX ORDER — ONDANSETRON 4 MG/1
4 TABLET, ORALLY DISINTEGRATING ORAL EVERY 6 HOURS PRN
Qty: 20 TABLET | Refills: 0 | Status: SHIPPED | OUTPATIENT
Start: 2020-11-09

## 2020-11-09 RX ORDER — LORAZEPAM 2 MG/ML
1 INJECTION INTRAMUSCULAR ONCE
Status: COMPLETED | OUTPATIENT
Start: 2020-11-09 | End: 2020-11-09

## 2020-11-09 RX ORDER — QUETIAPINE FUMARATE 50 MG/1
50 TABLET, FILM COATED ORAL DAILY
COMMUNITY
End: 2021-01-27 | Stop reason: SDUPTHER

## 2020-11-09 RX ORDER — POTASSIUM CHLORIDE 20 MEQ/1
40 TABLET, EXTENDED RELEASE ORAL ONCE
Status: COMPLETED | OUTPATIENT
Start: 2020-11-09 | End: 2020-11-09

## 2020-11-09 RX ORDER — ONDANSETRON 2 MG/ML
4 INJECTION INTRAMUSCULAR; INTRAVENOUS ONCE
Status: COMPLETED | OUTPATIENT
Start: 2020-11-09 | End: 2020-11-09

## 2020-11-09 RX ADMIN — SODIUM CHLORIDE 1000 ML: 0.9 INJECTION, SOLUTION INTRAVENOUS at 17:19

## 2020-11-09 RX ADMIN — POTASSIUM CHLORIDE 40 MEQ: 1500 TABLET, EXTENDED RELEASE ORAL at 18:12

## 2020-11-09 RX ADMIN — LORAZEPAM 1 MG: 2 INJECTION INTRAMUSCULAR; INTRAVENOUS at 17:28

## 2020-11-09 RX ADMIN — ONDANSETRON 4 MG: 2 INJECTION INTRAMUSCULAR; INTRAVENOUS at 17:25

## 2020-11-18 ENCOUNTER — TELEMEDICINE (OUTPATIENT)
Dept: FAMILY MEDICINE CLINIC | Facility: CLINIC | Age: 30
End: 2020-11-18
Payer: COMMERCIAL

## 2020-11-18 DIAGNOSIS — F41.0 PANIC DISORDER (EPISODIC PAROXYSMAL ANXIETY): ICD-10-CM

## 2020-11-18 DIAGNOSIS — F33.41 RECURRENT MAJOR DEPRESSIVE DISORDER, IN PARTIAL REMISSION (HCC): ICD-10-CM

## 2020-11-18 DIAGNOSIS — F11.21 OPIOID DEPENDENCE IN REMISSION (HCC): ICD-10-CM

## 2020-11-18 DIAGNOSIS — F31.4 BIPOLAR 1 DISORDER, DEPRESSED, SEVERE (HCC): Primary | ICD-10-CM

## 2020-11-18 PROCEDURE — 99443 PR PHYS/QHP TELEPHONE EVALUATION 21-30 MIN: CPT | Performed by: FAMILY MEDICINE

## 2020-12-21 DIAGNOSIS — F33.41 RECURRENT MAJOR DEPRESSIVE DISORDER, IN PARTIAL REMISSION (HCC): ICD-10-CM

## 2020-12-22 RX ORDER — VENLAFAXINE HYDROCHLORIDE 75 MG/1
CAPSULE, EXTENDED RELEASE ORAL
Qty: 90 CAPSULE | Refills: 0 | Status: SHIPPED | OUTPATIENT
Start: 2020-12-22 | End: 2021-03-31 | Stop reason: SDUPTHER

## 2020-12-23 ENCOUNTER — HOSPITAL ENCOUNTER (EMERGENCY)
Facility: HOSPITAL | Age: 30
Discharge: HOME | End: 2020-12-23
Attending: EMERGENCY MEDICINE | Admitting: EMERGENCY MEDICINE
Payer: COMMERCIAL

## 2020-12-23 VITALS
OXYGEN SATURATION: 99 % | DIASTOLIC BLOOD PRESSURE: 89 MMHG | TEMPERATURE: 97.7 F | RESPIRATION RATE: 18 BRPM | HEART RATE: 58 BPM | SYSTOLIC BLOOD PRESSURE: 151 MMHG

## 2020-12-23 DIAGNOSIS — R11.10 VOMITING: ICD-10-CM

## 2020-12-23 DIAGNOSIS — F41.9 ANXIETY: Primary | ICD-10-CM

## 2020-12-23 LAB
ANION GAP SERPL CALCULATED.3IONS-SCNC: 13 MMOL/L (ref 4–13)
BUN SERPL-MCNC: 17 MG/DL (ref 5–25)
CALCIUM SERPL-MCNC: 9.4 MG/DL (ref 8.3–10.1)
CHLORIDE SERPL-SCNC: 102 MMOL/L (ref 100–108)
CO2 SERPL-SCNC: 25 MMOL/L (ref 21–32)
CREAT SERPL-MCNC: 0.93 MG/DL (ref 0.6–1.3)
GFR SERPL CREATININE-BSD FRML MDRD: 111 ML/MIN/1.73SQ M
GLUCOSE SERPL-MCNC: 182 MG/DL (ref 65–140)
POTASSIUM SERPL-SCNC: 5 MMOL/L (ref 3.5–5.3)
SODIUM SERPL-SCNC: 140 MMOL/L (ref 136–145)

## 2020-12-23 PROCEDURE — 99284 EMERGENCY DEPT VISIT MOD MDM: CPT | Performed by: PHYSICIAN ASSISTANT

## 2020-12-23 PROCEDURE — 80048 BASIC METABOLIC PNL TOTAL CA: CPT | Performed by: PHYSICIAN ASSISTANT

## 2020-12-23 PROCEDURE — 96375 TX/PRO/DX INJ NEW DRUG ADDON: CPT

## 2020-12-23 PROCEDURE — 96361 HYDRATE IV INFUSION ADD-ON: CPT

## 2020-12-23 PROCEDURE — 99283 EMERGENCY DEPT VISIT LOW MDM: CPT

## 2020-12-23 PROCEDURE — C9113 INJ PANTOPRAZOLE SODIUM, VIA: HCPCS | Performed by: PHYSICIAN ASSISTANT

## 2020-12-23 PROCEDURE — 36415 COLL VENOUS BLD VENIPUNCTURE: CPT | Performed by: PHYSICIAN ASSISTANT

## 2020-12-23 PROCEDURE — 96376 TX/PRO/DX INJ SAME DRUG ADON: CPT

## 2020-12-23 PROCEDURE — 96374 THER/PROPH/DIAG INJ IV PUSH: CPT

## 2020-12-23 RX ORDER — LORAZEPAM 2 MG/ML
1 INJECTION INTRAMUSCULAR ONCE
Status: COMPLETED | OUTPATIENT
Start: 2020-12-23 | End: 2020-12-23

## 2020-12-23 RX ORDER — DIPHENHYDRAMINE HYDROCHLORIDE 50 MG/ML
50 INJECTION INTRAMUSCULAR; INTRAVENOUS ONCE
Status: COMPLETED | OUTPATIENT
Start: 2020-12-23 | End: 2020-12-23

## 2020-12-23 RX ORDER — LORAZEPAM 2 MG/ML
0.5 INJECTION INTRAMUSCULAR ONCE
Status: COMPLETED | OUTPATIENT
Start: 2020-12-23 | End: 2020-12-23

## 2020-12-23 RX ORDER — PANTOPRAZOLE SODIUM 40 MG/1
40 INJECTION, POWDER, FOR SOLUTION INTRAVENOUS ONCE
Status: COMPLETED | OUTPATIENT
Start: 2020-12-23 | End: 2020-12-23

## 2020-12-23 RX ORDER — ONDANSETRON 4 MG/1
4 TABLET, ORALLY DISINTEGRATING ORAL EVERY 6 HOURS PRN
Qty: 20 TABLET | Refills: 0 | Status: SHIPPED | OUTPATIENT
Start: 2020-12-23 | End: 2021-01-27 | Stop reason: SDUPTHER

## 2020-12-23 RX ADMIN — DIPHENHYDRAMINE HYDROCHLORIDE 50 MG: 50 INJECTION, SOLUTION INTRAMUSCULAR; INTRAVENOUS at 08:39

## 2020-12-23 RX ADMIN — SODIUM CHLORIDE 1000 ML: 0.9 INJECTION, SOLUTION INTRAVENOUS at 07:30

## 2020-12-23 RX ADMIN — LORAZEPAM 0.5 MG: 2 INJECTION INTRAMUSCULAR; INTRAVENOUS at 10:35

## 2020-12-23 RX ADMIN — PANTOPRAZOLE SODIUM 40 MG: 40 INJECTION, POWDER, FOR SOLUTION INTRAVENOUS at 07:30

## 2020-12-23 RX ADMIN — LORAZEPAM 1 MG: 2 INJECTION INTRAMUSCULAR; INTRAVENOUS at 07:30

## 2021-01-05 DIAGNOSIS — F31.4 BIPOLAR 1 DISORDER, DEPRESSED, SEVERE (HCC): ICD-10-CM

## 2021-01-06 RX ORDER — OXCARBAZEPINE 300 MG/1
TABLET, FILM COATED ORAL
Qty: 180 TABLET | Refills: 1 | Status: SHIPPED | OUTPATIENT
Start: 2021-01-06 | End: 2021-05-04

## 2021-01-27 ENCOUNTER — OFFICE VISIT (OUTPATIENT)
Dept: FAMILY MEDICINE CLINIC | Facility: CLINIC | Age: 31
End: 2021-01-27
Payer: COMMERCIAL

## 2021-01-27 VITALS
OXYGEN SATURATION: 98 % | TEMPERATURE: 97.5 F | RESPIRATION RATE: 20 BRPM | BODY MASS INDEX: 36.57 KG/M2 | WEIGHT: 270 LBS | HEIGHT: 72 IN | SYSTOLIC BLOOD PRESSURE: 131 MMHG | DIASTOLIC BLOOD PRESSURE: 90 MMHG | HEART RATE: 63 BPM

## 2021-01-27 DIAGNOSIS — K21.9 GASTROESOPHAGEAL REFLUX DISEASE WITHOUT ESOPHAGITIS: ICD-10-CM

## 2021-01-27 DIAGNOSIS — Z63.4 GRIEF AT LOSS OF CHILD: ICD-10-CM

## 2021-01-27 DIAGNOSIS — F43.21 GRIEF AT LOSS OF CHILD: ICD-10-CM

## 2021-01-27 DIAGNOSIS — F32.2 CURRENT SEVERE EPISODE OF MAJOR DEPRESSIVE DISORDER WITHOUT PSYCHOTIC FEATURES, UNSPECIFIED WHETHER RECURRENT (HCC): Primary | ICD-10-CM

## 2021-01-27 PROCEDURE — 99213 OFFICE O/P EST LOW 20 MIN: CPT | Performed by: FAMILY MEDICINE

## 2021-01-27 RX ORDER — OMEPRAZOLE 20 MG/1
CAPSULE, DELAYED RELEASE ORAL
Qty: 90 CAPSULE | Refills: 1 | Status: SHIPPED | OUTPATIENT
Start: 2021-01-27 | End: 2021-07-25

## 2021-01-27 SDOH — SOCIAL STABILITY - SOCIAL INSECURITY: DISSAPEARANCE AND DEATH OF FAMILY MEMBER: Z63.4

## 2021-01-27 NOTE — PROGRESS NOTES
Standard Visit   Assessment/Plan:     Visit Diagnosis:  Diagnoses and all orders for this visit:    Current severe episode of major depressive disorder without psychotic features, unspecified whether recurrent ([address] )    Grief at loss of child       FMLA PAPERS COMPLETED    Subjective:    Patient ID: Radha Sher is a 28 y o  male  Patient is here with the following concerns:    HODAN HYDE IS HERE TO HAVE FMLA PAPERS COMPLETED NEXT NOT HIS WIFE IS PREGNANT WITH THEIR 2ND CHILD  FIRST CHILD WAS STILLBORN  THIS IS A HIGH RISK PREGNANCY  HE NEEDS HIS FMLA TO BE ABLE TO GO TO THE VISIT AND BE THERE AFTER DELIVERY  The following portions of the patient's history were reviewed and updated as appropriate: allergies, current medications, past family history, past medical history, past social history, past surgical history and problem list     Review of Systems   Constitutional: Negative for activity change and fever  HENT: Negative for nosebleeds and trouble swallowing  Eyes: Negative  Respiratory: Negative  Cardiovascular: Negative for palpitations  Gastrointestinal: Negative for abdominal pain, blood in stool and vomiting  Endocrine: Negative for cold intolerance  Genitourinary: Negative  Musculoskeletal: Negative for neck stiffness  Skin: Negative for pallor  Allergic/Immunologic: Negative for immunocompromised state  Neurological: Negative for seizures and facial asymmetry  Hematological: Negative for adenopathy  Psychiatric/Behavioral: Negative for agitation and suicidal ideas           /90 (BP Location: Left arm, Patient Position: Sitting, Cuff Size: Adult)   Pulse 63   Temp 97 5 °F (36 4 °C)   Resp 20   Ht 6' (1 829 m)   Wt 122 kg (270 lb)   SpO2 98%   BMI 36 62 kg/m²   Social History     Socioeconomic History    Marital status: Single     Spouse name: Not on file    Number of children: Not on file    Years of education: Not on file    Highest education level: Not on file   Occupational History    Occupation: fulltime   Social Needs    Financial resource strain: Not on file    Food insecurity     Worry: Not on file     Inability: Not on file   [employer] needs     Medical: Not on file     Non-medical: Not on file   Tobacco Use    Smoking status: Former Smoker     Quit date: 3/30/2015     Years since quittin 8    Smokeless tobacco: Never Used   Substance and Sexual Activity    Alcohol use: Not Currently     Comment: at social events    Drug use: Yes     Frequency: 2 0 times per week     Types: Marijuana     Comment: Pt reports using THC daily x3, Heroin every other day and will alternate between using pills  Pt snorts heroin    Sexual activity: Yes     Partners: Female   Lifestyle    Physical activity     Days per week: Not on file     Minutes per session: Not on file    Stress: Not on file   Relationships    Social connections     Talks on phone: Not on file     Gets together: Not on file     Attends Christian service: Not on file     Active member of club or organization: Not on file     Attends meetings of clubs or organizations: Not on file     Relationship status: Not on file    Intimate partner violence     Fear of current or ex partner: Not on file     Emotionally abused: Not on file     Physically abused: Not on file     Forced sexual activity: Not on file   Other Topics Concern    Not on file   Social History Narrative    Not on file     Past Medical History:   Diagnosis Date    ADHD     Anxiety     Bipolar disorder ([facility] [address] )     Depression     Drug use     Essential hypertension     benign    Hyperlipidemia     Peptic reflux disease      Family History   Problem Relation Age of Onset    Mental illness Mother     Thyroid disease Mother     Drug abuse Mother         illicit drug    Diabetes Father     Cancer Father      History reviewed  No pertinent surgical history      Current Outpatient Medications:     buprenorphine-naloxone (SUBOXONE) 8-2 mg per SL tablet, Place 1 tablet under the tongue daily, Disp: , Rfl:     melatonin 3 mg, Take 1 tablet (3 mg total) by mouth daily at bedtime, Disp: 30 tablet, Rfl: 0    omeprazole (PriLOSEC) 20 mg delayed release capsule, TAKE 1 CAPSULE BY MOUTH EVERY DAY, Disp: 90 capsule, Rfl: 1    ondansetron (ZOFRAN-ODT) 4 mg disintegrating tablet, Take 1 tablet (4 mg total) by mouth every 6 (six) hours as needed for nausea or vomiting, Disp: 20 tablet, Rfl: 0    OXcarbazepine (TRILEPTAL) 300 mg tablet, TAKE 1 TABLET BY MOUTH TWICE A DAY, Disp: 180 tablet, Rfl: 1    QUEtiapine (SEROquel) 300 mg tablet, Take 1 tablet (300 mg total) by mouth daily at bedtime, Disp: 90 tablet, Rfl: 1    QUEtiapine (SEROquel) 50 mg tablet, Take 1 pill daily in the morning, Disp: 90 tablet, Rfl: 1    venlafaxine (EFFEXOR-XR) 75 mg 24 hr capsule, TAKE 1 CAPSULE BY MOUTH EVERY DAY, Disp: 90 capsule, Rfl: 0    Lab Review   Admission on 12/23/2020, Discharged on 12/23/2020   Component Date Value    Sodium 12/23/2020 140     Potassium 12/23/2020 5 0     Chloride 12/23/2020 102     CO2 12/23/2020 25     ANION GAP 12/23/2020 13     BUN 12/23/2020 17     Creatinine 12/23/2020 0 93     Glucose 12/23/2020 182*    Calcium 12/23/2020 9 4     eGFR 12/23/2020 111    Admission on 11/09/2020, Discharged on 11/09/2020   Component Date Value    WBC 11/09/2020 11 64*    RBC 11/09/2020 5 61     Hemoglobin 11/09/2020 15 7     Hematocrit 11/09/2020 44 5     MCV 11/09/2020 79*    MCH 11/09/2020 28 0     MCHC 11/09/2020 35 3     RDW 11/09/2020 12 4     MPV 11/09/2020 10 2     Platelets 30/57/0507 337     nRBC 11/09/2020 0     Neutrophils Relative 11/09/2020 81*    Immat GRANS % 11/09/2020 0     Lymphocytes Relative 11/09/2020 12*    Monocytes Relative 11/09/2020 7     Eosinophils Relative 11/09/2020 0     Basophils Relative 11/09/2020 0     Neutrophils Absolute 11/09/2020 9 35*    Immature Grans Absolute 11/09/2020 0 05  Lymphocytes Absolute 11/09/2020 1 41     Monocytes Absolute 11/09/2020 0 80     Eosinophils Absolute 11/09/2020 0 01     Basophils Absolute 11/09/2020 0 02     Sodium 11/09/2020 135*    Potassium 11/09/2020 3 2*    Chloride 11/09/2020 96*    CO2 11/09/2020 28     ANION GAP 11/09/2020 11     BUN 11/09/2020 12     Creatinine 11/09/2020 0 98     Glucose 11/09/2020 116     Calcium 11/09/2020 9 2     eGFR 11/09/2020 104    Admission on 11/07/2020, Discharged on 11/07/2020   Component Date Value    WBC 11/07/2020 12 78*    RBC 11/07/2020 6 01*    Hemoglobin 11/07/2020 16 8     Hematocrit 11/07/2020 48 1     MCV 11/07/2020 80*    MCH 11/07/2020 28 0     MCHC 11/07/2020 34 9     RDW 11/07/2020 12 8     MPV 11/07/2020 10 1     Platelets 33/25/0346 337     nRBC 11/07/2020 0     Neutrophils Relative 11/07/2020 71     Immat GRANS % 11/07/2020 1     Lymphocytes Relative 11/07/2020 16     Monocytes Relative 11/07/2020 12     Eosinophils Relative 11/07/2020 0     Basophils Relative 11/07/2020 0     Neutrophils Absolute 11/07/2020 9 14*    Immature Grans Absolute 11/07/2020 0 07     Lymphocytes Absolute 11/07/2020 2 04     Monocytes Absolute 11/07/2020 1 50*    Eosinophils Absolute 11/07/2020 0 01     Basophils Absolute 11/07/2020 0 02     Sodium 11/07/2020 133*    Potassium 11/07/2020 3 1*    Chloride 11/07/2020 91*    CO2 11/07/2020 31     ANION GAP 11/07/2020 11     BUN 11/07/2020 17     Creatinine 11/07/2020 1 20     Glucose 11/07/2020 116     Calcium 11/07/2020 9 8     eGFR 11/07/2020 81         Objective:     Physical Exam  Vitals signs and nursing note reviewed  Constitutional:       Appearance: He is well-developed  HENT:      Head: Normocephalic and atraumatic  Eyes:      Pupils: Pupils are equal, round, and reactive to light  Neck:      Musculoskeletal: Normal range of motion and neck supple  Cardiovascular:      Rate and Rhythm: Normal rate     Pulmonary: Effort: Pulmonary effort is normal       Breath sounds: Normal breath sounds  Abdominal:      Palpations: Abdomen is soft  Musculoskeletal: Normal range of motion  Skin:     General: Skin is warm and dry  Neurological:      Mental Status: He is alert and oriented to person, place, and time  There are no Patient Instructions on file for this visit          Muinr PETER Liu

## 2021-03-31 DIAGNOSIS — F33.41 RECURRENT MAJOR DEPRESSIVE DISORDER, IN PARTIAL REMISSION (HCC): ICD-10-CM

## 2021-03-31 RX ORDER — VENLAFAXINE HYDROCHLORIDE 75 MG/1
CAPSULE, EXTENDED RELEASE ORAL
Qty: 90 CAPSULE | Refills: 0 | Status: SHIPPED | OUTPATIENT
Start: 2021-03-31 | End: 2021-05-04

## 2021-04-30 DIAGNOSIS — F31.4 BIPOLAR 1 DISORDER, DEPRESSED, SEVERE (HCC): ICD-10-CM

## 2021-04-30 DIAGNOSIS — F33.41 RECURRENT MAJOR DEPRESSIVE DISORDER, IN PARTIAL REMISSION (HCC): ICD-10-CM

## 2021-05-04 RX ORDER — VENLAFAXINE HYDROCHLORIDE 75 MG/1
CAPSULE, EXTENDED RELEASE ORAL
Qty: 90 CAPSULE | Refills: 0 | Status: SHIPPED | OUTPATIENT
Start: 2021-05-04 | End: 2021-06-24

## 2021-05-04 RX ORDER — OXCARBAZEPINE 300 MG/1
TABLET, FILM COATED ORAL
Qty: 90 TABLET | Refills: 0 | Status: SHIPPED | OUTPATIENT
Start: 2021-05-04

## 2021-06-23 DIAGNOSIS — F33.41 RECURRENT MAJOR DEPRESSIVE DISORDER, IN PARTIAL REMISSION (HCC): ICD-10-CM

## 2021-06-24 RX ORDER — VENLAFAXINE HYDROCHLORIDE 75 MG/1
CAPSULE, EXTENDED RELEASE ORAL
Qty: 90 CAPSULE | Refills: 0 | Status: SHIPPED | OUTPATIENT
Start: 2021-06-24

## 2021-07-25 DIAGNOSIS — K21.9 GASTROESOPHAGEAL REFLUX DISEASE WITHOUT ESOPHAGITIS: ICD-10-CM

## 2021-07-25 RX ORDER — OMEPRAZOLE 20 MG/1
CAPSULE, DELAYED RELEASE ORAL
Qty: 90 CAPSULE | Refills: 1 | Status: SHIPPED | OUTPATIENT
Start: 2021-07-25

## 2021-08-24 DIAGNOSIS — F31.4 BIPOLAR 1 DISORDER, DEPRESSED, SEVERE (HCC): ICD-10-CM

## 2021-08-24 RX ORDER — QUETIAPINE FUMARATE 50 MG/1
TABLET, FILM COATED ORAL
Qty: 90 TABLET | Refills: 1 | Status: SHIPPED | OUTPATIENT
Start: 2021-08-24

## 2022-10-20 ENCOUNTER — APPOINTMENT (EMERGENCY)
Dept: RADIOLOGY | Facility: HOSPITAL | Age: 32
End: 2022-10-20
Payer: OTHER MISCELLANEOUS

## 2022-10-20 ENCOUNTER — HOSPITAL ENCOUNTER (EMERGENCY)
Facility: HOSPITAL | Age: 32
End: 2022-10-20
Attending: EMERGENCY MEDICINE
Payer: OTHER MISCELLANEOUS

## 2022-10-20 ENCOUNTER — HOSPITAL ENCOUNTER (OUTPATIENT)
Facility: HOSPITAL | Age: 32
Setting detail: OUTPATIENT SURGERY
Discharge: HOME | End: 2022-10-22
Attending: SURGERY | Admitting: SURGERY
Payer: OTHER MISCELLANEOUS

## 2022-10-20 VITALS
HEART RATE: 92 BPM | SYSTOLIC BLOOD PRESSURE: 124 MMHG | DIASTOLIC BLOOD PRESSURE: 69 MMHG | TEMPERATURE: 98.9 F | RESPIRATION RATE: 18 BRPM | OXYGEN SATURATION: 96 %

## 2022-10-20 DIAGNOSIS — S62.614A FRACTURE OF PROXIMAL PHALANX OF RIGHT RING FINGER: Primary | ICD-10-CM

## 2022-10-20 DIAGNOSIS — S62.614B OPEN DISPLACED FRACTURE OF PROXIMAL PHALANX OF RIGHT RING FINGER, INITIAL ENCOUNTER: Primary | ICD-10-CM

## 2022-10-20 PROCEDURE — 99284 EMERGENCY DEPT VISIT MOD MDM: CPT | Performed by: PHYSICIAN ASSISTANT

## 2022-10-20 PROCEDURE — 73130 X-RAY EXAM OF HAND: CPT

## 2022-10-20 PROCEDURE — 90471 IMMUNIZATION ADMIN: CPT

## 2022-10-20 PROCEDURE — 99284 EMERGENCY DEPT VISIT MOD MDM: CPT

## 2022-10-20 PROCEDURE — 96365 THER/PROPH/DIAG IV INF INIT: CPT

## 2022-10-20 PROCEDURE — 90715 TDAP VACCINE 7 YRS/> IM: CPT

## 2022-10-20 PROCEDURE — 96375 TX/PRO/DX INJ NEW DRUG ADDON: CPT

## 2022-10-20 PROCEDURE — 99285 EMERGENCY DEPT VISIT HI MDM: CPT

## 2022-10-20 PROCEDURE — 64450 NJX AA&/STRD OTHER PN/BRANCH: CPT | Performed by: PHYSICIAN ASSISTANT

## 2022-10-20 RX ORDER — HYDROMORPHONE HCL/PF 1 MG/ML
1 SYRINGE (ML) INJECTION ONCE
Status: COMPLETED | OUTPATIENT
Start: 2022-10-20 | End: 2022-10-20

## 2022-10-20 RX ORDER — LIDOCAINE HYDROCHLORIDE 10 MG/ML
INJECTION, SOLUTION EPIDURAL; INFILTRATION; INTRACAUDAL; PERINEURAL
Status: COMPLETED
Start: 2022-10-20 | End: 2022-10-20

## 2022-10-20 RX ORDER — BUPIVACAINE HYDROCHLORIDE 2.5 MG/ML
10 INJECTION, SOLUTION EPIDURAL; INFILTRATION; INTRACAUDAL ONCE
Status: COMPLETED | OUTPATIENT
Start: 2022-10-20 | End: 2022-10-20

## 2022-10-20 RX ORDER — LORAZEPAM 2 MG/ML
0.5 INJECTION INTRAMUSCULAR ONCE
Status: COMPLETED | OUTPATIENT
Start: 2022-10-20 | End: 2022-10-20

## 2022-10-20 RX ORDER — CEFAZOLIN SODIUM 2 G/50ML
2000 SOLUTION INTRAVENOUS ONCE
Status: COMPLETED | OUTPATIENT
Start: 2022-10-20 | End: 2022-10-20

## 2022-10-20 RX ORDER — LIDOCAINE HYDROCHLORIDE 10 MG/ML
20 INJECTION, SOLUTION EPIDURAL; INFILTRATION; INTRACAUDAL; PERINEURAL ONCE
Status: COMPLETED | OUTPATIENT
Start: 2022-10-20 | End: 2022-10-20

## 2022-10-20 RX ADMIN — HYDROMORPHONE HYDROCHLORIDE 1 MG: 1 INJECTION, SOLUTION INTRAMUSCULAR; INTRAVENOUS; SUBCUTANEOUS at 23:36

## 2022-10-20 RX ADMIN — LORAZEPAM 0.5 MG: 2 INJECTION INTRAMUSCULAR; INTRAVENOUS at 23:36

## 2022-10-20 RX ADMIN — LIDOCAINE HYDROCHLORIDE 300 MG: 10 INJECTION, SOLUTION EPIDURAL; INFILTRATION; INTRACAUDAL; PERINEURAL at 19:20

## 2022-10-20 RX ADMIN — CEFAZOLIN SODIUM 2000 MG: 2 SOLUTION INTRAVENOUS at 22:23

## 2022-10-20 RX ADMIN — LIDOCAINE HYDROCHLORIDE 20 ML: 10 INJECTION, SOLUTION EPIDURAL; INFILTRATION; INTRACAUDAL; PERINEURAL at 22:48

## 2022-10-20 RX ADMIN — TETANUS TOXOID, REDUCED DIPHTHERIA TOXOID AND ACELLULAR PERTUSSIS VACCINE, ADSORBED 0.5 ML: 5; 2.5; 8; 8; 2.5 SUSPENSION INTRAMUSCULAR at 23:36

## 2022-10-20 RX ADMIN — BUPIVACAINE HYDROCHLORIDE 10 ML: 2.5 INJECTION, SOLUTION EPIDURAL; INFILTRATION; INTRACAUDAL; PERINEURAL at 22:48

## 2022-10-21 ENCOUNTER — ANESTHESIA EVENT (OUTPATIENT)
Dept: PERIOP | Facility: HOSPITAL | Age: 32
End: 2022-10-21
Payer: OTHER MISCELLANEOUS

## 2022-10-21 ENCOUNTER — ANESTHESIA (OUTPATIENT)
Dept: PERIOP | Facility: HOSPITAL | Age: 32
End: 2022-10-21
Payer: OTHER MISCELLANEOUS

## 2022-10-21 ENCOUNTER — APPOINTMENT (OUTPATIENT)
Dept: RADIOLOGY | Facility: HOSPITAL | Age: 32
End: 2022-10-21
Payer: OTHER MISCELLANEOUS

## 2022-10-21 ENCOUNTER — APPOINTMENT (EMERGENCY)
Dept: RADIOLOGY | Facility: HOSPITAL | Age: 32
End: 2022-10-21
Payer: OTHER MISCELLANEOUS

## 2022-10-21 PROBLEM — S69.90XA FINGER INJURY, INITIAL ENCOUNTER: Status: ACTIVE | Noted: 2022-10-21

## 2022-10-21 LAB
ABO GROUP BLD: NORMAL
ANION GAP SERPL CALCULATED.3IONS-SCNC: 3 MMOL/L (ref 4–13)
APTT PPP: 25 SECONDS (ref 23–37)
BLD GP AB SCN SERPL QL: NEGATIVE
BUN SERPL-MCNC: 13 MG/DL (ref 5–25)
CALCIUM SERPL-MCNC: 8.6 MG/DL (ref 8.3–10.1)
CHLORIDE SERPL-SCNC: 106 MMOL/L (ref 96–108)
CO2 SERPL-SCNC: 27 MMOL/L (ref 21–32)
CREAT SERPL-MCNC: 0.75 MG/DL (ref 0.6–1.3)
ERYTHROCYTE [DISTWIDTH] IN BLOOD BY AUTOMATED COUNT: 13.3 % (ref 11.6–15.1)
GFR SERPL CREATININE-BSD FRML MDRD: 122 ML/MIN/1.73SQ M
GLUCOSE SERPL-MCNC: 107 MG/DL (ref 65–140)
HCT VFR BLD AUTO: 36.1 % (ref 36.5–49.3)
HGB BLD-MCNC: 12.2 G/DL (ref 12–17)
INR PPP: 0.84 (ref 0.84–1.19)
MCH RBC QN AUTO: 27.7 PG (ref 26.8–34.3)
MCHC RBC AUTO-ENTMCNC: 33.8 G/DL (ref 31.4–37.4)
MCV RBC AUTO: 82 FL (ref 82–98)
PLATELET # BLD AUTO: 272 THOUSANDS/UL (ref 149–390)
PMV BLD AUTO: 10 FL (ref 8.9–12.7)
POTASSIUM SERPL-SCNC: 3.9 MMOL/L (ref 3.5–5.3)
PROTHROMBIN TIME: 11.7 SECONDS (ref 11.6–14.5)
RBC # BLD AUTO: 4.41 MILLION/UL (ref 3.88–5.62)
RH BLD: POSITIVE
SODIUM SERPL-SCNC: 136 MMOL/L (ref 135–147)
SPECIMEN EXPIRATION DATE: NORMAL
WBC # BLD AUTO: 10.32 THOUSAND/UL (ref 4.31–10.16)

## 2022-10-21 PROCEDURE — 85610 PROTHROMBIN TIME: CPT

## 2022-10-21 PROCEDURE — 26418 REPAIR FINGER TENDON: CPT | Performed by: SURGERY

## 2022-10-21 PROCEDURE — C1713 ANCHOR/SCREW BN/BN,TIS/BN: HCPCS | Performed by: SURGERY

## 2022-10-21 PROCEDURE — 86901 BLOOD TYPING SEROLOGIC RH(D): CPT

## 2022-10-21 PROCEDURE — 96375 TX/PRO/DX INJ NEW DRUG ADDON: CPT

## 2022-10-21 PROCEDURE — 85730 THROMBOPLASTIN TIME PARTIAL: CPT

## 2022-10-21 PROCEDURE — 85027 COMPLETE CBC AUTOMATED: CPT

## 2022-10-21 PROCEDURE — 73140 X-RAY EXAM OF FINGER(S): CPT

## 2022-10-21 PROCEDURE — 73130 X-RAY EXAM OF HAND: CPT

## 2022-10-21 PROCEDURE — 99220 PR INITIAL OBSERVATION CARE/DAY 70 MINUTES: CPT | Performed by: SURGERY

## 2022-10-21 PROCEDURE — 86900 BLOOD TYPING SEROLOGIC ABO: CPT

## 2022-10-21 PROCEDURE — 96376 TX/PRO/DX INJ SAME DRUG ADON: CPT

## 2022-10-21 PROCEDURE — 26735 TREAT FINGER FRACTURE EACH: CPT | Performed by: SURGERY

## 2022-10-21 PROCEDURE — NC001 PR NO CHARGE: Performed by: SURGERY

## 2022-10-21 PROCEDURE — 11012 DEB SKIN BONE AT FX SITE: CPT | Performed by: SURGERY

## 2022-10-21 PROCEDURE — 86850 RBC ANTIBODY SCREEN: CPT

## 2022-10-21 PROCEDURE — 80048 BASIC METABOLIC PNL TOTAL CA: CPT

## 2022-10-21 PROCEDURE — 36415 COLL VENOUS BLD VENIPUNCTURE: CPT

## 2022-10-21 RX ORDER — MAGNESIUM HYDROXIDE/ALUMINUM HYDROXICE/SIMETHICONE 120; 1200; 1200 MG/30ML; MG/30ML; MG/30ML
30 SUSPENSION ORAL EVERY 6 HOURS PRN
Status: DISCONTINUED | OUTPATIENT
Start: 2022-10-21 | End: 2022-10-22 | Stop reason: HOSPADM

## 2022-10-21 RX ORDER — ONDANSETRON 4 MG/1
4 TABLET, ORALLY DISINTEGRATING ORAL EVERY 6 HOURS PRN
Status: DISCONTINUED | OUTPATIENT
Start: 2022-10-21 | End: 2022-10-22 | Stop reason: HOSPADM

## 2022-10-21 RX ORDER — SODIUM CHLORIDE, SODIUM LACTATE, POTASSIUM CHLORIDE, CALCIUM CHLORIDE 600; 310; 30; 20 MG/100ML; MG/100ML; MG/100ML; MG/100ML
100 INJECTION, SOLUTION INTRAVENOUS CONTINUOUS
Status: CANCELLED | OUTPATIENT
Start: 2022-10-21

## 2022-10-21 RX ORDER — CHLORHEXIDINE GLUCONATE 0.12 MG/ML
15 RINSE ORAL ONCE
OUTPATIENT
Start: 2022-10-21 | End: 2022-10-21

## 2022-10-21 RX ORDER — GLYCOPYRROLATE 0.2 MG/ML
INJECTION INTRAMUSCULAR; INTRAVENOUS AS NEEDED
Status: DISCONTINUED | OUTPATIENT
Start: 2022-10-21 | End: 2022-10-21

## 2022-10-21 RX ORDER — SENNOSIDES 8.6 MG
1 TABLET ORAL DAILY
Status: DISCONTINUED | OUTPATIENT
Start: 2022-10-22 | End: 2022-10-22 | Stop reason: HOSPADM

## 2022-10-21 RX ORDER — PANTOPRAZOLE SODIUM 20 MG/1
20 TABLET, DELAYED RELEASE ORAL
Status: DISCONTINUED | OUTPATIENT
Start: 2022-10-21 | End: 2022-10-22 | Stop reason: HOSPADM

## 2022-10-21 RX ORDER — ONDANSETRON 2 MG/ML
4 INJECTION INTRAMUSCULAR; INTRAVENOUS ONCE AS NEEDED
Status: DISCONTINUED | OUTPATIENT
Start: 2022-10-21 | End: 2022-10-21 | Stop reason: HOSPADM

## 2022-10-21 RX ORDER — SODIUM CHLORIDE, SODIUM LACTATE, POTASSIUM CHLORIDE, CALCIUM CHLORIDE 600; 310; 30; 20 MG/100ML; MG/100ML; MG/100ML; MG/100ML
75 INJECTION, SOLUTION INTRAVENOUS CONTINUOUS
Status: DISCONTINUED | OUTPATIENT
Start: 2022-10-22 | End: 2022-10-22

## 2022-10-21 RX ORDER — VENLAFAXINE HYDROCHLORIDE 75 MG/1
75 CAPSULE, EXTENDED RELEASE ORAL DAILY
Status: DISCONTINUED | OUTPATIENT
Start: 2022-10-21 | End: 2022-10-22 | Stop reason: HOSPADM

## 2022-10-21 RX ORDER — BUPRENORPHINE AND NALOXONE 8; 2 MG/1; MG/1
8.2 FILM, SOLUBLE BUCCAL; SUBLINGUAL DAILY
COMMUNITY

## 2022-10-21 RX ORDER — FENTANYL CITRATE/PF 50 MCG/ML
25 SYRINGE (ML) INJECTION
Status: DISCONTINUED | OUTPATIENT
Start: 2022-10-21 | End: 2022-10-21 | Stop reason: HOSPADM

## 2022-10-21 RX ORDER — CEFAZOLIN SODIUM 2 G/50ML
2000 SOLUTION INTRAVENOUS EVERY 8 HOURS
Status: DISCONTINUED | OUTPATIENT
Start: 2022-10-21 | End: 2022-10-22 | Stop reason: HOSPADM

## 2022-10-21 RX ORDER — BUPRENORPHINE AND NALOXONE 8; 2 MG/1; MG/1
8 FILM, SOLUBLE BUCCAL; SUBLINGUAL DAILY
Status: DISCONTINUED | OUTPATIENT
Start: 2022-10-21 | End: 2022-10-22 | Stop reason: HOSPADM

## 2022-10-21 RX ORDER — SIMETHICONE 80 MG
80 TABLET,CHEWABLE ORAL 4 TIMES DAILY PRN
Status: DISCONTINUED | OUTPATIENT
Start: 2022-10-21 | End: 2022-10-22 | Stop reason: HOSPADM

## 2022-10-21 RX ORDER — PROPOFOL 10 MG/ML
INJECTION, EMULSION INTRAVENOUS AS NEEDED
Status: DISCONTINUED | OUTPATIENT
Start: 2022-10-21 | End: 2022-10-21

## 2022-10-21 RX ORDER — LANOLIN ALCOHOL/MO/W.PET/CERES
3 CREAM (GRAM) TOPICAL
Status: DISCONTINUED | OUTPATIENT
Start: 2022-10-21 | End: 2022-10-22 | Stop reason: HOSPADM

## 2022-10-21 RX ORDER — HYDROMORPHONE HCL IN WATER/PF 6 MG/30 ML
0.2 PATIENT CONTROLLED ANALGESIA SYRINGE INTRAVENOUS
Status: DISCONTINUED | OUTPATIENT
Start: 2022-10-21 | End: 2022-10-21 | Stop reason: HOSPADM

## 2022-10-21 RX ORDER — QUETIAPINE FUMARATE 25 MG/1
50 TABLET, FILM COATED ORAL
Status: DISCONTINUED | OUTPATIENT
Start: 2022-10-21 | End: 2022-10-22 | Stop reason: HOSPADM

## 2022-10-21 RX ORDER — LORAZEPAM 2 MG/ML
1 INJECTION INTRAMUSCULAR ONCE
Status: COMPLETED | OUTPATIENT
Start: 2022-10-21 | End: 2022-10-21

## 2022-10-21 RX ORDER — PROPOFOL 10 MG/ML
50 INJECTION, EMULSION INTRAVENOUS ONCE
Status: CANCELLED | OUTPATIENT
Start: 2022-10-21 | End: 2022-10-21

## 2022-10-21 RX ORDER — OXCARBAZEPINE 300 MG/1
300 TABLET, FILM COATED ORAL 2 TIMES DAILY
Status: DISCONTINUED | OUTPATIENT
Start: 2022-10-21 | End: 2022-10-22 | Stop reason: HOSPADM

## 2022-10-21 RX ORDER — DEXAMETHASONE SODIUM PHOSPHATE 10 MG/ML
INJECTION, SOLUTION INTRAMUSCULAR; INTRAVENOUS AS NEEDED
Status: DISCONTINUED | OUTPATIENT
Start: 2022-10-21 | End: 2022-10-21

## 2022-10-21 RX ORDER — ACETAMINOPHEN 325 MG/1
975 TABLET ORAL EVERY 8 HOURS SCHEDULED
Status: DISCONTINUED | OUTPATIENT
Start: 2022-10-21 | End: 2022-10-22 | Stop reason: HOSPADM

## 2022-10-21 RX ORDER — LIDOCAINE HYDROCHLORIDE 10 MG/ML
INJECTION, SOLUTION EPIDURAL; INFILTRATION; INTRACAUDAL; PERINEURAL AS NEEDED
Status: DISCONTINUED | OUTPATIENT
Start: 2022-10-21 | End: 2022-10-21

## 2022-10-21 RX ORDER — KETAMINE HCL IN NACL, ISO-OSM 100MG/10ML
1 SYRINGE (ML) INJECTION ONCE
Status: COMPLETED | OUTPATIENT
Start: 2022-10-21 | End: 2022-10-21

## 2022-10-21 RX ORDER — DOCUSATE SODIUM 100 MG/1
100 CAPSULE, LIQUID FILLED ORAL 2 TIMES DAILY
Status: DISCONTINUED | OUTPATIENT
Start: 2022-10-21 | End: 2022-10-22 | Stop reason: HOSPADM

## 2022-10-21 RX ORDER — CEFAZOLIN SODIUM 2 G/50ML
2000 SOLUTION INTRAVENOUS ONCE
Status: CANCELLED | OUTPATIENT
Start: 2022-10-21 | End: 2022-10-21

## 2022-10-21 RX ORDER — KETOROLAC TROMETHAMINE 30 MG/ML
INJECTION, SOLUTION INTRAMUSCULAR; INTRAVENOUS AS NEEDED
Status: DISCONTINUED | OUTPATIENT
Start: 2022-10-21 | End: 2022-10-21

## 2022-10-21 RX ORDER — MIDAZOLAM HYDROCHLORIDE 2 MG/2ML
INJECTION, SOLUTION INTRAMUSCULAR; INTRAVENOUS AS NEEDED
Status: DISCONTINUED | OUTPATIENT
Start: 2022-10-21 | End: 2022-10-21

## 2022-10-21 RX ORDER — BUPRENORPHINE AND NALOXONE 2; .5 MG/1; MG/1
2 FILM, SOLUBLE BUCCAL; SUBLINGUAL ONCE
Status: DISCONTINUED | OUTPATIENT
Start: 2022-10-21 | End: 2022-10-21

## 2022-10-21 RX ORDER — QUETIAPINE FUMARATE 300 MG/1
300 TABLET, FILM COATED ORAL
Status: DISCONTINUED | OUTPATIENT
Start: 2022-10-21 | End: 2022-10-22 | Stop reason: HOSPADM

## 2022-10-21 RX ORDER — CALCIUM CARBONATE 200(500)MG
1000 TABLET,CHEWABLE ORAL DAILY PRN
Status: DISCONTINUED | OUTPATIENT
Start: 2022-10-21 | End: 2022-10-22 | Stop reason: HOSPADM

## 2022-10-21 RX ORDER — ONDANSETRON 2 MG/ML
INJECTION INTRAMUSCULAR; INTRAVENOUS AS NEEDED
Status: DISCONTINUED | OUTPATIENT
Start: 2022-10-21 | End: 2022-10-21

## 2022-10-21 RX ADMIN — CEFAZOLIN SODIUM 2000 MG: 2 SOLUTION INTRAVENOUS at 22:33

## 2022-10-21 RX ADMIN — SODIUM CHLORIDE, SODIUM LACTATE, POTASSIUM CHLORIDE, AND CALCIUM CHLORIDE: .6; .31; .03; .02 INJECTION, SOLUTION INTRAVENOUS at 16:25

## 2022-10-21 RX ADMIN — Medication 3 MG: at 21:07

## 2022-10-21 RX ADMIN — DEXAMETHASONE SODIUM PHOSPHATE 10 MG: 10 INJECTION, SOLUTION INTRAMUSCULAR; INTRAVENOUS at 14:39

## 2022-10-21 RX ADMIN — KETOROLAC TROMETHAMINE 30 MG: 30 INJECTION, SOLUTION INTRAMUSCULAR at 16:23

## 2022-10-21 RX ADMIN — Medication 50 MG: at 14:56

## 2022-10-21 RX ADMIN — SODIUM CHLORIDE, SODIUM LACTATE, POTASSIUM CHLORIDE, AND CALCIUM CHLORIDE: .6; .31; .03; .02 INJECTION, SOLUTION INTRAVENOUS at 14:36

## 2022-10-21 RX ADMIN — GLYCOPYRROLATE 0.2 MG: 0.2 INJECTION, SOLUTION INTRAMUSCULAR; INTRAVENOUS at 14:39

## 2022-10-21 RX ADMIN — OXCARBAZEPINE 300 MG: 300 TABLET, FILM COATED ORAL at 04:32

## 2022-10-21 RX ADMIN — CEFAZOLIN SODIUM 2000 MG: 2 SOLUTION INTRAVENOUS at 06:30

## 2022-10-21 RX ADMIN — Medication 122 MG: at 00:48

## 2022-10-21 RX ADMIN — DOCUSATE SODIUM 100 MG: 100 CAPSULE, LIQUID FILLED ORAL at 18:44

## 2022-10-21 RX ADMIN — LIDOCAINE HYDROCHLORIDE 50 MG: 10 INJECTION, SOLUTION EPIDURAL; INFILTRATION; INTRACAUDAL; PERINEURAL at 14:39

## 2022-10-21 RX ADMIN — LORAZEPAM 1 MG: 2 INJECTION INTRAMUSCULAR; INTRAVENOUS at 01:03

## 2022-10-21 RX ADMIN — PROPOFOL 200 MG: 10 INJECTION, EMULSION INTRAVENOUS at 14:39

## 2022-10-21 RX ADMIN — VENLAFAXINE HYDROCHLORIDE 75 MG: 75 CAPSULE, EXTENDED RELEASE ORAL at 08:10

## 2022-10-21 RX ADMIN — ACETAMINOPHEN 975 MG: 325 TABLET ORAL at 21:07

## 2022-10-21 RX ADMIN — CEFAZOLIN SODIUM 2000 MG: 2 SOLUTION INTRAVENOUS at 14:40

## 2022-10-21 RX ADMIN — ONDANSETRON 4 MG: 2 INJECTION INTRAMUSCULAR; INTRAVENOUS at 15:50

## 2022-10-21 RX ADMIN — BUPRENORPHINE AND NALOXONE 8 MG: 8; 2 FILM BUCCAL; SUBLINGUAL at 08:10

## 2022-10-21 RX ADMIN — QUETIAPINE FUMARATE 300 MG: 300 TABLET ORAL at 21:07

## 2022-10-21 RX ADMIN — MIDAZOLAM 2 MG: 1 INJECTION INTRAMUSCULAR; INTRAVENOUS at 14:36

## 2022-10-21 RX ADMIN — OXCARBAZEPINE 300 MG: 300 TABLET, FILM COATED ORAL at 18:44

## 2022-10-21 RX ADMIN — QUETIAPINE 50 MG: 25 TABLET ORAL at 21:07

## 2022-10-21 RX ADMIN — QUETIAPINE FUMARATE 300 MG: 300 TABLET ORAL at 04:32

## 2022-10-21 NOTE — ED PROCEDURE NOTE
Procedure  Pre-Procedural Sedation  Performed by: Margaret Sanches DO  Authorized by: Margaret Sanches DO     Consent:     Consent obtained:  Written    Consent given by:  Patient    Risks discussed:  Respiratory compromise necessitating ventilatory assistance and intubation, prolonged sedation necessitating reversal, prolonged hypoxia resulting in organ damage, allergic reaction, dysrhythmia, inadequate sedation, nausea and vomiting    Alternatives discussed:  Regional anesthesia  Ruth protocol:     Procedure explained and questions answered to patient or proxy's satisfaction: yes      Relevant documents present and verified: yes      Test results available and properly labeled: yes      Radiology Images displayed and confirmed    If images not available, report reviewed: yes      Immediately prior to procedure a time out was called: yes      Patient identity confirmation method:  Verbally with patient  Indications:     Sedation purpose:  Fracture reduction    Intended level of sedation:  Deep  Pre-sedation assessment:     NPO status caution: unable to specify NPO status      ASA classification: class 1 - normal, healthy patient      Neck mobility: normal      Mouth opening:  3 or more finger widths    Thyromental distance:  3 finger widths    Mallampati score:  I - soft palate, uvula, fauces, pillars visible    History of difficult intubation: no      Pre-sedation assessment completed:  10/21/2022 1:57 AM                     Margaret Sanches DO  10/21/22 0159

## 2022-10-21 NOTE — ED NOTES
Ortho resident paged via tigertext @ [phone] of patients arrival  Resident Mercedes Buchanan responded to [phone]       Romelia Patel RN  10/20/22 6537

## 2022-10-21 NOTE — ANESTHESIA PREPROCEDURE EVALUATION
Procedure:  OPEN REDUCTION W/ INTERNAL FIXATION (ORIF)  FINGER- Right ring finger with possible tendon and nerve repair vs possible amputation (Right Finger)    Relevant Problems   CARDIO   (+) Hyperlipidemia      GI/HEPATIC   (+) Gastroesophageal reflux disease without esophagitis      NEURO/PSYCH   (+) Anxiety   (+) Depression      Current Outpatient Medications   Medication Instructions   • buprenorphine-naloxone (SUBOXONE) 8-2 mg per SL tablet 1 tablet, Sublingual, Daily   • buprenorphine-naloxone (Suboxone) 8-2 mg 8 2 mg, Sublingual, Daily   • melatonin 3 mg, Oral, Daily at bedtime   • omeprazole (PriLOSEC) 20 mg delayed release capsule TAKE 1 CAPSULE BY MOUTH EVERY DAY   • ondansetron (ZOFRAN-ODT) 4 mg, Oral, Every 6 hours PRN   • OXcarbazepine (TRILEPTAL) 300 mg tablet TAKE 1 TABLET BY MOUTH TWICE A DAY   • QUEtiapine (SEROquel) 50 mg tablet TAKE 1 TABLET DAILY IN THE MORNING   • QUEtiapine (SEROQUEL) 300 mg, Oral, Daily at bedtime   • venlafaxine (EFFEXOR-XR) 75 mg 24 hr capsule TAKE 1 CAPSULE BY MOUTH EVERY DAY     Medication Administration - last 24 hours from 10/20/2022 1304 to 10/21/2022 1304       Date/Time Order Dose Route Action Action by     10/20/2022 2248 lidocaine (PF) (XYLOCAINE-MPF) 1 % injection 20 mL 20 mL Infiltration Given by Other Karolee Lines, RN     10/20/2022 2248 bupivacaine (PF) (MARCAINE) 0 25 % injection 10 mL 10 mL Infiltration Given by Other Karolee Lines, RN     10/20/2022 2249 ceFAZolin (ANCEF) IVPB (premix in dextrose) 2,000 mg 50 mL 0 mg Intravenous Stopped Karolee Lines, RN     10/20/2022 2223 ceFAZolin (ANCEF) IVPB (premix in dextrose) 2,000 mg 50 mL 2,000 mg Intravenous [address] Griselda Riddles, RN     10/20/2022 2336 tetanus-diphtheria-acellular pertussis (BOOSTRIX) IM injection 0 5 mL 0 5 mL Intramuscular Given Griselda Riddles, RN     10/20/2022 2336 LORazepam (ATIVAN) injection 0 5 mg 0 5 mg Intravenous Given Griselda Riddles, RN     10/20/2022 3520 HYDROmorphone (DILAUDID) injection 1 mg 1 mg Intravenous Given Harsha Edwards, RN     10/21/2022 3997 Ketamine HCl 122 mg 122 mg Intravenous Given by Other Emma Hernandez RN     10/21/2022 0103 LORazepam (ATIVAN) injection 1 mg 1 mg Intravenous Given Emma Hernandez, RN     10/21/2022 0628 pantoprazole (PROTONIX) EC tablet 20 mg 20 mg Oral Refused Sina Macdonald RN     10/21/2022 0432 OXcarbazepine (TRILEPTAL) tablet 300 mg 300 mg Oral Given Harsha Zainab, RN     10/21/2022 0432 QUEtiapine (SEROquel) tablet 300 mg 300 mg Oral Given Harshastella Lamb, PRETTY     10/21/2022 0810 venlafaxine (EFFEXOR-XR) 24 hr capsule 75 mg 75 mg Oral Given Joel Miller, PRETTY     10/21/2022 0630 ceFAZolin (ANCEF) IVPB (premix in dextrose) 2,000 mg 50 mL 2,000 mg Intravenous [address] Sina Macdonald RN     10/21/2022 3379 acetaminophen (TYLENOL) tablet 975 mg 975 mg Oral Refused Sina Macdonald RN     10/21/2022 0810 buprenorphine-naloxone (Suboxone) film 8 mg 8 mg Sublingual Given Joel Miller RN        Lab Results   Component Value Date    WBC 10 32 (H) 10/21/2022    HGB 12 2 10/21/2022    HCT 36 1 (L) 10/21/2022     10/21/2022    SODIUM 136 10/21/2022    K 3 9 10/21/2022     10/21/2022    CO2 27 10/21/2022    BUN 13 10/21/2022    CREATININE 0 75 10/21/2022    GLUC 107 10/21/2022    PROTIME 11 7 10/21/2022    PTT 25 10/21/2022    INR 0 84 10/21/2022      Echo (TTE) 4/22/19  SUMMARY   LEFT VENTRICLE:  Systolic function was normal  Ejection fraction was estimated in the range of 55 % to 65 %  There were no regional wall motion abnormalities      MITRAL VALVE: There was mild regurgitation  TRICUSPID VALVE: There was mild regurgitation  PULMONIC VALVE: There was mild regurgitation            Physical Exam    Airway    Mallampati score: II  TM Distance: >3 FB  Neck ROM: full     Dental   No notable dental hx     Cardiovascular  Rhythm: regular, Rate: normal, Cardiovascular exam normal    Pulmonary  Pulmonary exam normal Breath sounds clear to auscultation, Other Findings        Anesthesia Plan  ASA Score- 2 Emergent    Anesthesia Type- general with ASA Monitors  Additional Monitors:   Airway Plan: LMA  Plan Factors-Exercise tolerance (METS): >4 METS  Chart reviewed  EKG reviewed  Imaging results reviewed  Existing labs reviewed  Patient summary reviewed  Patient is a current smoker  Patient did not smoke on day of surgery  Induction- intravenous  Postoperative Plan- Plan for postoperative opioid use  Informed Consent- Anesthetic plan and risks discussed with patient  I personally reviewed this patient with the CRNA  Discussed and agreed on the Anesthesia Plan with the CRNA  Juan José Harris

## 2022-10-21 NOTE — ANESTHESIA POSTPROCEDURE EVALUATION
Post-Op Assessment Note    CV Status:  Stable    Pain management: adequate     Hydration Status:  Euvolemic   PONV Controlled:  Controlled   Airway Patency:  Patent      Post Op Vitals Reviewed: Yes      Staff: CRNA   Comments: Sleeping VSS report RN        No complications documented      BP   123/65   Temp      Pulse  75   Resp      SpO2   99 FM 6l

## 2022-10-21 NOTE — ED NOTES
X-ray at bedside     James Dudley, [facility]  10/21/22 4070

## 2022-10-21 NOTE — ED PROVIDER NOTES
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  Felt an instant crack and had bleeding  He was wearing a glove and has not taken it off yet  He has severe pain  States the finger is numb and has difficulty ranging it  No other injuries reported  History provided by:  Patient   used: No    Hand Injury  Location:  Finger  Finger location:  R ring finger  Injury: yes    Time since incident:  1 hour  Mechanism of injury: crush    Handedness:  Right-handed  Dislocation: no    Foreign body present:  No foreign bodies  Prior injury to area:  No  Relieved by:  Nothing  Worsened by: Movement  Ineffective treatments:  None tried  Associated symptoms: decreased range of motion    Associated symptoms: no back pain and no fever        Prior to Admission Medications   Prescriptions Last Dose Informant Patient Reported? Taking?    OXcarbazepine (TRILEPTAL) 300 mg tablet   No Yes   Sig: TAKE 1 TABLET BY MOUTH TWICE A DAY   QUEtiapine (SEROquel) 300 mg tablet   No Yes   Sig: Take 1 tablet (300 mg total) by mouth daily at bedtime   QUEtiapine (SEROquel) 50 mg tablet   No Yes   Sig: TAKE 1 TABLET DAILY IN THE MORNING   buprenorphine-naloxone (SUBOXONE) 8-2 mg per SL tablet   Yes Yes   Sig: Place 1 tablet under the tongue daily   melatonin 3 mg   No No   Sig: Take 1 tablet (3 mg total) by mouth daily at bedtime   omeprazole (PriLOSEC) 20 mg delayed release capsule   No Yes   Sig: TAKE 1 CAPSULE BY MOUTH EVERY DAY   ondansetron (ZOFRAN-ODT) 4 mg disintegrating tablet   No No   Sig: Take 1 tablet (4 mg total) by mouth every 6 (six) hours as needed for nausea or vomiting   venlafaxine (EFFEXOR-XR) 75 mg 24 hr capsule   No Yes   Sig: TAKE 1 CAPSULE BY MOUTH EVERY DAY      Facility-Administered Medications: None       Past Medical History:   Diagnosis Date   • ADHD    • Anxiety    • Bipolar disorder ([facility] [address] )    • Depression    • Drug use    • Essential hypertension     benign   • Hyperlipidemia    • Peptic reflux disease        Past Surgical History:   Procedure Laterality Date   • HAND HARDWARE REMOVAL Bilateral    • TONSILLECTOMY Bilateral    • WISDOM TOOTH EXTRACTION Bilateral        Family History   Problem Relation Age of Onset   • Mental illness Mother    • Thyroid disease Mother    • Drug abuse Mother         illicit drug   • Diabetes Father    • Cancer Father      I have reviewed and agree with the history as documented  E-Cigarette/Vaping   • E-Cigarette Use Never User      E-Cigarette/Vaping Substances     Social History     Tobacco Use   • Smoking status: Former Smoker     Quit date: 3/30/2015     Years since quittin 5   • Smokeless tobacco: Never Used   Vaping Use   • Vaping Use: Never used   Substance Use Topics   • Alcohol use: Not Currently     Comment: at social events   • Drug use: Yes     Frequency: 2 0 times per week     Types: Marijuana     Comment: Pt reports using THC daily x3, Heroin every other day and will alternate between using pills  Pt snorts heroin       Review of Systems   Constitutional: Negative for chills and fever  HENT: Negative for ear pain and sore throat  Eyes: Negative for pain and visual disturbance  Respiratory: Negative for cough and shortness of breath  Cardiovascular: Negative for chest pain and palpitations  Gastrointestinal: Negative for abdominal pain and vomiting  Genitourinary: Negative for dysuria and hematuria  Musculoskeletal: Negative for arthralgias and back pain  Skin: Negative for color change and rash  Neurological: Negative for seizures and syncope  All other systems reviewed and are negative        Physical Exam  Physical Exam  Vitals and nursing note reviewed  Constitutional:       Appearance: He is well-developed  HENT:      Head: Normocephalic and atraumatic  Eyes:      Conjunctiva/sclera: Conjunctivae normal    Cardiovascular:      Rate and Rhythm: Normal rate and regular rhythm  Heart sounds: No murmur heard  Pulmonary:      Effort: Pulmonary effort is normal  No respiratory distress  Breath sounds: Normal breath sounds  Abdominal:      Palpations: Abdomen is soft  Tenderness: There is no abdominal tenderness  Musculoskeletal:      Cervical back: Neck supple  Skin:     General: Skin is warm and dry  Neurological:      Mental Status: He is alert and oriented to person, place, and time  GCS: GCS eye subscore is 4  GCS verbal subscore is 5  GCS motor subscore is 6  Comments: GCS 15  AAOx3  Ambulating in department without difficulty  CN II-XII grossly intact  No focal neuro deficits  Vital Signs  ED Triage Vitals   Temperature Pulse Respirations Blood Pressure SpO2   10/20/22 1853 10/20/22 1853 10/20/22 1853 10/20/22 1853 10/20/22 1853   98 9 °F (37 2 °C) 86 16 144/82 96 %      Temp Source Heart Rate Source Patient Position - Orthostatic VS BP Location FiO2 (%)   10/20/22 1853 10/20/22 1853 10/20/22 1853 10/20/22 1853 --   Oral Monitor Sitting Left arm       Pain Score       10/20/22 1907       (S) No Pain           Vitals:    10/20/22 1853 10/20/22 1945   BP: 144/82 124/69   Pulse: 86 92   Patient Position - Orthostatic VS: Sitting Lying         Visual Acuity      ED Medications  Medications   lidocaine (PF) (XYLOCAINE-MPF) 1 % injection **ADS Override Pull** (300 mg  Given 10/20/22 1920)       Diagnostic Studies  Results Reviewed     None                 XR hand 3+ vw right   Final Result by Cira Saenz MD (10/21 2204)      4th proximal phalanx displaced fracture as above with overlying soft tissue laceration and swelling              Workstation performed: [id number]                    Procedures  Digital Block  Performed by: Ashwin Paul PA-C  Authorized by: Ashwin Paul PA-C     Consent:     Consent obtained:  Verbal    Risks discussed: Allergic reaction, bleeding, intravascular injection, infection, nerve damage, pain, unsuccessful block and swelling    Alternatives discussed:  Delayed treatment, referral and alternative treatment  Indications:     Indications:  Pain relief  Location:     Block location:  Finger    Finger blocked:  R ring finger             ED Course                                             MDM  Number of Diagnoses or Management Options  Fracture of proximal phalanx of right ring finger: new and requires workup  Diagnosis management comments: Differential diagnosis including but not limited to: sprain, strain, fracture, dislocation, contusion; doubt compartment syndrome  Plan:  X-ray  Dispo pending  Amount and/or Complexity of Data Reviewed  Tests in the radiology section of CPT®: ordered and reviewed    Risk of Complications, Morbidity, and/or Mortality  Presenting problems: moderate  Management options: low  General comments: 67-year-old male with finger injury  He was given a digital block to facilitate removal of his glove for examination  The globe was able to be removed entirely  He has what appears to be open fracture  X-ray confirms fracture  He is hypersensitive and very difficult to achieve anesthesia with digital block  He appears to be more so very anxious more than just pain  Unable to reduce or suture at bedside  I discussed with Hand surgery  They are agreeable for transfer for orthopedic evaluation and ultimately if unable to do a block at bedside with Orthopedics he may need surgical intervention  Patient was informed of plan and is in agreement  He refused transport by ambulance and would like to go down by private transportation  Stable at the time of leaving the emergency room      Patient Progress  Patient progress: stable      Disposition  Final diagnoses:   Fracture of proximal phalanx of right ring finger     Time reflects when diagnosis was documented in both MDM as applicable and the Disposition within this note     Time User Action Codes Description Comment    10/20/2022  8:15 PM Dewayne Perkins Add [C10 227G] Fracture of proximal phalanx of right ring finger       ED Disposition     ED Disposition   Transfer to Another Facility-In Network    Condition   --    Date/Time   Thu Oct 20, 2022  8:16 PM    Comment   T[facility] [place] should be transferred out to [facility]             MD Documentation    Denny Blunt Most Recent Value   Patient Condition The patient has been stabilized such that within reasonable medical probability, no material deterioration of the patient condition or the condition of the unborn child(jairo) is likely to result from the transfer   Reason for Transfer Level of Care needed not available at this facility   Benefits of Transfer Specialized equipment and/or services available at the receiving facility (Include comment)________________________   Risks of Transfer Potential for delay in receiving treatment   Accepting Physician Dr Juan Houston Name, Enrique Hein    (Name & Tel number) [facility]   Sending MD Dr Swati Napoles PA-C   Provider Certification General risk, such as traffic hazards, adverse weather conditions, rough terrain or turbulence, possible failure of equipment (including vehicle or aircraft), or consequences of actions of persons outside the control of the transport personnel      RN Documentation    Flowsheet Row Most [address] [facility] Name, [address]  B    (Name & Tel number) [facility]      Follow-up Information     Follow up With Specialties Details Why [address] ED    [address], [place], [address] [facility]  [phone]          Discharge Medication List as of 10/20/2022  8:18 PM CONTINUE these medications which have NOT CHANGED    Details   buprenorphine-naloxone (SUBOXONE) 8-2 mg per SL tablet Place 1 tablet under the tongue daily, Historical Med      melatonin 3 mg Take 1 tablet (3 mg total) by mouth daily at bedtime, Starting Fri 10/25/2019, Normal      omeprazole (PriLOSEC) 20 mg delayed release capsule TAKE 1 CAPSULE BY MOUTH EVERY DAY, Normal      ondansetron (ZOFRAN-ODT) 4 mg disintegrating tablet Take 1 tablet (4 mg total) by mouth every 6 (six) hours as needed for nausea or vomiting, Starting Mon 11/9/2020, Print      OXcarbazepine (TRILEPTAL) 300 mg tablet TAKE 1 TABLET BY MOUTH TWICE A DAY, Normal      !! QUEtiapine (SEROquel) 300 mg tablet Take 1 tablet (300 mg total) by mouth daily at bedtime, Starting Wed 10/28/2020, Normal      !! QUEtiapine (SEROquel) 50 mg tablet TAKE 1 TABLET DAILY IN THE MORNING, Normal      venlafaxine (EFFEXOR-XR) 75 mg 24 hr capsule TAKE 1 CAPSULE BY MOUTH EVERY DAY, Normal       !! - Potential duplicate medications found  Please discuss with provider  No discharge procedures on file      PDMP Review       Value Time User    PDMP Reviewed  Yes 10/25/2019  1:01 PM Deirdre Gonzales MD          ED Provider  Electronically Signed by           Edwin Sorensen PA-C  10/22/22 1937

## 2022-10-21 NOTE — EMTALA/ACUTE CARE TRANSFER
[address] [address]  Dept: [phone]      [facility] TRANSFER CONSENT    NAME Izzy Monk                                         1990                              MRN [id number]    I have been informed of my rights regarding examination, treatment, and transfer   by Dr Ginger Acosta DO    Benefits: Specialized equipment and/or services available at the receiving facility (Include comment)________________________    Risks: Potential for delay in receiving treatment      Consent for Transfer:  I acknowledge that my medical condition has been evaluated and explained to me by the emergency department physician or other qualified medical person and/or my attending physician, who has recommended that I be transferred to the service of  Accepting Physician: Dr Luz Lea at [address] [place] [address] Name, [address] : [facility]  The above potential benefits of such transfer, the potential risks associated with such transfer, and the probable risks of not being transferred have been explained to me, and I fully understand them  The doctor has explained that, in my case, the benefits of transfer outweigh the risks  I agree to be transferred  I authorize the performance of emergency medical procedures and treatments upon me in both transit and upon arrival at the receiving facility  Additionally, I authorize the release of any and all medical records to the receiving facility and request they be transported with me, if possible  I understand that the safest mode of transportation during a medical emergency is an ambulance and that the Hospital advocates the use of this mode of transport  Risks of traveling to the receiving facility by car, including absence of medical control, life sustaining equipment, such as oxygen, and medical personnel has been explained to me and I fully understand them      (ELENA CORRECT BOX BELOW)  [  ]  I consent to the stated transfer and to be transported by ambulance/helicopter  [ X ]  I consent to the stated transfer, but refuse transportation by ambulance and accept full responsibility for my transportation by car  I understand the risks of non-ambulance transfers and I exonerate the Hospital and its staff from any deterioration in my condition that results from this refusal     X___________________________________________    DATE  10/20/22  TIME________  Signature of patient or legally responsible individual signing on patient behalf           RELATIONSHIP TO PATIENT_________________________          Provider Certification    NAME Beryle Cairo                                         1990                              MRN [id number]    A medical screening exam was performed on the above named patient  Based on the examination:    Condition Necessitating Transfer The encounter diagnosis was Fracture of proximal phalanx of right ring finger  Patient Condition: The patient has been stabilized such that within reasonable medical probability, no material deterioration of the patient condition or the condition of the unborn child(jairo) is likely to result from the transfer    Reason for Transfer: Level of Care needed not available at this facility    Transfer Requirements: Facility [facility]   · Space available and qualified personnel available for treatment as acknowledged by [facility]  · Agreed to accept transfer and to provide appropriate medical treatment as acknowledged by       Dr Joel Hernandez  · Appropriate medical records of the examination and treatment of the patient are provided at the time of transfer   [address], [address] _______  · Transfer will be performed by qualified personnel from    and appropriate transfer equipment as required, including the use of necessary and appropriate life support measures      Provider Certification: I have examined the patient and explained the following risks and benefits of being transferred/refusing transfer to the patient/family:  General risk, such as traffic hazards, adverse weather conditions, rough terrain or turbulence, possible failure of equipment (including vehicle or aircraft), or consequences of actions of persons outside the control of the transport personnel      Based on these reasonable risks and benefits to the patient and/or the unborn child(jairo), and based upon the information available at the time of the patient’s examination, I certify that the medical benefits reasonably to be expected from the provision of appropriate medical treatments at another medical facility outweigh the increasing risks, if any, to the individual’s medical condition, and in the case of labor to the unborn child, from effecting the transfer      X____________________________________________ DATE 10/20/22        TIME_______      ORIGINAL - SEND TO MEDICAL RECORDS   COPY - SEND WITH PATIENT DURING TRANSFER

## 2022-10-21 NOTE — ED NOTES
Wife Simone Gosselin ([phone]) would like to be called with updates     Emma Mcadams RN  10/21/22 3457

## 2022-10-21 NOTE — ED PROCEDURE NOTE
Procedure  Procedural Sedation    Date/Time: 10/21/2022 12:50 AM  Performed by: Berto Dang DO  Authorized by: Berto Dang DO     Immediate pre-procedure details:     Reassessment: Patient reassessed immediately prior to procedure      Reviewed: vital signs      Verified: bag valve mask available, emergency equipment available, intubation equipment available, IV patency confirmed, oxygen available, reversal medications available and suction available    Procedure details (see MAR for exact dosages):     Preoxygenation:  Nasal cannula    Sedation:  Ketamine    Intra-procedure monitoring:  Blood pressure monitoring, cardiac monitor, continuous capnometry, frequent vital sign checks, continuous pulse oximetry and frequent LOC assessments    Sedation end time:  10/21/2022 1:22 AM    Total sedation time (minutes):  32  Post-procedure details:     Attendance: Constant attendance by certified staff until patient recovered      Recovery: Patient returned to pre-procedure baseline      Post-sedation assessments completed and reviewed: airway patency, cardiovascular function, pain level and respiratory function      Patient is stable for discharge or admission: yes      Patient tolerance: Tolerated with difficulty  Comments:      Patient had an emergence reaction to ketamine  Patient received 1 milligram/kilogram IV the patient was monitored the entire time  Patient received 1 mg of Ativan for emergence reaction and agitation  Patient returned to baseline  Patient is alert and oriented to person place time and event                       Berto Dang DO  10/21/22 8515

## 2022-10-21 NOTE — CONSULTS
Orthopedics   Susu Whittington 33 y o  male MRN: [id number]  Unit/Bed#: [facility] Pool Room      Chief Complaint:   Right ring finger pain    HPI:  33 y o  right hand dominant male complaining of right ring finger pain s/p crush injury between forklift and metal surface    negative Headstrike, negative LOC, no Blood thinners  Pain is sharp in character, Located mainly in the right ring finger but present in all other digits except thumb, acute in onset, constant in duration, severe in intensity  Exacerbating factors include palpation and movement, remitting factors include rest  nonradiating, endorses numbness and tingling to all fingers except thumb, open wounds noted over volar and dorsal proximal phalanx of ring finger  No other complaints at this time  PMH significant for drug use now on suboxone, anxiety/ depression on seroquel  Occupation   Review Of Systems:   · Skin: Open at fracture site  · Neuro: See HPI  · Musculoskeletal: See HPI  · 14 point review of systems negative except as stated above     Past Medical History:   Past Medical History:   Diagnosis Date   • ADHD    • Anxiety    • Bipolar disorder ([facility] [address] )    • Depression    • Drug use    • Essential hypertension     benign   • Hyperlipidemia    • Peptic reflux disease        Past Surgical History:   No past surgical history on file      Family History:  Family history reviewed and non-contributory  Family History   Problem Relation Age of Onset   • Mental illness Mother    • Thyroid disease Mother    • Drug abuse Mother         illicit drug   • Diabetes Father    • Cancer Father        Social History:  Social History     Socioeconomic History   • Marital status: Single     Spouse name: Not on file   • Number of children: Not on file   • Years of education: Not on file   • Highest education level: Not on file   Occupational History   • Occupation: fulltime   Tobacco Use   • Smoking status: Former Smoker     Quit date: 3/30/2015     Years since quittin 5   • Smokeless tobacco: Never Used   Vaping Use   • Vaping Use: Never used   Substance and Sexual Activity   • Alcohol use: Not Currently     Comment: at social events   • Drug use: Yes     Frequency: 2 0 times per week     Types: Marijuana     Comment: Pt reports using THC daily x3, Heroin every other day and will alternate between using pills  Pt snorts heroin   • Sexual activity: Yes     Partners: Female   Other Topics Concern   • Not on file   Social History Narrative   • Not on file     Social Determinants of Health     Financial Resource Strain: Not on file   Food Insecurity: Not on file   Transportation Needs: Not on file   Physical Activity: Not on file   Stress: Not on file   Social Connections: Not on file   Intimate Partner Violence: Not on file   Housing Stability: Not on file       Allergies:   No Known Allergies         Labs:  0   Lab Value Date/Time    HCT 44 5 2020 1720    HCT 48 1 2020 1113    HCT 47 8 2020 0959    HCT 47 3 2014 1821    HGB 15 7 2020 1720    HGB 16 8 2020 1113    HGB 16 3 2020 0959    HGB 17 7 (H) 2014 0117    HGB 17 7 (H) 2014 1821    WBC 11 64 (H) 2020 1720    WBC 12 78 (H) 2020 1113    WBC 13 92 (H) 2020 0959    WBC 10 03 2014 1821       Meds:  No current facility-administered medications for this encounter      Current Outpatient Medications:   •  buprenorphine-naloxone (SUBOXONE) 8-2 mg per SL tablet, Place 1 tablet under the tongue daily, Disp: , Rfl:   •  melatonin 3 mg, Take 1 tablet (3 mg total) by mouth daily at bedtime, Disp: 30 tablet, Rfl: 0  •  omeprazole (PriLOSEC) 20 mg delayed release capsule, TAKE 1 CAPSULE BY MOUTH EVERY DAY, Disp: 90 capsule, Rfl: 1  •  ondansetron (ZOFRAN-ODT) 4 mg disintegrating tablet, Take 1 tablet (4 mg total) by mouth every 6 (six) hours as needed for nausea or vomiting, Disp: 20 tablet, Rfl: 0  •  OXcarbazepine (TRILEPTAL) 300 mg tablet, TAKE 1 TABLET BY MOUTH TWICE A DAY, Disp: 90 tablet, Rfl: 0  •  QUEtiapine (SEROquel) 300 mg tablet, Take 1 tablet (300 mg total) by mouth daily at bedtime, Disp: 90 tablet, Rfl: 1  •  QUEtiapine (SEROquel) 50 mg tablet, TAKE 1 TABLET DAILY IN THE MORNING, Disp: 90 tablet, Rfl: 1  •  venlafaxine (EFFEXOR-XR) 75 mg 24 hr capsule, TAKE 1 CAPSULE BY MOUTH EVERY DAY, Disp: 90 capsule, Rfl: 0    Blood Culture:   Lab Results   Component Value Date    BLOODCX No Growth After 5 Days  04/21/2019       Wound Culture:   No results found for: WOUNDCULT    Ins and Outs:  No intake/output data recorded  Physical Exam:   There were no vitals taken for this visit  Gen: No acute distress, resting comfortably in bed  HEENT: Eyes clear, moist mucus membranes, hearing intact  Respiratory: No audible wheezing or stridor  Cardiovascular: Well Perfused peripherally, 2+ distal pulse  Abdomen: nondistended, no peritoneal signs  Musculoskeletal: right upper extremity  · Skin demonstrating 2 open wounds at fracture site of right index finger on dorsal and volar aspect of proximal phalanx of ring finger with presence of tendon, nerve and artery lacerations  · TTP around fracture of right ring finger  · Limited AROM and PROM due to pain   · Sensation intact m/r/u but endorses numbness to all fingers except thumn  · Motor intact ain/pin/m/r/u but unable to flex ring finger and remainder move with difficulty with the exception of the thumb  · 2+ radial and ulnar pulse  · Musculature is soft and compressible, no pain with passive stretch    Radiology:   I personally reviewed the films  Hand x rays AP/Lateral and dedicated thumb views showed right ring finger oblique displaced open proximal phalanx fracture  Procedure: Splint application  After adequate analgesia was obtained, pt was placed in a well padded ulnar gutter splint  Pt tolerated the procedure well and was neurovascularly intact pre and post procedure    Post splinting radiographs show displaced proximal phalanx fracture of ring finger in a splint  Procedure- Jayme Red 33 y o  male MRN: [id number]  Unit/Bed#: ED 13    Procedure: Closure of open fracture wounds  Local anesthesia was give via a digital block with 10cc of 1% lidocaine and 5cc bupivicaine without epi  Once adequate anesthesia was obtained the wound was then copiously irrigated with 3L of NS  Wound was closed with 3-0 chromic suture  Laceration repairs were the dressed with xeroform, webril, and an ulnar gutter splint  Pt tolerated the procedure well and was neurovascularly intact pre and post procedure  Assessment:  33 y o  right hand dominant male status post crush injury to the right ring finger with an open right ring finger proximal phalanx fracture  Will benefit operative fixation of proximal phalanx fracture, tendon, and nerve vs amputation      Plan:   · NWB Right hand in ulnar gutter splint   · Tetanus updated  · Ancef 2g given for open fracture protocol  · Consented, preoped, added on for OR with Dr Christal Powers  · Dispo: Ortho will follow    Case was reviewed and discussed with senior resident and attending  Stephen Mcelroy  Orthopedic surgery resident   · 10/20/22  ·     Stephen Mcelroy MD

## 2022-10-21 NOTE — ED NOTES
Trans to b  Dr Gutierrez Burks accepting as private patient  [phone] for report     Antony Lugo RN  10/20/22 2025

## 2022-10-21 NOTE — CASE MANAGEMENT
Case Management Progress Note    Patient name Deanna Colindres  Location OR POOL/OR POOL MRN [id number]  : 1990 Date 10/21/2022       LOS (days): 0  Geometric Mean LOS (GMLOS) (days):   Days to GMLOS:        OBJECTIVE:        Current admission status: Observation  Preferred Pharmacy:   [facility] [address], [place] - [address] [facility] [address]  Phone: [phone] Fax: [phone]    [id number], [place] - [address] [facility] [address]  Phone: [phone] Fax: [phone] [place] [address]  Phone: [phone] Fax: [phone]    Primary Care Provider: PETER Baez    Primary Insurance: [address] [place] [address]  Secondary Insurance:     PROGRESS NOTE:  TC from Maia Patel [facility] Case management, who is handling patient's workman's comp claim  Claim number: [id number]  Phone number [phone], fax [phone]  Chance Odell is requesting clinical information pertaining to patient's injury  Patient in OR, will need to sign medical release  Miranda needs Facesheet, H & P, OP reports, imaging, etc     Workman's comp information sent via email to hospital financial counselors to add to patient's chart

## 2022-10-21 NOTE — DISCHARGE INSTRUCTIONS
Discharge Instructions - Sheryl Noonan 33 y o  male MRN: [id number]  Unit/Bed#: Operating Room    Weight Bearing Status:                                           Nonweightbearing right hand    DVT prophylaxis  none    Pain:  Continue analgesics as directed    Dressing Instructions:   Please keep clean, dry and intact until follow up     Appt Instructions: If you do not have your appointment, please call the clinic at [phone] t  Otherwise followup as scheduled     Contact the office sooner if you experience any increased numbness/tingling in the extremities        Miscellaneous:  F/u 2 weeks

## 2022-10-21 NOTE — PROGRESS NOTES
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  He presented to [facility] ED and then transferred to [facility] for further evaluation/treatment  Examination revealed dorsal and volar-radial laceration of Right ring finger over P1  Xrays showed right ring finger displaced P1 fracture    Multiple attempts to provide digital block, but he was too anxious  He received dilaudid and ativan which calmed him down and was able to tolerate digital block  Once digital block performed, he was not tolerating sutures due to anxiety and was moving the hand away  It was deemed not safe to continue suturing as he was not tolerating  Discussion with ED team and patient, we proceeded to go with sedation in order to safely suture, reduce and immobilize his injury  He received sedation, I was able to inspect his wound and showed he had lacerated the radial digital neurovascular bundle and his flexor tendons to the ring finger  I was able to reduce the fracture and apply some sutures, but unfortunately after 5 minutes of sedation, patient had an adverse reaction to sedation where he started moving aggressively and shouting  He received ativan, but he was still fighting the staff, it took 6 staff members to hold him down  Due to safety concerns and discussing with ER attending, decision was made to stop sedation and just immobilize the finger with a splint  In the interim of trying to hold him down and immobilization, reduction was lost      Later when he was awake, I discussed with him and his wife the extent of his injury and that recommendation is for him to stay in the hospital so he can go surgical intervention in the OR under general anesthesia   I discussed with him that besides the fracture of the proximal phalanx, he also lacerated the neurovascular bundle as well as his flexor tendons so he will require wound exploration with bony fixation and repair of structures as the injury pattern allows  He mentioned that he rather would get an amputation of his finger  I furtherly discussed with him, that ultimately it is his decision after I explained the pros and cons of both pathways  Blade Hobbs He wanted to go home, but I advised that he is not safe for discharge and if he wanted to leave he would have to sign AMA paperwork  He will take the night to think about and further discuss with his wife  He signed consent for wound exploration with bony fixation, possible nerve/artery/tendon repair vs amputation  He will further discuss later today with the team and Dr Barb Ferris       -VINOD  -Ancef q8h  -Tdap updated  -NV checks  -NWB NICLOE Man  2:44 AM  10/21/22

## 2022-10-22 VITALS
HEIGHT: 72 IN | OXYGEN SATURATION: 98 % | DIASTOLIC BLOOD PRESSURE: 78 MMHG | WEIGHT: 268.96 LBS | RESPIRATION RATE: 16 BRPM | BODY MASS INDEX: 36.43 KG/M2 | TEMPERATURE: 98 F | HEART RATE: 71 BPM | SYSTOLIC BLOOD PRESSURE: 126 MMHG

## 2022-10-22 LAB
ANION GAP SERPL CALCULATED.3IONS-SCNC: 4 MMOL/L (ref 4–13)
BASOPHILS # BLD AUTO: 0.03 THOUSANDS/ÂΜL (ref 0–0.1)
BASOPHILS NFR BLD AUTO: 0 % (ref 0–1)
BUN SERPL-MCNC: 14 MG/DL (ref 5–25)
CALCIUM SERPL-MCNC: 9.3 MG/DL (ref 8.3–10.1)
CHLORIDE SERPL-SCNC: 107 MMOL/L (ref 96–108)
CO2 SERPL-SCNC: 26 MMOL/L (ref 21–32)
CREAT SERPL-MCNC: 0.82 MG/DL (ref 0.6–1.3)
EOSINOPHIL # BLD AUTO: 0.01 THOUSAND/ÂΜL (ref 0–0.61)
EOSINOPHIL NFR BLD AUTO: 0 % (ref 0–6)
ERYTHROCYTE [DISTWIDTH] IN BLOOD BY AUTOMATED COUNT: 13.9 % (ref 11.6–15.1)
GFR SERPL CREATININE-BSD FRML MDRD: 117 ML/MIN/1.73SQ M
GLUCOSE SERPL-MCNC: 123 MG/DL (ref 65–140)
HCT VFR BLD AUTO: 40.5 % (ref 36.5–49.3)
HGB BLD-MCNC: 13.2 G/DL (ref 12–17)
IMM GRANULOCYTES # BLD AUTO: 0.07 THOUSAND/UL (ref 0–0.2)
IMM GRANULOCYTES NFR BLD AUTO: 1 % (ref 0–2)
LYMPHOCYTES # BLD AUTO: 1.78 THOUSANDS/ÂΜL (ref 0.6–4.47)
LYMPHOCYTES NFR BLD AUTO: 13 % (ref 14–44)
MCH RBC QN AUTO: 27.9 PG (ref 26.8–34.3)
MCHC RBC AUTO-ENTMCNC: 32.6 G/DL (ref 31.4–37.4)
MCV RBC AUTO: 86 FL (ref 82–98)
MONOCYTES # BLD AUTO: 0.97 THOUSAND/ÂΜL (ref 0.17–1.22)
MONOCYTES NFR BLD AUTO: 7 % (ref 4–12)
NEUTROPHILS # BLD AUTO: 10.54 THOUSANDS/ÂΜL (ref 1.85–7.62)
NEUTS SEG NFR BLD AUTO: 79 % (ref 43–75)
NRBC BLD AUTO-RTO: 0 /100 WBCS
PLATELET # BLD AUTO: 299 THOUSANDS/UL (ref 149–390)
PMV BLD AUTO: 9.7 FL (ref 8.9–12.7)
POTASSIUM SERPL-SCNC: 4.1 MMOL/L (ref 3.5–5.3)
RBC # BLD AUTO: 4.73 MILLION/UL (ref 3.88–5.62)
SODIUM SERPL-SCNC: 137 MMOL/L (ref 135–147)
WBC # BLD AUTO: 13.4 THOUSAND/UL (ref 4.31–10.16)

## 2022-10-22 PROCEDURE — 85025 COMPLETE CBC W/AUTO DIFF WBC: CPT | Performed by: ORTHOPAEDIC SURGERY

## 2022-10-22 PROCEDURE — NC001 PR NO CHARGE: Performed by: SURGERY

## 2022-10-22 PROCEDURE — 80048 BASIC METABOLIC PNL TOTAL CA: CPT | Performed by: ORTHOPAEDIC SURGERY

## 2022-10-22 PROCEDURE — NC001 PR NO CHARGE: Performed by: ORTHOPAEDIC SURGERY

## 2022-10-22 RX ADMIN — ACETAMINOPHEN 975 MG: 325 TABLET ORAL at 06:09

## 2022-10-22 RX ADMIN — CEFAZOLIN SODIUM 2000 MG: 2 SOLUTION INTRAVENOUS at 06:09

## 2022-10-22 RX ADMIN — BUPRENORPHINE AND NALOXONE 8 MG: 8; 2 FILM BUCCAL; SUBLINGUAL at 09:32

## 2022-10-22 RX ADMIN — PANTOPRAZOLE SODIUM 20 MG: 20 TABLET, DELAYED RELEASE ORAL at 06:09

## 2022-10-22 RX ADMIN — VENLAFAXINE HYDROCHLORIDE 75 MG: 75 CAPSULE, EXTENDED RELEASE ORAL at 09:32

## 2022-10-22 NOTE — PLAN OF CARE
Problem: PAIN - ADULT  Goal: Verbalizes/displays adequate comfort level or baseline comfort level  Description: Interventions:  - Encourage patient to monitor pain and request assistance  - Assess pain using appropriate pain scale  - Administer analgesics based on type and severity of pain and evaluate response  - Implement non-pharmacological measures as appropriate and evaluate response  - Consider cultural and social influences on pain and pain management  - Notify physician/advanced practitioner if interventions unsuccessful or patient reports new pain  10/22/2022 0634 by Aubrie Gandhi RN  Outcome: Progressing  10/22/2022 0417 by Aubrie Gandhi RN  Outcome: Progressing     Problem: INFECTION - ADULT  Goal: Absence or prevention of progression during hospitalization  Description: INTERVENTIONS:  - Assess and monitor for signs and symptoms of infection  - Monitor lab/diagnostic results  - Monitor all insertion sites, i e  indwelling lines, tubes, and drains  - Monitor endotracheal if appropriate and nasal secretions for changes in amount and color  - [place] appropriate cooling/warming therapies per order  - Administer medications as ordered  - Instruct and encourage patient and family to use good hand hygiene technique  - Identify and instruct in appropriate isolation precautions for identified infection/condition  10/22/2022 0634 by Aubrie Gandhi RN  Outcome: Progressing  10/22/2022 0417 by Aubrie Gandhi RN  Outcome: Progressing  Goal: Absence of fever/infection during neutropenic period  Description: INTERVENTIONS:  - Monitor WBC    Outcome: Progressing     Problem: SAFETY ADULT  Goal: Patient will remain free of falls  Description: INTERVENTIONS:  - Educate patient/family on patient safety including physical limitations  - Instruct patient to call for assistance with activity   - Consult OT/PT to assist with strengthening/mobility   - Keep Call bell within reach  - Keep bed low and locked with side rails adjusted as appropriate  - Keep care items and personal belongings within reach  - Initiate and maintain comfort rounds  - Make Fall Risk Sign visible to staff  - Apply yellow socks and bracelet for high fall risk patients  - Consider moving patient to room near nurses station  10/22/2022 0634 by Kelly Fishman RN  Outcome: Progressing  10/22/2022 0417 by Kelly Fishman RN  Outcome: Progressing  Goal: Maintain or return to baseline ADL function  Description: INTERVENTIONS:  -  Assess patient's ability to carry out ADLs; assess patient's baseline for ADL function and identify physical deficits which impact ability to perform ADLs (bathing, care of mouth/teeth, toileting, grooming, dressing, etc )  - Assess/evaluate cause of self-care deficits   - Assess range of motion  - Assess patient's mobility; develop plan if impaired  - Assess patient's need for assistive devices and provide as appropriate  - Encourage maximum independence but intervene and supervise when necessary  - Involve family in performance of ADLs  - Assess for home care needs following discharge   - Consider OT consult to assist with ADL evaluation and planning for discharge  - Provide patient education as appropriate  Outcome: Progressing  Goal: Maintains/Returns to pre admission functional level  Description: INTERVENTIONS:  - Perform BMAT or MOVE assessment daily    - Set and communicate daily mobility goal to care team and patient/family/caregiver     - Collaborate with rehabilitation services on mobility goals if consulted  - Out of bed for toileting  - Record patient progress and toleration of activity level   Outcome: Progressing     Problem: DISCHARGE PLANNING  Goal: Discharge to home or other facility with appropriate resources  Description: INTERVENTIONS:  - Identify barriers to discharge w/patient and caregiver  - Arrange for needed discharge resources and transportation as appropriate  - Identify discharge learning needs (meds, wound care, etc )  - Arrange for interpretive services to assist at discharge as needed  - Refer to Case Management Department for coordinating discharge planning if the patient needs post-hospital services based on physician/advanced practitioner order or complex needs related to functional status, cognitive ability, or social support system  Outcome: Progressing     Problem: Knowledge Deficit  Goal: Patient/family/caregiver demonstrates understanding of disease process, treatment plan, medications, and discharge instructions  Description: Complete learning assessment and assess knowledge base    Interventions:  - Provide teaching at level of understanding  - Provide teaching via preferred learning methods  Outcome: Progressing

## 2022-10-22 NOTE — DISCHARGE SUMMARY
Discharge Summary - Orthopedics   David Santos 33 y o  male MRN: [id number]  [facility]    Attending Physician: Melissa Bullock    Admitting diagnosis: Open displaced fracture of proximal phalanx of right ring finger, initial encounter [M55 519Z]    Discharge diagnosis: Open displaced fracture of proximal phalanx of right ring finger, initial encounter [V18 118M]    Date of admission: 10/20/2022    Date of discharge: 10/22/22    Procedure: R ring finger pinning, I&D and extensor tendon repair    HPI: 33 y o  male with a history of crush injury to the right ring finger with an open right ring finger proximal phalanx fracture and extensor tendon injury who has been seen by Dr Melissa Bullock  Pt was scheduled for R ring finger pinning, I&D and extensor tendon repair  Prior to surgery the risks and benefits of surgery were explained and informed consent was obtained  Hospital course: Pt was taken to the OR on 10/21  Surgery went without complications and pt was discharged to the PACU in a stable condition and was transferred to the floor  On discharge date pt was cleared by PT and the medicine team and determined to be safe for discharge  Daily discussion was had with the patient, nursing staff, orthopaedic team, and family members if present  All questions were answered to the patients satisifaction        0   Lab Value Date/Time    HGB 13 2 10/22/2022 0627    HGB 12 2 10/21/2022 0231    HGB 15 7 11/09/2020 1720    HGB 16 8 11/07/2020 1113    HGB 16 3 06/28/2020 0959    HGB 17 8 (H) 02/12/2020 1435    HGB 14 7 10/24/2019 0517    HGB 14 6 10/23/2019 0502    HGB 16 5 10/22/2019 1833    HGB 14 7 10/02/2019 0442    HGB 17 2 (H) 10/01/2019 0751    HGB 17 8 (H) 09/29/2019 0807    HGB 11 7 (L) 04/24/2019 0632    HGB 11 8 (L) 04/23/2019 0620    HGB 11 2 (L) 04/22/2019 0553    HGB 12 7 04/21/2019 1046    HGB 15 8 04/13/2019 0717    HGB 18 1 (H) 03/18/2019 1737    HGB 17 6 (H) 01/28/2019 0800    B 15 2 11/17/2018 1748 HGB 16 8 11/17/2018 0751    HGB 16 3 10/15/2018 0753    HGB 16 3 10/12/2018 0837    HGB 16 5 06/04/2018 1558    HGB 17 3 (H) 06/02/2018 0603    HGB 17 7 (H) 05/30/2014 0117    HGB 17 7 (H) 05/29/2014 1821       Patient was monitored for diagnosis of acute blood loss anemia  Vital signs remained stable and pt was resuscitated with IVF as needed  Discharge Instructions:   · NWB RUE in splint  · Keep dressings clean and dry at all times   · Physical therapy  · Follow-up with Dr Rosey Dumont in 2 weeks    Discharge Medications: For the complete list of discharge medications, please refer to the patient's medication reconciliation

## 2022-10-22 NOTE — PLAN OF CARE
Problem: PAIN - ADULT  Goal: Verbalizes/displays adequate comfort level or baseline comfort level  Description: Interventions:  - Encourage patient to monitor pain and request assistance  - Assess pain using appropriate pain scale  - Administer analgesics based on type and severity of pain and evaluate response  - Implement non-pharmacological measures as appropriate and evaluate response  - Consider cultural and social influences on pain and pain management  - Notify physician/advanced practitioner if interventions unsuccessful or patient reports new pain  Outcome: Adequate for Discharge     Problem: INFECTION - ADULT  Goal: Absence or prevention of progression during hospitalization  Description: INTERVENTIONS:  - Assess and monitor for signs and symptoms of infection  - Monitor lab/diagnostic results  - Monitor all insertion sites, i e  indwelling lines, tubes, and drains  - Monitor endotracheal if appropriate and nasal secretions for changes in amount and color  - [place] appropriate cooling/warming therapies per order  - Administer medications as ordered  - Instruct and encourage patient and family to use good hand hygiene technique  - Identify and instruct in appropriate isolation precautions for identified infection/condition  Outcome: Adequate for Discharge  Goal: Absence of fever/infection during neutropenic period  Description: INTERVENTIONS:  - Monitor WBC    Outcome: Adequate for Discharge     Problem: SAFETY ADULT  Goal: Patient will remain free of falls  Description: INTERVENTIONS:  - Educate patient/family on patient safety including physical limitations  - Instruct patient to call for assistance with activity   - Consult OT/PT to assist with strengthening/mobility   - Keep Call bell within reach  - Keep bed low and locked with side rails adjusted as appropriate  - Keep care items and personal belongings within reach  - Initiate and maintain comfort rounds  - Make Fall Risk Sign visible to staff  - Offer Toileting every  Hours, in advance of need  - Initiate/Maintain alarm  - Obtain necessary fall risk management equipment:   - Apply yellow socks and bracelet for high fall risk patients  - Consider moving patient to room near nurses station  Outcome: Adequate for Discharge  Goal: Maintain or return to baseline ADL function  Description: INTERVENTIONS:  -  Assess patient's ability to carry out ADLs; assess patient's baseline for ADL function and identify physical deficits which impact ability to perform ADLs (bathing, care of mouth/teeth, toileting, grooming, dressing, etc )  - Assess/evaluate cause of self-care deficits   - Assess range of motion  - Assess patient's mobility; develop plan if impaired  - Assess patient's need for assistive devices and provide as appropriate  - Encourage maximum independence but intervene and supervise when necessary  - Involve family in performance of ADLs  - Assess for home care needs following discharge   - Consider OT consult to assist with ADL evaluation and planning for discharge  - Provide patient education as appropriate  Outcome: Adequate for Discharge  Goal: Maintains/Returns to pre admission functional level  Description: INTERVENTIONS:  - Perform BMAT or MOVE assessment daily    - Set and communicate daily mobility goal to care team and patient/family/caregiver  - Collaborate with rehabilitation services on mobility goals if consulted  - Perform Range of Motion  times a day  - Reposition patient every  hours    - Dangle patient  times a day  - Stand patient  times a day  - Ambulate patient  times a day  - Out of bed to chair  times a day   - Out of bed for yanelis times a day  - Out of bed for toileting  - Record patient progress and toleration of activity level   Outcome: Adequate for Discharge     Problem: DISCHARGE PLANNING  Goal: Discharge to home or other facility with appropriate resources  Description: INTERVENTIONS:  - Identify barriers to discharge w/patient and caregiver  - Arrange for needed discharge resources and transportation as appropriate  - Identify discharge learning needs (meds, wound care, etc )  - Arrange for interpretive services to assist at discharge as needed  - Refer to Case Management Department for coordinating discharge planning if the patient needs post-hospital services based on physician/advanced practitioner order or complex needs related to functional status, cognitive ability, or social support system  Outcome: Adequate for Discharge     Problem: Knowledge Deficit  Goal: Patient/family/caregiver demonstrates understanding of disease process, treatment plan, medications, and discharge instructions  Description: Complete learning assessment and assess knowledge base    Interventions:  - Provide teaching at level of understanding  - Provide teaching via preferred learning methods  Outcome: Adequate for Discharge

## 2022-10-22 NOTE — PROGRESS NOTES
Progress Note - Orthopedics   Armen Olszewski 33 y o  male MRN: [id number]  Unit/Bed#: [facility] 616-01      Subjective:    No acute issues overnight, pain well controlled       Labs:  0   Lab Value Date/Time    HCT 40 5 10/22/2022 0627    HCT 36 1 (L) 10/21/2022 0231    HCT 44 5 11/09/2020 1720    HCT 47 3 05/29/2014 1821    HGB 13 2 10/22/2022 0627    HGB 12 2 10/21/2022 0231    HGB 15 7 11/09/2020 1720    HGB 17 7 (H) 05/30/2014 0117    HGB 17 7 (H) 05/29/2014 1821    INR 0 84 10/21/2022 0231    WBC 13 40 (H) 10/22/2022 0627    WBC 10 32 (H) 10/21/2022 0231    WBC 11 64 (H) 11/09/2020 1720    WBC 10 03 05/29/2014 1821       Meds:    Current Facility-Administered Medications:   •  acetaminophen (TYLENOL) tablet 975 mg, 975 mg, Oral, Q8H [address], Swathi Fortune MD, 975 mg at 10/22/22 0609  •  aluminum-magnesium hydroxide-simethicone (MYLANTA) oral suspension 30 mL, 30 mL, Oral, Q6H PRN, Sawthi Fortune MD  •  buprenorphine-naloxone (Suboxone) film 8 mg, 8 mg, Sublingual, Daily, Swathi Fortune MD, 8 mg at 10/21/22 2096  •  calcium carbonate (TUMS) chewable tablet 1,000 mg, 1,000 mg, Oral, Daily PRN, Swathi Fortune MD  •  ceFAZolin (ANCEF) IVPB (premix in dextrose) 2,000 mg 50 mL, 2,000 mg, Intravenous, Q8H, Dariel Man MD, Last Rate: 100 mL/hr at 10/22/22 0609, 2,000 mg at 10/22/22 0609  •  docusate sodium (COLACE) capsule 100 mg, 100 mg, Oral, BID, Karyna Man MD, 100 mg at 10/21/22 1844  •  melatonin tablet 3 mg, 3 mg, Oral, HS, Karyna Man MD, 3 mg at 10/21/22 2107  •  ondansetron (ZOFRAN-ODT) dispersible tablet 4 mg, 4 mg, Oral, Q6H PRN, Swathi Fortune MD  •  OXcarbazepine (TRILEPTAL) tablet 300 mg, 300 mg, Oral, BID, Dariel Man MD, 300 mg at 10/21/22 1844  •  pantoprazole (PROTONIX) EC tablet 20 mg, 20 mg, Oral, Early Morning, Dariel Man MD, 20 mg at 10/22/22 0609  •  QUEtiapine (SEROquel) tablet 300 mg, 300 mg, Oral, HS, Swathi Fortune MD, 300 mg at 10/21/22 2107  • QUEtiapine (SEROquel) tablet 50 mg, 50 mg, Oral, HS, Pratik Hernesto Man MD, 50 mg at 10/21/22 2107  •  senna (SENOKOT) tablet 8 6 mg, 1 tablet, Oral, Daily, Moisés Castellanos MD  •  simethicone (MYLICON) chewable tablet 80 mg, 80 mg, Oral, 4x Daily PRN, Moisés Castellanos MD  •  venlafaxine (EFFEXOR-XR) 24 hr capsule 75 mg, 75 mg, Oral, Daily, Moisés Castellanos MD, 75 mg at 10/21/22 0810    Blood Culture:   Lab Results   Component Value Date    BLOODCX No Growth After 5 Days  04/21/2019       Wound Culture:   No results found for: WOUNDCULT    Ins and Outs:  I/O last 24 hours: In: 1280 [P O :240; I V :990; IV Piggyback:50]  Out: -           Physical:  Vitals:    10/22/22 0014   BP: 97/64   Pulse: 82   Resp:    Temp:    SpO2:      Musculoskeletal: right Upper Extremity  · Splint in place  · Sensation intact to light touch m/r/u  · Motor intact m/r/u/ain/pin  · Fingers warm and well perused      Assessment:    32 y o male sp R ring finger pinning, I&D and extensor tendon repair, overall doing well     Plan:  · NWB RUE in splint  · Will monitor for ABLA  · PT/OT  · Pain control  · DVT PPX: SCDs, encourage ambulation  · Dispo:  Ok to discharge from MD Mateusz

## 2022-10-22 NOTE — PLAN OF CARE
Problem: PAIN - ADULT  Goal: Verbalizes/displays adequate comfort level or baseline comfort level  Description: Interventions:  - Encourage patient to monitor pain and request assistance  - Assess pain using appropriate pain scale  - Administer analgesics based on type and severity of pain and evaluate response  - Implement non-pharmacological measures as appropriate and evaluate response  - Consider cultural and social influences on pain and pain management  - Notify physician/advanced practitioner if interventions unsuccessful or patient reports new pain  Outcome: Progressing     Problem: INFECTION - ADULT  Goal: Absence or prevention of progression during hospitalization  Description: INTERVENTIONS:  - Assess and monitor for signs and symptoms of infection  - Monitor lab/diagnostic results  - Monitor all insertion sites, i e  indwelling lines, tubes, and drains  - Monitor endotracheal if appropriate and nasal secretions for changes in amount and color  - [place] appropriate cooling/warming therapies per order  - Administer medications as ordered  - Instruct and encourage patient and family to use good hand hygiene technique  - Identify and instruct in appropriate isolation precautions for identified infection/condition  Outcome: Progressing  Goal: Absence of fever/infection during neutropenic period  Description: INTERVENTIONS:  - Monitor WBC    Outcome: Progressing     Problem: SAFETY ADULT  Goal: Patient will remain free of falls  Description: INTERVENTIONS:  - Educate patient/family on patient safety including physical limitations  - Instruct patient to call for assistance with activity   - Consult OT/PT to assist with strengthening/mobility   - Keep Call bell within reach  - Keep bed low and locked with side rails adjusted as appropriate  - Keep care items and personal belongings within reach  - Initiate and maintain comfort rounds  - Make Fall Risk Sign visible to staff  - Apply yellow socks and bracelet for high fall risk patients  - Consider moving patient to room near nurses station  Outcome: Progressing  Goal: Maintain or return to baseline ADL function  Description: INTERVENTIONS:  -  Assess patient's ability to carry out ADLs; assess patient's baseline for ADL function and identify physical deficits which impact ability to perform ADLs (bathing, care of mouth/teeth, toileting, grooming, dressing, etc )  - Assess/evaluate cause of self-care deficits   - Assess range of motion  - Assess patient's mobility; develop plan if impaired  - Assess patient's need for assistive devices and provide as appropriate  - Encourage maximum independence but intervene and supervise when necessary  - Involve family in performance of ADLs  - Assess for home care needs following discharge   - Consider OT consult to assist with ADL evaluation and planning for discharge  - Provide patient education as appropriate  Outcome: Progressing  Goal: Maintains/Returns to pre admission functional level  Description: INTERVENTIONS:  - Perform BMAT or MOVE assessment daily    - Set and communicate daily mobility goal to care team and patient/family/caregiver     - Collaborate with rehabilitation services on mobility goals if consulted  - Record patient progress and toleration of activity level   Outcome: Progressing     Problem: DISCHARGE PLANNING  Goal: Discharge to home or other facility with appropriate resources  Description: INTERVENTIONS:  - Identify barriers to discharge w/patient and caregiver  - Arrange for needed discharge resources and transportation as appropriate  - Identify discharge learning needs (meds, wound care, etc )  - Arrange for interpretive services to assist at discharge as needed  - Refer to Case Management Department for coordinating discharge planning if the patient needs post-hospital services based on physician/advanced practitioner order or complex needs related to functional status, cognitive ability, or social support system  Outcome: Progressing     Problem: Knowledge Deficit  Goal: Patient/family/caregiver demonstrates understanding of disease process, treatment plan, medications, and discharge instructions  Description: Complete learning assessment and assess knowledge base    Interventions:  - Provide teaching at level of understanding  - Provide teaching via preferred learning methods  Outcome: Progressing

## 2022-10-23 NOTE — OP NOTE
=  OPERATIVE REPORT  PATIENT NAME: Columba Mata  :  1990  MRN: [id number]  Pt Location: [facility]    SURGERY DATE: 10/21/22  Surgeon(s) and Role:     * Janelle Cordova MD - Primary     * Rosey Araujo MD - Assisting     * Arnold Fan MD - Assisting    Pre-Op Diagnosis:  Open displaced fracture of proximal phalanx of right ring finger, initial encounter [G19 846O]    Post-Op Diagnosis Codes:     * Open displaced fracture of proximal phalanx of right ring finger, initial encounter [T50 [id number]]    Procedure(s):  OPEN REDUCTION W/ INTERNAL FIXATION (ORIF)  FINGER- Right ring finger, extensor tendon repair, irrigation and debridement Proximal Phalanx (right ring finger) (Right)    Specimen(s):  Order Name Source Comment Collection Info Order Time   BASIC METABOLIC PANEL Arm, Left  Collected By: Camilo Dumont RN 10/22/2022  4:00 AM   CBC AND DIFFERENTIAL Arm, Left  Collected By: Camilo Dumont RN 10/22/2022  4:00 AM       Estimated Blood Loss:   Minimal    Anesthesia Type:   General    IMPLANTS:  Implant Name Type Inv  Item Serial No   Lot No  LRB No  Used Action   LOG [id number] - [employer] MODULAR MINI FRAG LCP SYSTEM - 1           LOG [id number] - [employer] MODULAR HAND SYSTEM TITANIUM IMPLANTS AND INSTRUMENTS - 1           C-WIRE GREEN  045 - [id number]  C-WIRE GREEN  045  [employer]  Right 2 Implanted and Explanted   K-WIRE C 035 BLUE - [id number]  K-WIRE C 035 BLUE  [employer]  Right 4 Implanted and Explanted       PERIOPERATIVE ANTIBIOTICS:    cefazolin, 2 grams    Tourniquet Time: 73 min  at 250 mmHg          Operative Indications: The patient has a history of right ring finger crush injury that was recalcitrant to conservative management    The decision was made to bring the patient to the operating room for right ring finger proximal phalanx open versus closed reduction, internal fixation, exploration debrided, all other necessary procedures  Risks of the procedure were explained which include, but are not limited to bleeding; infection; damage to nerves, arteries,veins, tendons; scar; pain; need for reoperation; failure to give desired result; and risks of anaesthesia  All questions were answered to satisfaction and they were willing to proceed  Operative Findings:  Complete avulsion of A2 pulley  Intact FDS and FDP tendons  Complete laceration with segmental loss of approximately 70% of the extensor tendon overlying the proximal phalanx site  Rupture of radial digital artery, nerve intact     Complications:   None    Procedure and Technique:  After the patient, site, and procedure were identified, the patient was brought into the operating room in a supine position  General anaesthesia and local medication were provided  A well padded tourniquet was applied to the extremity, set at 250 mmHg  The  right upper extremity was then prepped and drapped in a normal, sterile, orthopedic fashion  The volar laceration was extended proximally in an oblique fashion overlying the A1 pulley  Full-thickness skin flaps were elevated  Under loupe magnification dissection was undertaken  The FDS and FDP tendons were noted to be intact, there was complete avulsion of the A2 pulley  Next the radial digital neurovascular bundle was identified and was dissected anterograde  It was noted that there was a rupture of the radial digital artery, but but a major and a minor branch of the radial digital nerve was intact  Further inspection demonstrated some comminution of the transverse fracture site of the proximal phalanx  Digit was then inset explored from the dorsal aspect, the laceration was extended proximally and distally  It was noted there was near complete loss of about 70% of the extensor tendon overlying the area of the fracture site with the proximally to 20% of the overall with remaining in continuity    Prior to proceeding an excisional debridement was performed of bone, fascia skin and tendon but not muscle in a total area of 3 x 3 cm  Once the wound was fully debrided back to healthy bleeding tissue with a rongeur and a dissection scissor, the wound was irrigated with 3 L of sterile saline  Next preliminary reduction was performed of the proximal phalanx, and a temporary 0 045 K-wire advanced on oscillator to maintain reduction  Orthogonal fluoroscopic films demonstrated near anatomic alignment however slight rotational deformity remained secondary to the significant comminution  Once appropriate alignment was achieved, additional 30 035 K-wires were advanced on oscillate in a retrograde fashion, creating a bouquet type construct providing a stable construct  The provisional pin was removed  Next attention was turned to the extensor records  Using grasp in type suture pattern the 2 ends of the extensor tendon were brought to the remaining 20% which was in continuity, eventually resulting in a primary repair of the extensor tendon of the index finger  The wound was again irrigated  The pins were cut below the skin surface  At the completion of the procedure, hemostasis was obtained with cautery and direct pressure  The wounds were copiously irrigated with sterile solution  The wounds were closed with Prolene  Sterile dressings were applied, including Xeroform, gauze, tweeners, webril, ACE, Sling and ulnar gutter splint  Please note, all sponge, needle, and instrument counts were correct prior to closure  Loupe magnification was utilized  The patient tolerated the procedure well       I was present for the entire procedure    Patient Disposition:  PACU     SIGNATURE: Theresa Velasquez  DATE: 10/23/22  TIME: 5:21 PM

## 2022-10-24 ENCOUNTER — TELEPHONE (OUTPATIENT)
Dept: OBGYN CLINIC | Facility: CLINIC | Age: 32
End: 2022-10-24

## 2022-10-24 NOTE — TELEPHONE ENCOUNTER
----- Message from Sushma Root MD sent at 10/22/2022  9:30 AM EDT -----  10/21: R RF P1 I&D, pinning, extensor tendon repair with Dr Doroteo Merino  Please follow up with Dr Doroteo Merino in 2 weeks

## 2022-10-24 NOTE — TELEPHONE ENCOUNTER
Caller: Shaheen Youssef Case management     Doctor: Kanika Cormier     Reason for call: Called to see if patient had a follow up appt       Call back#:

## 2022-10-25 ENCOUNTER — TELEPHONE (OUTPATIENT)
Dept: OBGYN CLINIC | Facility: HOSPITAL | Age: 32
End: 2022-10-25

## 2022-10-25 NOTE — TELEPHONE ENCOUNTER
Caller: Rodell Schaumann (Nurse )     Doctor: Jayme Portillo     Reason for call: Patient would like her to set up appointment so she can attend     Call back#: [phone]

## 2022-10-25 NOTE — TELEPHONE ENCOUNTER
Spoke with patient for [facility] call  Patient is aware of the PO appt  Case Mgt is providing transport

## 2022-10-25 NOTE — TELEPHONE ENCOUNTER
Caller: Gerhard Garcia    Doctor: Christal Powers    Reason for call: Patient had surgery on 10/21/2022  Calling for post-op appt      Call back#: [phone]

## 2022-10-28 NOTE — TELEPHONE ENCOUNTER
Caller: francisco javier jorge-     Doctor: eliecer    Reason for call: requesting a letter stating no work  10/21/22-11/3/22    Call back#: [phone]

## 2022-11-03 ENCOUNTER — OFFICE VISIT (OUTPATIENT)
Dept: OCCUPATIONAL THERAPY | Facility: CLINIC | Age: 32
End: 2022-11-03

## 2022-11-03 ENCOUNTER — APPOINTMENT (OUTPATIENT)
Dept: RADIOLOGY | Facility: AMBULARY SURGERY CENTER | Age: 32
End: 2022-11-03
Attending: SURGERY

## 2022-11-03 ENCOUNTER — OFFICE VISIT (OUTPATIENT)
Dept: OBGYN CLINIC | Facility: CLINIC | Age: 32
End: 2022-11-03

## 2022-11-03 ENCOUNTER — TELEPHONE (OUTPATIENT)
Dept: OBGYN CLINIC | Facility: CLINIC | Age: 32
End: 2022-11-03

## 2022-11-03 VITALS
RESPIRATION RATE: 16 BRPM | SYSTOLIC BLOOD PRESSURE: 115 MMHG | BODY MASS INDEX: 32.64 KG/M2 | DIASTOLIC BLOOD PRESSURE: 82 MMHG | WEIGHT: 241 LBS | HEART RATE: 62 BPM | HEIGHT: 72 IN

## 2022-11-03 DIAGNOSIS — Z47.89 AFTERCARE FOLLOWING SURGERY OF THE MUSCULOSKELETAL SYSTEM: ICD-10-CM

## 2022-11-03 DIAGNOSIS — S69.90XA FINGER INJURY, INITIAL ENCOUNTER: Primary | ICD-10-CM

## 2022-11-03 DIAGNOSIS — Z47.89 AFTERCARE FOLLOWING SURGERY OF THE MUSCULOSKELETAL SYSTEM: Primary | ICD-10-CM

## 2022-11-03 RX ORDER — BUPRENORPHINE 10 UG/H
1 PATCH TRANSDERMAL
COMMUNITY

## 2022-11-03 NOTE — PROGRESS NOTES
Orthosis    Diagnosis:   1  Finger injury, initial encounter       Indication: Motion Blocking    Location: Right  hand, long finger, ring finger and small finger  Supplies: Custom Fit Orthotic  Orthosis type: Ulnar Gutter Hand Based  Wearing Schedule: Remove for hygiene only  Describe Position: including border digits to IPs    Precautions: Universal (skin contact/breakdown)    Patient or Caregiver expresses understanding of wearing Schedule and Precautions? Yes  Patient or Caregiver able to don/doff orthotic independently? Yes    Written orders provided to patient?  Yes  Orders Obtained: Written  Orders Obtained from: eliecer    Return for evaluation and treatment Yes, at a location closer to his home

## 2022-11-03 NOTE — LETTER
November 3, 2022     Patient: Dia Glover  YOB: 1990  Date of Visit: 11/3/2022      To Whom it May Concern:    Zenon Kessler is under my professional care  Tyler was seen in my office on 11/3/2022  Tyler is to limited work with no use of the right hand until re-evaluated in 2 weeks  If you have any questions or concerns, please don't hesitate to call           Sincerely,          Barbie Malave MD        CC: No Recipients

## 2022-11-03 NOTE — PROGRESS NOTES
Assessment/Plan:  Patient ID: Akiko Chin 33 y o  male   Surgery: OPEN REDUCTION W/ INTERNAL FIXATION (ORIF)  FINGER- Right ring finger, extensor tendon repair, irrigation and debridement Proximal Phalanx (right ring finger) - Right  Date of Surgery: 10/21/2022    At this time start OT to work on MP joint motion and isloating the DIP joint at this point  In 2 weeks he can start moving the PIP joint  He will be sent over for a custom made ulnar gutter splint to be worn all the time like a cast  Can take it on and off for showers  Reviewed what was seen while in surgery: The A2 pulley was layed over to scar down  The artery was repaired but nerve was intact  The extensor tendons were pieced together with what was left over from the nature of the injury  Pins will come out around 6 weeks post op, which are buried and will be taken out in the OR  Follow Up:  2  week(s)     To Do Next Visit:  Re-evaluate with xrays of the right hand      CHIEF COMPLAINT:  Chief Complaint   Patient presents with   • Right Ring Finger - Post-op         SUBJECTIVE:  Akiko Chin is a 33y o  year old male who presents for follow up after OPEN REDUCTION W/ INTERNAL FIXATION (ORIF)  FINGER- Right ring finger, extensor tendon repair, irrigation and debridement Proximal Phalanx (right ring finger) - Right  He presents in a post op dressing today  Today patient has Pain  Moderate  Constant  Dull and Aching and Stiffness/LROM         PHYSICAL EXAMINATION:  General: well developed and well nourished, alert, oriented times 3 and appears comfortable  Psychiatric: Normal    MUSCULOSKELETAL EXAMINATION:  Incision: Clean, dry, intact, healed and suture(s) intact   Surgery Site: normal, no evidence of infection  and swelling present  Range of Motion: As expected  Neurovascular status: Neuro intact, good cap refill  Activity Restrictions: Cast/splint restrictions  Done today: Sutures out      STUDIES REVIEWED:  Images were reviewed in PACS by Dr Rosalina Kruger and demonstrate:  Stable alignment of ring finger proximal phalanx fracture, appropriately positioned pins      PROCEDURES PERFORMED:  Procedures  No Procedures performed today    Scribe Attestation    I,:  Kyle Muse am acting as a scribe while in the presence of the attending physician :       I,:  Kevin Fuentes MD personally performed the services described in this documentation    as scribed in my presence :

## 2022-11-17 ENCOUNTER — OFFICE VISIT (OUTPATIENT)
Dept: OBGYN CLINIC | Facility: CLINIC | Age: 32
End: 2022-11-17

## 2022-11-17 ENCOUNTER — APPOINTMENT (OUTPATIENT)
Dept: RADIOLOGY | Facility: AMBULARY SURGERY CENTER | Age: 32
End: 2022-11-17
Attending: SURGERY

## 2022-11-17 VITALS
SYSTOLIC BLOOD PRESSURE: 124 MMHG | DIASTOLIC BLOOD PRESSURE: 79 MMHG | WEIGHT: 239.6 LBS | BODY MASS INDEX: 32.45 KG/M2 | HEIGHT: 72 IN | HEART RATE: 78 BPM | RESPIRATION RATE: 16 BRPM

## 2022-11-17 DIAGNOSIS — S62.614G: ICD-10-CM

## 2022-11-17 DIAGNOSIS — Z47.89 AFTERCARE FOLLOWING SURGERY OF THE MUSCULOSKELETAL SYSTEM: ICD-10-CM

## 2022-11-17 DIAGNOSIS — Z96.9 RETAINED ORTHOPEDIC HARDWARE: Primary | ICD-10-CM

## 2022-11-17 RX ORDER — CHLORHEXIDINE GLUCONATE 0.12 MG/ML
15 RINSE ORAL ONCE
OUTPATIENT
Start: 2022-11-17 | End: 2022-11-17

## 2022-11-17 NOTE — H&P
Assessment/Plan:  Patient ID: Sheri Alvarado 33 y o  male   Surgery: OPEN REDUCTION W/ INTERNAL FIXATION (ORIF)  FINGER- Right ring finger, extensor tendon repair, irrigation and debridement Proximal Phalanx (right ring finger) - Right  Date of Surgery: 10/21/2022    xrays reviewed and do not demonstrate a lot of healing at the fracture site  Plan for pin removal in the OR on 12/2  Risks, benefits, and alternatives were reviewed and informed consent was signed in office today for pin removal from the right ring finger  He should continue working on motion at the DIP and DIP joints   After pins are out can begin transitioning out of splint, but continue wearing it for now   No use of hand for work, note provided    Follow Up: After Surgery    To Do Next Visit:  X-rays of the  right  ring finger      CHIEF COMPLAINT:  Chief Complaint   Patient presents with   • Right Ring Finger - Post-op, Follow-up   • Right Hand - Post-op, Follow-up         SUBJECTIVE:  Sheri Alvarado is a 33y o  year old male who presents for follow up after OPEN REDUCTION W/ INTERNAL FIXATION (ORIF)  FINGER- Right ring finger, extensor tendon repair, irrigation and debridement Proximal Phalanx (right ring finger) - Right  Today patient has some soreness and a lot of stiffness in that finger   He is working with therapy for motion      Review of Systems - History obtained from the patient  General ROS: negative for - chills, fatigue or fever  ENT ROS: negative  Respiratory ROS: no cough, shortness of breath, or wheezing  Cardiovascular ROS: no chest pain or dyspnea on exertion  Musculoskeletal ROS: positive for - joint pain and joint stiffness  negative for - gait disturbance or muscle pain  Neurological ROS: no TIA or stroke symptoms  Dermatological ROS: negative      Vitals:    11/17/22 1304   BP: 124/79   Pulse: 78   Resp: 16       PHYSICAL EXAMINATION:  General: well developed and well nourished, alert, oriented times 3 and appears comfortable  Psychiatric: Normal  HEENT: Normocephalic, Atraumatic Trachea Midline, No torticollis  Abdominal: no distention, no tenderness  Pulmonary: No audible wheezing or respiratory distress   Cardiovascular: No pitting edema, 2+ radial pulse   Skin: see below  Musculoskeletal: Normal, except as noted in detailed exam and in HPI  MUSCULOSKELETAL EXAMINATION:  Incision: Clean, dry, intact and healing  Surgery Site: normal, no evidence of infection   Range of Motion: As expected and Limited due to stiffness  Neurovascular status: Neuro intact, good cap refill  Activity Restrictions: Cast/splint restrictions         STUDIES REVIEWED:  Images were reviewed in PACS by Dr Chela Waller and demonstrate: well aligned ring finger P1 fracture with pins in place   No significant healing noted       PROCEDURES PERFORMED:  Procedures  No Procedures performed today    Scribe Attestation    I,:   am acting as a scribe while in the presence of the attending physician :       I,:   personally performed the services described in this documentation    as scribed in my presence :

## 2022-11-17 NOTE — PROGRESS NOTES
Assessment/Plan:  Patient ID: Jeannie Salamanca 33 y o  male   Surgery: OPEN REDUCTION W/ INTERNAL FIXATION (ORIF)  FINGER- Right ring finger, extensor tendon repair, irrigation and debridement Proximal Phalanx (right ring finger) - Right  Date of Surgery: 10/21/2022    xrays reviewed and do not demonstrate a lot of healing at the fracture site  Plan for pin removal in the OR on 12/2  Risks, benefits, and alternatives were reviewed and informed consent was signed in office today for pin removal from the right ring finger  He should continue working on motion at the DIP and DIP joints   After pins are out can begin transitioning out of splint, but continue wearing it for now   No use of hand for work, note provided    Follow Up: After Surgery    To Do Next Visit:  X-rays of the  right  ring finger      CHIEF COMPLAINT:  Chief Complaint   Patient presents with   • Right Ring Finger - Post-op, Follow-up   • Right Hand - Post-op, Follow-up         SUBJECTIVE:  Jeannie Salamanca is a 33y o  year old male who presents for follow up after OPEN REDUCTION W/ INTERNAL FIXATION (ORIF)  FINGER- Right ring finger, extensor tendon repair, irrigation and debridement Proximal Phalanx (right ring finger) - Right  Today patient has some soreness and a lot of stiffness in that finger   He is working with therapy for motion      Review of Systems - History obtained from the patient  General ROS: negative for - chills, fatigue or fever  ENT ROS: negative  Respiratory ROS: no cough, shortness of breath, or wheezing  Cardiovascular ROS: no chest pain or dyspnea on exertion  Musculoskeletal ROS: positive for - joint pain and joint stiffness  negative for - gait disturbance or muscle pain  Neurological ROS: no TIA or stroke symptoms  Dermatological ROS: negative      Vitals:    11/17/22 1304   BP: 124/79   Pulse: 78   Resp: 16       PHYSICAL EXAMINATION:  General: well developed and well nourished, alert, oriented times 3 and appears comfortable  Psychiatric: Normal  HEENT: Normocephalic, Atraumatic Trachea Midline, No torticollis  Abdominal: no distention, no tenderness  Pulmonary: No audible wheezing or respiratory distress   Cardiovascular: No pitting edema, 2+ radial pulse   Skin: see below  Musculoskeletal: Normal, except as noted in detailed exam and in HPI  MUSCULOSKELETAL EXAMINATION:  Incision: Clean, dry, intact and healing  Surgery Site: normal, no evidence of infection   Range of Motion: As expected and Limited due to stiffness  Neurovascular status: Neuro intact, good cap refill  Activity Restrictions: Cast/splint restrictions         STUDIES REVIEWED:  Images were reviewed in PACS by Dr Ophelia Best and demonstrate: well aligned ring finger P1 fracture with pins in place   No significant healing noted       PROCEDURES PERFORMED:  Procedures  No Procedures performed today    Scribe Attestation    I,:   am acting as a scribe while in the presence of the attending physician :       I,:   personally performed the services described in this documentation    as scribed in my presence :

## 2022-12-01 ENCOUNTER — ANESTHESIA EVENT (OUTPATIENT)
Dept: PERIOP | Facility: HOSPITAL | Age: 32
End: 2022-12-01

## 2022-12-01 PROBLEM — Z96.9 RETAINED ORTHOPEDIC HARDWARE: Status: ACTIVE | Noted: 2022-12-01

## 2022-12-01 RX ORDER — HYDROCODONE BITARTRATE AND ACETAMINOPHEN 5; 325 MG/1; MG/1
1 TABLET ORAL EVERY 6 HOURS PRN
Qty: 5 TABLET | Refills: 0 | Status: CANCELLED | OUTPATIENT
Start: 2022-12-01 | End: 2022-12-11

## 2022-12-01 NOTE — PRE-PROCEDURE INSTRUCTIONS
Pre-Surgery Instructions:   Medication Instructions   • buprenorphine-naloxone (Suboxone) 8-2 mg Take day of surgery  • omeprazole (PriLOSEC) 20 mg delayed release capsule Take day of surgery  • ondansetron (ZOFRAN-ODT) 4 mg disintegrating tablet Uses PRN- OK to take day of surgery   • OXcarbazepine (TRILEPTAL) 300 mg tablet Take day of surgery  • QUEtiapine (SEROquel) 300 mg tablet Take night before surgery   • QUEtiapine (SEROquel) 50 mg tablet Take day of surgery  • venlafaxine (EFFEXOR-XR) 75 mg 24 hr capsule Take day of surgery  Pt denies fever, sob, sore throat and cough  Pt verbalized understanding of shower and med instructions  Pt verbalized understanding of shower and med instructions  Pt instructed to stop nsaids and supplements prior to surgery

## 2022-12-01 NOTE — H&P
Assessment/Plan:  Patient ID: Bridgette Delacruz 33 y o  male   Surgery: OPEN REDUCTION W/ INTERNAL FIXATION (ORIF)  FINGER- Right ring finger, extensor tendon repair, irrigation and debridement Proximal Phalanx (right ring finger) - Right  Date of Surgery: 10/21/2022    xrays reviewed and do not demonstrate a lot of healing at the fracture site  Plan for pin removal in the OR on 12/2  Risks, benefits, and alternatives were reviewed and informed consent was signed in office today for pin removal from the right ring finger  He should continue working on motion at the DIP and DIP joints   After pins are out can begin transitioning out of splint, but continue wearing it for now   No use of hand for work, note provided    Follow Up: After Surgery    To Do Next Visit:  X-rays of the  right  ring finger      CHIEF COMPLAINT:  Chief Complaint   Patient presents with   • Right Ring Finger - Post-op, Follow-up   • Right Hand - Post-op, Follow-up         SUBJECTIVE:  Bridgette Delacruz is a 33y o  year old male who presents for follow up after OPEN REDUCTION W/ INTERNAL FIXATION (ORIF)  FINGER- Right ring finger, extensor tendon repair, irrigation and debridement Proximal Phalanx (right ring finger) - Right  Today patient has some soreness and a lot of stiffness in that finger   He is working with therapy for motion      Review of Systems - History obtained from the patient  General ROS: negative for - chills, fatigue or fever  ENT ROS: negative  Respiratory ROS: no cough, shortness of breath, or wheezing  Cardiovascular ROS: no chest pain or dyspnea on exertion  Musculoskeletal ROS: positive for - joint pain and joint stiffness  negative for - gait disturbance or muscle pain  Neurological ROS: no TIA or stroke symptoms  Dermatological ROS: negative      Vitals:    11/17/22 1304   BP: 124/79   Pulse: 78   Resp: 16       PHYSICAL EXAMINATION:  General: well developed and well nourished, alert, oriented times 3 and appears comfortable  Psychiatric: Normal  HEENT: Normocephalic, Atraumatic Trachea Midline, No torticollis  Abdominal: no distention, no tenderness  Pulmonary: No audible wheezing or respiratory distress   Cardiovascular: No pitting edema, 2+ radial pulse   Skin: see below  Musculoskeletal: Normal, except as noted in detailed exam and in HPI  MUSCULOSKELETAL EXAMINATION:  Incision: Clean, dry, intact and healing  Surgery Site: normal, no evidence of infection   Range of Motion: As expected and Limited due to stiffness  Neurovascular status: Neuro intact, good cap refill  Activity Restrictions: Cast/splint restrictions         STUDIES REVIEWED:  Images were reviewed in PACS by Dr Anthony Patterson and demonstrate: well aligned ring finger P1 fracture with pins in place   No significant healing noted       PROCEDURES PERFORMED:  Procedures  No Procedures performed today      Jadine Necessary

## 2022-12-01 NOTE — DISCHARGE INSTRUCTIONS
Post Operative Instructions    You have had surgery on your arm today, please read and follow the information below:  Elevate your hand above your elbow during the next 24-48 hours to help with swelling  Place your hand and arm over your head with motion at your shoulder three times a day  Do not apply any cream/ointment/oil to your incisions including antibiotics  Do not soak your hands in standing water (dishwater, tubs, Jacuzzi's, pools, etc ) until given permission (typically 2-3 weeks after injury)    Call the office if you notice any:  Increased numbness or tingling of your hand or fingers that is not relieved with elevation  Increasing pain that is not controlled with medication  Difficulty chewing, breathing, swallowing  Chest pains or shortness of breath  Fever over 101 4 degrees  Bandage: Please keep bandages clean and dry  Remove bandage after 3 days  Once bandages are removed you may wash hands with soap and water  Short showers are okay as well, but please avoid soaking the hand as described above (ie no pools, baths, dirty dish water, hot tubs, ocean/lake water, etc)  Sutures will be removed in the office at your first follow up visit, please do not remove them yourself  Please do NOT put any topical agents on the surgical wound including neosporin, peroxide, tea tree oil, vitamin E, etc  as these can delay wound healing  Motion: Move fingers into a fist 5 times a day, DO NOT move any splinted fingers  Weight bearing status: Avoid heavy lifting (>5 pounds) with the extremity that was operated on until follow up appointment  Normal activities of daily living are OK  Ice: Ice for 10 minutes every hour as needed for swelling x 24 hours  Sling: No sling necessary      Pain medication:   Naproxen 220 mg two time a day (do not take this medication if you were told by your doctor that you cannot take anti-inflammatories or NSAIDS)  Tylenol Extended Release 650 mg every 8 hours Follow-up Appointment: 10-14 days with Dr West Perales        Please call the office if you have any questions or concerns regarding your post-operative care

## 2022-12-02 ENCOUNTER — HOSPITAL ENCOUNTER (OUTPATIENT)
Dept: RADIOLOGY | Facility: HOSPITAL | Age: 32
Discharge: HOME | End: 2022-12-02

## 2022-12-02 ENCOUNTER — HOSPITAL ENCOUNTER (OUTPATIENT)
Facility: HOSPITAL | Age: 32
Setting detail: OUTPATIENT SURGERY
Discharge: HOME | End: 2022-12-02
Attending: SURGERY | Admitting: SURGERY

## 2022-12-02 ENCOUNTER — ANESTHESIA (OUTPATIENT)
Dept: PERIOP | Facility: HOSPITAL | Age: 32
End: 2022-12-02

## 2022-12-02 VITALS
BODY MASS INDEX: 33.09 KG/M2 | RESPIRATION RATE: 15 BRPM | SYSTOLIC BLOOD PRESSURE: 133 MMHG | HEART RATE: 79 BPM | WEIGHT: 244 LBS | DIASTOLIC BLOOD PRESSURE: 77 MMHG | OXYGEN SATURATION: 94 % | TEMPERATURE: 97.3 F

## 2022-12-02 DIAGNOSIS — Z96.9 RETAINED ORTHOPEDIC HARDWARE: ICD-10-CM

## 2022-12-02 PROBLEM — E66.9 CLASS 1 OBESITY IN ADULT: Status: ACTIVE | Noted: 2022-12-02

## 2022-12-02 PROBLEM — I10 HTN (HYPERTENSION): Status: ACTIVE | Noted: 2022-12-02

## 2022-12-02 RX ORDER — ONDANSETRON 2 MG/ML
INJECTION INTRAMUSCULAR; INTRAVENOUS AS NEEDED
Status: DISCONTINUED | OUTPATIENT
Start: 2022-12-02 | End: 2022-12-02

## 2022-12-02 RX ORDER — LIDOCAINE HYDROCHLORIDE 10 MG/ML
INJECTION, SOLUTION EPIDURAL; INFILTRATION; INTRACAUDAL; PERINEURAL AS NEEDED
Status: DISCONTINUED | OUTPATIENT
Start: 2022-12-02 | End: 2022-12-02

## 2022-12-02 RX ORDER — PROPOFOL 10 MG/ML
INJECTION, EMULSION INTRAVENOUS CONTINUOUS PRN
Status: DISCONTINUED | OUTPATIENT
Start: 2022-12-02 | End: 2022-12-02

## 2022-12-02 RX ORDER — ONDANSETRON 2 MG/ML
4 INJECTION INTRAMUSCULAR; INTRAVENOUS ONCE AS NEEDED
Status: DISCONTINUED | OUTPATIENT
Start: 2022-12-02 | End: 2022-12-02 | Stop reason: HOSPADM

## 2022-12-02 RX ORDER — HYDROCODONE BITARTRATE AND ACETAMINOPHEN 5; 325 MG/1; MG/1
1 TABLET ORAL EVERY 6 HOURS PRN
Status: DISCONTINUED | OUTPATIENT
Start: 2022-12-02 | End: 2022-12-02 | Stop reason: HOSPADM

## 2022-12-02 RX ORDER — LIDOCAINE HYDROCHLORIDE 10 MG/ML
0.5 INJECTION, SOLUTION EPIDURAL; INFILTRATION; INTRACAUDAL; PERINEURAL ONCE AS NEEDED
Status: COMPLETED | OUTPATIENT
Start: 2022-12-02 | End: 2022-12-02

## 2022-12-02 RX ORDER — PROPOFOL 10 MG/ML
INJECTION, EMULSION INTRAVENOUS AS NEEDED
Status: DISCONTINUED | OUTPATIENT
Start: 2022-12-02 | End: 2022-12-02

## 2022-12-02 RX ORDER — SODIUM CHLORIDE, SODIUM LACTATE, POTASSIUM CHLORIDE, CALCIUM CHLORIDE 600; 310; 30; 20 MG/100ML; MG/100ML; MG/100ML; MG/100ML
125 INJECTION, SOLUTION INTRAVENOUS CONTINUOUS
Status: DISCONTINUED | OUTPATIENT
Start: 2022-12-02 | End: 2022-12-02 | Stop reason: HOSPADM

## 2022-12-02 RX ORDER — CEFAZOLIN SODIUM 2 G/50ML
2000 SOLUTION INTRAVENOUS ONCE
Status: COMPLETED | OUTPATIENT
Start: 2022-12-02 | End: 2022-12-02

## 2022-12-02 RX ORDER — SODIUM CHLORIDE, SODIUM LACTATE, POTASSIUM CHLORIDE, CALCIUM CHLORIDE 600; 310; 30; 20 MG/100ML; MG/100ML; MG/100ML; MG/100ML
INJECTION, SOLUTION INTRAVENOUS CONTINUOUS PRN
Status: DISCONTINUED | OUTPATIENT
Start: 2022-12-02 | End: 2022-12-02

## 2022-12-02 RX ORDER — FENTANYL CITRATE/PF 50 MCG/ML
50 SYRINGE (ML) INJECTION
Status: DISCONTINUED | OUTPATIENT
Start: 2022-12-02 | End: 2022-12-02 | Stop reason: HOSPADM

## 2022-12-02 RX ORDER — MIDAZOLAM HYDROCHLORIDE 2 MG/2ML
INJECTION, SOLUTION INTRAMUSCULAR; INTRAVENOUS AS NEEDED
Status: DISCONTINUED | OUTPATIENT
Start: 2022-12-02 | End: 2022-12-02

## 2022-12-02 RX ADMIN — SODIUM CHLORIDE, SODIUM LACTATE, POTASSIUM CHLORIDE, AND CALCIUM CHLORIDE 125 ML/HR: .6; .31; .03; .02 INJECTION, SOLUTION INTRAVENOUS at 10:40

## 2022-12-02 RX ADMIN — MIDAZOLAM 2 MG: 1 INJECTION INTRAMUSCULAR; INTRAVENOUS at 11:16

## 2022-12-02 RX ADMIN — PROPOFOL 120 MCG/KG/MIN: 10 INJECTION, EMULSION INTRAVENOUS at 11:25

## 2022-12-02 RX ADMIN — SODIUM CHLORIDE, SODIUM LACTATE, POTASSIUM CHLORIDE, AND CALCIUM CHLORIDE: .6; .31; .03; .02 INJECTION, SOLUTION INTRAVENOUS at 11:13

## 2022-12-02 RX ADMIN — LIDOCAINE HYDROCHLORIDE 30 MG: 10 INJECTION, SOLUTION EPIDURAL; INFILTRATION; INTRACAUDAL; PERINEURAL at 11:20

## 2022-12-02 RX ADMIN — PROPOFOL 50 MG: 10 INJECTION, EMULSION INTRAVENOUS at 11:25

## 2022-12-02 RX ADMIN — PROPOFOL 50 MG: 10 INJECTION, EMULSION INTRAVENOUS at 11:22

## 2022-12-02 RX ADMIN — LIDOCAINE HYDROCHLORIDE 0.5 ML: 10 INJECTION, SOLUTION EPIDURAL; INFILTRATION; INTRACAUDAL; PERINEURAL at 10:40

## 2022-12-02 RX ADMIN — PROPOFOL 100 MG: 10 INJECTION, EMULSION INTRAVENOUS at 11:20

## 2022-12-02 RX ADMIN — ONDANSETRON 4 MG: 2 INJECTION INTRAMUSCULAR; INTRAVENOUS at 11:19

## 2022-12-02 RX ADMIN — LIDOCAINE HYDROCHLORIDE 20 MG: 10 INJECTION, SOLUTION EPIDURAL; INFILTRATION; INTRACAUDAL; PERINEURAL at 11:25

## 2022-12-02 RX ADMIN — CEFAZOLIN SODIUM 2000 MG: 2 SOLUTION INTRAVENOUS at 11:13

## 2022-12-02 NOTE — ANESTHESIA PREPROCEDURE EVALUATION
Procedure:  REMOVAL PINS FROM RIGHT RING FINGER (Right: Finger)    Relevant Problems   CARDIO   (+) HTN (hypertension)   (+) Hyperlipidemia      GI/HEPATIC   (+) Gastroesophageal reflux disease without esophagitis      NEURO/PSYCH   (+) Anxiety   (+) Depression      PULMONARY (within normal limits)      Other   (+) ADHD   (+) Bipolar disorder (HCC)   (+) Class 1 obesity in adult   (+) Narcotic abuse in remission (HCC)   (+) Retained orthopedic hardware        Physical Exam    Airway    Mallampati score: II  TM Distance: >3 FB  Neck ROM: full     Dental   No notable dental hx     Cardiovascular      Pulmonary      Other Findings        Anesthesia Plan  ASA Score- 2     Anesthesia Type- IV sedation with anesthesia with ASA Monitors  Additional Monitors:   Airway Plan:     Comment: PT is on suboxone and denies other drug use  Plan Factors-Exercise tolerance (METS): >4 METS  Chart reviewed  EKG reviewed  Existing labs reviewed  Patient summary reviewed  Patient is not a current smoker  Induction-     Postoperative Plan-     Informed Consent- Anesthetic plan and risks discussed with patient  I personally reviewed this patient with the CRNA  Discussed and agreed on the Anesthesia Plan with the CRNA  Charisma Alonzo

## 2022-12-02 NOTE — OP NOTE
OPERATIVE REPORT  PATIENT NAME: Brody Sexton  :  1990  MRN: [id number]  Pt Location: [facility]    SURGERY DATE: 22    Surgeon(s) and Role:     * Emmett Brito MD - Primary     * Lillian Dunn MD - Assisting    Pre-Op Diagnosis:  Retained orthopedic hardware [Z96 9]    Post-Op Diagnosis Codes:     * Retained orthopedic hardware [Z96 9]    Procedure(s):  REMOVAL PINS FROM RIGHT RING FINGER (Right)    Specimen(s):  * No orders in the log *    Estimated Blood Loss:   Minimal    Anesthesia Type:   Conscious Sedation     IMPLANTS:  Implant Name Type Inv  Item Serial No   Lot No  LRB No  Used Action   LOG [id number] - [employer] MODULAR MINI FRAG LCP SYSTEM - 1           LOG [id number] - [employer] MODULAR HAND SYSTEM TITANIUM IMPLANTS AND INSTRUMENTS - 1               PERIOPERATIVE ANTIBIOTICS:    cefazolin, 2 grams              Operative Indications: The patient has a history of right ring finger significant injury requiring pinning of the proximal phalanx that was recalcitrant to conservative management  The decision was made to bring the patient to the operating room for removal of percutaneous pin  Risks of the procedure were explained which include, but are not limited to bleeding; infection; damage to nerves, arteries,veins, tendons; scar; pain; need for reoperation; failure to give desired result; and risks of anaesthesia  All questions were answered to satisfaction and they were willing to proceed  Operative Findings:  Stable fracture after removal of pins    Complications:   None    Procedure and Technique:  After the patient, site, and procedure were identified, the patient was brought into the operating room in a supine position  Local anaesthesia and sedation were provided  The  right upper extremity was then prepped and drapped in a normal, sterile, orthopedic fashion  Digital tourniquet was applied    Small stab incisions were made after localizing pins under fluoroscopy  Using a small needle  the pins were removed atraumatically  Orthogonal fluoroscopic films demonstrated complete removal of hardware stable fracture alignment in orthogonal planes  At the completion of the procedure, hemostasis was obtained with cautery and direct pressure  The wounds were copiously irrigated with sterile solution  The wounds were closed with Prolene  Sterile dressings were applied, including Xeroform, Gauze and Finger Dressing  Please note, all sponge, needle, and instrument counts were correct prior to closure  Loupe magnification was utilized  The patient tolerated the procedure well       I was present for the entire procedure    Patient Disposition:  PACU     SIGNATURE: Lavon Eddy MD  DATE: 12/02/22  TIME: 11:52 AM

## 2022-12-02 NOTE — ANESTHESIA POSTPROCEDURE EVALUATION
Post-Op Assessment Note    CV Status:  Stable  Pain Score: 0    Pain management: adequate     Mental Status:  Alert and awake   Hydration Status:  Euvolemic   PONV Controlled:  Controlled   Airway Patency:  Patent   Two or more mitigation strategies used for obstructive sleep apnea   Post Op Vitals Reviewed: Yes      Staff: CRNA         No notable events documented      BP   112/60   Temp  97 7   Pulse  77   Resp   16   SpO2   96 ra

## 2022-12-15 ENCOUNTER — APPOINTMENT (OUTPATIENT)
Dept: RADIOLOGY | Facility: AMBULARY SURGERY CENTER | Age: 32
End: 2022-12-15
Attending: SURGERY

## 2022-12-15 ENCOUNTER — OFFICE VISIT (OUTPATIENT)
Dept: OBGYN CLINIC | Facility: CLINIC | Age: 32
End: 2022-12-15

## 2022-12-15 VITALS
HEIGHT: 72 IN | BODY MASS INDEX: 33.05 KG/M2 | WEIGHT: 244 LBS | SYSTOLIC BLOOD PRESSURE: 147 MMHG | DIASTOLIC BLOOD PRESSURE: 86 MMHG | HEART RATE: 86 BPM

## 2022-12-15 DIAGNOSIS — S62.614G: ICD-10-CM

## 2022-12-15 DIAGNOSIS — Z47.89 AFTERCARE FOLLOWING SURGERY OF THE MUSCULOSKELETAL SYSTEM: Primary | ICD-10-CM

## 2022-12-15 DIAGNOSIS — Z47.89 AFTERCARE FOLLOWING SURGERY OF THE MUSCULOSKELETAL SYSTEM: ICD-10-CM

## 2022-12-15 NOTE — PROGRESS NOTES
Assessment/Plan:  Patient ID: Beryle Cairo 33 y o  male   Surgery: Removal Pins From Right Ring Finger - Right  Date of Surgery: 12/2/2022    xrays reviewed, fracture is healed and will continue to fill in  Plan to continue OT with more aggressive ROM at the PIP joint   No need for splinting unless progressive motion splints  Continue current restrictions   Follow up in 6 weeks for repeat eval and motion check     Follow Up:  6 weeks    To Do Next Visit:         CHIEF COMPLAINT:  No chief complaint on file  SUBJECTIVE:  Beryle Cairo is a 33y o  year old male who presents for follow up after Removal Pins From Right Ring Finger - Right  Today patient has soreness in the ring finger and feels stiffness but is working with therapy  No fever or chills  PHYSICAL EXAMINATION:  General: well developed and well nourished, alert, oriented times 3 and appears comfortable  Psychiatric: Normal    MUSCULOSKELETAL EXAMINATION:  Incision: Clean, dry, intact  Surgery Site: normal, no evidence of infection   Range of Motion: As expected - flexion contracture of about 40 degrees at ring PIP, slightly passively correctable   Neurovascular status: Neuro intact, good cap refill  Activity Restrictions: allowed unrestricted motion   avoid heavy lifting, continue work restrictions         STUDIES REVIEWED:  Images were reviewed in PACS by Dr Joel Hernandez and demonstrate: healed P1 fracture of right ring      PROCEDURES PERFORMED:  Procedures  No Procedures performed today      Raul Appl

## 2022-12-19 ENCOUNTER — TELEPHONE (OUTPATIENT)
Dept: OBGYN CLINIC | Facility: HOSPITAL | Age: 32
End: 2022-12-19

## 2022-12-19 NOTE — TELEPHONE ENCOUNTER
Caller: Aleks Irby from [facility]    Doctor: Fatou Conway    Reason for call: electronically faxed 12/15 OVN to fax # [phone]    Call back#:

## 2023-01-26 ENCOUNTER — OFFICE VISIT (OUTPATIENT)
Dept: OBGYN CLINIC | Facility: CLINIC | Age: 33
End: 2023-01-26

## 2023-01-26 VITALS
SYSTOLIC BLOOD PRESSURE: 125 MMHG | BODY MASS INDEX: 33.05 KG/M2 | DIASTOLIC BLOOD PRESSURE: 88 MMHG | HEART RATE: 90 BPM | HEIGHT: 72 IN | WEIGHT: 244 LBS

## 2023-01-26 DIAGNOSIS — Z47.89 AFTERCARE FOLLOWING SURGERY OF THE MUSCULOSKELETAL SYSTEM: ICD-10-CM

## 2023-01-26 DIAGNOSIS — S62.614G: Primary | ICD-10-CM

## 2023-01-26 NOTE — LETTER
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

## 2023-01-26 NOTE — PROGRESS NOTES
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  Follow up in 6 weeks for likely final eval  Likely return to full duty at that time     Follow Up:  6  week(s)    To Do Next Visit:         CHIEF COMPLAINT:  No chief complaint on file  SUBJECTIVE:  Kimberly Soto is a 33y o  year old male who presents for follow up after Removal Pins From Right Ring Finger - Right  Today patient has no significant pain unless the finger is bumped  He is progressing well with motion but does feel he has plateaued  Overall is happy with his progress          PHYSICAL EXAMINATION:  General: well developed and well nourished, alert, oriented times 3 and appears comfortable  Psychiatric: Normal    MUSCULOSKELETAL EXAMINATION:  Incision: healed  Surgery Site: normal, no evidence of infection   Range of Motion: improving motion  Neurovascular status: Neuro intact, good cap refill  Activity Restrictions: Restrictions: 10 lb restrictions for work         STUDIES REVIEWED:  No Studies to review      PROCEDURES PERFORMED:  Procedures  No Procedures performed today        Scribe Attestation    I,:  Xochitl Tamayo PA-C am acting as a scribe while in the presence of the attending physician :       I,:  Rosemarie Galan MD personally performed the services described in this documentation    as scribed in my presence :

## 2023-03-09 ENCOUNTER — OFFICE VISIT (OUTPATIENT)
Dept: OBGYN CLINIC | Facility: CLINIC | Age: 33
End: 2023-03-09

## 2023-03-09 VITALS
SYSTOLIC BLOOD PRESSURE: 131 MMHG | WEIGHT: 244 LBS | HEIGHT: 72 IN | DIASTOLIC BLOOD PRESSURE: 76 MMHG | BODY MASS INDEX: 33.05 KG/M2 | HEART RATE: 67 BPM

## 2023-03-09 DIAGNOSIS — S62.614G: Primary | ICD-10-CM

## 2023-03-09 NOTE — LETTER
March 9, 2023     Patient: Johana Pedersen  YOB: 1990  Date of Visit: 3/9/2023      To Whom it May Concern:    Ilana Conrad is under my professional care  Kaylee Reason was seen in my office on 3/9/2023  Kaylee Reason will remain on max lifting of 10lbs from today until 3/14/2023 returning to full duty, no restrictions as of 3/15/2023  We will re-evaluate in 6-7 weeks  If you have any questions or concerns, please don't hesitate to call           Sincerely,          Diamante Peñaloza MD        CC: No Recipients

## 2023-03-09 NOTE — PROGRESS NOTES
ASSESSMENT/PLAN:      29year old male here for his s/p removal of pins of the right ring finger, 12/2/22  On exam, the PIP joint is pliable and has more passive ROM, which means he can continue to gain motion  Recommended to continue to take it through the entire range, use the splints provided  He will remain on his max lifting 10lbs until 3/14/23, returning full duty, no restrictions as on 3/15/23  We will follow up in 6-7 weeks to ensure he's doing well  The patient verbalized understanding of exam findings and treatment plan  We engaged in the shared decision-making process and treatment options were discussed at length with the patient  Surgical and conservative management discussed today along with risks and benefits  Diagnoses and all orders for this visit:    Open displaced fracture of proximal phalanx of right ring finger with delayed healing, subsequent encounter        Follow Up:  Return in about 6 weeks (around 4/20/2023) for right hand  To Do Next Visit:  Re-evaluation of current issue    ____________________________________________________________________________________________________________________________________________      CHIEF COMPLAINT:  Chief Complaint   Patient presents with   • Right Ring Finger - Post-op       SUBJECTIVE:  Ola Kocher is a 29y o  year old RHD male who presents today for a 6 week follow up for his right ring finger  He is 3 months s/p removal of pins of the right ring finger, 12/2/22  He is currently in OT working on finger ROM, making a full composite fist  He was just d/c from OT on 3/3/23, noting that he has reached what he can do  He has 3 braces he can wear at night  He was working on a work restriction of max lifting 10lbs  He drives a forklift  I have personally reviewed all the relevant PMH, PSH, SH, FH, Medications and allergies       PAST MEDICAL HISTORY:  Past Medical History:   Diagnosis Date   • ADHD    • Anxiety    • [facility]    • Depression    • Drug use    • Essential hypertension     benign   • Hyperlipidemia    • Peptic reflux disease        PAST SURGICAL HISTORY:  Past Surgical History:   Procedure Laterality Date   • HAND HARDWARE REMOVAL Bilateral    • ORIF FINGER FRACTURE Right 10/21/2022    Procedure: OPEN REDUCTION W/ INTERNAL FIXATION (ORIF)  FINGER- Right ring finger, extensor tendon repair, irrigation and debridement Proximal Phalanx (right ring finger); Surgeon: William Camarillo MD;  [facility];  Service: Orthopedics   • ORIF FINGER FRACTURE Right 2022    Procedure: REMOVAL PINS FROM RIGHT RING FINGER;  Surgeon: William Camarillo MD;  [facility];  Service: Orthopedics   • TONSILLECTOMY Bilateral    • WISDOM TOOTH EXTRACTION Bilateral        FAMILY HISTORY:  Family History   Problem Relation Age of Onset   • Mental illness Mother    • Thyroid disease Mother    • Drug abuse Mother         illicit drug   • Diabetes Father    • Cancer Father        SOCIAL HISTORY:  Social History     Tobacco Use   • Smoking status: Former     Types: Cigarettes     Quit date: 3/30/2015     Years since quittin 9   • Smokeless tobacco: Never   Vaping Use   • Vaping Use: Never used   Substance Use Topics   • Alcohol use: Not Currently   • Drug use: Not Currently     Frequency: 2 0 times per week     Types: Marijuana     Comment: Pt reports using THC daily x3, Heroin every other day and will alternate between using pills  Pt snorts heroin   ( hx in 2018)       MEDICATIONS:    Current Outpatient Medications:   •  buprenorphine-naloxone (Suboxone) 8-2 mg, Place 8 2 mg under the tongue daily, Disp: , Rfl:   •  omeprazole (PriLOSEC) 20 mg delayed release capsule, TAKE 1 CAPSULE BY MOUTH EVERY DAY, Disp: 90 capsule, Rfl: 1  •  ondansetron (ZOFRAN-ODT) 4 mg disintegrating tablet, Take 1 tablet (4 mg total) by mouth every 6 (six) hours as needed for nausea or vomiting, Disp: 20 tablet, Rfl: 0  • OXcarbazepine (TRILEPTAL) 300 mg tablet, TAKE 1 TABLET BY MOUTH TWICE A DAY (Patient taking differently: daily at bedtime), Disp: 90 tablet, Rfl: 0  •  QUEtiapine (SEROquel) 300 mg tablet, Take 1 tablet (300 mg total) by mouth daily at bedtime, Disp: 90 tablet, Rfl: 1  •  QUEtiapine (SEROquel) 50 mg tablet, TAKE 1 TABLET DAILY IN THE MORNING, Disp: 90 tablet, Rfl: 1  •  venlafaxine (EFFEXOR-XR) 75 mg 24 hr capsule, TAKE 1 CAPSULE BY MOUTH EVERY DAY, Disp: 90 capsule, Rfl: 0    ALLERGIES:  No Known Allergies    REVIEW OF SYSTEMS:  Review of Systems   Constitutional: Negative for chills, fever and unexpected weight change  HENT: Negative for hearing loss, nosebleeds and sore throat  Eyes: Negative for pain, redness and visual disturbance  Respiratory: Negative for cough, shortness of breath and wheezing  Cardiovascular: Negative for chest pain, palpitations and leg swelling  Gastrointestinal: Negative for abdominal pain, nausea and vomiting  Endocrine: Negative for polydipsia and polyuria  Genitourinary: Negative for dysuria and hematuria  Musculoskeletal: Positive for joint swelling  Skin: Negative for rash and wound  Neurological: Negative for dizziness, light-headedness and headaches  Psychiatric/Behavioral: Negative for decreased concentration, dysphoric mood and suicidal ideas  The patient is not nervous/anxious          VITALS:  Vitals:    03/09/23 1259   BP: 131/76   Pulse: 67       LABS:  HgA1c: No results found for: HGBA1C  BMP:   Lab Results   Component Value Date    GLUCOSE 139 10/12/2018    CALCIUM 9 3 10/22/2022     (L) 05/29/2014    K 4 1 10/22/2022    CO2 26 10/22/2022     10/22/2022    BUN 14 10/22/2022    CREATININE 0 82 10/22/2022       _____________________________________________________  PHYSICAL EXAMINATION:  General: well developed and well nourished, alert, oriented times 3 and appears comfortable  Psychiatric: Normal  HEENT: Normocephalic, Atraumatic Trachea Midline, No torticollis  Pulmonary: No audible wheezing or respiratory distress   Cardiovascular: No pitting edema, 2+ radial pulse   Abdominal/GI: abdomen non tender, non distended   Skin: No masses, erythema, lacerations, fluctation, ulcerations  Neurovascular: Sensation Intact to the Median, Ulnar, Radial Nerve, Motor Intact to the Median, Ulnar, Radial Nerve and Pulses Intact  Musculoskeletal: Normal, except as noted in detailed exam and in HPI        MUSCULOSKELETAL EXAMINATION:    Right hand:   Ring finger:  Swelling at the proximal phalanx  PIP joint 45 degrees, PROM lacking 20 degrees  +3 DPC of ring when performing full composite fist  Sensation intact to light touch  Brisk capillary refill   ___________________________________________________  STUDIES REVIEWED:  No images reviewed at today's visit        PROCEDURES PERFORMED:  Procedures  No Procedures performed today    _____________________________________________________      Brianna Leger    I,:  Becca Villarreal am acting as a scribe while in the presence of the attending physician :       I,:  Familia Hobbs MD personally performed the services described in this documentation    as scribed in my presence :

## 2023-03-10 NOTE — TELEPHONE ENCOUNTER
Caller: Marky Jeff    Doctor/Office: [facility]    CB#:      What needs to be faxed: 3/9/23 ov note    ATTN to: Mahad Irizarry    Fax#: [phone]      Documents were successfully e-faxed

## 2023-06-28 ENCOUNTER — OFFICE VISIT (OUTPATIENT)
Dept: OBGYN CLINIC | Facility: CLINIC | Age: 33
End: 2023-06-28
Payer: OTHER MISCELLANEOUS

## 2023-06-28 VITALS — HEART RATE: 88 BPM | DIASTOLIC BLOOD PRESSURE: 76 MMHG | SYSTOLIC BLOOD PRESSURE: 114 MMHG

## 2023-06-28 DIAGNOSIS — Z47.89 AFTERCARE FOLLOWING SURGERY OF THE MUSCULOSKELETAL SYSTEM: Primary | ICD-10-CM

## 2023-06-28 PROCEDURE — 99213 OFFICE O/P EST LOW 20 MIN: CPT | Performed by: SURGERY

## 2023-06-28 NOTE — PROGRESS NOTES
ASSESSMENT/PLAN:      29 y o  male s/p removal of pins of the right ring finger, 12/2/22  The patient verbalized understanding of exam findings and treatment plan  We engaged in the shared decision-making process and treatment options were discussed at length with the patient  Surgical and conservative management discussed today along with risks and benefits  Diagnoses and all orders for this visit:    Aftercare following surgery of the musculoskeletal system      Follow Up:  Return if symptoms worsen or fail to improve  To Do Next Visit:  Re-evaluation of current issue    ____________________________________________________________________________________________________________________________________________      CHIEF COMPLAINT:  Chief Complaint   Patient presents with   • Right Hand - Follow-up     Open displaced fracture of proximal phalanx of right ring finger with delayed healing       SUBJECTIVE:  Gloria Salgado is a 29y o  year old RHD male who presents to the office for a follow up regarding his right ring finger  This is a continuation of a works compensation case  Overall Davno Monte is doing well  He returned back to full duty in March, without any issues  He finished formal therapy  He notes that he does continue to wear his extension splint  He also notes passively he does have full PIP joint extension  He states he is happy with his outcome and has no complaints at this time  I have personally reviewed all the relevant PMH, PSH, SH, FH, Medications and allergies       PAST MEDICAL HISTORY:  Past Medical History:   Diagnosis Date   • ADHD    • Anxiety    • Bipolar disorder ([facility] )    • Depression    • Drug use    • Essential hypertension     benign   • Hyperlipidemia    • Peptic reflux disease        PAST SURGICAL HISTORY:  Past Surgical History:   Procedure Laterality Date   • HAND HARDWARE REMOVAL Bilateral    • ORIF FINGER FRACTURE Right 10/21/2022    Procedure: OPEN REDUCTION W/ INTERNAL FIXATION (ORIF)  FINGER- Right ring finger, extensor tendon repair, irrigation and debridement Proximal Phalanx (right ring finger); Surgeon: Killian Hollingsworth MD;  [facility];  Service: Orthopedics   • ORIF FINGER FRACTURE Right 2022    Procedure: REMOVAL PINS FROM RIGHT RING FINGER;  Surgeon: Killian Hollingsworth MD;  [facility];  Service: Orthopedics   • TONSILLECTOMY Bilateral    • WISDOM TOOTH EXTRACTION Bilateral        FAMILY HISTORY:  Family History   Problem Relation Age of Onset   • Mental illness Mother    • Thyroid disease Mother    • Drug abuse Mother         illicit drug   • Diabetes Father    • Cancer Father        SOCIAL HISTORY:  Social History     Tobacco Use   • Smoking status: Former     Types: Cigarettes     Quit date: 3/30/2015     Years since quittin 2   • Smokeless tobacco: Never   Vaping Use   • Vaping Use: Never used   Substance Use Topics   • Alcohol use: Not Currently   • Drug use: Not Currently     Frequency: 2 0 times per week     Types: Marijuana     Comment: Pt reports using THC daily x3, Heroin every other day and will alternate between using pills  Pt snorts heroin   ( hx in 2018)       MEDICATIONS:    Current Outpatient Medications:   •  buprenorphine-naloxone (Suboxone) 8-2 mg, Place 8 2 mg under the tongue daily, Disp: , Rfl:   •  omeprazole (PriLOSEC) 20 mg delayed release capsule, TAKE 1 CAPSULE BY MOUTH EVERY DAY, Disp: 90 capsule, Rfl: 1  •  ondansetron (ZOFRAN-ODT) 4 mg disintegrating tablet, Take 1 tablet (4 mg total) by mouth every 6 (six) hours as needed for nausea or vomiting, Disp: 20 tablet, Rfl: 0  •  OXcarbazepine (TRILEPTAL) 300 mg tablet, TAKE 1 TABLET BY MOUTH TWICE A DAY (Patient taking differently: daily at bedtime), Disp: 90 tablet, Rfl: 0  •  QUEtiapine (SEROquel) 300 mg tablet, Take 1 tablet (300 mg total) by mouth daily at bedtime, Disp: 90 tablet, Rfl: 1  •  QUEtiapine (SEROquel) 50 mg tablet, TAKE 1 TABLET DAILY IN "THE MORNING, Disp: 90 tablet, Rfl: 1  •  venlafaxine (EFFEXOR-XR) 75 mg 24 hr capsule, TAKE 1 CAPSULE BY MOUTH EVERY DAY, Disp: 90 capsule, Rfl: 0    ALLERGIES:  No Known Allergies    REVIEW OF SYSTEMS:  Review of Systems   Constitutional: Negative for chills, fever and unexpected weight change  HENT: Negative for hearing loss, nosebleeds and sore throat  Eyes: Negative for pain, redness and visual disturbance  Respiratory: Negative for cough, shortness of breath and wheezing  Cardiovascular: Negative for chest pain, palpitations and leg swelling  Gastrointestinal: Negative for abdominal pain, nausea and vomiting  Endocrine: Negative for polydipsia and polyuria  Genitourinary: Negative for difficulty urinating and hematuria  Musculoskeletal: Negative for arthralgias, joint swelling and myalgias  Skin: Negative for rash and wound  Neurological: Negative for dizziness, numbness and headaches  Psychiatric/Behavioral: Negative for decreased concentration, dysphoric mood and suicidal ideas  The patient is not nervous/anxious          VITALS:  Vitals:    06/28/23 1725   BP: 114/76   Pulse: 88       LABS:  HgA1c: No results found for: \"HGBA1C\"  BMP:   Lab Results   Component Value Date    GLUCOSE 139 10/12/2018    CALCIUM 9 3 10/22/2022     (L) 05/29/2014    K 4 1 10/22/2022    CO2 26 10/22/2022     10/22/2022    BUN 14 10/22/2022    CREATININE 0 82 10/22/2022       _____________________________________________________  PHYSICAL EXAMINATION:  General: well developed and well nourished, alert, oriented times 3 and appears comfortable  Psychiatric: Normal  HEENT: Normocephalic, Atraumatic Trachea Midline, No torticollis  Pulmonary: No audible wheezing or respiratory distress   Cardiovascular: No pitting edema, 2+ radial pulse   Abdominal/GI: abdomen non tender, non distended   Skin: No masses, erythema, lacerations, fluctation, ulcerations  Neurovascular: Sensation Intact to the Median, " Ulnar, Radial Nerve, Motor Intact to the Median, Ulnar, Radial Nerve and Pulses Intact  Musculoskeletal: Normal, except as noted in detailed exam and in HPI  MUSCULOSKELETAL EXAMINATION:    Right ring finger:     No erythema, ecchymosis or edema  PIP flexion contracture which is passively correctable to near full extension   No bony or soft tissue tenderness   Tip to palm aprox   1-2 CM regarding the ring finger, otherwise full composite fist   Brisk capillary refill     ___________________________________________________  STUDIES REVIEWED:  No new imaging to review           PROCEDURES PERFORMED:  Procedures  No Procedures performed today    _____________________________________________________      Scarlett Ramírez    I,:  Michael High am acting as a scribe while in the presence of the attending physician :       I,:  Behzad Zuniga MD personally performed the services described in this documentation    as scribed in my presence :

## 2023-06-28 NOTE — LETTER
June 28, 2023     Patient: Luly Drummond  YOB: 1990  Date of Visit: 6/28/2023      To Whom it May Concern:    Jayme Carreno is under my professional care  Venecia Warren was seen in my office on 6/28/2023  Venecia Warren may continue to work full duty without restrictions  I will see him back in the office as needed if symptoms worsen or fail to improve  If you have any questions or concerns, please don't hesitate to call           Sincerely,          Killian Hollingsworth MD

## 2023-06-29 NOTE — TELEPHONE ENCOUNTER
Caller: HENRY Griggs     Doctor/Office: [facility]     CB#: AN      What needs to be faxed: office notes from 6/28    ATTN to: NA    Fax#: [phone]      Documents were successfully e-faxed

## 2024-04-01 NOTE — CASE MANAGEMENT
Case Management Progress Note    Patient name Melita [facility]  Location OR POOL/OR POOL MRN [id number]  : 1990 Date 10/21/2022       LOS (days): 0  Geometric Mean LOS (GMLOS) (days):   Days to GMLOS:        OBJECTIVE:        Current admission status: Observation  Preferred Pharmacy:   [id number] [address], [place] - [address] [facility] [address]  Phone: [phone] Fax: [phone]    [id number], [place] - [address] [place] [address]   Phone: [phone] Fax: [phone] [place] [id number]  Phone: [phone] Fax: [phone]    Primary Care Provider: PETER Valerio    Primary Insurance: [address] [place] [address]  Secondary Insurance:     PROGRESS NOTE: This CM went to speak with patient, patient currently in [address] [facility] [address] Admission

## 2024-05-11 NOTE — PROGRESS NOTES
Patient continues to be anxious and wanting to go home to [facility]  Pt was asked his coping skills for his anxiety and patient stated, it helps him to take a shower  Patient had 3 showers this shift  Patient stated feeling better after talking to SW  Denies any SI/HI  PRN haldol given around 1515 per patient's request to keep his anxiety in control  No other changes or problems reported  Will continue to monitor for behaviors and changes  Ultrasound at bedside

## 2024-06-26 ENCOUNTER — TELEPHONE (OUTPATIENT)
Dept: INTERNAL MEDICINE CLINIC | Facility: CLINIC | Age: 34
End: 2024-06-26

## 2024-06-26 NOTE — TELEPHONE ENCOUNTER
This patient is now under the care of Anna Olivia MD   [address] [place] [id number]   Phone: [phone]   Fax: [phone]     He will no longer be using our office for primary care Please end pcp

## 2024-06-30 NOTE — TELEPHONE ENCOUNTER
06/29/24 11:36 PM        The office's request has been received, reviewed, and the patient chart updated. The PCP has successfully been removed with a patient attribution note. This message will now be completed.        Thank you  Luis Alfredo Joy

## 2025-06-02 NOTE — PROGRESS NOTES
Pt c/o burning with IV potassium infusion, rate lowered several times and pt still with complaints  Sam Broderick made aware and infusion stopped and maintenance fluids restarted  back pain/injury

## (undated) DEVICE — PADDING CAST 4 IN  COTTON STRL

## (undated) DEVICE — CHLORAPREP HI-LITE 26ML ORANGE

## (undated) DEVICE — GLOVE INDICATOR PI UNDERGLOVE SZ 7 BLUE

## (undated) DEVICE — TUBING SUCTION 5MM X 12 FT

## (undated) DEVICE — CAST PADDING 4 IN STERILE

## (undated) DEVICE — STERILE BETHLEHEM PLASTIC HAND: Brand: CARDINAL HEALTH

## (undated) DEVICE — DISPOSABLE EQUIPMENT COVER: Brand: SMALL TOWEL DRAPE

## (undated) DEVICE — SPONGE PVP SCRUB WING STERILE

## (undated) DEVICE — GLOVE SRG BIOGEL 7

## (undated) DEVICE — DRAPE C-ARM X-RAY

## (undated) DEVICE — OCCLUSIVE GAUZE STRIP,3% BISMUTH TRIBROMOPHENATE IN PETROLATUM BLEND: Brand: XEROFORM

## (undated) DEVICE — ACE WRAP 4 IN STERILE

## (undated) DEVICE — PADDING CAST 2IN COTTON STRL

## (undated) DEVICE — INTENDED FOR TISSUE SEPARATION, AND OTHER PROCEDURES THAT REQUIRE A SHARP SURGICAL BLADE TO PUNCTURE OR CUT.: Brand: BARD-PARKER ® CARBON RIB-BACK BLADES

## (undated) DEVICE — MINI BLADE ROUND TIP SHARP ON ONE SIDE

## (undated) DEVICE — DRAPE SHEET THREE QUARTER

## (undated) DEVICE — GAUZE SPONGES,16 PLY: Brand: CURITY

## (undated) DEVICE — C-WIRE PAK DOUBLE ENDED ORTHOPAEDIC WIRE, SPADE, .045" (1.14 MM)
Type: IMPLANTABLE DEVICE | Site: FINGER | Status: NON-FUNCTIONAL
Brand: C-WIRE
Removed: 2022-10-21

## (undated) DEVICE — SUT GUT 4-0 FS 2 H821H

## (undated) DEVICE — SUT NYLON 4-0 P-3 18 IN 699G

## (undated) DEVICE — C-WIRE PAK DOUBLE ENDED ORTHOPAEDIC WIRE, SPADE, .035" (0.89 MM)
Type: IMPLANTABLE DEVICE | Site: FINGER | Status: NON-FUNCTIONAL
Brand: C-WIRE
Removed: 2022-10-21